# Patient Record
Sex: MALE | Race: WHITE | Employment: OTHER | URBAN - METROPOLITAN AREA
[De-identification: names, ages, dates, MRNs, and addresses within clinical notes are randomized per-mention and may not be internally consistent; named-entity substitution may affect disease eponyms.]

---

## 2017-01-17 PROBLEM — T82.118A BIVENTRICULAR AICD MALFUNCTION: Status: ACTIVE | Noted: 2017-01-17

## 2017-03-02 PROBLEM — R26.89 MULTIFACTORIAL GAIT DISORDER: Status: ACTIVE | Noted: 2017-03-02

## 2018-01-24 PROBLEM — R49.8 HYPOPHONIA: Status: ACTIVE | Noted: 2018-01-24

## 2018-02-13 ENCOUNTER — HOSPITAL ENCOUNTER (OUTPATIENT)
Dept: SURGERY | Age: 73
Discharge: HOME | End: 2018-02-13
Payer: MEDICARE

## 2018-02-13 ENCOUNTER — HOSPITAL ENCOUNTER (OUTPATIENT)
Dept: PHYSICAL THERAPY | Age: 73
Discharge: HOME | End: 2018-02-13
Payer: MEDICARE

## 2018-02-13 LAB
ANION GAP SERPL CALC-SCNC: 9 MMOL/L (ref 7–16)
APPEARANCE UR: CLEAR
APTT PPP: 30.4 SEC (ref 23.2–35.3)
BACTERIA SPEC CULT: ABNORMAL
BASOPHILS # BLD: 0 K/UL (ref 0–0.2)
BASOPHILS NFR BLD: 0 % (ref 0–2)
BILIRUB UR QL: NEGATIVE
BUN SERPL-MCNC: 21 MG/DL (ref 8–23)
CALCIUM SERPL-MCNC: 9 MG/DL (ref 8.3–10.4)
CHLORIDE SERPL-SCNC: 109 MMOL/L (ref 98–107)
CO2 SERPL-SCNC: 28 MMOL/L (ref 21–32)
COLOR UR: YELLOW
CREAT SERPL-MCNC: 1.21 MG/DL (ref 0.8–1.5)
DIFFERENTIAL METHOD BLD: ABNORMAL
EOSINOPHIL # BLD: 0.3 K/UL (ref 0–0.8)
EOSINOPHIL NFR BLD: 3 % (ref 0.5–7.8)
ERYTHROCYTE [DISTWIDTH] IN BLOOD BY AUTOMATED COUNT: 12.9 % (ref 11.9–14.6)
GLUCOSE SERPL-MCNC: 79 MG/DL (ref 65–100)
GLUCOSE UR STRIP.AUTO-MCNC: NEGATIVE MG/DL
HCT VFR BLD AUTO: 45.6 % (ref 41.1–50.3)
HGB BLD-MCNC: 14.4 G/DL (ref 13.6–17.2)
HGB UR QL STRIP: NEGATIVE
IMM GRANULOCYTES # BLD: 0 K/UL (ref 0–0.5)
IMM GRANULOCYTES NFR BLD AUTO: 0 % (ref 0–5)
INR PPP: 1.1
KETONES UR QL STRIP.AUTO: NEGATIVE MG/DL
LEUKOCYTE ESTERASE UR QL STRIP.AUTO: NEGATIVE
LYMPHOCYTES # BLD: 1.7 K/UL (ref 0.5–4.6)
LYMPHOCYTES NFR BLD: 18 % (ref 13–44)
MCH RBC QN AUTO: 30.8 PG (ref 26.1–32.9)
MCHC RBC AUTO-ENTMCNC: 31.6 G/DL (ref 31.4–35)
MCV RBC AUTO: 97.6 FL (ref 79.6–97.8)
MONOCYTES # BLD: 0.7 K/UL (ref 0.1–1.3)
MONOCYTES NFR BLD: 7 % (ref 4–12)
NEUTS SEG # BLD: 6.7 K/UL (ref 1.7–8.2)
NEUTS SEG NFR BLD: 72 % (ref 43–78)
NITRITE UR QL STRIP.AUTO: NEGATIVE
PH UR STRIP: 6 [PH] (ref 5–9)
PLATELET # BLD AUTO: 226 K/UL (ref 150–450)
PMV BLD AUTO: 10 FL (ref 10.8–14.1)
POTASSIUM SERPL-SCNC: 4.2 MMOL/L (ref 3.5–5.1)
PROT UR STRIP-MCNC: NEGATIVE MG/DL
PROTHROMBIN TIME: 13.9 SEC (ref 11.5–14.5)
RBC # BLD AUTO: 4.67 M/UL (ref 4.23–5.67)
SERVICE CMNT-IMP: ABNORMAL
SODIUM SERPL-SCNC: 146 MMOL/L (ref 136–145)
SP GR UR REFRACTOMETRY: 1.02 (ref 1–1.02)
UROBILINOGEN UR QL STRIP.AUTO: 0.2 EU/DL (ref 0.2–1)
WBC # BLD AUTO: 9.4 K/UL (ref 4.3–11.1)

## 2018-02-13 PROCEDURE — 81003 URINALYSIS AUTO W/O SCOPE: CPT | Performed by: ORTHOPAEDIC SURGERY

## 2018-02-13 PROCEDURE — 85025 COMPLETE CBC W/AUTO DIFF WBC: CPT | Performed by: ORTHOPAEDIC SURGERY

## 2018-02-13 PROCEDURE — G8979 MOBILITY GOAL STATUS: HCPCS

## 2018-02-13 PROCEDURE — G8980 MOBILITY D/C STATUS: HCPCS

## 2018-02-13 PROCEDURE — 85610 PROTHROMBIN TIME: CPT | Performed by: ORTHOPAEDIC SURGERY

## 2018-02-13 PROCEDURE — 36415 COLL VENOUS BLD VENIPUNCTURE: CPT | Performed by: ORTHOPAEDIC SURGERY

## 2018-02-13 PROCEDURE — 80048 BASIC METABOLIC PNL TOTAL CA: CPT | Performed by: ORTHOPAEDIC SURGERY

## 2018-02-13 PROCEDURE — 87641 MR-STAPH DNA AMP PROBE: CPT | Performed by: ORTHOPAEDIC SURGERY

## 2018-02-13 PROCEDURE — 97162 PT EVAL MOD COMPLEX 30 MIN: CPT

## 2018-02-13 PROCEDURE — 77030027138 HC INCENT SPIROMETER -A

## 2018-02-13 PROCEDURE — 85730 THROMBOPLASTIN TIME PARTIAL: CPT | Performed by: ORTHOPAEDIC SURGERY

## 2018-02-13 PROCEDURE — G8978 MOBILITY CURRENT STATUS: HCPCS

## 2018-02-13 RX ORDER — CHOLECALCIFEROL (VITAMIN D3) 125 MCG
100 CAPSULE ORAL DAILY
COMMUNITY
End: 2018-03-29

## 2018-02-13 RX ORDER — BISMUTH SUBSALICYLATE 262 MG
1 TABLET,CHEWABLE ORAL DAILY
COMMUNITY
End: 2018-03-01

## 2018-02-13 NOTE — PERIOP NOTES
Patient verified name, , and surgery as listed in [facility]. Type 3 surgery, PAT joint assessment complete. Labs per surgeon: cbc, cmp, ua, pt/inr, ptt, mrsa/mssa swab, T&S DOS; NA results to be reviewed by anesthesia. All other lab results within anesthesia limits. Labs dated 18 routed via [address] [facility] to patients PCP, Dr. Jo Jesus, per Dr. Lionel Fortune' request.    [facility] per anesthesia protocol: All required lab work included in surgeon's orders. EKG: completed 17; within anesthesia limits. Previous cardiology note (17), Pacemaker Interrogation (18), Echo (10/24/17), Telephone encounter with surgical clearance (18), and neurology note (18) placed on chart for anesthesia reference. Anesthesia to review echo (10/24/17) due to EF < 50%    LISA Flowers made aware of NA results and presence of pea sized lesion located on right side of patient's nose due to recent diagnosis of basal cell carcinoma. No in-person assessment needed, no new orders received. Hibiclens and instructions to return bottle on DOS given per hospital policy. Nasal Swab collected per MD order and instructions for Mupirocin nasal ointment if required. Patient provided with handouts including Guide to Surgery, Pain Management, Hand Hygiene, Blood Transfusion Education, and [place] Brochure. Patient answered medical/surgical history questions at their best of ability. All prior to admission medications documented in [facility]. Original medication prescription bottle NOT visualized during patient appointment. Patient instructed to hold all vitamins 7 days prior to surgery and NSAIDS 5 days prior to surgery. Medications to be held: all vitamins/supplements/herbals.      Patient instructed to continue previous medications as prescribed prior to surgery and to take the following medications the day of surgery according to anesthesia guidelines with a small sip of water: Carvedilol, Tylenol if needed, Spiriva, Simvastatin, Carbidopa-levodopa, ProAir, Advair, 81 mg ASA, Vesicare. Patient instructed to bring inhalers, Carbidopa-Levodopa ER, bottle of Hibiclens, and incentive spirometer to the hospital on the DOS. Patient teach back successful and patient demonstrates knowledge of instruction.

## 2018-02-13 NOTE — PERIOP NOTES
Recent Results (from the past 12 hour(s))   CBC WITH AUTOMATED DIFF    Collection Time: 02/13/18 10:25 AM   Result Value Ref Range    WBC 9.4 4.3 - 11.1 K/uL    RBC 4.67 4.23 - 5.67 M/uL    HGB 14.4 13.6 - 17.2 g/dL    HCT 45.6 41.1 - 50.3 %    MCV 97.6 79.6 - 97.8 FL    MCH 30.8 26.1 - 32.9 PG    MCHC 31.6 31.4 - 35.0 g/dL    RDW 12.9 11.9 - 14.6 %    PLATELET 799 835 - 464 K/uL    MPV 10.0 (L) 10.8 - 14.1 FL    DF AUTOMATED      NEUTROPHILS 72 43 - 78 %    LYMPHOCYTES 18 13 - 44 %    MONOCYTES 7 4.0 - 12.0 %    EOSINOPHILS 3 0.5 - 7.8 %    BASOPHILS 0 0.0 - 2.0 %    IMMATURE GRANULOCYTES 0 0.0 - 5.0 %    ABS. NEUTROPHILS 6.7 1.7 - 8.2 K/UL    ABS. LYMPHOCYTES 1.7 0.5 - 4.6 K/UL    ABS. MONOCYTES 0.7 0.1 - 1.3 K/UL    ABS. EOSINOPHILS 0.3 0.0 - 0.8 K/UL    ABS. BASOPHILS 0.0 0.0 - 0.2 K/UL    ABS. IMM.  GRANS. 0.0 0.0 - 0.5 K/UL   PROTHROMBIN TIME + INR    Collection Time: 02/13/18 10:25 AM   Result Value Ref Range    Prothrombin time 13.9 11.5 - 14.5 sec    INR 1.1     PTT    Collection Time: 02/13/18 10:25 AM   Result Value Ref Range    aPTT 30.4 23.2 - 20.9 SEC   METABOLIC PANEL, BASIC    Collection Time: 02/13/18 10:25 AM   Result Value Ref Range    Sodium 146 (H) 136 - 145 mmol/L    Potassium 4.2 3.5 - 5.1 mmol/L    Chloride 109 (H) 98 - 107 mmol/L    CO2 28 21 - 32 mmol/L    Anion gap 9 7 - 16 mmol/L    Glucose 79 65 - 100 mg/dL    BUN 21 8 - 23 MG/DL    Creatinine 1.21 0.8 - 1.5 MG/DL    GFR est AA >60 >60 ml/min/1.73m2    GFR est non-AA >60 >60 ml/min/1.73m2    Calcium 9.0 8.3 - 10.4 MG/DL   URINALYSIS W/ RFLX MICROSCOPIC    Collection Time: 02/13/18 10:25 AM   Result Value Ref Range    Color YELLOW      Appearance CLEAR      Specific gravity 1.019 1.001 - 1.023      pH (UA) 6.0 5.0 - 9.0      Protein NEGATIVE  NEG mg/dL    Glucose NEGATIVE  mg/dL    Ketone NEGATIVE  NEG mg/dL    Bilirubin NEGATIVE  NEG      Blood NEGATIVE  NEG      Urobilinogen 0.2 0.2 - 1.0 EU/dL    Nitrites NEGATIVE  NEG      Leukocyte Esterase NEGATIVE  NEG

## 2018-02-13 NOTE — ADVANCED PRACTICE NURSE
Total Joint Surgery Preoperative Chart Review      Patient ID:  Qasim Leach  [id number]  71 y.o.  1945  Surgeon: Dr. Sidney Sol  Date of Surgery: 2/27/2018  Procedure: Total Left Hip Arthroplasty  Primary Care Physician: Oanh Ruiz [phone]  Specialty Physician(s):      Subjective:   Qasim Leach. is a 71 y.o. WHITE OR  male who presents for preoperative evaluation for Total Left Hip arthroplasty. This is a preoperative chart review note based on data collected by the nurse at the surgical Pre-Assessment visit. Past Medical History:   Diagnosis Date    Acquired spondylolisthesis 6/16/2015    Actinic keratosis 8/9/2016    Arthritis     generalized OA, managed with OTC medication     Back pain 8/9/2016    Basal cell carcinoma dx 2/2018    small pea-sized lesion on right side of nose    BPH (benign prostatic hypertrophy) 8/9/2016    Cardiomyopathy ([facility] [address] 08/02/2016    Follow[facility] by Dr. R[facility]tr[facility] at [facility]     Chronic obstructive pulmonary disease [facility])     managed with inhalers-Followed by PCP, Dr. Marlena Juárez Chronic renal insufficiency 08/09/2016    patient denies, states Creatinine elevated one time.      Controlled diabetes mellitus type II without complication ([address] 72/30/5914    Patient denies-states BS was elevated one time-patient does not take any medications for diabetes     COPD exacerbation ([facility] [address] 8/9/2016    Coronary artery disease involving native coronary artery of native heart without angina pectoris 8/2/2016    Dermatitis, seborrheic 8/9/2016    ZAFAR (dyspnea on exertion) 05/31/2016    due to COPD     Dyspnea     ED (erectile dysfunction) 8/9/2016    Elevated PSA 8/9/2016    Emphysemal COPD     Encounter for long-term (current) use of medications 8/9/2016    Enlarged prostate     managed with medication     Fatigue 8/9/2016    Finger swelling 8/9/2016    History of kidney stones     several with surgical interventions    Hyperlipidemia     Hypertension     managed with medication     Hypophonia with hoarseness 8/8/2016    Kidney problem     Kidney stone 06/2015    states he presently has 3 kidney stones monitored by Urology    Knee effusion 8/9/2016    Low vitamin B12 level 8/9/2016    Neurogenic bladder 8/9/2016    Obesity (BMI 30-39. 9)     BMI 34.5    Osteoarthritis 8/9/2016    Parkinson disease ([address]     Followed by Dr. Lucius Haddad with medication     Primary osteoarthritis of knee 8/9/2016    Prostatitis 8/9/2016    Pseudogout of hand 8/9/2016    Rosacea 8/9/2016    S/P placement of cardiac pacemaker 10/31/2016    Sciatica 8/9/2016    Seborrhea 8/9/2016    Slurred speech 08/09/2016    per patient \"mostly in the evenings\"     SOB (shortness of breath) 8/9/2016    Spinal stenosis, lumbar region, with neurogenic claudication 6/16/2015    Syncope, near 8/9/2016    Tremor 8/8/2016    Urinary frequency 08/09/2016    controlled with medication     Urinary retention 8/9/2016    Urinary urgency 8/9/2016    Wears dentures     upper and lower    Wears glasses       Past Surgical History:   Procedure Laterality Date    HX ACL RECONSTRUCTION Right     HX APPENDECTOMY      age 22    HX CYSTOSTOMY      HX KNEE ARTHROSCOPY Right     HX LITHOTRIPSY  1998    x 2    HX LUMBAR LAMINECTOMY  06/2015    Laminectomy with fusion; lower back; with pins    HX PACEMAKER  10/31/2016    HX UROLOGICAL Right     open removal of kidney stones     Family History   Problem Relation Age of Onset    Heart Disease Mother     Lung Disease Mother     COPD Mother     Stroke Father     Heart Attack Father 52    Heart Disease Father     Hypertension Other     Diabetes Other      paternal uncle    Heart Attack Other 45     son    Thyroid Disease Sister     Heart Disease Paternal Uncle     Other Paternal Grandmother      Aneurysm    Diabetes Son     Heart Attack Son 45      Social History   Substance Use Topics    Smoking status: Former Smoker     Packs/day: 1.50     Years: 45.00     Quit date: 7/12/2009    Smokeless tobacco: Never Used      Comment: stopped 2007    Alcohol use No       Prior to Admission medications    Medication Sig Start Date End Date Taking? Authorizing Provider   co-enzyme Q-10 (COQ-10) 100 mg capsule Take 100 mg by mouth daily. Yes Historical Provider   multivitamin (ONE A DAY) tablet Take 1 Tab by mouth daily. Yes Historical Provider   carbidopa-levodopa ER (SINEMET CR)  mg per tablet TAKE 2 TABLETs AT BEDTIME  Patient taking differently: TAKE 2 TABLETs AT BEDTIME    Non-formulary. Instructed to bring to the hospital on the DOS, in the original bottle, and give to nurse. 1/24/18  Yes Salvatore Barreto MD   carbidopa-levodopa (SINEMET-25/250)  mg per tablet Take 1 Tab by mouth four (4) times daily. 1/24/18  Yes Salvatore Barreto MD   sacubitril-valsartan (ENTRESTO) 24 mg/26 mg tablet Take 1 Tab by mouth two (2) times a day. 12/1/17  Yes Stephen Mccarthy MD   tiotropium (SPIRIVA WITH HANDIHALER) 18 mcg inhalation capsule Take 1 Cap by inhalation every morning. 11/27/17  Yes Artis Kapoor MD   fluticasone-salmeterol (ADVAIR) 250-50 mcg/dose diskus inhaler Take 1 Puff by inhalation daily. Patient taking differently: Take 1 Puff by inhalation daily. Instructed to bring to the hospital on the DOS per anesthesia protocol. 11/27/17  Yes Artis Kapoor MD   potassium chloride SR (KLOR-CON 10) 10 mEq tablet Take 2 Tabs by mouth daily. 7/31/17  Yes Artis Kapoor MD   carvedilol (COREG) 6.25 mg tablet Take 1 Tab by mouth two (2) times daily (with meals). 7/21/17  Yes Stephen Mccarthy MD   VESICARE 5 mg tablet TAKE 1 TABLET DAILY 6/25/17  Yes Giuseppe Kaur DO   simvastatin (ZOCOR) 80 mg tablet Take 1 Tab by mouth daily. Patient taking differently: Take 40 mg by mouth daily.  5/17/17  Yes Artis Kapoor MD   acetaminophen (TYLENOL) 650 mg CR tablet Take 650 mg by mouth every six (6) hours as needed for Pain. Yes Historical Provider   albuterol (PROAIR HFA) 90 mcg/actuation inhaler Take 2 Puffs by inhalation every four (4) hours as needed for Wheezing. Instructed to bring to the hospital on the DOS per anesthesia protocol. Yes Historical Provider   aspirin delayed-release 81 mg tablet Take 81 mg by mouth daily. Indications: 1qd   Yes Historical Provider   OTHER Please resume PT specifically for gait and balance. Avoid aggravating his lower back with frequent sitting and standing. His gait is still slow with decreased stride and dragging of his leg leg and decreased arm swing. Posture is leaning forward. 11/8/16   Salvatore Barreto MD     Allergies   Allergen Reactions    Polyurethane-39 Rash     Harsh soaps           Objective:     Physical Exam:   Patient Vitals for the past 24 hrs:   Temp Pulse Resp BP SpO2   02/13/18 1215 96.6 °F (35.9 °C) 78 18 145/81 95 %       ECG:    EKG Results     Procedure 720 Value Units Date/Time    EKG, 12 LEAD, INITIAL [[id number]]     Order Status:  Sent           Data Review:   Labs:   Recent Results (from the past 24 hour(s))   CBC WITH AUTOMATED DIFF    Collection Time: 02/13/18 10:25 AM   Result Value Ref Range    WBC 9.4 4.3 - 11.1 K/uL    RBC 4.67 4.23 - 5.67 M/uL    HGB 14.4 13.6 - 17.2 g/dL    HCT 45.6 41.1 - 50.3 %    MCV 97.6 79.6 - 97.8 FL    MCH 30.8 26.1 - 32.9 PG    MCHC 31.6 31.4 - 35.0 g/dL    RDW 12.9 11.9 - 14.6 %    PLATELET 106 451 - 279 K/uL    MPV 10.0 (L) 10.8 - 14.1 FL    DF AUTOMATED      NEUTROPHILS 72 43 - 78 %    LYMPHOCYTES 18 13 - 44 %    MONOCYTES 7 4.0 - 12.0 %    EOSINOPHILS 3 0.5 - 7.8 %    BASOPHILS 0 0.0 - 2.0 %    IMMATURE GRANULOCYTES 0 0.0 - 5.0 %    ABS. NEUTROPHILS 6.7 1.7 - 8.2 K/UL    ABS. LYMPHOCYTES 1.7 0.5 - 4.6 K/UL    ABS. MONOCYTES 0.7 0.1 - 1.3 K/UL    ABS. EOSINOPHILS 0.3 0.0 - 0.8 K/UL    ABS. BASOPHILS 0.0 0.0 - 0.2 K/UL    ABS. IMM.  GRANS. 0.0 0.0 - 0.5 K/UL   PROTHROMBIN TIME + INR Collection Time: 02/13/18 10:25 AM   Result Value Ref Range    Prothrombin time 13.9 11.5 - 14.5 sec    INR 1.1     PTT    Collection Time: 02/13/18 10:25 AM   Result Value Ref Range    aPTT 30.4 23.2 - 89.0 SEC   METABOLIC PANEL, BASIC    Collection Time: 02/13/18 10:25 AM   Result Value Ref Range    Sodium 146 (H) 136 - 145 mmol/L    Potassium 4.2 3.5 - 5.1 mmol/L    Chloride 109 (H) 98 - 107 mmol/L    CO2 28 21 - 32 mmol/L    Anion gap 9 7 - 16 mmol/L    Glucose 79 65 - 100 mg/dL    BUN 21 8 - 23 MG/DL    Creatinine 1.21 0.8 - 1.5 MG/DL    GFR est AA >60 >60 ml/min/1.73m2    GFR est non-AA >60 >60 ml/min/1.73m2    Calcium 9.0 8.3 - 10.4 MG/DL   URINALYSIS W/ RFLX MICROSCOPIC    Collection Time: 02/13/18 10:25 AM   Result Value Ref Range    Color YELLOW      Appearance CLEAR      Specific gravity 1.019 1.001 - 1.023      pH (UA) 6.0 5.0 - 9.0      Protein NEGATIVE  NEG mg/dL    Glucose NEGATIVE  mg/dL    Ketone NEGATIVE  NEG mg/dL    Bilirubin NEGATIVE  NEG      Blood NEGATIVE  NEG      Urobilinogen 0.2 0.2 - 1.0 EU/dL    Nitrites NEGATIVE  NEG      Leukocyte Esterase NEGATIVE  NEG           Problem List:  )  Patient Active Problem List   Diagnosis Code    Obesity (BMI 30-39. 9) E66.9    Hypertension I10    Chronic obstructive pulmonary disease (HCC) J44.9    Enlarged prostate N40.0    Spinal stenosis, lumbar region, with neurogenic claudication M48.062    Acquired spondylolisthesis M43.10    Parkinson disease ([address] G20    Hyperlipidemia E78.5    ZAFAR (dyspnea on exertion) R06.09    Arthritis M19.90    Cardiomyopathy ([address] I42.9    Coronary artery disease involving native coronary artery of native heart without angina pectoris I25.10    Tremor R25.1    Hoarseness of voice R49.0    BPH (benign prostatic hypertrophy) N40.0    Osteoarthritis M19.90    Dermatitis, seborrheic L21.9    Encounter for long-term (current) use of medications Z79.899    Controlled diabetes mellitus type II without complication ([address] I44.5    Urinary retention R33.9    Primary osteoarthritis of knee M17.10    Chronic renal insufficiency N18.9    ED (erectile dysfunction) N52.9    Rosacea L71.9    Neurogenic bladder N31.9    Sciatica M54.30    COPD exacerbation (HCC) J44.1    Postural imbalance R29.3    Abnormal posture R29.3    Biventricular AICD malfunction T82.118A    Multifactorial gait disorder R26.89    Hypophonia R49.8     Small lesion on right side of nose; basal cell carcinoma to be removed next month. Total Joint Surgery Pre-Assessment Recommendations:         Significant pulmonary history. Albuterol every 6 hours as need during hospitalization. Recommend continuous saturation monitoring hours of sleep, during hospitalization.        Signed By: Dlyan Perez NP-C    February 13, 2018

## 2018-02-13 NOTE — PROGRESS NOTES
Ramy Ferris. : 1945(72 y.o.) [address] at [facility]  [address]  [phone]       Physical Therapy Prehab Plan of Treatment and Evaluation Summary:2018    ICD-10: Treatment Diagnosis:   · Pain in left hip (M25.552)  · Stiffness of Left Hip, Not elsewhere classified (M25.652)  Precautions/Allergies:   Review of patient's allergies indicates no known allergies. MEDICAL/REFERRING DIAGNOSIS:  Unilateral primary osteoarthritis, left hip [M16.12]  REFERRING PHYSICIAN: Maya Medina,*  DATE OF SURGERY: 18   Assessment:   Comments:  He is here with his spouse today. He has Parkinson's dx and has had back surgery. After HIS octavio , HE PLANS ON GOING HOME WITH HIS WIFE'S HELP. PROBLEM LIST (Impacting functional limitations):  Mr. Dexter Winston presents with the following left lower extremity(s) problems:  1. Strength  2. Range of Motion  3. Home Exercise Program  4. Pain   INTERVENTIONS PLANNED:  1. Home Exercise Program  2. Educational Discussion     TREATMENT PLAN: Effective Dates: 2018 TO 2018. Frequency/Duration: Patient to continue to perform home exercise program at least twice per day up until his surgery. GOALS: (Goals have been discussed and agreed upon with patient.)  Discharge Goals: Time Frame: 1 Day  1. Patient will demonstrate independence with a home exercise program designed to increase strength, range of motion and pain control to minimize functional deficits and optimize patient for total joint replacement. Rehabilitation Potential For Stated Goals: Good  Regarding Erasto Crowder Jr.'s therapy, I certify that the treatment plan above will be carried out by a therapist or under their direction.   Thank you for this referral,  Catrachita Terrell, PT               HISTORY:   Present Symptoms:  Pain Intensity 1: 9 (at its worst)  Pain Location 1: Hip, Buttocks, Groin  Pain Orientation 1: Anterior, Medial, Lateral, Posterior, Left   History of Present Injury/Illness (Reason for Referral):  Medical/Referring Diagnosis: Unilateral primary osteoarthritis, left hip [M16.12]   Past Medical History/Comorbidities:   Mr. Dorcas Blum  has a past medical history of Acquired spondylolisthesis (6/16/2015); Actinic keratosis (8/9/2016); Arthritis; Back pain (8/9/2016); BPH (benign prostatic hypertrophy) (8/9/2016); Cardiomyopathy ([facility] [address] (8/2/2016); Chronic obstructive pulmonary disease ([facility] [address] Chronic renal insufficiency (8/9/2016); Controlled diabetes mellitus type II without complication ([facility] [address] (1/5/0699); [facility] (8/9/2016); Coronary artery disease involving native coronary artery of native heart without angina pectoris (8/2/2016); Dermatitis, seborrheic (8/9/2016); ZAFAR (dyspnea on exertion) (5/31/2016); Dyspnea; ED (erectile dysfunction) (8/9/2016); Elevated PSA (8/9/2016); Emphysemal COPD; Encounter for long-term (current) use of medications (8/9/2016); Enlarged prostate; Fatigue (8/9/2016); Finger swelling (8/9/2016); History of kidney stones; Hyperlipidemia; Hypertension; Hypophonia with hoarseness (8/8/2016); Ill-defined condition; Kidney problem; Kidney stone (06/2015); Knee effusion (8/9/2016); Low vitamin B12 level (8/9/2016); Neurogenic bladder (8/9/2016); Obesity (BMI 30-39.9); Osteoarthritis (8/9/2016); Parkinson disease ([facility] Primary osteoarthritis of knee (8/9/2016); Prostatitis (8/9/2016); Pseudogout of hand (8/9/2016); Rosacea (8/9/2016); Sciatica (8/9/2016); Seborrhea (8/9/2016); Slurred speech (8/9/2016); SOB (shortness of breath) (8/9/2016); Spinal stenosis, lumbar region, with neurogenic claudication (6/16/2015); Syncope, near (8/9/2016); Tremor (8/8/2016); Urinary frequency (8/9/2016); Urinary retention (8/9/2016); Urinary urgency (8/9/2016); Wears dentures; and Wears glasses.   Mr. Toscano First  has a past surgical history that includes hx lithotripsy; hx cystostomy; hx urological (Right); hx appendectomy; hx knee arthroscopy (Right); hx back surgery; hx acl reconstruction; and hx pacemaker.   Social History/Living Environment:   Home Environment: Private residence  # Steps to Enter: 1  Hand Rails : Right  One/Two Story Residence: Two story, live on 1st floor  # of Interior Steps: 21  Interior Rails: Left  Living Alone: No  Support Systems: Spouse/Significant Other/Partner  Patient Expects to be Discharged to[de-identified] Private residence  Current DME Used/Available at Home: [employer], [address], straight, [address], [address] [facility] [employer] chair  Tub or Shower Type: Tub/Shower combination  Work/Activity:  retired  Dominant Side:  RIGHT  Current Medications:  See [address] note   Number of Personal Factors/Comorbidities that affect the Plan of Care: 3+: HIGH COMPLEXITY   EXAMINATION:   ADLs (Current Functional Status):  Ambulation:  [x] Independent  [] Walk Indoors Only  [x] Walk Outdoors  [x] Use Assistive Device  [] Use Wheelchair Only Dressing:  [x] [address] from Someone for:  [] Sock/Shoes  [] Pants  [] Everything   Bathing/Showering:   [x] Independent  [] Requires Assistance from Someone  [] [address]:  [] Routine house and yard work  [x] Light Housework Only  [] None   Observation/Orthostatic Postural Assessment:       ROM/Flexibility:   AROM: Within functional limits (R LE)                LLE AROM  L Hip Flexion: 100  L Hip ABduction: 20          Strength:   Strength: Generally decreased, functional (R LE 4/5)       LLE Strength  L Hip Flexion: 3+  L Hip ABduction: 3+  L Knee Extension: 4  L Ankle Dorsiflexion: 4          Functional Mobility:    Sensation: Intact (R LE)    Stand to Sit: Independent  Sit to Stand: Independent  Stand Pivot Transfers: Modified independent  Distance (ft): 1000 Feet (ft)  Ambulation - Level of Assistance: Modified independent  Assistive Device: Walker, rollator  Speed/Dana: Pace decreased (<100 feet/min)  Gait Abnormalities: Antalgic          Balance:    Sitting: Intact  Standing: With support   Body Structures Involved:  1. Bones  2. Joints  3. Muscles Body Functions Affected:  1. Neuromusculoskeletal  2. Movement Related Activities and Participation Affected:  1. Mobility   Number of elements that affect the Plan of Care: 4+: HIGH COMPLEXITY   CLINICAL PRESENTATION:   Presentation: Evolving clinical presentation with changing clinical characteristics: MODERATE COMPLEXITY   CLINICAL DECISION MAKING:   Outcome Measure: Tool Used: Lower Extremity Functional Scale (LEFS)  Score:  Initial: 25/80 Most Recent: X/80 (Date: -- )   Interpretation of Score: 20 questions each scored on a 5 point scale with 0 representing \"extreme difficulty or unable to perform\" and 4 representing \"no difficulty\". The lower the score, the greater the functional disability. 80/80 represents no disability. Minimal detectable change is 9 points. Score 80 79-65 64-49 48-33 32-17 16-1 0   Modifier CH CI CJ CK CL CM CN     ? Mobility - Walking and Moving Around:     - CURRENT STATUS: CL - 60%-79% impaired, limited or restricted    - GOAL STATUS: CL - 60%-79% impaired, limited or restricted    - D/C STATUS:  CL - 60%-79% impaired, limited or restricted  Medical Necessity:   · Mr. Dorcas Blum is expected to optimize his lower extremity strength and ROM in preparation for joint replacement surgery. Reason for Services/Other Comments:  · Achieve baseline assesment of musculoskeletal system, functional mobility and home environment. , educate in PT HEP in preparation for surgery, educate in hospital plan of care. Use of outcome tool(s) and clinical judgement create a POC that gives a: Questionable prediction of patient's progress: MODERATE COMPLEXITY   TREATMENT:   Treatment/Session Assessment:  Patient was instructed in PT- HEP to increase strength and ROM in LEs. Answered all questions.   · Post session pain:  1  · Compliance with Program/Exercises: compliant most of the time.   Total Treatment Duration:  PT Patient Time In/Time Out  Time In: 1015  Time Out: [address], [place]

## 2018-02-13 NOTE — PERIOP NOTES
Berta Espinal, surgery scheduler for [facility], made aware that patient has a pacemaker.

## 2018-02-13 NOTE — PERIOP NOTES
Dr. Ethel Le (anesthesia) reviewed cardiac records, including Echo report, and NA results; \"okay\" to proceed with procedure, no new orders received.

## 2018-02-14 VITALS
BODY MASS INDEX: 31.83 KG/M2 | WEIGHT: 235 LBS | HEART RATE: 78 BPM | RESPIRATION RATE: 18 BRPM | HEIGHT: 72 IN | DIASTOLIC BLOOD PRESSURE: 81 MMHG | SYSTOLIC BLOOD PRESSURE: 145 MMHG | TEMPERATURE: 96.6 F | OXYGEN SATURATION: 95 %

## 2018-02-14 RX ORDER — MUPIROCIN 20 MG/G
OINTMENT TOPICAL 2 TIMES DAILY
COMMUNITY
Start: 2018-02-22 | End: 2018-02-27

## 2018-02-14 NOTE — PERIOP NOTES
Nasal swab results reviewed:   Culture result:  (A)    Final   MRSA target DNA not detected, SA target DNA detected.   A MRSA negative, SA positive test result does not preclude MRSA nasal colonization         Called pt and informed of results    Instructed:Called Mupirocin Rx to [facility].  Pt to apply to ea nostril intranasally twice a day for 5 days beginning :2/22/18

## 2018-02-14 NOTE — PROGRESS NOTES
02/13/18 1030   Oxygen Therapy   O2 Sat (%) 98 %   Pulse via Oximetry 81 beats per minute   O2 Device Room air   Pre-Treatment   Breath Sounds Bilateral Clear   Pre FEV1 (liters) 3.4 liters   % Predicted 95   Incentive Spirometry Treatment   Actual Volume (ml) 2750 ml   Sleep Disorder Breathing Screen:     Patient reports symptoms of:   · Snoring   · Excessive daytime sleepiness with napping  · Observed apnea  · TIRED NAPS DAILY FOR 1-3 HOURS  · STOP-BANG __6__  · Liebenthal Score __10__  · Height_6'0\"____ Weight_235 lbs____  · Sacs 42  · Menard 4     Refer patient for sleep study based on above assessment. Initial respiratory Assessment completed with pt. Pt was interviewed and evaluated in [facility] [place] prior to surgery. Patient ID:  Nicole Gutierrez  [id number]  71 y.o.  1945  Surgeon: Dr. Kraig Luz  Date of Surgery: 2/27/2018  Procedure: Total Left Hip Arthroplasty  Primary Care Physician: Ayah Hernandez [phone]  Specialists:                                  Pt instructed in the use of Incentive Spirometry. Pt instructed to bring Incentive Spirometer back on date of surgery & to start using Is upon return to pt room.     Pt taught proper cough technique    History of smoking:                                    FORMER                        1-1.5 ppd for 35 years  Quit date:          10 years ago  Secondhand smoke:PARENTS      Past procedures with Oxygen desaturation:NONE    Past Medical History:   Diagnosis Date    Acquired spondylolisthesis 6/16/2015    Actinic keratosis 8/9/2016    Arthritis     generalized OA, managed with OTC medication     Back pain 8/9/2016    Basal cell carcinoma dx 2/2018    small pea-sized lesion on right side of nose    BPH (benign prostatic hypertrophy) 8/9/2016    Cardiomyopathy ([address] 08/02/2016    Follow[facility] by [facility]. Emili Lee at [facility]     Chronic obstructive pulmonary disease [facility])     managed with inhalers-Followed by PCP, Dr. Addison Parker Chronic renal insufficiency 08/09/2016    patient denies, states Creatinine elevated one time.  Controlled diabetes mellitus type II without complication ([facility] 92/56/8169    Patient denies-states BS was elevated one time-patient does not take any medications for diabetes     COPD exacerbation ([facility] 8/9/2016    Coronary artery disease involving native coronary artery of native heart without angina pectoris 8/2/2016    Dermatitis, seborrheic 8/9/2016    ZAFAR (dyspnea on exertion) 05/31/2016    due to COPD     Dyspnea     ED (erectile dysfunction) 8/9/2016    Elevated PSA 8/9/2016    Emphysemal COPD     Encounter for long-term (current) use of medications 8/9/2016    Enlarged prostate     managed with medication     Fatigue 8/9/2016    Finger swelling 8/9/2016    History of kidney stones     several with surgical interventions    Hyperlipidemia     Hypertension     managed with medication     Hypophonia with hoarseness 8/8/2016    Kidney problem     Kidney stone 06/2015    states he presently has 3 kidney stones monitored by Urology    Knee effusion 8/9/2016    Low vitamin B12 level 8/9/2016    Neurogenic bladder 8/9/2016    Obesity (BMI 30-39. 9)     BMI 34.5    Osteoarthritis 8/9/2016    Parkinson disease ([facility]     Followed by Dr. Marquez Morillo with medication     Primary osteoarthritis of knee 8/9/2016    Prostatitis 8/9/2016    Pseudogout of hand 8/9/2016    Rosacea 8/9/2016    S/P placement of cardiac pacemaker 10/31/2016    Sciatica 8/9/2016    Seborrhea 8/9/2016    Slurred speech 08/09/2016    per patient \"mostly in the evenings\"     SOB (shortness of breath) 8/9/2016    Spinal stenosis, lumbar region, with neurogenic claudication 6/16/2015    Syncope, near 8/9/2016    Tremor 8/8/2016    Urinary frequency 08/09/2016    controlled with medication     Urinary retention 8/9/2016    Urinary urgency 8/9/2016    Wears dentures     upper and lower    Wears glasses Respiratory history: COPD                                 SOB  ON EXERTION                                    Respiratory meds:  SPIRIVA,ADVAIR PROAIR                                       FAMILY PRESENT:            SPOUSE,                                                                                           PAST SLEEP STUDY:                        NO  HX OF ANTHONY:                                       NO                                   PT'S 2 BROTHERS HAD DIAGNOSED ANTHONY  ANTHONY assessment:                                               SLEEPS ON SIDE                                                             PHYSICAL EXAM   Body mass index is 31.87 kg/(m^2). Visit Vitals    /81 (BP 1 Location: Left arm)    Pulse 78    Temp 96.6 °F (35.9 °C)    Resp 18    Ht 6' (1.829 m)    Wt 106.6 kg (235 lb)    SpO2 95%    BMI 31.87 kg/m2     Neck circumference:   43.5   cm    Loud snoring:        YES                              Witnessed apnea or wakening gasping or choking:,             APNEA                                                                                               Awakens with headaches:                                                  DENIES    Morning or daytime tiredness/ sleepiness:                                                                                                            TIRED   Dry mouth or sore throat in morning:                YES                                                                            Menard stage:  4    SACS score:20    STOP/BAN                              CPAP:                       NONE                                     ALBUTEROL NEB Q6 PRN          SPACER         CONT SAT HS              Referrals:  SLEEP STUDY  Pt.  Phone Number:  [phone]

## 2018-02-19 NOTE — H&P
[id number] [facility]  History and Physical Exam    Patient ID:  Fermin Flores  [id number]    37 y.o.  1945    Today: February 19, 2018    Vitals Signs: Reviewed as noted in medical record. Allergies: Allergies   Allergen Reactions    Polyurethane-39 Rash     Harsh soaps        CC: Left hip pain    HPI:  Pt complains of left hip pain and with difficulty ambulating. Relevant PMH:   Past Medical History:   Diagnosis Date    Acquired spondylolisthesis 6/16/2015    Actinic keratosis 8/9/2016    Arthritis     generalized OA, managed with OTC medication     Back pain 8/9/2016    Basal cell carcinoma dx 2/2018    small pea-sized lesion on right side of nose    BPH (benign prostatic hypertrophy) 8/9/2016    Cardiomyopathy ([address] 08/02/2016    [place] [facility]     Chronic obstructive pulmonary disease [facility])     managed with inhalers-Followed by PCP, Dr. Elmer Pulido Chronic renal insufficiency 08/09/2016    patient denies, states Creatinine elevated one time.      Controlled diabetes mellitus type II without complication ([address] 51/21/7666    Patient denies-states BS was elevated one time-patient does not take any medications for diabetes     COPD exacerbation ([address] 8/9/2016    Coronary artery disease involving native coronary artery of native heart without angina pectoris 8/2/2016    Dermatitis, seborrheic 8/9/2016    ZAFAR (dyspnea on exertion) 05/31/2016    due to COPD     Dyspnea     ED (erectile dysfunction) 8/9/2016    Elevated PSA 8/9/2016    Emphysemal COPD     Encounter for long-term (current) use of medications 8/9/2016    Enlarged prostate     managed with medication     Fatigue 8/9/2016    Finger swelling 8/9/2016    History of kidney stones     several with surgical interventions    Hyperlipidemia     Hypertension     managed with medication     Hypophonia with hoarseness 8/8/2016    Kidney problem     Kidney stone 06/2015 states he presently has 3 kidney stones monitored by Urology    Knee effusion 8/9/2016    Low vitamin B12 level 8/9/2016    Neurogenic bladder 8/9/2016    Obesity (BMI 30-39. 9)     BMI 34.5    Osteoarthritis 8/9/2016    Parkinson disease ([address]     Followed by Dr. Susanne Pisano with medication     Primary osteoarthritis of knee 8/9/2016    Prostatitis 8/9/2016    Pseudogout of hand 8/9/2016    Rosacea 8/9/2016    S/P placement of cardiac pacemaker 10/31/2016    Sciatica 8/9/2016    Seborrhea 8/9/2016    Slurred speech 08/09/2016    per patient \"mostly in the evenings\"     SOB (shortness of breath) 8/9/2016    Spinal stenosis, lumbar region, with neurogenic claudication 6/16/2015    Syncope, near 8/9/2016    Tremor 8/8/2016    Urinary frequency 08/09/2016    controlled with medication     Urinary retention 8/9/2016    Urinary urgency 8/9/2016    Wears dentures     upper and lower    Wears glasses        Objective:                    HEENT: NC/AT                   Lungs:  clear                   Heart:   rrr                   Abdomen: soft                   Extremities:  Pain with rom of the left hip joint    Radiographs: reveal osteoarthritis with loss of joint space and bone spurs. Assessment: Unilateral primary osteoarthritis, left hip [M16.12]    Plan:  Proceed with scheduled Procedure(s) (LRB):  LEFT HIP ARTHROPLASTY TOTAL/MEL DUAL MOBILITY/ PT HAS A PACEMAKER (Left) . The patient has failed conservative treatment including NSAIDS, and injections. Due to the amount of pain the patient is experiencing we will proceed with scheduled procedure.       Signed By: LEANA Ballard  February 19, 2018

## 2018-02-26 ENCOUNTER — ANESTHESIA EVENT (OUTPATIENT)
Dept: SURGERY | Age: 73
DRG: 470 | End: 2018-02-26
Payer: MEDICARE

## 2018-02-27 ENCOUNTER — HOSPITAL ENCOUNTER (INPATIENT)
Age: 73
LOS: 2 days | Discharge: HOME HEALTH | DRG: 470 | End: 2018-03-01
Attending: ORTHOPAEDIC SURGERY | Admitting: ORTHOPAEDIC SURGERY
Payer: MEDICARE

## 2018-02-27 ENCOUNTER — ANESTHESIA (OUTPATIENT)
Dept: SURGERY | Age: 73
DRG: 470 | End: 2018-02-27
Payer: MEDICARE

## 2018-02-27 ENCOUNTER — APPOINTMENT (OUTPATIENT)
Dept: GENERAL RADIOLOGY | Age: 73
DRG: 470 | End: 2018-02-27
Attending: PHYSICIAN ASSISTANT
Payer: MEDICARE

## 2018-02-27 DIAGNOSIS — M43.10 ACQUIRED SPONDYLOLISTHESIS: ICD-10-CM

## 2018-02-27 DIAGNOSIS — M16.12 ARTHRITIS OF LEFT HIP: Primary | ICD-10-CM

## 2018-02-27 DIAGNOSIS — R29.3 ABNORMAL POSTURE: ICD-10-CM

## 2018-02-27 DIAGNOSIS — M19.90 ARTHRITIS: ICD-10-CM

## 2018-02-27 LAB
ABO + RH BLD: NORMAL
BLOOD GROUP ANTIBODIES SERPL: NORMAL
GLUCOSE BLD STRIP.AUTO-MCNC: 90 MG/DL (ref 65–100)
HGB BLD-MCNC: 13.4 G/DL (ref 13.6–17.2)
SPECIMEN EXP DATE BLD: NORMAL

## 2018-02-27 PROCEDURE — 74011250636 HC RX REV CODE- 250/636: Performed by: ANESTHESIOLOGY

## 2018-02-27 PROCEDURE — 97165 OT EVAL LOW COMPLEX 30 MIN: CPT

## 2018-02-27 PROCEDURE — 0SRB03A REPLACEMENT OF LEFT HIP JOINT WITH CERAMIC SYNTHETIC SUBSTITUTE, UNCEMENTED, OPEN APPROACH: ICD-10-PCS | Performed by: ORTHOPAEDIC SURGERY

## 2018-02-27 PROCEDURE — 77030011640 HC PAD GRND REM COVD -A: Performed by: ORTHOPAEDIC SURGERY

## 2018-02-27 PROCEDURE — 77030007880 HC KT SPN EPDRL BBMI -B: Performed by: NURSE ANESTHETIST, CERTIFIED REGISTERED

## 2018-02-27 PROCEDURE — 77030002966 HC SUT PDS J&J -A: Performed by: ORTHOPAEDIC SURGERY

## 2018-02-27 PROCEDURE — 74011250637 HC RX REV CODE- 250/637: Performed by: PHYSICIAN ASSISTANT

## 2018-02-27 PROCEDURE — 76060000035 HC ANESTHESIA 2 TO 2.5 HR: Performed by: ORTHOPAEDIC SURGERY

## 2018-02-27 PROCEDURE — 65270000029 HC RM PRIVATE

## 2018-02-27 PROCEDURE — 74011250636 HC RX REV CODE- 250/636: Performed by: PHYSICIAN ASSISTANT

## 2018-02-27 PROCEDURE — 85018 HEMOGLOBIN: CPT | Performed by: PHYSICIAN ASSISTANT

## 2018-02-27 PROCEDURE — 77030018074 HC RTVR SUT ARTH4 S&N -B: Performed by: ORTHOPAEDIC SURGERY

## 2018-02-27 PROCEDURE — 74011250636 HC RX REV CODE- 250/636

## 2018-02-27 PROCEDURE — 77030018846 HC SOL IRR STRL H20 ICUM -A: Performed by: ORTHOPAEDIC SURGERY

## 2018-02-27 PROCEDURE — 94762 N-INVAS EAR/PLS OXIMTRY CONT: CPT

## 2018-02-27 PROCEDURE — 77030003665 HC NDL SPN BBMI -A: Performed by: NURSE ANESTHETIST, CERTIFIED REGISTERED

## 2018-02-27 PROCEDURE — 77030019557 HC ELECTRD VES SEAL MEDT -F: Performed by: ORTHOPAEDIC SURGERY

## 2018-02-27 PROCEDURE — 77030034849: Performed by: ORTHOPAEDIC SURGERY

## 2018-02-27 PROCEDURE — 74011000302 HC RX REV CODE- 302: Performed by: ORTHOPAEDIC SURGERY

## 2018-02-27 PROCEDURE — C1776 JOINT DEVICE (IMPLANTABLE): HCPCS | Performed by: ORTHOPAEDIC SURGERY

## 2018-02-27 PROCEDURE — 74011250637 HC RX REV CODE- 250/637: Performed by: ANESTHESIOLOGY

## 2018-02-27 PROCEDURE — 74011000258 HC RX REV CODE- 258: Performed by: ORTHOPAEDIC SURGERY

## 2018-02-27 PROCEDURE — 99221 1ST HOSP IP/OBS SF/LOW 40: CPT | Performed by: PHYSICAL MEDICINE & REHABILITATION

## 2018-02-27 PROCEDURE — 86580 TB INTRADERMAL TEST: CPT | Performed by: ORTHOPAEDIC SURGERY

## 2018-02-27 PROCEDURE — 74011000250 HC RX REV CODE- 250: Performed by: ANESTHESIOLOGY

## 2018-02-27 PROCEDURE — 77030032490 HC SLV COMPR SCD KNE COVD -B

## 2018-02-27 PROCEDURE — 74011000250 HC RX REV CODE- 250: Performed by: ORTHOPAEDIC SURGERY

## 2018-02-27 PROCEDURE — 82962 GLUCOSE BLOOD TEST: CPT

## 2018-02-27 PROCEDURE — 77030006790 HC BLD SAW OSC ZIMM -B: Performed by: ORTHOPAEDIC SURGERY

## 2018-02-27 PROCEDURE — 76210000016 HC OR PH I REC 1 TO 1.5 HR: Performed by: ORTHOPAEDIC SURGERY

## 2018-02-27 PROCEDURE — 77030035236 HC SUT PDS STRATFX BARB J&J -B: Performed by: ORTHOPAEDIC SURGERY

## 2018-02-27 PROCEDURE — 77030020782 HC GWN BAIR PAWS FLX 3M -B: Performed by: NURSE ANESTHETIST, CERTIFIED REGISTERED

## 2018-02-27 PROCEDURE — 36415 COLL VENOUS BLD VENIPUNCTURE: CPT | Performed by: PHYSICIAN ASSISTANT

## 2018-02-27 PROCEDURE — 77030018547 HC SUT ETHBND1 J&J -B: Performed by: ORTHOPAEDIC SURGERY

## 2018-02-27 PROCEDURE — 77030013727 HC IRR FAN PULSVC ZIMM -B: Performed by: ORTHOPAEDIC SURGERY

## 2018-02-27 PROCEDURE — 77030002933 HC SUT MCRYL J&J -A: Performed by: ORTHOPAEDIC SURGERY

## 2018-02-27 PROCEDURE — 77030008477 HC STYL SATN SLP COVD -A: Performed by: NURSE ANESTHETIST, CERTIFIED REGISTERED

## 2018-02-27 PROCEDURE — 77030034479 HC ADH SKN CLSR PRINEO J&J -B: Performed by: ORTHOPAEDIC SURGERY

## 2018-02-27 PROCEDURE — 77030018836 HC SOL IRR NACL ICUM -A: Performed by: ORTHOPAEDIC SURGERY

## 2018-02-27 PROCEDURE — 97161 PT EVAL LOW COMPLEX 20 MIN: CPT

## 2018-02-27 PROCEDURE — 76010000171 HC OR TIME 2 TO 2.5 HR INTENSV-TIER 1: Performed by: ORTHOPAEDIC SURGERY

## 2018-02-27 PROCEDURE — 74011250636 HC RX REV CODE- 250/636: Performed by: ORTHOPAEDIC SURGERY

## 2018-02-27 PROCEDURE — 72170 X-RAY EXAM OF PELVIS: CPT

## 2018-02-27 PROCEDURE — 94760 N-INVAS EAR/PLS OXIMETRY 1: CPT

## 2018-02-27 PROCEDURE — 74011000250 HC RX REV CODE- 250

## 2018-02-27 PROCEDURE — 86900 BLOOD TYPING SEROLOGIC ABO: CPT | Performed by: ORTHOPAEDIC SURGERY

## 2018-02-27 PROCEDURE — 77030008703 HC TU ET UNCUF COVD -A: Performed by: NURSE ANESTHETIST, CERTIFIED REGISTERED

## 2018-02-27 DEVICE — SHELL ACET SZ G DIA60MM HIP TRIDENT HA CLUS H HMSPHR MOD 2: Type: IMPLANTABLE DEVICE | Site: HIP | Status: FUNCTIONAL

## 2018-02-27 DEVICE — LINER- CEMENTLESS
Type: IMPLANTABLE DEVICE | Site: HIP | Status: FUNCTIONAL
Brand: MDM

## 2018-02-27 DEVICE — X3 INSERT FOR ADM/ MDM
Type: IMPLANTABLE DEVICE | Site: HIP | Status: FUNCTIONAL
Brand: X3

## 2018-02-27 DEVICE — CANCELLOUS BONE SCREW
Type: IMPLANTABLE DEVICE | Site: HIP | Status: FUNCTIONAL
Brand: TORX

## 2018-02-27 DEVICE — CORAIL HIP SYSTEM CEMENTLESS FEMORAL STEM 12/14 AMT 135 DEGREES KA SIZE 14 HA COATED STANDARD COLLAR
Type: IMPLANTABLE DEVICE | Site: HIP | Status: FUNCTIONAL
Brand: CORAIL

## 2018-02-27 DEVICE — BIOLOX DELTA CERAMIC FEMORAL HEAD 28MM DIA +5.0 12/14 TAPER
Type: IMPLANTABLE DEVICE | Site: HIP | Status: FUNCTIONAL
Brand: BIOLOX DELTA

## 2018-02-27 RX ORDER — SODIUM CHLORIDE 0.9 % (FLUSH) 0.9 %
5-10 SYRINGE (ML) INJECTION AS NEEDED
Status: DISCONTINUED | OUTPATIENT
Start: 2018-02-27 | End: 2018-02-27 | Stop reason: HOSPADM

## 2018-02-27 RX ORDER — FENTANYL CITRATE 50 UG/ML
INJECTION, SOLUTION INTRAMUSCULAR; INTRAVENOUS AS NEEDED
Status: DISCONTINUED | OUTPATIENT
Start: 2018-02-27 | End: 2018-02-27 | Stop reason: HOSPADM

## 2018-02-27 RX ORDER — HYDROMORPHONE HYDROCHLORIDE 2 MG/ML
0.5 INJECTION, SOLUTION INTRAMUSCULAR; INTRAVENOUS; SUBCUTANEOUS
Status: DISCONTINUED | OUTPATIENT
Start: 2018-02-27 | End: 2018-02-27 | Stop reason: HOSPADM

## 2018-02-27 RX ORDER — ACETAMINOPHEN 10 MG/ML
1000 INJECTION, SOLUTION INTRAVENOUS ONCE
Status: COMPLETED | OUTPATIENT
Start: 2018-02-27 | End: 2018-02-27

## 2018-02-27 RX ORDER — FAMOTIDINE 20 MG/1
20 TABLET, FILM COATED ORAL ONCE
Status: COMPLETED | OUTPATIENT
Start: 2018-02-27 | End: 2018-02-27

## 2018-02-27 RX ORDER — SODIUM CHLORIDE 9 MG/ML
100 INJECTION, SOLUTION INTRAVENOUS CONTINUOUS
Status: DISPENSED | OUTPATIENT
Start: 2018-02-27 | End: 2018-02-28

## 2018-02-27 RX ORDER — ASPIRIN 81 MG/1
81 TABLET ORAL EVERY 12 HOURS
Status: DISCONTINUED | OUTPATIENT
Start: 2018-02-27 | End: 2018-03-01 | Stop reason: HOSPADM

## 2018-02-27 RX ORDER — MIDAZOLAM HYDROCHLORIDE 1 MG/ML
2 INJECTION, SOLUTION INTRAMUSCULAR; INTRAVENOUS
Status: DISCONTINUED | OUTPATIENT
Start: 2018-02-27 | End: 2018-02-27 | Stop reason: HOSPADM

## 2018-02-27 RX ORDER — MIDAZOLAM HYDROCHLORIDE 1 MG/ML
2 INJECTION, SOLUTION INTRAMUSCULAR; INTRAVENOUS ONCE
Status: DISCONTINUED | OUTPATIENT
Start: 2018-02-27 | End: 2018-02-27 | Stop reason: HOSPADM

## 2018-02-27 RX ORDER — FLUTICASONE PROPIONATE AND SALMETEROL 250; 50 UG/1; UG/1
1 POWDER RESPIRATORY (INHALATION) DAILY
Status: DISCONTINUED | OUTPATIENT
Start: 2018-02-28 | End: 2018-03-01 | Stop reason: HOSPADM

## 2018-02-27 RX ORDER — CARVEDILOL 6.25 MG/1
6.25 TABLET ORAL 2 TIMES DAILY WITH MEALS
Status: DISCONTINUED | OUTPATIENT
Start: 2018-02-27 | End: 2018-03-01 | Stop reason: HOSPADM

## 2018-02-27 RX ORDER — TRANEXAMIC ACID 100 MG/ML
INJECTION, SOLUTION INTRAVENOUS AS NEEDED
Status: DISCONTINUED | OUTPATIENT
Start: 2018-02-27 | End: 2018-02-27 | Stop reason: HOSPADM

## 2018-02-27 RX ORDER — CEFAZOLIN SODIUM/WATER 2 G/20 ML
2 SYRINGE (ML) INTRAVENOUS ONCE
Status: COMPLETED | OUTPATIENT
Start: 2018-02-27 | End: 2018-02-27

## 2018-02-27 RX ORDER — CEFAZOLIN SODIUM/WATER 2 G/20 ML
2 SYRINGE (ML) INTRAVENOUS EVERY 8 HOURS
Status: COMPLETED | OUTPATIENT
Start: 2018-02-27 | End: 2018-02-28

## 2018-02-27 RX ORDER — LIDOCAINE HYDROCHLORIDE 10 MG/ML
0.1 INJECTION INFILTRATION; PERINEURAL AS NEEDED
Status: DISCONTINUED | OUTPATIENT
Start: 2018-02-27 | End: 2018-02-27 | Stop reason: HOSPADM

## 2018-02-27 RX ORDER — DIPHENHYDRAMINE HYDROCHLORIDE 50 MG/ML
12.5 INJECTION, SOLUTION INTRAMUSCULAR; INTRAVENOUS ONCE
Status: DISCONTINUED | OUTPATIENT
Start: 2018-02-27 | End: 2018-02-27 | Stop reason: HOSPADM

## 2018-02-27 RX ORDER — HYDROMORPHONE HYDROCHLORIDE 2 MG/ML
1 INJECTION, SOLUTION INTRAMUSCULAR; INTRAVENOUS; SUBCUTANEOUS
Status: DISCONTINUED | OUTPATIENT
Start: 2018-02-27 | End: 2018-03-01 | Stop reason: HOSPADM

## 2018-02-27 RX ORDER — SOLIFENACIN SUCCINATE 5 MG/1
5 TABLET, FILM COATED ORAL DAILY
Status: DISCONTINUED | OUTPATIENT
Start: 2018-02-28 | End: 2018-03-01 | Stop reason: HOSPADM

## 2018-02-27 RX ORDER — ALBUTEROL SULFATE 0.83 MG/ML
2.5 SOLUTION RESPIRATORY (INHALATION)
Status: DISCONTINUED | OUTPATIENT
Start: 2018-02-27 | End: 2018-03-01 | Stop reason: HOSPADM

## 2018-02-27 RX ORDER — DEXAMETHASONE SODIUM PHOSPHATE 4 MG/ML
INJECTION, SOLUTION INTRA-ARTICULAR; INTRALESIONAL; INTRAMUSCULAR; INTRAVENOUS; SOFT TISSUE AS NEEDED
Status: DISCONTINUED | OUTPATIENT
Start: 2018-02-27 | End: 2018-02-27 | Stop reason: HOSPADM

## 2018-02-27 RX ORDER — ROCURONIUM BROMIDE 10 MG/ML
INJECTION, SOLUTION INTRAVENOUS AS NEEDED
Status: DISCONTINUED | OUTPATIENT
Start: 2018-02-27 | End: 2018-02-27 | Stop reason: HOSPADM

## 2018-02-27 RX ORDER — CARBIDOPA AND LEVODOPA 25; 250 MG/1; MG/1
1 TABLET ORAL 4 TIMES DAILY
Status: DISCONTINUED | OUTPATIENT
Start: 2018-02-27 | End: 2018-03-01 | Stop reason: HOSPADM

## 2018-02-27 RX ORDER — AMOXICILLIN 250 MG
2 CAPSULE ORAL DAILY
Status: DISCONTINUED | OUTPATIENT
Start: 2018-02-28 | End: 2018-03-01 | Stop reason: HOSPADM

## 2018-02-27 RX ORDER — NEOMYCIN AND POLYMYXIN B SULFATES 40; 200000 MG/ML; [USP'U]/ML
SOLUTION IRRIGATION AS NEEDED
Status: DISCONTINUED | OUTPATIENT
Start: 2018-02-27 | End: 2018-02-27 | Stop reason: HOSPADM

## 2018-02-27 RX ORDER — ONDANSETRON 2 MG/ML
INJECTION INTRAMUSCULAR; INTRAVENOUS AS NEEDED
Status: DISCONTINUED | OUTPATIENT
Start: 2018-02-27 | End: 2018-02-27 | Stop reason: HOSPADM

## 2018-02-27 RX ORDER — HYDROMORPHONE HYDROCHLORIDE 2 MG/ML
INJECTION, SOLUTION INTRAMUSCULAR; INTRAVENOUS; SUBCUTANEOUS AS NEEDED
Status: DISCONTINUED | OUTPATIENT
Start: 2018-02-27 | End: 2018-02-27 | Stop reason: HOSPADM

## 2018-02-27 RX ORDER — ALBUTEROL SULFATE 90 UG/1
2 AEROSOL, METERED RESPIRATORY (INHALATION)
Status: DISCONTINUED | OUTPATIENT
Start: 2018-02-27 | End: 2018-03-01 | Stop reason: HOSPADM

## 2018-02-27 RX ORDER — LIDOCAINE HYDROCHLORIDE 20 MG/ML
INJECTION, SOLUTION EPIDURAL; INFILTRATION; INTRACAUDAL; PERINEURAL AS NEEDED
Status: DISCONTINUED | OUTPATIENT
Start: 2018-02-27 | End: 2018-02-27 | Stop reason: HOSPADM

## 2018-02-27 RX ORDER — OXYCODONE AND ACETAMINOPHEN 5; 325 MG/1; MG/1
1 TABLET ORAL AS NEEDED
Status: DISCONTINUED | OUTPATIENT
Start: 2018-02-27 | End: 2018-02-27 | Stop reason: HOSPADM

## 2018-02-27 RX ORDER — SODIUM CHLORIDE 0.9 % (FLUSH) 0.9 %
5-10 SYRINGE (ML) INJECTION EVERY 8 HOURS
Status: DISCONTINUED | OUTPATIENT
Start: 2018-02-27 | End: 2018-02-27 | Stop reason: HOSPADM

## 2018-02-27 RX ORDER — SODIUM CHLORIDE 0.9 % (FLUSH) 0.9 %
5-10 SYRINGE (ML) INJECTION EVERY 8 HOURS
Status: CANCELLED | OUTPATIENT
Start: 2018-02-27

## 2018-02-27 RX ORDER — ACETAMINOPHEN 500 MG
1000 TABLET ORAL EVERY 6 HOURS
Status: DISCONTINUED | OUTPATIENT
Start: 2018-02-28 | End: 2018-03-01 | Stop reason: HOSPADM

## 2018-02-27 RX ORDER — GLYCOPYRROLATE 0.2 MG/ML
INJECTION INTRAMUSCULAR; INTRAVENOUS AS NEEDED
Status: DISCONTINUED | OUTPATIENT
Start: 2018-02-27 | End: 2018-02-27 | Stop reason: HOSPADM

## 2018-02-27 RX ORDER — OXYCODONE HYDROCHLORIDE 5 MG/1
10 TABLET ORAL
Status: DISCONTINUED | OUTPATIENT
Start: 2018-02-27 | End: 2018-03-01 | Stop reason: HOSPADM

## 2018-02-27 RX ORDER — DIPHENHYDRAMINE HCL 25 MG
25 CAPSULE ORAL
Status: DISCONTINUED | OUTPATIENT
Start: 2018-02-27 | End: 2018-03-01 | Stop reason: HOSPADM

## 2018-02-27 RX ORDER — CARBIDOPA AND LEVODOPA 50; 200 MG/1; MG/1
2 TABLET, EXTENDED RELEASE ORAL
Status: DISCONTINUED | OUTPATIENT
Start: 2018-02-27 | End: 2018-03-01 | Stop reason: HOSPADM

## 2018-02-27 RX ORDER — OXYCODONE HYDROCHLORIDE 5 MG/1
5 TABLET ORAL
Status: DISCONTINUED | OUTPATIENT
Start: 2018-02-27 | End: 2018-02-27 | Stop reason: HOSPADM

## 2018-02-27 RX ORDER — NALOXONE HYDROCHLORIDE 0.4 MG/ML
.2-.4 INJECTION, SOLUTION INTRAMUSCULAR; INTRAVENOUS; SUBCUTANEOUS
Status: DISCONTINUED | OUTPATIENT
Start: 2018-02-27 | End: 2018-03-01 | Stop reason: HOSPADM

## 2018-02-27 RX ORDER — FENTANYL CITRATE 50 UG/ML
25 INJECTION, SOLUTION INTRAMUSCULAR; INTRAVENOUS ONCE
Status: DISCONTINUED | OUTPATIENT
Start: 2018-02-27 | End: 2018-02-27 | Stop reason: HOSPADM

## 2018-02-27 RX ORDER — NEOSTIGMINE METHYLSULFATE 1 MG/ML
INJECTION INTRAVENOUS AS NEEDED
Status: DISCONTINUED | OUTPATIENT
Start: 2018-02-27 | End: 2018-02-27 | Stop reason: HOSPADM

## 2018-02-27 RX ORDER — ACETAMINOPHEN 10 MG/ML
1000 INJECTION, SOLUTION INTRAVENOUS ONCE
Status: DISCONTINUED | OUTPATIENT
Start: 2018-02-27 | End: 2018-02-27

## 2018-02-27 RX ORDER — SODIUM CHLORIDE 0.9 % (FLUSH) 0.9 %
5-10 SYRINGE (ML) INJECTION AS NEEDED
Status: DISCONTINUED | OUTPATIENT
Start: 2018-02-27 | End: 2018-03-01 | Stop reason: HOSPADM

## 2018-02-27 RX ORDER — ETOMIDATE 2 MG/ML
INJECTION INTRAVENOUS AS NEEDED
Status: DISCONTINUED | OUTPATIENT
Start: 2018-02-27 | End: 2018-02-27 | Stop reason: HOSPADM

## 2018-02-27 RX ORDER — DEXAMETHASONE SODIUM PHOSPHATE 100 MG/10ML
10 INJECTION INTRAMUSCULAR; INTRAVENOUS ONCE
Status: ACTIVE | OUTPATIENT
Start: 2018-02-28 | End: 2018-03-01

## 2018-02-27 RX ORDER — ROPIVACAINE HYDROCHLORIDE 5 MG/ML
INJECTION, SOLUTION EPIDURAL; INFILTRATION; PERINEURAL AS NEEDED
Status: DISCONTINUED | OUTPATIENT
Start: 2018-02-27 | End: 2018-02-27 | Stop reason: HOSPADM

## 2018-02-27 RX ORDER — SODIUM CHLORIDE, SODIUM LACTATE, POTASSIUM CHLORIDE, CALCIUM CHLORIDE 600; 310; 30; 20 MG/100ML; MG/100ML; MG/100ML; MG/100ML
100 INJECTION, SOLUTION INTRAVENOUS CONTINUOUS
Status: DISCONTINUED | OUTPATIENT
Start: 2018-02-27 | End: 2018-02-27 | Stop reason: HOSPADM

## 2018-02-27 RX ORDER — SODIUM CHLORIDE 9 MG/ML
50 INJECTION, SOLUTION INTRAVENOUS CONTINUOUS
Status: DISCONTINUED | OUTPATIENT
Start: 2018-02-27 | End: 2018-02-27 | Stop reason: HOSPADM

## 2018-02-27 RX ORDER — ZOLPIDEM TARTRATE 5 MG/1
5 TABLET ORAL
Status: DISCONTINUED | OUTPATIENT
Start: 2018-02-27 | End: 2018-03-01 | Stop reason: HOSPADM

## 2018-02-27 RX ORDER — CELECOXIB 200 MG/1
200 CAPSULE ORAL EVERY 12 HOURS
Status: DISCONTINUED | OUTPATIENT
Start: 2018-02-27 | End: 2018-03-01 | Stop reason: HOSPADM

## 2018-02-27 RX ORDER — ONDANSETRON 2 MG/ML
4 INJECTION INTRAMUSCULAR; INTRAVENOUS
Status: DISCONTINUED | OUTPATIENT
Start: 2018-02-27 | End: 2018-03-01 | Stop reason: HOSPADM

## 2018-02-27 RX ADMIN — ASPIRIN 81 MG: 81 TABLET, COATED ORAL at 21:15

## 2018-02-27 RX ADMIN — HYDROMORPHONE HYDROCHLORIDE 0.8 MG: 2 INJECTION, SOLUTION INTRAMUSCULAR; INTRAVENOUS; SUBCUTANEOUS at 10:09

## 2018-02-27 RX ADMIN — FENTANYL CITRATE 50 MCG: 50 INJECTION, SOLUTION INTRAMUSCULAR; INTRAVENOUS at 08:12

## 2018-02-27 RX ADMIN — FENTANYL CITRATE 50 MCG: 50 INJECTION, SOLUTION INTRAMUSCULAR; INTRAVENOUS at 08:16

## 2018-02-27 RX ADMIN — SODIUM CHLORIDE, SODIUM LACTATE, POTASSIUM CHLORIDE, AND CALCIUM CHLORIDE 1000 ML: 600; 310; 30; 20 INJECTION, SOLUTION INTRAVENOUS at 06:55

## 2018-02-27 RX ADMIN — OXYCODONE HYDROCHLORIDE 10 MG: 5 TABLET ORAL at 22:57

## 2018-02-27 RX ADMIN — HYDROMORPHONE HYDROCHLORIDE 0.4 MG: 2 INJECTION, SOLUTION INTRAMUSCULAR; INTRAVENOUS; SUBCUTANEOUS at 08:48

## 2018-02-27 RX ADMIN — SODIUM CHLORIDE, SODIUM LACTATE, POTASSIUM CHLORIDE, AND CALCIUM CHLORIDE: 600; 310; 30; 20 INJECTION, SOLUTION INTRAVENOUS at 08:45

## 2018-02-27 RX ADMIN — NEOSTIGMINE METHYLSULFATE 3 MG: 1 INJECTION INTRAVENOUS at 09:57

## 2018-02-27 RX ADMIN — ACETAMINOPHEN 1000 MG: 10 INJECTION, SOLUTION INTRAVENOUS at 17:39

## 2018-02-27 RX ADMIN — Medication 2 G: at 17:39

## 2018-02-27 RX ADMIN — CARBIDOPA AND LEVODOPA 1 TABLET: 25; 250 TABLET ORAL at 21:15

## 2018-02-27 RX ADMIN — GLYCOPYRROLATE 0.4 MG: 0.2 INJECTION INTRAMUSCULAR; INTRAVENOUS at 09:57

## 2018-02-27 RX ADMIN — HYDROMORPHONE HYDROCHLORIDE 0.4 MG: 2 INJECTION, SOLUTION INTRAMUSCULAR; INTRAVENOUS; SUBCUTANEOUS at 09:03

## 2018-02-27 RX ADMIN — FENTANYL CITRATE 100 MCG: 50 INJECTION, SOLUTION INTRAMUSCULAR; INTRAVENOUS at 10:09

## 2018-02-27 RX ADMIN — TUBERCULIN PURIFIED PROTEIN DERIVATIVE 5 UNITS: 5 INJECTION, SOLUTION INTRADERMAL at 06:55

## 2018-02-27 RX ADMIN — ONDANSETRON 4 MG: 2 INJECTION INTRAMUSCULAR; INTRAVENOUS at 22:55

## 2018-02-27 RX ADMIN — CELECOXIB 200 MG: 200 CAPSULE ORAL at 21:15

## 2018-02-27 RX ADMIN — TRANEXAMIC ACID 1000 MG: 100 INJECTION, SOLUTION INTRAVENOUS at 08:34

## 2018-02-27 RX ADMIN — SODIUM CHLORIDE 100 ML/HR: 900 INJECTION, SOLUTION INTRAVENOUS at 12:38

## 2018-02-27 RX ADMIN — ETOMIDATE 20 MG: 2 INJECTION INTRAVENOUS at 08:16

## 2018-02-27 RX ADMIN — DEXAMETHASONE SODIUM PHOSPHATE 10 MG: 4 INJECTION, SOLUTION INTRA-ARTICULAR; INTRALESIONAL; INTRAMUSCULAR; INTRAVENOUS; SOFT TISSUE at 08:39

## 2018-02-27 RX ADMIN — SACUBITRIL AND VALSARTAN 1 TABLET: 24; 26 TABLET, FILM COATED ORAL at 17:39

## 2018-02-27 RX ADMIN — ETOMIDATE 20 MG: 2 INJECTION INTRAVENOUS at 08:17

## 2018-02-27 RX ADMIN — LIDOCAINE HYDROCHLORIDE 60 MG: 20 INJECTION, SOLUTION EPIDURAL; INFILTRATION; INTRACAUDAL; PERINEURAL at 08:16

## 2018-02-27 RX ADMIN — LIDOCAINE HYDROCHLORIDE 0.1 ML: 10 INJECTION, SOLUTION INFILTRATION; PERINEURAL at 06:55

## 2018-02-27 RX ADMIN — HYDROMORPHONE HYDROCHLORIDE 0.5 MG: 2 INJECTION, SOLUTION INTRAMUSCULAR; INTRAVENOUS; SUBCUTANEOUS at 10:49

## 2018-02-27 RX ADMIN — ROCURONIUM BROMIDE 30 MG: 10 INJECTION, SOLUTION INTRAVENOUS at 08:16

## 2018-02-27 RX ADMIN — OXYCODONE HYDROCHLORIDE 10 MG: 5 TABLET ORAL at 12:38

## 2018-02-27 RX ADMIN — OXYCODONE HYDROCHLORIDE 10 MG: 5 TABLET ORAL at 17:39

## 2018-02-27 RX ADMIN — Medication 2 G: at 08:23

## 2018-02-27 RX ADMIN — ONDANSETRON 4 MG: 2 INJECTION INTRAMUSCULAR; INTRAVENOUS at 17:53

## 2018-02-27 RX ADMIN — SODIUM CHLORIDE 100 ML/HR: 900 INJECTION, SOLUTION INTRAVENOUS at 21:23

## 2018-02-27 RX ADMIN — SODIUM CHLORIDE, SODIUM LACTATE, POTASSIUM CHLORIDE, AND CALCIUM CHLORIDE: 600; 310; 30; 20 INJECTION, SOLUTION INTRAVENOUS at 08:03

## 2018-02-27 RX ADMIN — HYDROMORPHONE HYDROCHLORIDE 0.4 MG: 2 INJECTION, SOLUTION INTRAMUSCULAR; INTRAVENOUS; SUBCUTANEOUS at 08:55

## 2018-02-27 RX ADMIN — ONDANSETRON 4 MG: 2 INJECTION INTRAMUSCULAR; INTRAVENOUS at 08:39

## 2018-02-27 RX ADMIN — ACETAMINOPHEN 1000 MG: 10 INJECTION, SOLUTION INTRAVENOUS at 10:54

## 2018-02-27 RX ADMIN — FAMOTIDINE 20 MG: 20 TABLET ORAL at 06:55

## 2018-02-27 NOTE — PROGRESS NOTES
TRANSFER - IN REPORT:    Verbal report received from Twan RN (name) on [facility].  being received from [facility]) for routine progression of care      Report consisted of patients Situation, Background, Assessment and   Recommendations(SBAR). Information from the following report(s) SBAR, Kardex, ED Summary, OR Summary, Procedure Summary, Intake/Output, MAR, Accordion, Recent Results and Med Rec Status was reviewed with the receiving nurse. Opportunity for questions and clarification was provided. Assessment completed upon patients arrival to unit and care assumed.

## 2018-02-27 NOTE — PROGRESS NOTES
Admission Assessment Complete. Pt had a LTHA today. . Pt is A&Ox 3.  +2 pedal pulses with purposeful movement in all four extremities. General Anesthesia  Dressing is clean, dry and intact. IVF inusing. Pierce is draining straw yellow urine. Pt denies any pain or need at this time. Bed low and locked. Side rails x3. Call light with in reach. Pt verbalizes understanding of call light.

## 2018-02-27 NOTE — ANESTHESIA POSTPROCEDURE EVALUATION
Post-Anesthesia Evaluation and Assessment    Patient: Stuart Newton. MRN: [id number]  SSN: [id number]    YOB: 1945  Age: 71 y.o. Sex: male       Cardiovascular Function/Vital Signs  Visit Vitals    /66    Pulse 71    Temp 36.6 °C (97.8 °F)    Resp 16    Wt 106.1 kg (234 lb)    SpO2 100%    BMI 31.74 kg/m2       Patient is status post spinal anesthesia for Procedure(s):  LEFT HIP ARTHROPLASTY TOTAL/MEL DUAL MOBILITY/ PT HAS A PACEMAKER. Nausea/Vomiting: None    Postoperative hydration reviewed and adequate. Pain:  Pain Scale 1: Visual (02/27/18 1114)  Pain Intensity 1: 0 (02/27/18 1114)   Managed    Neurological Status:   Neuro (WDL): Exceptions to WDL (02/27/18 1104)  Neuro  Neurologic State: Drowsy (02/27/18 1104)  Orientation Level: Oriented X4 (02/27/18 1104)  LLE Motor Response: Weak (02/27/18 1104)   At baseline    Mental Status and Level of Consciousness: Arousable    Pulmonary Status:   O2 Device: Room air (02/27/18 1114)   Adequate oxygenation and airway patent    Complications related to anesthesia: None    Post-anesthesia assessment completed.  No concerns    Signed By: Aarti Escalera MD     February 27, 2018

## 2018-02-27 NOTE — PERIOP NOTES
TRANSFER - OUT REPORT:    Verbal report given to GAGE Smith on [place] [facility].  being transferred to preop holding for routine progression of care       Report consisted of patients Situation, Background, Assessment and   Recommendations(SBAR). Information from the following report(s) SBAR, MAR and Recent Results was reviewed with the receiving nurse. Lines:   Peripheral IV 20/84/27 Left Cephalic (Active)   Site Assessment Clean, dry, & intact 2/27/2018  6:46 AM   Phlebitis Assessment 0 2/27/2018  6:46 AM   Dressing Status Clean, dry, & intact 2/27/2018  6:46 AM   Dressing Type Transparent;Tape 2/27/2018  6:46 AM   Hub Color/Line Status Green; Infusing 2/27/2018  6:46 AM   Action Taken Blood drawn 2/27/2018  6:46 AM        Opportunity for questions and clarification was provided.       Patient transported with:   wripl

## 2018-02-27 NOTE — PERIOP NOTES
TRANSFER - OUT REPORT:    Verbal report given to Paris Obregon RN on Poppy Barakat.  being transferred to [id number] for routine post - op       Report consisted of patients Situation, Background, Assessment and   Recommendations(SBAR). Information from the following report(s) OR Summary, Procedure Summary, Intake/Output and MAR was reviewed with the receiving nurse. Opportunity for questions and clarification was provided.       Patient transported on room air

## 2018-02-27 NOTE — H&P
The patient has end stage arthritis of the left hip. The patient was seen and examined and there are no changes to the patient's orthopedic condition. They have tried conservative treatment for this condition; including antiinflammatories and lifestyle modifications and have failed. The necessity for the joint replacement is still present, and the H&P from the office is still current. The patient will be admitted today forProcedure(s) (LRB):  LEFT HIP ARTHROPLASTY TOTAL/MEL DUAL MOBILITY/ PT HAS A PACEMAKER (Left) .

## 2018-02-27 NOTE — ANESTHESIA PREPROCEDURE EVALUATION
Anesthetic History   No history of anesthetic complications            Review of Systems / Medical History  Patient summary reviewed and pertinent labs reviewed    Pulmonary    COPD: moderate               Neuro/Psych   Within defined limits           Cardiovascular    Hypertension: well controlled          CAD and hyperlipidemia    Exercise tolerance: <4 METS  Comments: EF 35% has defibrillator/pacer. Never discharged. Hx cardiomyopathy   GI/Hepatic/Renal         Renal disease: stones       Endo/Other        Morbid obesity and arthritis  Pertinent negatives: Diabetes: denies. Other Findings   Comments: Parkinson's  Spinal stenosis  Urinary retention  ED  LBP           Physical Exam    Airway  Mallampati: II  TM Distance: 4 - 6 cm  Neck ROM: normal range of motion   Mouth opening: Normal     Cardiovascular    Rhythm: regular  Rate: normal         Dental    Dentition: Lower partial plate and Upper partial plate     Pulmonary  Breath sounds clear to auscultation          Prolonged expiration     Abdominal  GI exam deferred       Other Findings            Anesthetic Plan    ASA: 3  Anesthesia type: spinal            Anesthetic plan and risks discussed with: Patient      Magnet available.

## 2018-02-27 NOTE — IP AVS SNAPSHOT
[address] 
[phone] Patient: Ernie Sanchez. MRN: [id number] :1945 About your hospitalization You were admitted on:  2018 You last received care in the:  [facility] You were discharged on:  2018 Why you were hospitalized Your primary diagnosis was:  Not on File Your diagnoses also included: Arthritis Of Left Hip Follow-up Information Follow up With Details Comments Contact Info Denny Reed MD  As needed [address] [address] 
[phone] Aurora Lo MD  [facility] [address] [place] [address] 
[phone] [address] [place] [address]   Will contact you within 48 hrs  
[phone] Your Scheduled Appointments 2018 10:30 AM EDT Follow Up with RAMSEY Mcpherson [facility] [place] ([facility]UROLOG[facility]) [address]  
[phone]  11:00 AM EDT Office Visit with Derek Balbuena MD  
[facility] ([facility]) [address] [place] [address]  
[place] [address] [place] [address]  
[phone] Discharge Orders None A check juan indicates which time of day the medication should be taken. My Medications START taking these medications Instructions Each Dose to Equal  
 Morning Noon Evening Bedtime  
 oxyCODONE IR 10 mg Tab immediate release tablet Commonly known as:  Crockett Cam Your next dose is: Today Take 1 Tab by mouth every three (3) hours as needed. Max Daily Amount: 80 mg.  
 10 mg CHANGE how you take these medications Instructions Each Dose to Equal  
 Morning Noon Evening Bedtime  
 aspirin delayed-release 81 mg tablet What changed:  when to take this Your next dose is: Today Take 1 Tab by mouth every twelve (12) hours every twelve (12) hours. 81 mg  
    
   
   
  
   
  
 * carbidopa-levodopa ER  mg per tablet Commonly known as:  SINEMET CR What changed:  additional instructions Your next dose is: Today TAKE 2 TABLETs AT BEDTIME  
     
   
   
   
  
  
 * carbidopa-levodopa  mg per tablet Commonly known as:  Sinemet-25/250 What changed:  Another medication with the same name was changed. Make sure you understand how and when to take each. Your next dose is: Today Take 1 Tab by mouth four (4) times daily. 1 Tab  
    
   
   
  
   
  
 fluticasone-salmeterol 250-50 mcg/dose diskus inhaler Commonly known as:  ADVAIR What changed:  additional instructions Your next dose is:  Tomorrow Take 1 Puff by inhalation daily. 1 Puff  
    
  
   
   
   
  
 simvastatin 80 mg tablet Commonly known as:  ZOCOR What changed:  how much to take Your next dose is:  Tomorrow Take 1 Tab by mouth daily. 80 mg  
    
  
   
   
   
  
 * Notice: This list has 2 medication(s) that are the same as other medications prescribed for you. Read the directions carefully, and ask your doctor or other care provider to review them with you. CONTINUE taking these medications Instructions Each Dose to Equal  
 Morning Noon Evening Bedtime  
 acetaminophen 650 mg Tber Commonly known as:  TYLENOL Your next dose is: Today Take 650 mg by mouth every six (6) hours as needed for Pain. 650 mg  
    
   
   
  
   
  
 carvedilol 6.25 mg tablet Commonly known as:  Delphisalvador [place] Your next dose is: Today Take 1 Tab by mouth two (2) times daily (with meals). 6.25 mg  
    
   
   
  
   
  
 COQ-10 100 mg capsule Generic drug:  co-enzyme Q-10 Your next dose is:  DO NOT TAKE X 5 WEEKS!!  
   
 Take 100 mg by mouth daily.   
 100 mg  
    
   
   
 OTHER Please resume PT specifically for gait and balance. Avoid aggravating his lower back with frequent sitting and standing. His gait is still slow with decreased stride and dragging of his leg leg and decreased arm swing. Posture is leaning forward. potassium chloride SR 10 mEq tablet Commonly known as:  KLOR-CON 10 Your next dose is:  Tomorrow Take 2 Tabs by mouth daily. 20 mEq PROAIR HFA 90 mcg/actuation inhaler Generic drug:  albuterol Your next dose is: Today Take 2 Puffs by inhalation every four (4) hours as needed for Wheezing. Instructed to bring to the hospital on the DOS per anesthesia protocol. 2 Puff  
    
   
   
  
   
  
 sacubitril-valsartan 24-26 mg tablet Commonly known as:  ENTRESTO Your next dose is: Today Take 1 Tab by mouth two (2) times a day. 1 Tab  
    
   
   
  
   
  
 tiotropium 18 mcg inhalation capsule Commonly known as:  [address] Your next dose is:  Tomorrow Take 1 Cap by inhalation every morning. 1 Cap VESIcare 5 mg tablet Generic drug:  solifenacin Your next dose is:  Tomorrow TAKE 1 TABLET DAILY  
     
  
   
   
   
  
  
STOP taking these medications   
 multivitamin tablet Commonly known as:  ONE A DAY ASK your doctor about these medications Instructions Each Dose to Equal  
 Morning Noon Evening Bedtime  
 mupirocin 2 % ointment Commonly known as:  [facility] Ask about: Should I take this medication? by Both Nostrils route two (2) times a day. Where to Get Your Medications These medications were sent to [address], [address] [place] [address] Phone:  [phone]  
  aspirin delayed-release 81 mg tablet Information on where to get these meds will be given to you by the nurse or doctor. ! Ask your nurse or doctor about these medications  
  oxyCODONE IR 10 mg Tab immediate release tablet Discharge Instructions [employer] Patient Discharge Instructions Joselito Burleson. / [id number] : 1945 Admitted 2018 Discharged: 3/1/2018 IF YOU HAVE ANY PROBLEMS ONCE YOU ARE AT HOME CALL THE FOLLOWING NUMBERS:  
Main office number: [phone] Take Home Medications · It is important that you take the medication exactly as they are prescribed. · Keep your medication in the bottles provided by the pharmacist and keep a list of the medication names, dosages, and times to be taken in your wallet. · Do not take other medications without consulting your doctor. What to do at [facility] Resume your prehospital diet. If you have excessive nausea or vomitting call your doctor's office Home Physical Therapy is arranged. Use rolling walker when walking. Patients who have had a joint replacement should not drive until you are seen for your follow up appointment by Dr. Kraig Luz. When to Call - Call if you have a temperature greater then 101 
- Unable to keep food down - Loose control of your bladder or bowel function - Are unable to bear any weight  
- Need a pain medication refill DISCHARGE SUMMARY from Nurse The following personal items collected during your admission are returned to you:  
Dental Appliance: Dental Appliances: Partials, Lowers, Uppers Vision: Visual Aid: None Hearing Aid:   na 
Jewelry: Jewelry: Ring Clothing:   self Other Valuables: Other Valuables: Chong , Cell Phone (all with Bruna Parks) Valuables sent to safe:   na 
 
PATIENT INSTRUCTIONS: 
 
After general anesthesia or intravenous sedation, for 24 hours or while taking prescription Narcotics: · Limit your activities · Do not drive and operate hazardous machinery · Do not make important personal or business decisions · Do  not drink alcoholic beverages · If you have not urinated within 8 hours after discharge, please contact your surgeon on call. Report the following to your surgeon: 
· Excessive pain, swelling, redness or odor of or around the surgical area · Temperature over 101 · Nausea and vomiting lasting longer than 4 hours or if unable to take medications · Any signs of decreased circulation or nerve impairment to extremity: change in color, persistent  numbness, tingling, coldness or increase pain · Follow hip precautions @ all times! · Any questions, call office @ [phone] Keep scheduled follow up appointment. If need to change, call office @ [phone]. *  Please give a list of your current medications to your Primary Care Provider. *  Please update this list whenever your medications are discontinued, doses are 
    changed, or new medications (including over-the-counter products) are added. *  Please carry medication information at all times in case of emergency situations. Hip Replacement Surgery (Posterior): What to Expect at [facility] Your Recovery Hip replacement surgery replaces the worn parts of your hip joint. When you leave the hospital, you will probably be walking with crutches or a walker. You may be able to climb a few stairs and get in and out of bed and chairs. But you will need someone to help you at home for the next few weeks or until you have more energy and can move around better. If there is no one to help you at home, you may go to a rehabilitation center or long-term care center. You will go home with a bandage and stitches or staples. You can remove the bandage when your doctor tells you to. Your doctor will remove your stitches or staples 10 days to 3 weeks after your surgery.  You may still have some mild pain, and the area may be swollen for 3 to 4 months after surgery. Your doctor will give you medicine for the pain. You will continue the rehabilitation program (rehab) you started in the hospital. The better you do with your rehab exercises, the sooner you will get your strength and movement back. Most people are able to return to work 4 weeks to 4 months after surgery. This care sheet gives you a general idea about how long it will take for you to recover. But each person recovers at a different pace. Follow the steps below to get better as quickly as possible. How can you care for yourself at home? Activity ? · Your doctor may not want your affected leg to cross the center of your body toward the other leg. If so, your therapist may suggest these ideas: ¨ Do not cross your legs. ¨ Be very careful as you get in or out of bed or a car, so your leg does not cross that imaginary line in the middle of your body. ? · Rest when you feel tired. You may take a nap, but do not stay in bed all day. ? · Work with your physical therapist to learn the best way to exercise. You may be able to take frequent, short walks using crutches or a walker. You will probably have to use crutches or a walker for at least 4 to 6 weeks. ? · Your doctor may advise you to stay away from activities that put stress on the joint. This includes sports such as tennis, football, and jogging. ? · Try not to sit for too long at one time. You will feel less stiff if you take a short walk about every hour. When you sit, use chairs with arms, and do not sit in low chairs. ? · Do not bend over more than 90 degrees (like the angle in a letter \"L\"). ? · Sleep on your back with your legs slightly apart or on your side with a pillow between your knees for about 6 weeks or as your doctor tells you. Do not sleep on your stomach or affected leg. ? · You may need to take sponge baths until your stitches or staples have been removed.  You will probably be able to shower 24 hours after they are removed. ? · Ask your doctor when you can drive again. ? · Most people are able to return to work 4 weeks to 4 months after surgery. ? · Ask your doctor when it is okay for you to have sex. Diet ? · By the time you leave the hospital, you will probably be eating your normal diet. If your stomach is upset, try bland, low-fat foods like plain rice, broiled chicken, toast, and yogurt. Your doctor may recommend that you take iron and vitamin supplements. ? · Drink plenty of fluids (unless your doctor tells you not to). ? · Eat healthy foods, and watch your portion sizes. Try to stay at your ideal weight. Too much weight puts more stress on your new hip joint. ? · You may notice that your bowel movements are not regular right after your surgery. This is common. Try to avoid constipation and straining with bowel movements. You may want to take a fiber supplement every day. If you have not had a bowel movement after a couple of days, ask your doctor about taking a mild laxative. Medicines ? · Your doctor will tell you if and when you can restart your medicines. He or she will also give you instructions about taking any new medicines. ? · If you take blood thinners, such as warfarin (Coumadin), clopidogrel (Plavix), or aspirin, be sure to talk to your doctor. He or she will tell you if and when to start taking those medicines again. Make sure that you understand exactly what your doctor wants you to do.  
? · Your doctor may give you a blood-thinning medicine to prevent blood clots. If you take a blood thinner, be sure you get instructions about how to take your medicine safely. Blood thinners can cause serious bleeding problems. This medicine could be in pill form or as a shot (injection). If a shot is necessary, your doctor will tell you how to do this. ? · Be safe with medicines. Take pain medicines exactly as directed.  
¨ If the doctor gave you a prescription medicine for pain, take it as prescribed. ¨ If you are not taking a prescription pain medicine, ask your doctor if you can take an over-the-counter medicine. ? · If you think your pain medicine is making you sick to your stomach: 
¨ Take your medicine after meals (unless your doctor has told you not to). ¨ Ask your doctor for a different pain medicine. ? · If your doctor prescribed antibiotics, take them as directed. Do not stop taking them just because you feel better. You need to take the full course of antibiotics. Incision care ? · You will have a bandage over the cut (incision) and staples or stitches. Follow your doctor's instructions on when to take the bandage off. Giving the incision air will help it heal.  
? · Your doctor will remove the staples or stitches 10 days to 3 weeks after the surgery and replace them with strips of tape. Leave the strips on for a week or until they fall off. Exercise ? · Your rehab program will include a number of exercises to do. Always do them as your therapist tells you. Ice and elevation ? · For pain, put ice or a cold pack on the area for 10 to 20 minutes at a time. Put a thin cloth between the ice and your skin. ? · Your ankle may swell for about 3 months. Prop up your ankle when you ice it or anytime you sit or lie down. Try to keep it above the level of your heart. This will help reduce swelling. Other instructions ? Continue to wear your support stockings as your doctor says. These help to prevent blood clots. The length of time that you will have to wear them depends on your activity level and the amount of swelling you have. Most people wear these stockings for 4 to 6 weeks after surgery. ?Preventing falls is also very important. To prevent falls: 
? · Arrange furniture so that you will not trip on it. ? · Get rid of throw rugs, and move electrical cords out of the way. ? · Walk only in areas with plenty of light. ? · Put grab bars in showers and bathtubs. ? · Avoid icy or snowy sidewalks. ? · Wear shoes with sturdy, flat soles. Follow-up care is a key part of your treatment and safety. Be sure to make and go to all appointments, and call your doctor if you are having problems. It's also a good idea to know your test results and keep a list of the medicines you take. When should you call for help? Call 911 anytime you think you may need emergency care. For example, call if: 
? · You passed out (lost consciousness). ? · You have severe trouble breathing. ? · You have sudden chest pain and shortness of breath, or you cough up blood. ?Call your doctor now or seek immediate medical care if: 
? · You have signs that your hip may be dislocated, including: ¨ Severe pain and not being able to stand. ¨ A crooked leg that looks like your hip is out of position. ¨ Not being able to bend or straighten your leg. ? · Your leg or foot is cool or pale or changes color. ? · You cannot feel or move your leg. ? · You have signs of a blood clot, such as: 
¨ Pain in your calf, back of the knee, thigh, or groin. ¨ Redness and swelling in your leg or groin. ? · Your incision comes open and begins to bleed, or the bleeding increases. ? · You feel like your heart is racing or beating irregularly. ? · You have signs of infection, such as: 
¨ Increased pain, swelling, warmth, or redness. ¨ Red streaks leading from the incision. ¨ Pus draining from the incision. ¨ A fever. ? Watch closely for changes in your health, and be sure to contact your doctor if: 
? · You do not have a bowel movement after taking a laxative. ? · You do not get better as expected. Where can you learn more? Go to http://clementina-shaista.info/. Enter C695 in the search box to learn more about \"Hip Replacement Surgery (Posterior): What to Expect at Home. \" Current as of: March 21, 2017 Content Version: 11.4 © 4933-9843 Healthwise, [employer]. Care instructions adapted under license by [employer] (which disclaims liability or warranty for this information). If you have questions about a medical condition or this instruction, always ask your healthcare professional. [address] any warranty or liability for your use of this information. These are general instructions for a healthy lifestyle: No smoking/ No tobacco products/ Avoid exposure to second hand smoke Surgeon General's Warning:  Quitting smoking now greatly reduces serious risk to your health. Obesity, smoking, and sedentary lifestyle greatly increases your risk for illness A healthy diet, regular physical exercise & weight monitoring are important for maintaining a healthy lifestyle You may be retaining fluid if you have a history of heart failure or if you experience any of the following symptoms:  Weight gain of 3 pounds or more overnight or 5 pounds in a week, increased swelling in our hands or feet or shortness of breath while lying flat in bed. Please call your doctor as soon as you notice any of these symptoms; do not wait until your next office visit. Recognize signs and symptoms of STROKE: 
 
F-face looks uneven A-arms unable to move or move even S-speech slurred or non-existent T-time-call 911 as soon as signs and symptoms begin-DO NOT go Back to bed or wait to see if you get better-TIME IS BRAIN. The discharge information has been reviewed with the patient. The patient verbalized understanding. Information obtained by : 
I understand that if any problems occur once I am at home I am to contact my physician. I understand and acknowledge receipt of the instructions indicated above. Physician's or R.N.'s Signature                                                                  Date/Time Patient or Representative Signature                                                          Date/Time 
 
 
 
[employer]O Transitions of Care Introducing [employer] [address] offers a voluntary care coordination program to provide high quality service and care to [facility] fee-for-service beneficiaries. Ru Gallegos was designed to help you enhance your health and well-being through the following services: ? Transitions of Care  support for individuals who are transitioning from one care setting to another (example: Hospital to home). ? Chronic and Complex Care Coordination  support for individuals and caregivers of those with serious or chronic illnesses or with more than one chronic (ongoing) condition and those who take a number of different medications. If you meet specific medical criteria, a [address] [place] [address] Rd may call you directly to coordinate your care with your primary care physician and your other care providers. For questions about the [facility] programs, please, contact your physicians office. For general questions or additional information about Accountable Care Organizations: 
Please visit www.medicare.gov/acos. html or call 1-800-MEDICARE (9-434.843.3563) TTY users should call 8-107.525.3920. Introducing [facility]! [facility] introduces ID.me patient portal. Now you can access parts of your medical record, email your doctor's office, and request medication refills online. 1. In your internet browser, go to [email or web address] 2. Click on the First Time User? Click Here link in the Sign In box.  You will see the New Member Sign Up page. 3. Enter your [employer] Access Code exactly as it appears below. You will not need to use this code after youve completed the sign-up process. If you do not sign up before the expiration date, you must request a new code. · [employer] Access Code: [email or web address] Expires: 5/21/2018 11:28 AM 
 
4. Enter the last four digits of your Social Security Number (xxxx) and Date of Birth (mm/dd/yyyy) as indicated and click Submit. You will be taken to the next sign-up page. 5. Create a "[employer]t ID. This will be your [employer] login ID and cannot be changed, so think of one that is secure and easy to remember. 6. Create a [employer] password. You can change your password at any time. 7. Enter your Password Reset Question and Answer. This can be used at a later time if you forget your password. 8. Enter your e-mail address. You will receive e-mail notification when new information is available in [address]. 9. Click Sign Up. You can now view and download portions of your medical record. 10. Click the Download Summary menu link to download a portable copy of your medical information. If you have questions, please visit the Frequently Asked Questions section of the [employer] website. Remember, [employer] is NOT to be used for urgent needs. For medical emergencies, dial 911. Now available from your iPhone and Android! Providers Seen During Your Hospitalization Provider Specialty Primary office phone Aurora Lo MD Orthopedic Surgery [phone] Immunizations Administered for This Admission Name Date  
 TB Skin Test (PPD) Intradermal 2/27/2018 Your Primary Care Physician (PCP) Primary Care Physician Office Phone Office Fax Daphane Epley [phone] You are allergic to the following Allergen Reactions Phenergan (Promethazine) Other (comments) Polyurethane-39 Rash Harsh soaps Recent Documentation Weight BMI Smoking Status 106.1 kg 31.74 kg/m2 Former Smoker Emergency Contacts Name Discharge Info Relation Home Work Mobile [facility] DISCHARGE CAREGIVER [3] Spouse [3] [phone] Patient Belongings The following personal items are in your possession at time of discharge: 
  Dental Appliances: Partials, Lowers, Uppers  Visual Aid: None      Home Medications: None   Jewelry: Ring       Other Valuables: Wallet, Cell Phone (all with Sandoval Viridiana) Please provide this summary of care documentation to your next provider. Signatures-by signing, you are acknowledging that this After Visit Summary has been reviewed with you and you have received a copy. Patient Signature:  ____________________________________________________________ Date:  ____________________________________________________________  
  
[facility] Provider Signature:  ____________________________________________________________ Date:  ____________________________________________________________

## 2018-02-27 NOTE — PERIOP NOTES
TRANSFER - IN REPORT:    Verbal report received from Sarah CHAPARRO/KENIA Campbell on Ernie [facility].  being received from 3rd floor for routine progression of care      Report consisted of patients Situation, Background, Assessment and   Recommendations(SBAR). Information from the following report(s) Kardex was reviewed with the receiving nurse. Opportunity for questions and clarification was provided. Assessment completed upon patients arrival to unit and care assumed.

## 2018-02-27 NOTE — PROGRESS NOTES
Problem: Self Care Deficits Care Plan (Adult)  Goal: *Acute Goals and Plan of Care (Insert Text)  GOALS:   DISCHARGE GOALS (in preparation for going home/rehab):  3 days  1. Mr. Nupur Gaviria will perform one lower body dressing activity with minimal assistance with adaptive equipment to demonstrate improved functional mobility and safety. 2.  Mr. Nupur Gaviria will perform one lower body bathing activity with minimal  assistance with adaptive equipment to demonstrate improved functional mobility and safety. 3.  Mr. Nupur Gaviria will perform toileting/toilet transfer with contact guard assistance with adaptive equipment to demonstrate improved functional mobility and safety. 4.  Mr. Nupur Gaviria will perform shower transfer with contact guard assistance with adaptive equipment to demonstrate improved functional mobility and safety. 5.  Mr. Nupur Gaviria will state GILMA precautions with two verbal cues to demonstrate improved functional mobility and safety. Comments:       JOINT CAMP OCCUPATIONAL THERAPY GILMA: Initial Assessment and PM 2/27/2018  INPATIENT: Hospital Day: 1  [place] / Product Type: Medicare /      NAME/AGE/GENDER: Igor Wilkins is a 71 y.o. male   PRIMARY DIAGNOSIS:  Unilateral primary osteoarthritis, left hip [M16.12]   Procedure(s) and Anesthesia Type:     * LEFT HIP ARTHROPLASTY TOTAL/MEL DUAL MOBILITY/ PT HAS A PACEMAKER - General (Left)  ICD-10: Treatment Diagnosis:    · Pain in left hip (M25.552)  · Stiffness of Left Hip, Not elsewhere classified (M25.652)  · Other lack of cordination (R27.8)      ASSESSMENT:     Mr. Nupur Gaviria is s/p left GILMA and presents with decreased weight bearing on left LE and decreased independence with functional mobility and activities of daily living. Patient would benefit from skilled Occupational Therapy to maximize independence and safety with self-care task and functional mobility.   Pt would also benefit from education on lower body adaptive equipment and hip precautions post-surgery in preparation for going home or for recommendations for post-hospital rehab program.  Patient plans for further rehab at home with home health services and good family support. OT reviewed therapy schedule and plan of care with patient. Patient was able to transfer and perform self care skills as charted below. Patient instructed to call for assistance when needing to get up from the bed and all needs in reach. Patient verbalized understanding of call light. This section established at most recent assessment   PROBLEM LIST (Impairments causing functional limitations):  1. Decreased Strength  2. Decreased ADL/Functional Activities  3. Decreased Transfer Abilities  4. Increased Pain  5. Increased Fatigue  6. Decreased Flexibility/Joint Mobility  7. Decreased Knowledge of Precautions   INTERVENTIONS PLANNED: (Benefits and precautions of occupational therapy have been discussed with the patient.)  1. Activities of daily living training  2. Adaptive equipment training  3. Balance training  4. Clothing management  5. Donning&doffing training  6. Theraputic activity     TREATMENT PLAN: Frequency/Duration: Follow patient 1 time to address above goals. Rehabilitation Potential For Stated Goals: Good     RECOMMENDED REHABILITATION/EQUIPMENT: (at time of discharge pending progress): Continue Skilled Therapy and Home Health: Physical Therapy. OCCUPATIONAL PROFILE AND HISTORY:   History of Present Injury/Illness (Reason for Referral): Pt presents this date s/p (left) GILMA. Past Medical History/Comorbidities:   Mr. James Lynne  has a past medical history of Acquired spondylolisthesis (6/16/2015); Actinic keratosis (8/9/2016); Arthritis; Back pain (8/9/2016); Basal cell carcinoma (dx 2/2018); BPH (benign prostatic hypertrophy) (8/9/2016); Cardiomyopathy ([facility] (08/02/2016); Chronic obstructive pulmonary disease ([facility]  Chronic renal insufficiency (08/09/2016); Controlled diabetes mellitus type II without complication ([facility] (99/67/1846); COPD exacerbation ([facility] (8/9/2016); Coronary artery disease involving native coronary artery of native heart without angina pectoris (8/2/2016); Dermatitis, seborrheic (8/9/2016); ZAFAR (dyspnea on exertion) (05/31/2016); Dyspnea; ED (erectile dysfunction) (8/9/2016); Elevated PSA (8/9/2016); Emphysemal COPD; Encounter for long-term (current) use of medications (8/9/2016); Enlarged prostate; Fatigue (8/9/2016); Finger swelling (8/9/2016); History of kidney stones; Hyperlipidemia; Hypertension; Hypophonia with hoarseness (8/8/2016); Kidney problem; Kidney stone (06/2015); Knee effusion (8/9/2016); Low vitamin B12 level (8/9/2016); Neurogenic bladder (8/9/2016); Obesity (BMI 30-39.9); Osteoarthritis (8/9/2016); Parkinson disease ([facility] Primary osteoarthritis of knee (8/9/2016); Prostatitis (8/9/2016); Pseudogout of hand (8/9/2016); Rosacea (8/9/2016); S/P placement of cardiac pacemaker (10/31/2016); Sciatica (8/9/2016); Seborrhea (8/9/2016); Slurred speech (08/09/2016); SOB (shortness of breath) (8/9/2016); Spinal stenosis, lumbar region, with neurogenic claudication (6/16/2015); Syncope, near (8/9/2016); Tremor (8/8/2016); Urinary frequency (08/09/2016); Urinary retention (8/9/2016); Urinary urgency (8/9/2016); Wears dentures; and Wears glasses. Mr. Rebecca Cartwright  has a past surgical history that includes hx lithotripsy (1998); hx cystostomy; hx urological (Right); hx appendectomy; hx knee arthroscopy (Right); hx acl reconstruction (Right); hx pacemaker (10/31/2016); and hx lumbar laminectomy (06/2015).   Social History/Living Environment:   Home Environment: Private residence  # Steps to Enter: 1  One/Two Story Residence: Two story, live on 1st floor  # of Interior Steps: 21  Interior Rails: Left  Living Alone: No  Support Systems: Spouse/Significant Other/Partner  Patient Expects to be Discharged to[employer] residence  Current DME Used/Available at Home: [address] [facility] [place], straight, Shower chair, Walker, rollator, Walker, rolling  Tub or Shower Type: Tub/Shower combination  Prior Level of Function/Work/Activity:  Mod I with ADLs, used canes     Number of Personal Factors/Comorbidities that affect the Plan of Care: Brief history (0):  LOW COMPLEXITY   ASSESSMENT OF OCCUPATIONAL PERFORMANCE[de-identified]   Most Recent Physical Functioning:   Balance  Sitting: Intact  Standing: Pull to stand; With support       Patient Vitals for the past 6 hrs:   BP BP Patient Position SpO2 O2 Flow Rate (L/min) Pulse   02/27/18 1019 165/82 Supine 99 % 2 l/min 75   02/27/18 1024 170/77 - 100 % - 71   02/27/18 1029 167/80 - 100 % - 68   02/27/18 1034 168/81 - 95 % - 70   02/27/18 1049 153/66 - 99 % - 64   02/27/18 1104 147/68 - 100 % - 65   02/27/18 1114 140/66 - 100 % - 71   02/27/18 1147 121/74 - 94 % - 72   02/27/18 1339 - - 96 % - -                    Coordination  Fine Motor Skills-Upper: Left Intact; Right Intact  Gross Motor Skills-Upper: Left Intact; Right Intact (for walker use)         Mental Status  Neurologic State: Appropriate for age;Drowsy  Orientation Level: Appropriate for age  Cognition: Appropriate decision making; Appropriate for age attention/concentration; Appropriate safety awareness; Follows commands  Perception: Appears intact  Perseveration: No perseveration noted  Safety/Judgement: Awareness of environment; Fall prevention                Basic ADLs (From Assessment) Complex ADLs (From Assessment)   Basic ADL  Feeding: Supervision  Oral Facial Hygiene/Grooming: Supervision  Bathing: Moderate assistance  Upper Body Dressing: Minimum assistance  Lower Body Dressing: Moderate assistance  Toileting: Total assistance (catheter)     Grooming/Bathing/Dressing Activities of Daily Living     Cognitive Retraining  Safety/Judgement: Awareness of environment; Fall prevention                 Functional Transfers  Toilet Transfer : Minimum assistance  Shower Transfer: Minimum assistance     Bed/Mat Mobility  Supine to Sit: Minimum assistance; Moderate assistance  Sit to Supine: Moderate assistance  Sit to Stand: Minimum assistance  Scooting: Minimum assistance         Physical Skills Involved:  1. Balance  2. Strength  3. Activity Tolerance Cognitive Skills Affected (resulting in the inability to perform in a timely and safe manner): 1. patient has parkinsons disease Psychosocial Skills Affected:  1. Habits/Routines  2. Self-Awareness   Number of elements that affect the Plan of Care: 5+:  HIGH COMPLEXITY   CLINICAL DECISION MAKIN[address] [place] [address] AM-PAC 6 Clicks   Daily Activity Inpatient Short Form  How much help from another person does the patient currently need. .. Total A Lot A Little None   1. Putting on and taking off regular lower body clothing? [] 1   [x] 2   [] 3   [] 4   2. Bathing (including washing, rinsing, drying)? [] 1   [x] 2   [] 3   [] 4   3. Toileting, which includes using toilet, bedpan or urinal?   [x] 1   [] 2   [] 3   [] 4   4. Putting on and taking off regular upper body clothing? [] 1   [] 2   [x] 3   [] 4   5. Taking care of personal grooming such as brushing teeth? [] 1   [] 2   [x] 3   [] 4   6. Eating meals? [] 1   [] 2   [] 3   [x] 4   © , Trustees of [address] [place] [address], under license to [employer]. All rights reserved     Score:  Initial: 15 Most Recent: X (Date: -- )    Interpretation of Tool:  Represents activities that are increasingly more difficult (i.e. Bed mobility, Transfers, Gait). Score 24 23 22-20 19-15 14-10 9-7 6     Modifier CH CI CJ CK CL CM CN      ?  Self Care:     - CURRENT STATUS: CK - 40%-59% impaired, limited or restricted    - GOAL STATUS: CJ - 20%-39% impaired, limited or restricted    - D/C STATUS:  ---------------To be determined---------------  [place] / Product Type: Medicare /      Medical Necessity:     · Patient is expected to demonstrate progress in balance, coordination and functional technique to decrease assistance required with self care and functional mobility and improve safety during self care and functional mobility. Reason for Services/Other Comments:  · Patient continues to require skilled intervention due to decreased self care and functional mobility. Use of outcome tool(s) and clinical judgement create a POC that gives a: LOW COMPLEXITY            TREATMENT:   (In addition to Assessment/Re-Assessment sessions the following treatments were rendered)     Pre-treatment Symptoms/Complaints:    Pain: Initial:   Pain Intensity 1: 3  Pain Location 1: Hip  Pain Orientation 1: Left  Pain Intervention(s) 1:  (nurse gave pain meds prior)  Post Session:  2/10 ice pack given , rest     Assessment/Reassessment only, no treatment provided today    Treatment/Session Assessment:     Response to Treatment:  Tolerated well, wife staying here with him. Education:  [] Home Exercises  [x] Fall Precautions  [x] Hip Precautions [] Going Home Video  [] Knee/Hip Prosthesis Review  [x] Walker Management/Safety [x] Adaptive Equipment as Needed       Interdisciplinary Collaboration:   o Physical Therapist  o Occupational Therapist  o Registered Nurse    After treatment position/precautions:   o Supine in bed  o Bed/Chair-wheels locked  o Bed in low position  o Call light within reach  o RN notified  o Family at bedside  o Side rails x 3     Compliance with Program/Exercises: compliant all of the time. Recommendations/Intent for next treatment session:  Treatment next visit will focus on increasing Mr. Bowie's independence with bed mobility, transfers, self care, functional mobility, modalities for pain, and patient education.       Total Treatment Duration:  OT Patient Time In/Time Out  Time In: 1325  Time Out: Joe Vansesa 5, OT

## 2018-02-27 NOTE — PROGRESS NOTES
Problem: Mobility Impaired (Adult and Pediatric)  Goal: *Acute Goals and Plan of Care (Insert Text)  GOALS (1-4 days):  (1.)Mr. Casey Gant will move from supine to sit and sit to supine  in bed with INDEPENDENT. (2.)Mr. Casey Gant will transfer from bed to chair and chair to bed with INDEPENDENT using the least restrictive device. (3.)Mr. Casey Gant will ambulate with INDEPENDENT for 250 feet with the least restrictive device. (4.)Mr. Casey Gant will ambulate up/down 3 steps with bilateral  railing with MINIMAL ASSIST with no device. (5.)Mr. Casey Gant will state/observe GILMA precautions with 0 verbal cues. ________________________________________________________________________________________________    Outcome: Progressing Towards Goal    PHYSICAL THERAPY Joint camp [place]: Initial Assessment 2/27/2018  INPATIENT: Hospital Day: 1  [place] / Product Type: Medicare /      NAME/AGE/GENDER: Rey Rudolph is a 71 y.o. male   PRIMARY DIAGNOSIS:  Unilateral primary osteoarthritis, left hip [M16.12]   Procedure(s) and Anesthesia Type:     * LEFT HIP ARTHROPLASTY TOTAL/MEL DUAL MOBILITY/ PT HAS A PACEMAKER - General (Left)  ICD-10: Treatment Diagnosis:    · Pain in left hip (M25.552)  · Stiffness of Left Hip, Not elsewhere classified (M25.652)  · Difficulty in walking, Not elsewhere classified (R26.2)  · Other abnormalities of gait and mobility (R26.89)      ASSESSMENT:     Mr. Casey Gant presents status post left total hip replacement with decreased independence with bed mobility, transfers, gait, and therapeutic exercise. Therapy will maximize independence with functional mobility. Pt progressing with steps by edge of bed. This section established at most recent assessment   PROBLEM LIST (Impairments causing functional limitations):  1. Decreased Strength  2. Decreased Transfer Abilities  3. Decreased Ambulation Ability/Technique  4. Decreased Balance  5.  Increased Pain  6. Decreased Activity Tolerance  7. Decreased Flexibility/Joint Mobility   INTERVENTIONS PLANNED: (Benefits and precautions of physical therapy have been discussed with the patient.)  1. Bed Mobility  2. Gait Training  3. Home Exercise Program (HEP)  4. Therapeutic Exercise/Strengthening  5. Transfer Training  6. Range of Motion: active/assisted/passive  7. Therapeutic Activities  8. Group Therapy     TREATMENT PLAN: Frequency/Duration: Follow patient BID   to address above goals. Rehabilitation Potential For Stated Goals: Good     RECOMMENDED REHABILITATION/EQUIPMENT: (at time of discharge pending progress): Home Health: Physical Therapy. HISTORY:   History of Present Injury/Illness (Reason for Referral):  Pt is status post left total hip replacement. Past Medical History/Comorbidities:   Mr. Jennifer Alex  has a past medical history of Acquired spondylolisthesis (6/16/2015); Actinic keratosis (8/9/2016); Arthritis; Back pain (8/9/2016); Basal cell carcinoma (dx 2/2018); BPH (benign prostatic hypertrophy) (8/9/2016); Cardiomyopathy ([facility] (08/02/2016); Chronic obstructive pulmonary disease ([facility] Chronic renal insufficiency (08/09/2016); Controlled diabetes mellitus type II without complication ([facility] (95/88/5637); COPD exacerbation ([facility] (8/9/2016); Coronary artery disease involving native coronary artery of native heart without angina pectoris (8/2/2016); Dermatitis, seborrheic (8/9/2016); ZAFAR (dyspnea on exertion) (05/31/2016); Dyspnea; ED (erectile dysfunction) (8/9/2016); Elevated PSA (8/9/2016); Emphysemal COPD; Encounter for long-term (current) use of medications (8/9/2016); Enlarged prostate; Fatigue (8/9/2016); Finger swelling (8/9/2016); History of kidney stones; Hyperlipidemia; Hypertension; Hypophonia with hoarseness (8/8/2016); Kidney problem; Kidney stone (06/2015); Knee effusion (8/9/2016); Low vitamin B12 level (8/9/2016); Neurogenic bladder (8/9/2016);  Obesity (BMI 30-39.9); Osteoarthritis (8/9/2016); Parkinson disease ([facility] Primary osteoarthritis of knee (8/9/2016); Prostatitis (8/9/2016); Pseudogout of hand (8/9/2016); Rosacea (8/9/2016); S/P placement of cardiac pacemaker (10/31/2016); Sciatica (8/9/2016); Seborrhea (8/9/2016); Slurred speech (08/09/2016); SOB (shortness of breath) (8/9/2016); Spinal stenosis, lumbar region, with neurogenic claudication (6/16/2015); Syncope, near (8/9/2016); Tremor (8/8/2016); Urinary frequency (08/09/2016); Urinary retention (8/9/2016); Urinary urgency (8/9/2016); Wears dentures; and Wears glasses. Mr. Asenath Alpers  has a past surgical history that includes hx lithotripsy (1998); hx cystostomy; hx urological (Right); hx appendectomy; hx knee arthroscopy (Right); hx acl reconstruction (Right); hx pacemaker (10/31/2016); and hx lumbar laminectomy (06/2015). Social History/Living Environment:   Home Environment: Private residence  # Steps to Enter: 1  One/Two Story Residence: Two story, live on 1st floor  # of Interior Steps: 20  Interior Rails: Left  Living Alone: No  Support Systems: Spouse/Significant Other/Partner  Patient Expects to be Discharged to[de-identified] Private residence  Current DME Used/Available at Home: [employer], straight, Shower chair, Walker, rollator, Walker, rolling  Tub or Shower Type: Tub/Shower combination  Prior Level of Function/Work/Activity:  Independent with ambulation with a rollator walker. Number of Personal Factors/Comorbidities that affect the Plan of Care: 0: LOW COMPLEXITY   EXAMINATION:   Most Recent Physical Functioning:   Gross Assessment: Yes  Gross Assessment  AROM: Generally decreased, functional  Strength: Generally decreased, functional  Sensation: Intact          LLE AROM  L Hip Flexion: 90          Bed Mobility  Supine to Sit: Minimum assistance; Moderate assistance  Sit to Supine:  Moderate assistance  Scooting: Minimum assistance    Transfers  Sit to Stand: Minimum assistance  Stand to Sit: Minimum assistance    Balance  Sitting: Intact  Standing: Pull to stand; With support    Posture  Posture (WDL): Within defined limits            Distance (ft): 5 Feet (ft) (to head of bed)  Ambulation - Level of Assistance: Minimal assistance;Assist x2  Assistive Device: Walker, rolling  Gait Abnormalities: Antalgic        Braces/Orthotics: none    Left Hip Cold  Type: Cold/ice pack      Body Structures Involved:  1. Bones  2. Joints  3. Muscles  4. Ligaments Body Functions Affected:  1. Neuromusculoskeletal  2. Movement Related Activities and Participation Affected:  1. Mobility   Number of elements that affect the Plan of Care: 4+: HIGH COMPLEXITY   CLINICAL PRESENTATION:   Presentation: Stable and uncomplicated: LOW COMPLEXITY   CLINICAL DECISION MAKIN[address] [place] [id number] AM-PAC 6 Clicks   Basic Mobility Inpatient Short Form  How much difficulty does the patient currently have. .. Unable A Lot A Little None   1. Turning over in bed (including adjusting bedclothes, sheets and blankets)? [] 1   [] 2   [x] 3   [] 4   2. Sitting down on and standing up from a chair with arms ( e.g., wheelchair, bedside commode, etc.)   [] 1   [] 2   [x] 3   [] 4   3. Moving from lying on back to sitting on the side of the bed? [] 1   [] 2   [x] 3   [] 4   How much help from another person does the patient currently need. .. Total A Lot A Little None   4. Moving to and from a bed to a chair (including a wheelchair)? [] 1   [] 2   [x] 3   [] 4   5. Need to walk in hospital room? [] 1   [] 2   [x] 3   [] 4   6. Climbing 3-5 steps with a railing? [] 1   [] 2   [x] 3   [] 4   © , Trustees of [address] [place] [address] [address] [address], under license to [employer]. All rights reserved        Score:  Initial: 18 Most Recent: X (Date: -- )    Interpretation of Tool:  Represents activities that are increasingly more difficult (i.e. Bed mobility, Transfers, Gait). Score 24 23 22-20 19-15 14-10 9-7 6     Modifier CH CI CJ CK CL CM CN      ?  Mobility - Walking and Moving Around:     - CURRENT STATUS: CK - 40%-59% impaired, limited or restricted    - GOAL STATUS: CK - 40%-59% impaired, limited or restricted    - D/C STATUS:  CK - 40%-59% impaired, limited or restricted  [place] / Product Type: Medicare /      Medical Necessity:     · Skilled intervention continues to be required due to decreased mobility ability. Reason for Services/Other Comments:  · decreased mobility ability. Use of outcome tool(s) and clinical judgement create a POC that gives a: Clear prediction of patient's progress: LOW COMPLEXITY            TREATMENT:   (In addition to Assessment/Re-Assessment sessions the following treatments were rendered)     Pre-treatment Symptoms/Complaints:  3/10 pain left hip  Pain: Initial:   Pain Intensity 1: 3  Pain Location 1: Hip  Pain Orientation 1: Left  Post Session:  3/10 pain left hip     Assessment/Reassessment only, no treatment provided today    Date:   Date:   Date:     ACTIVITY/EXERCISE AM PM AM PM AM PM   GROUP THERAPY  []  []  []  []  []  []   Ankle Pumps         Quad Sets         Gluteal Sets         Hip ABd/ADduction         Straight Leg Raises         Knee Slides         Short Arc Quads         Long Arc Quads         Chair Slides                  B = bilateral; AA = active assistive; A = active; P = passive      Treatment/Session Assessment:     Response to Treatment:  Pt agreeable to take steps to head of bed.     Education:  [x] Home Exercises  [] Fall Precautions  [x] Hip Precautions [] D/C Instruction Review  [] Knee/Hip Prosthesis Review  [x] Walker Management/Safety [] Adaptive Equipment as Needed       Interdisciplinary Collaboration:   o Physical Therapist  o Occupational Therapist  o Registered Nurse    After treatment position/precautions:   o Supine in bed  o Bed/Chair-wheels locked  o Bed in low position  o Caregiver at bedside  o Call light within reach  o RN notified Compliance with Program/Exercises: compliant most of the time. Recommendations/Intent for next treatment session:  Treatment next visit will focus on increasing Mr. Bowie's independence with bed mobility, transfers, gait training, strength/ROM exercises, modalities for pain, and patient education.       Total Treatment Duration:  PT Patient Time In/Time Out  Time In: 1335  Time Out: Nadya 60, PT

## 2018-02-27 NOTE — PROGRESS NOTES
02/27/18 1339   Oxygen Therapy   O2 Sat (%) 96 %   Pulse via Oximetry 93 beats per minute   O2 Device Room air   Joint Camp Notes Reviewed. Pt working on IS. Pt encouraged to do 10 breaths per hour while awake on IS. Good NPC. No respiratory distress noted at this time. No complications noted at this time. C/S 8  At bedside.

## 2018-02-27 NOTE — CONSULTS
Physical Medicine & Rehabilitation Note-consult    Patient: Rocío Sánchez. MRN: [id number]  SSN: [id number]    YOB: 1945  Age: 71 y.o. Sex: male      Admit Date: 2/27/2018  Admitting Physician: Denisa Beyer MD    Medical Decision Making/Plan/Recommend: Gait impairment and functional deficits following left total hip arthroplasty. Plan for home discharge if possible. If unsafe will plan sub acute rehab at [facility]. Continue PT, OT for LLE strengthening, mobility, transfers, and gait training. Weakness, parkinson's symptoms may limit progress. Will follow progress. Chief Complaint : Gait dysfunction secondary to below. Admit Diagnosis: Unilateral primary osteoarthritis, left hip [M16.12]  left  total hip arthroplasty   2/27/2018  Pain  DVT risk  Post op hemorrhagic anemia  Parkinson disease ([facility] [address]  Osteoarthritis  Controlled diabetes mellitus type II without complication (HCC)  Acute Rehab Dx:  Gait impairment  Weakness  tremor  Mobility and ambulation deficits  Self Care/ADL deficits    Medical Dx:  Past Medical History:   Diagnosis Date    Acquired spondylolisthesis 6/16/2015    Actinic keratosis 8/9/2016    Arthritis     generalized OA, managed with OTC medication     Back pain 8/9/2016    Basal cell carcinoma dx 2/2018    small pea-sized lesion on right side of nose    BPH (benign prostatic hypertrophy) 8/9/2016    Cardiomyopathy ([facility] [address] 08/02/2016    Followed by Dr. Anyi Crawley at [facility]     Chronic obstructive pulmonary disease [facility])     managed with inhalers-Followed by PCP, Dr. Hollis Gunn Chronic renal insufficiency 08/09/2016    patient denies, states Creatinine elevated one time.      Controlled diabetes mellitus type II without complication ([address] 72/52/2754    Patient denies-states BS was elevated one time-patient does not take any medications for diabetes     COPD exacerbation ([address] 8/9/2016    Coronary artery disease involving native coronary artery of native heart without angina pectoris 8/2/2016    Dermatitis, seborrheic 8/9/2016    ZAFAR (dyspnea on exertion) 05/31/2016    due to COPD     Dyspnea     ED (erectile dysfunction) 8/9/2016    Elevated PSA 8/9/2016    Emphysemal COPD     Encounter for long-term (current) use of medications 8/9/2016    Enlarged prostate     managed with medication     Fatigue 8/9/2016    Finger swelling 8/9/2016    History of kidney stones     several with surgical interventions    Hyperlipidemia     Hypertension     managed with medication     Hypophonia with hoarseness 8/8/2016    Kidney problem     Kidney stone 06/2015    states he presently has 3 kidney stones monitored by Urology    Knee effusion 8/9/2016    Low vitamin B12 level 8/9/2016    Neurogenic bladder 8/9/2016    Obesity (BMI 30-39. 9)     BMI 34.5    Osteoarthritis 8/9/2016    Parkinson disease ([facility] [address]     Followed by Dr. Hardy Dye with medication     Primary osteoarthritis of knee 8/9/2016    Prostatitis 8/9/2016    Pseudogout of hand 8/9/2016    Rosacea 8/9/2016    S/P placement of cardiac pacemaker 10/31/2016    Sciatica 8/9/2016    Seborrhea 8/9/2016    Slurred speech 08/09/2016    per patient \"mostly in the evenings\"     SOB (shortness of breath) 8/9/2016    Spinal stenosis, lumbar region, with neurogenic claudication 6/16/2015    Syncope, near 8/9/2016    Tremor 8/8/2016    Urinary frequency 08/09/2016    controlled with medication     Urinary retention 8/9/2016    Urinary urgency 8/9/2016    Wears dentures     upper and lower    Wears glasses      Subjective:     Date of Evaluation:  February 27, 2018    HPI: Natasha Bliss is a 71 y.o. male patient at [address] [place] [address] who was admitted on 2/27/2018  by Perez Brandon MD with below mentioned medical history, is being seen for Physical Medicine and Rehabilitation consult.     Natasha Bliss had painful left hip due to severe DJD, that has been refractory to conservative management. The symptoms were aggravated by weight bearing, walking and activity, limiting daily function. He underwent a left GILMA per Dr. Daniel Augustin MD on 2/27/2018. The patient's roselyn operative course has been uneventful. Carlee Scott. is seen and examined today. Medical Records reviewed. Patient is to be WBAT LLE. Hip precautions are to be followed strictly. Patient is starting to work with acute PT, OT, standing, taking few steps with minimum assist  so far. Patient still shows significant functional deficits due to right hip pain, decreased ROM and strength. He is also likely limited by Parkinson's symptoms. Will need to monitor closely. He is at baseline independent using a RW. Present level of function - bed mobility - min/ mod A, transfers - min/ mod A, decreased balance , ambulation - 5' with min assist x 2 using a RW. Prior Level of Function/Work/Activity:  Independent with ambulation with a rollator walker.   Family History   Problem Relation Age of Onset    Heart Disease Mother     Lung Disease Mother     COPD Mother     Stroke Father     Heart Attack Father 52    Heart Disease Father     Hypertension Other     Diabetes Other      paternal uncle    Heart Attack Other 45     son    Thyroid Disease Sister     Heart Disease Paternal Uncle     Other Paternal Grandmother      Aneurysm    Diabetes Son     Heart Attack Son 45      Social History   Substance Use Topics    Smoking status: Former Smoker     Packs/day: 1.50     Years: 45.00     Quit date: 7/12/2009    Smokeless tobacco: Never Used      Comment: stopped 2007    Alcohol use No     Past Surgical History:   Procedure Laterality Date    HX ACL RECONSTRUCTION Right     HX APPENDECTOMY      age 22    HX CYSTOSTOMY      HX KNEE ARTHROSCOPY Right     HX LITHOTRIPSY  1998    x 2    HX LUMBAR LAMINECTOMY  06/2015    Laminectomy with fusion; lower back; with pins    HX PACEMAKER  10/31/2016    HX UROLOGICAL Right     open removal of kidney stones      Prior to Admission medications    Medication Sig Start Date End Date Taking? Authorizing Provider   mupirocin (BACTROBAN) 2 % ointment by Both Nostrils route two (2) times a day. 2/22/18 2/27/18 Yes Historical Provider   co-enzyme Q-10 (COQ-10) 100 mg capsule Take 100 mg by mouth daily. Yes Historical Provider   multivitamin (ONE A DAY) tablet Take 1 Tab by mouth daily. Yes Historical Provider   carbidopa-levodopa ER (SINEMET CR)  mg per tablet TAKE 2 TABLETs AT BEDTIME  Patient taking differently: TAKE 2 TABLETs AT BEDTIME    Non-formulary. Instructed to bring to the hospital on the DOS, in the original bottle, and give to nurse. 1/24/18  Yes Geoffrey Lee MD   carbidopa-levodopa (SINEMET-25/250)  mg per tablet Take 1 Tab by mouth four (4) times daily. 1/24/18  Yes Geoffrey Lee MD   sacubitril-valsartan (ENTRESTO) 24 mg/26 mg tablet Take 1 Tab by mouth two (2) times a day. 12/1/17  Yes Alejandro Mckeon MD   tiotropium (SPIRIVA WITH HANDIHALER) 18 mcg inhalation capsule Take 1 Cap by inhalation every morning. 11/27/17  Yes Shahid Mar MD   fluticasone-salmeterol (ADVAIR) 250-50 mcg/dose diskus inhaler Take 1 Puff by inhalation daily. Patient taking differently: Take 1 Puff by inhalation daily. Instructed to bring to the hospital on the DOS per anesthesia protocol. 11/27/17  Yes Shahid Mar MD   potassium chloride SR (KLOR-CON 10) 10 mEq tablet Take 2 Tabs by mouth daily. 7/31/17  Yes Shahid Mar MD   carvedilol (COREG) 6.25 mg tablet Take 1 Tab by mouth two (2) times daily (with meals). 7/21/17  Yes Alejandro Mckeon MD   VESICARE 5 mg tablet TAKE 1 TABLET DAILY 6/25/17  Yes Giuseppe Kaur DO   simvastatin (ZOCOR) 80 mg tablet Take 1 Tab by mouth daily. Patient taking differently: Take 40 mg by mouth daily.  5/17/17  Yes Shahid Masterson., MD   acetaminophen (TYLENOL) 650 mg CR tablet Take 650 mg by mouth every six (6) hours as needed for Pain. Yes Historical Provider   albuterol (PROAIR HFA) 90 mcg/actuation inhaler Take 2 Puffs by inhalation every four (4) hours as needed for Wheezing. Instructed to bring to the hospital on the DOS per anesthesia protocol. Yes Historical Provider   aspirin delayed-release 81 mg tablet Take 81 mg by mouth daily. Indications: 1qd   Yes Historical Provider   OTHER Please resume PT specifically for gait and balance. Avoid aggravating his lower back with frequent sitting and standing. His gait is still slow with decreased stride and dragging of his leg leg and decreased arm swing. Posture is leaning forward. 16   Tee Ceballos MD     Allergies   Allergen Reactions    Phenergan [Promethazine] Other (comments)    Polyurethane-39 Rash     Harsh soaps         Review of Systems: + hip  +antalgic gait. + tremor. Denies chest pain, shortness of breath, cough, headache, visual problems, abdominal pain, dysurea, calf pain. Pertinent positives are as noted in the medical records and unremarkable otherwise. Objective:     Vitals:  Blood pressure 108/67, pulse 89, temperature 98.4 °F (36.9 °C), resp. rate 16, weight 234 lb (106.1 kg), SpO2 92 %. Temp (24hrs), Av.5 °F (36.4 °C), Min:96.3 °F (35.7 °C), Max:98.4 °F (36.9 °C)    BMI (calculated): 31.7 (18 1034)   Intake and Output:       Physical Exam:   General: Alert and age appropriately oriented. No acute cardio respiratory distress. HEENT: Normocephalic, no conjunctival pallor, no scleral icterus  Oral mucosa moist without cyanosis   Lungs: Clear to auscultation bilaterally. Respiration even and unlabored   Heart: Regular rate and rhythm, S1, S2   No  murmurs, clicks, rub or gallops   Abdomen: Soft, non-tender, non-distended. Genitourinary: defered   Neuromuscular:      Grossly no focal motor deficits. Left knee extension strong.   Left ankle dorsiflexion 5/5  Left ankle plantarflexion 5/5  No sensory deficits distally BLE to soft touch. + tremors B hands, + hoarseness. + mild bradykinesia. Skin/extremity: Calves non tender BLE. No rashes, no marginal erythema. Labs/Studies:  Recent Results (from the past 72 hour(s))   TYPE & SCREEN    Collection Time: 02/27/18  6:55 AM   Result Value Ref Range    Crossmatch Expiration 03/02/2018     ABO/Rh(D) AB POSITIVE     Antibody screen NEG    GLUCOSE, POC    Collection Time: 02/27/18  7:03 AM   Result Value Ref Range    Glucose (POC) 90 65 - 100 mg/dL       Functional Assessment:  Reviewed participation and progress in therapies  Gross Assessment  AROM: Generally decreased, functional (02/27/18 1459)  Strength: Generally decreased, functional (02/27/18 1459)  Sensation: Intact (02/27/18 1459)   Bed Mobility  Supine to Sit: Minimum assistance; Moderate assistance (02/27/18 1459)  Sit to Supine: Moderate assistance (02/27/18 1459)  Scooting: Minimum assistance (02/27/18 1325)   Balance  Sitting: Intact (02/27/18 1459)  Standing: Pull to stand; With support (02/27/18 1459)               Bed/Mat Mobility  Supine to Sit: Minimum assistance; Moderate assistance (02/27/18 1459)  Sit to Supine:  Moderate assistance (02/27/18 1459)  Sit to Stand: Minimum assistance (02/27/18 1459)  Scooting: Minimum assistance (02/27/18 1325)     Ambulation:  Gait  Gait Abnormalities: Antalgic (02/27/18 1459)  Ambulation - Level of Assistance: Minimal assistance;Assist x2 (02/27/18 1459)  Distance (ft): 5 Feet (ft) (to head of bed) (02/27/18 1459)  Assistive Device: Walker, rolling (02/27/18 1459)    Impression/Plan:     Active Problems:    Arthritis of left hip (2/27/2018)        Current Facility-Administered Medications   Medication Dose Route Frequency Provider Last Rate Last Dose    albuterol (PROVENTIL VENTOLIN) nebulizer solution 2.5 mg  2.5 mg Nebulization Q6H PRN Harriet Vick MD        tuberculin injection 5 Units  5 Units IntraDERMal ONCE Harriet Vick MD   5 Units at 02/27/18 0655    albuterol (PROVENTIL HFA, VENTOLIN HFA, PROAIR HFA) inhaler 2 Puff  2 Puff Inhalation Q4H PRN LEANA Schrader        carbidopa-levodopa (SINEMET)  mg per tablet 1 Tab  1 Tab Oral QID LEANA Schrader        carvedilol (COREG) tablet 6.25 mg  6.25 mg Oral BID WITH MEALS LEANA Schrader        carbidopa-levodopa ER (SINEMET CR)  mg per tablet 2 Tab  (Patient Supplied)  2 Tab Oral QHS LEANA Christianson        [START ON 2/28/2018] fluticasone-salmeterol (ADVAIR) 250mcg-50mcg/puff  (Patient Supplied)  1 Puff Inhalation DAILY LEANA Christianson        sacubitril-valsartan (ENTRESTO) 24-26 mg tablet 1 Tab  1 Tab Oral BID WITH MEALS LEANA Jay        [START ON 2/28/2018] tiotropium [facility]) inhalation capsule 18 mcg  1 Cap Inhalation DAILY LEANA Schrader        [START ON 2/28/2018] solifenacin (VESICARE) tablet 5 mg  5 mg Oral DAILY LEANA Christianson        0.9% sodium chloride infusion  100 mL/hr IntraVENous CONTINUOUS LEANA Christianson 100 mL/hr at 02/27/18 1238 100 mL/hr at 02/27/18 1238    sodium chloride (NS) flush 5-10 mL  5-10 mL IntraVENous PRN LEANA Schrader        [START ON 2/28/2018] acetaminophen (TYLENOL) tablet 1,000 mg  1,000 mg Oral Q6H LEANA Schrader        celecoxib (CELEBREX) capsule 200 mg  200 mg Oral Q12H LEANA Schrader        naloxone [facility]) injection 0.2-0.4 mg  0.2-0.4 mg IntraVENous Q10MIN PRN LEANA Schrader        [START ON 2/28/2018] dexamethasone (DECADRON) injection 10 mg  10 mg IntraVENous ONCE LEANA Schrader        diphenhydrAMINE (BENADRYL) capsule 25 mg  25 mg Oral Q4H PRN Jas Members, PA        zolpidem (AMBIEN) tablet 5 mg  5 mg Oral QHS PRN Jas Members, PA        aspirin delayed-release tablet 81 mg  81 mg Oral Q12H Jas Members, PA        ceFAZolin (ANCEF) 2 g/20 mL in sterile water IV syringe  2 g IntraVENous Q8H LEANA Joy        oxyCODONE IR (ROXICODONE) tablet 10 mg  10 mg Oral Q3H PRN LEANA Joy   10 mg at 02/27/18 1238    HYDROmorphone (PF) (DILAUDID) injection 1 mg  1 mg IntraVENous Q3H PRN LEANA Ball        [START ON 2/28/2018] senna-docusate (PERICOLACE) 8.6-50 mg per tablet 2 Tab  2 Tab Oral DAILY LEANA Joy        acetaminophen (OFIRMEV) infusion 1,000 mg  1,000 mg IntraVENous ONCE Romi Tse MD            Recommendations: Plan for home discharge if possible. If unsafe will plan sub acute [facility]. Will follow progress. Continue Acute Rehab Program. Continue gait training, transfer training, balance activities. Coordination of rehab/medical care. Counseling of Physical Medicine & Rehab care issues management. Monitoring and management of rehab conditions per the plan of care/orders. Will follow along with you for PM&R issues and provide you follow up. Rehabilitation Management/ Medical Management: 1. Devices:Walkers, Type: Rolling Walker  2. Consult:Rehab team including PT, OT,  and . 3. Disposition Rehab-discussed with patient. 4. Thigh-high or knee-high ADELIA's when out of bed. 5. DVT Prophylaxis - [id number] bid x 30days. 6. Incentive spirometer Q1H while awake  7. Post op hemorrhagic anemia- monitor. 8. Activity: WBAT LLE, total hip precautions. 9. Planned Labs: CBC,BMP  10. Pain Control: fair control. Continue scheduled tylenol, celebrex and  PRN meds. Continue current management. 11.Wound Care: Keep surgical wound clean and dry and reinforce dressing PRN. May remove Aquacel 1 week post op ad replace with new one. Remove staples 12-14 post surgery, when incision appears appropriately closed and apply benzoin and 1/2\" steristrips. Follow up with Dr Jaymie Larson  2 weeks after discharge from rehab. Follow up with ORTHO per instructions.       Thank you for the opportunity to participate in the care of this patient.     Signed By: Yamel Garcia MD     February 27, 2018

## 2018-02-27 NOTE — OP NOTES
Total Hip Procedure Note    [place],  [id number],  1945    Pre-operative Diagnosis:  Unilateral primary osteoarthritis, left hip [M16.12]  Post-operative Diagnosis: Unilateral primary osteoarthritis, left hip [M16.12]    Procedure: Left Total Hip Arthroplasty    BMI: There is no height or weight on file to calculate BMI. .    Location: [address] [place] [address]  Surgeon: Marcela Owens MD  Assistant: Cheryle Bunch, PA    Anesthesia: GET    EBL: 300 cc    Procedure: Elizabethgabbyhirenjackson Avendano MDM Total Hip Arthroplasty     The complexity of total joint surgery requires the use of a skilled first assistant for positioning, retraction and assistance in closure. [place] was brought to the operating room and positioned on the operating table. Anesthesia was administered. A pretty catheter was placed preoperatively and IV antibiotics were administered. A time out identifying the patient, procedure, operative side and surgeon was administered and charted by the OR Nurse. [place] was positioned on right lateral decubitus position. The limb was prepped and draped in the usual sterile fashion. The Posterior approach was utilized to expose the hip. The incision was carried through the subcutaneous tissue and underlying fascia with hemostasis obtained using the bovie cauterization. A Charmley retractor was inserted. The short external rotators were divided at their insertion. The sciatic nerve was palpated and identified. Next, a T-shaped capsular incision was accomplished and femoral head was dislocated. The neck was osteotomized in the appropriate position just above the lesser trochanteric region. The neck cut was measured to be 8 mm. Acetabular retractors were placed and the appropriate capsulotomy performed. Soft tissue was removed from the acetabulum. The acetabulum was sequentially reamed.   Using trial components, the acetabulum was sized to a 60 mm Trident acetabular cup.   The acetabular component was impacted to achieve appropriate horizontal tilt, anteversion, bone coverage and stability. 2 screws were used to stabilize the cup  A metal liner was impacted into position. Utilizing the femoral retractor, the canal was prepared with appropriate lateralization and reamed with the starter reamer. The canal was then broached progressively. The broach was positioned with the appropriate anatomic femoral anteversion. A calcar planar was utilized. A trial reduction with a  +5 Bilox neck length was utilized. This was found to be the most stable to flexion greater than 90 degrees and on internal and external rotation. Limb lengths were thought to be equilibrated and with appropriate stability as mentioned above. All trial components were removed. The cementless permanent size 14 std Corail stem was impacted into place. A trial reduction was performed and the above neck length was selected. The permanent  MDM  femoral head was impacted into place. Janna Degroot Jr.'s hip was reduced and stability was as mentioned above. Copious irrigation was accomplished about the surgical site. The sciatic nerve was palpated and noted to be intact. The capsule was repaired with a #1 PDS and the external rotators were reattached with a #5 Mersilene. A lavage of diluted betadine solution of 17.5 ml Betadine in 500 of 0.9% Normal Saline was allowed to soak in the wound for 3 minutes after implanting of the prosthesis. The wound was irrigated with Saline again before closure. Prior to the final skin closure, full strength betadine was applied to the skin surrounding the skin incision. The operative hip was injected prior to closure for post operative pain management. A #1 PDS suture was used to re-approximate the fascia. Monocryl sutures were used to approximate the subcutaneous layers. Staples were used to reapproximate the skin edges and a sterile bandage was placed. The patient was then rolled to a supine position. The sponge and needle counts were correct. The patient tolerated the procedure without difficulty and left the operating room in satisfactory condition. Implants:   Implant Name Type Inv.  Item Serial No.  Lot No. LRB No. Used   SCR BNE ACET CANC 6.5X25MM -- TRIDENT - [id number]  SCR BNE ACET CANC 6.5X25MM -- TRIDENT [id number] [employer] HOW [id number] Left 1   LINER MDM COCR 48MM G --  - [id number]  LINER MDM COCR 48MM G --  [id number] [employer] HOW [id number] Left 1   SCR BNE ACET CANC 6.5X25MM -- TRIDENT - [id number]  SCR BNE ACET CANC 6.5X25MM -- TRIDENT [id number] [employer] HOW [id number] Left 1   SHELL ACET CLUS H TRIDENT 60MM --  - [id number]  SHELL ACET CLUS H TRIDENT 60MM --  [id number] [employer] HOW [id number] Left 1   INSERT ACET CUP X3 28/54MM -- RESTORATION ADM - [id number]  INSERT ACET CUP X3 28/54MM -- RESTORATION ADM [id number] [employer] HOW [id number] Left 1   HEAD FEM 28MM +5MM NK -- BIOLOX DELTA - [id number]  HEAD FEM 28MM +5MM NK -- BIOLOX DELTA [id number] [facility] ORTHOPEDICS [id number] Left 1   STEM FEM CORAIL STD COL SZ 14 --  - [id number]   STEM FEM CORAIL STD COL SZ 14 --  [id number] [facility] ORTHOPEDICS [id number] Left 1     Signed By: Leandra Thomas MD

## 2018-02-28 LAB
ANION GAP SERPL CALC-SCNC: 10 MMOL/L (ref 7–16)
BUN SERPL-MCNC: 18 MG/DL (ref 8–23)
CALCIUM SERPL-MCNC: 8.5 MG/DL (ref 8.3–10.4)
CHLORIDE SERPL-SCNC: 106 MMOL/L (ref 98–107)
CO2 SERPL-SCNC: 27 MMOL/L (ref 21–32)
CREAT SERPL-MCNC: 1.02 MG/DL (ref 0.8–1.5)
GLUCOSE SERPL-MCNC: 126 MG/DL (ref 65–100)
HGB BLD-MCNC: 12.7 G/DL (ref 13.6–17.2)
POTASSIUM SERPL-SCNC: 4.4 MMOL/L (ref 3.5–5.1)
SODIUM SERPL-SCNC: 143 MMOL/L (ref 136–145)

## 2018-02-28 PROCEDURE — 97110 THERAPEUTIC EXERCISES: CPT

## 2018-02-28 PROCEDURE — 94762 N-INVAS EAR/PLS OXIMTRY CONT: CPT

## 2018-02-28 PROCEDURE — 74011250636 HC RX REV CODE- 250/636: Performed by: ORTHOPAEDIC SURGERY

## 2018-02-28 PROCEDURE — 36415 COLL VENOUS BLD VENIPUNCTURE: CPT | Performed by: PHYSICIAN ASSISTANT

## 2018-02-28 PROCEDURE — 97535 SELF CARE MNGMENT TRAINING: CPT

## 2018-02-28 PROCEDURE — 80048 BASIC METABOLIC PNL TOTAL CA: CPT | Performed by: PHYSICIAN ASSISTANT

## 2018-02-28 PROCEDURE — 97150 GROUP THERAPEUTIC PROCEDURES: CPT

## 2018-02-28 PROCEDURE — 85018 HEMOGLOBIN: CPT | Performed by: PHYSICIAN ASSISTANT

## 2018-02-28 PROCEDURE — 74011250636 HC RX REV CODE- 250/636: Performed by: PHYSICIAN ASSISTANT

## 2018-02-28 PROCEDURE — 97116 GAIT TRAINING THERAPY: CPT

## 2018-02-28 PROCEDURE — 65270000029 HC RM PRIVATE

## 2018-02-28 PROCEDURE — 74011250637 HC RX REV CODE- 250/637: Performed by: PHYSICIAN ASSISTANT

## 2018-02-28 PROCEDURE — 94760 N-INVAS EAR/PLS OXIMETRY 1: CPT

## 2018-02-28 RX ADMIN — CARBIDOPA AND LEVODOPA 1 TABLET: 25; 250 TABLET ORAL at 06:41

## 2018-02-28 RX ADMIN — Medication 2 G: at 00:40

## 2018-02-28 RX ADMIN — CARBIDOPA AND LEVODOPA 2 TABLET: 50; 200 TABLET, EXTENDED RELEASE ORAL at 00:43

## 2018-02-28 RX ADMIN — ONDANSETRON 4 MG: 2 INJECTION INTRAMUSCULAR; INTRAVENOUS at 08:09

## 2018-02-28 RX ADMIN — SOLIFENACIN SUCCINATE 5 MG: 5 TABLET, FILM COATED ORAL at 08:11

## 2018-02-28 RX ADMIN — ACETAMINOPHEN 1000 MG: 500 TABLET, FILM COATED ORAL at 06:40

## 2018-02-28 RX ADMIN — CARVEDILOL 6.25 MG: 6.25 TABLET, FILM COATED ORAL at 08:11

## 2018-02-28 RX ADMIN — ASPIRIN 81 MG: 81 TABLET, COATED ORAL at 08:10

## 2018-02-28 RX ADMIN — OXYCODONE HYDROCHLORIDE 10 MG: 5 TABLET ORAL at 08:10

## 2018-02-28 RX ADMIN — SENNOSIDES AND DOCUSATE SODIUM 2 TABLET: 8.6; 5 TABLET ORAL at 08:10

## 2018-02-28 RX ADMIN — CELECOXIB 200 MG: 200 CAPSULE ORAL at 20:31

## 2018-02-28 RX ADMIN — ASPIRIN 81 MG: 81 TABLET, COATED ORAL at 20:31

## 2018-02-28 RX ADMIN — SACUBITRIL AND VALSARTAN 1 TABLET: 24; 26 TABLET, FILM COATED ORAL at 08:10

## 2018-02-28 RX ADMIN — CELECOXIB 200 MG: 200 CAPSULE ORAL at 08:11

## 2018-02-28 RX ADMIN — ACETAMINOPHEN 1000 MG: 500 TABLET, FILM COATED ORAL at 18:02

## 2018-02-28 RX ADMIN — ACETAMINOPHEN 1000 MG: 500 TABLET, FILM COATED ORAL at 00:36

## 2018-02-28 RX ADMIN — SACUBITRIL AND VALSARTAN 1 TABLET: 24; 26 TABLET, FILM COATED ORAL at 18:03

## 2018-02-28 RX ADMIN — CARVEDILOL 6.25 MG: 6.25 TABLET, FILM COATED ORAL at 18:02

## 2018-02-28 NOTE — PROGRESS NOTES
Problem: Mobility Impaired (Adult and Pediatric)  Goal: *Acute Goals and Plan of Care (Insert Text)  GOALS (1-4 days):  (1.)Mr. Oly Luis will move from supine to sit and sit to supine  in bed with INDEPENDENT. (2.)Mr. Oly Luis will transfer from bed to chair and chair to bed with INDEPENDENT using the least restrictive device. (3.)Mr. Oly Luis will ambulate with INDEPENDENT for 250 feet with the least restrictive device. (4.)Mr. Oly Luis will ambulate up/down 3 steps with bilateral  railing with MINIMAL ASSIST with no device. (5.)Mr. Oly Luis will state/observe GILMA precautions with 0 verbal cues. ________________________________________________________________________________________________    Outcome: Progressing Towards Goal    PHYSICAL THERAPY [facility] [place]: Daily Note, PM 2/28/2018  INPATIENT: Hospital Day: 2  [place]: [place] / Product Type: Medicare /      NAME/AGE/GENDER: Ernie Lassiter is a 71 y.o. male   PRIMARY DIAGNOSIS:  Unilateral primary osteoarthritis, left hip [M16.12]   Procedure(s) and Anesthesia Type:     * LEFT HIP ARTHROPLASTY TOTAL/MEL DUAL MOBILITY/ PT HAS A PACEMAKER - General (Left)  ICD-10: Treatment Diagnosis:    · Pain in left hip (M25.552)  · Stiffness of Left Hip, Not elsewhere classified (M25.652)  · Difficulty in walking, Not elsewhere classified (R26.2)  · Other abnormalities of gait and mobility (R26.89)      ASSESSMENT:     Mr. Oly Luis  Sitting up in chair on arrival.  He is agreeable and plans to go home tomorrow with HHPT. This section established at most recent assessment   PROBLEM LIST (Impairments causing functional limitations):  1. Decreased Strength  2. Decreased Transfer Abilities  3. Decreased Ambulation Ability/Technique  4. Decreased Balance  5. Increased Pain  6. Decreased Activity Tolerance  7.  Decreased Flexibility/Joint Mobility   INTERVENTIONS PLANNED: (Benefits and precautions of physical therapy have been discussed with the patient.)  1. Bed Mobility  2. Gait Training  3. Home Exercise Program (HEP)  4. Therapeutic Exercise/Strengthening  5. Transfer Training  6. Range of Motion: active/assisted/passive  7. Therapeutic Activities  8. Group Therapy     TREATMENT PLAN: Frequency/Duration: Follow patient BID   to address above goals. Rehabilitation Potential For Stated Goals: Good     RECOMMENDED REHABILITATION/EQUIPMENT: (at time of discharge pending progress): Home Health: Physical Therapy. HISTORY:   History of Present Injury/Illness (Reason for Referral):  Pt is status post left total hip replacement. Past Medical History/Comorbidities:   Mr. James Lynne  has a past medical history of Acquired spondylolisthesis (6/16/2015); Actinic keratosis (8/9/2016); Arthritis; Back pain (8/9/2016); Basal cell carcinoma (dx 2/2018); BPH (benign prostatic hypertrophy) (8/9/2016); Cardiomyopathy ([facility] [address] (08/02/2016); Chronic obstructive pulmonary disease ([facility] [address] [facility] Chronic renal insufficiency (08/09/2016); Controlled diabetes mellitus type II without complication ([facility] [address] [facility] (20/50/9418); COPD exacerbation ([facility] [address] [facility] (8/9/2016); Coronary artery disease involving native coronary artery of native heart without angina pectoris (8/2/2016); Dermatitis, seborrheic (8/9/2016); ZAFAR (dyspnea on exertion) (05/31/2016); Dyspnea; ED (erectile dysfunction) (8/9/2016); Elevated PSA (8/9/2016); Emphysemal COPD; Encounter for long-term (current) use of medications (8/9/2016); Enlarged prostate; Fatigue (8/9/2016); Finger swelling (8/9/2016); History of kidney stones; Hyperlipidemia; Hypertension; Hypophonia with hoarseness (8/8/2016); Kidney problem; Kidney stone (06/2015); Knee effusion (8/9/2016); Low vitamin B12 level (8/9/2016); Neurogenic bladder (8/9/2016); Obesity (BMI 30-39.9); Osteoarthritis (8/9/2016); Parkinson disease ([facility] [address] Primary osteoarthritis of knee (8/9/2016); Prostatitis (8/9/2016); Pseudogout of hand (8/9/2016);  Rosacea (8/9/2016); S/P placement of cardiac pacemaker (10/31/2016); Sciatica (8/9/2016); Seborrhea (8/9/2016); Slurred speech (08/09/2016); SOB (shortness of breath) (8/9/2016); Spinal stenosis, lumbar region, with neurogenic claudication (6/16/2015); Syncope, near (8/9/2016); Tremor (8/8/2016); Urinary frequency (08/09/2016); Urinary retention (8/9/2016); Urinary urgency (8/9/2016); Wears dentures; and Wears glasses. Mr. Mehran Baca  has a past surgical history that includes hx lithotripsy (1998); hx cystostomy; hx urological (Right); hx appendectomy; hx knee arthroscopy (Right); hx acl reconstruction (Right); hx pacemaker (10/31/2016); and hx lumbar laminectomy (06/2015). Social History/Living Environment:   Home Environment: Private residence  # Steps to Enter: 1  One/Two Story Residence: Two story, live on 1st floor  # of Interior Steps: 20  Interior Rails: Left  Living Alone: No  Support Systems: Spouse/Significant Other/Partner  Patient Expects to be Discharged to[de-identified] Private residence  Current DME Used/Available at Home: Lannette Curb, straight, Shower chair, Walker, rollator, Walker, rolling  Tub or Shower Type: Tub/Shower combination  Prior Level of Function/Work/Activity:  Independent with ambulation with a rollator walker. Number of Personal Factors/Comorbidities that affect the Plan of Care: 0: LOW COMPLEXITY   EXAMINATION:   Most Recent Physical Functioning:                                 Transfers  Sit to Stand: Stand-by assistance  Stand to Sit: Stand-by assistance    Balance  Sitting: Intact  Standing: Pull to stand; With support              Weight Bearing Status  Left Side Weight Bearing: As tolerated  Distance (ft): 308 Feet (ft)  Ambulation - Level of Assistance: Stand-by assistance  Assistive Device: Walker, rolling  Speed/Dana: Delayed;Pace decreased (<100 feet/min)  Step Length: Left shortened;Right shortened  Gait Abnormalities: Antalgic;Decreased step clearance  Interventions: Safety awareness training;Verbal cues Braces/Orthotics: none    Left Hip Cold  Type: Cold/ice pack      Body Structures Involved:  1. Bones  2. Joints  3. Muscles  4. Ligaments Body Functions Affected:  1. Neuromusculoskeletal  2. Movement Related Activities and Participation Affected:  1. Mobility   Number of elements that affect the Plan of Care: 4+: HIGH COMPLEXITY   CLINICAL PRESENTATION:   Presentation: Stable and uncomplicated: LOW COMPLEXITY   CLINICAL DECISION MAKIN[address] [place] [address] AM-PAC 6 Clicks   Basic Mobility Inpatient Short Form  How much difficulty does the patient currently have. .. Unable A Lot A Little None   1. Turning over in bed (including adjusting bedclothes, sheets and blankets)? [] 1   [] 2   [x] 3   [] 4   2. Sitting down on and standing up from a chair with arms ( e.g., wheelchair, bedside commode, etc.)   [] 1   [] 2   [x] 3   [] 4   3. Moving from lying on back to sitting on the side of the bed? [] 1   [] 2   [x] 3   [] 4   How much help from another person does the patient currently need. .. Total A Lot A Little None   4. Moving to and from a bed to a chair (including a wheelchair)? [] 1   [] 2   [x] 3   [] 4   5. Need to walk in hospital room? [] 1   [] 2   [x] 3   [] 4   6. Climbing 3-5 steps with a railing? [] 1   [] 2   [x] 3   [] 4   © , Trustees of [address] [place] [address], under license to [employer]. All rights reserved        Score:  Initial: 18 Most Recent: X (Date: -- )    Interpretation of Tool:  Represents activities that are increasingly more difficult (i.e. Bed mobility, Transfers, Gait). Score 24 23 22-20 19-15 14-10 9-7 6     Modifier CH CI CJ CK CL CM CN      ?  Mobility - Walking and Moving Around:     - CURRENT STATUS: CK - 40%-59% impaired, limited or restricted    - GOAL STATUS: CK - 40%-59% impaired, limited or restricted    - D/C STATUS:  CK - 40%-59% impaired, limited or restricted  [place] / [place] / Product Type: Medicare /      Medical Necessity:     · Skilled intervention continues to be required due to decreased mobility ability. Reason for Services/Other Comments:  · decreased mobility ability. Use of outcome tool(s) and clinical judgement create a POC that gives a: Clear prediction of patient's progress: LOW COMPLEXITY            TREATMENT:   (In addition to Assessment/Re-Assessment sessions the following treatments were rendered)     Pre-treatment Symptoms/Complaints:  3/10 pain left hip  Pain: Initial:      Post Session:  3/10 pain     Gait Training (15 Minutes):  Gait training to improve and/or restore physical functioning as related to mobility, strength and balance. Ambulated 308 Feet (ft) with Stand-by assistance using a Walker, rolling and minimal Safety awareness training;Verbal cues related to their stance phase and stride length to promote proper body alignment and promote proper body posture. Therapeutic Exercise: (45 Minutes (group)):  Exercises per grid below to improve mobility, strength and balance. Required minimal verbal cues to promote proper body alignment, promote proper body posture and promote proper body mechanics. Progressed repetitions as indicated. Date:  2/28/18 Date:   Date:     ACTIVITY/EXERCISE AM PM AM PM AM PM   GROUP THERAPY  []  [x]  []  []  []  []   Ankle Pumps 10 15       Quad Sets 10 15       Gluteal Sets 10 15       Hip ABd/ADduction 10 15       Straight Leg Raises 10 15       Knee Slides 10 15       Short Arc Quads  15       Long Arc Quads         Chair Slides  15                B = bilateral; AA = active assistive; A = active; P = passive      Treatment/Session Assessment:     Response to Treatment:   Continues to progress well.     Education:  [x] Home Exercises  [] Fall Precautions  [x] Hip Precautions [] D/C Instruction Review  [] Knee/Hip Prosthesis Review  [x] Walker Management/Safety [] Adaptive Equipment as Needed       Interdisciplinary Collaboration:   o Registered Nurse    After treatment position/precautions:   o Up in chair  o Bed/Chair-wheels locked  o Bed in low position  o Caregiver at bedside  o Call light within reach  o RN notified    Compliance with Program/Exercises: compliant most of the time. Recommendations/Intent for next treatment session:  Treatment next visit will focus on increasing Mr. Bowie's independence with bed mobility, transfers, gait training, strength/ROM exercises, modalities for pain, and patient education.       Total Treatment Duration:  PT Patient Time In/Time Out  Time In: 1300  Time Out: 110 ALMA ROSA Cobian

## 2018-02-28 NOTE — PROGRESS NOTES
[address]  Face to Face Encounter    Patients Name: [place] YOB: 1945    Ordering Physician: Al Peres    Primary Diagnosis: Unilateral primary osteoarthritis, left hip [M16.12]  S/p left GILMA    Date of Face to Face:   2-27-18                              Face to Face Encounter findings are related to primary reason for home care:   yes. 1. I certify that the patient needs intermittent care as follows: physical therapy: gait/stair training    2. I certify that this patient is homebound, that is: 1) patient requires the use of a walker device, special transportation, or assistance of another to leave the home; or 2) patient's condition makes leaving the home medically contraindicated; and 3) patient has a normal inability to leave the home and leaving the home requires considerable and taxing effort. Patient may leave the home for infrequent and short duration for medical reasons, and occasional absences for non-medical reasons. Homebound status is due to the following functional limitations: Patient's ambulation limited secondary to severe pain and requires the use of an assistive device and the assistance of a caregiver for safe completion. Patient with strength and ROM deficits limiting ambulation endurance requiring the use of an assistive device and the assistance of a caregiver. Patient deemed temporarily homebound secondary to increased risk for infection when leaving home and going out into the community. 3. I certify that this patient is under my care and that I, or a nurse practitioner or  [id number], or clinical nurse specialist, or certified nurse midwife, working with me, had a Face-to-Face Encounter that meets the physician Face-to-Face Encounter requirements.   The following are the clinical findings from the [address] [place] [address] encounter that support the need for skilled services and is a summary of the encounter:   See hospital chart      Michael Albany Jose Magana, [address]  2/28/2018      THE FOLLOWING TO BE COMPLETED BY THE COMMUNITY PHYSICIAN:    I concur with the findings described above from the F2F encounter that this patient is homebound and in need of a skilled service.     Certifying Physician: _____________________________________      Printed Certifying Physician Name: _____________________________________    Date: _________________

## 2018-02-28 NOTE — PROGRESS NOTES
Shift Assessment Complete. Pt is post op day 1 from [address] [place] [address]. Pt is A&Ox 3.  +2 pedal pulses with purposeful movement in all four extremities. Dressing is clean, dry and intact. PIV capped. Pt c/o pain. See MAR. Bed low and locked. Side rails x3. Call light with in reach. Pt verbalizes understanding of call light.

## 2018-02-28 NOTE — PROGRESS NOTES
Care Management Interventions  Mode of Transport at Discharge: Self  Transition of Care Consult (CM Consult): [address]: No  Reason Outside [place]: Out of service area  Physical Therapy Consult: Yes  Occupational Therapy Consult: Yes  Current Support Network: Lives with Spouse  Confirm Follow Up Transport: Family  Plan discussed with Pt/Family/Caregiver: Yes  Freedom of Choice Offered: Yes  Discharge Location  Discharge Placement: Home with home health    Patient is a 71y.o. year old male admitted for Left GILMA . Patient lives with His spouse in [place], [place] and plans to return home on discharge. Order received to arrange home health. Patient without preference towards agency. Referral sent to [facility] who cover [facility] . Patient requesting we arrange a walker and bedside commode. [facility] prior to [facility]. Will follow until discharge.   Adryan Parson

## 2018-02-28 NOTE — PROGRESS NOTES
2018         Post Op day: 1 Day Post-Op   Admit Diagnosis: Unilateral primary osteoarthritis, left hip [M16.12]  LAB:    Recent Results (from the past 24 hour(s))   HEMOGLOBIN    Collection Time: 18  7:07 PM   Result Value Ref Range    HGB 13.4 (L) 13.6 - 17.2 g/dL   HEMOGLOBIN    Collection Time: 18  4:10 AM   Result Value Ref Range    HGB 12.7 (L) 13.6 - 63.8 g/dL   METABOLIC PANEL, BASIC    Collection Time: 18  4:10 AM   Result Value Ref Range    Sodium 143 136 - 145 mmol/L    Potassium 4.4 3.5 - 5.1 mmol/L    Chloride 106 98 - 107 mmol/L    CO2 27 21 - 32 mmol/L    Anion gap 10 7 - 16 mmol/L    Glucose 126 (H) 65 - 100 mg/dL    BUN 18 8 - 23 MG/DL    Creatinine 1.02 0.8 - 1.5 MG/DL    GFR est AA >60 >60 ml/min/1.73m2    GFR est non-AA >60 >60 ml/min/1.73m2    Calcium 8.5 8.3 - 10.4 MG/DL     Vital Signs:    Patient Vitals for the past 8 hrs:   BP Temp Pulse Resp SpO2   18 0519 122/76 95.7 °F (35.4 °C) 85 16 96 %     Temp (24hrs), Av.2 °F (36.2 °C), Min:95.7 °F (35.4 °C), Max:98.4 °F (36.9 °C)    Pain Control:   Pain Assessment  Pain Scale 1: Numeric (0 - 10)  Pain Intensity 1: 4  Pain Onset 1: at rest  Pain Location 1: Hip  Pain Orientation 1: Left  Pain Description 1: Aching  Pain Intervention(s) 1: Medication (see MAR)  Subjective: Doing well, pain is well controlled, no complaints     Objective:  No Acute Distress, Alert and Oriented, left hip dressing is C/D/I. Posterior thigh and calves are soft, NT. Neurovascular exam is normal       Assessment:   Patient Active Problem List   Diagnosis Code    Obesity (BMI 30-39. 9) E66.9    Hypertension I10    Chronic obstructive pulmonary disease (HCC) J44.9    Enlarged prostate N40.0    Spinal stenosis, lumbar region, with neurogenic claudication M48.062    Acquired spondylolisthesis M43.10    Parkinson disease ([address] G20    Hyperlipidemia E78.5    ZAFAR (dyspnea on exertion) R06.09    Arthritis M19.90    Cardiomyopathy ([facility] [address] I42.9    Coronary artery disease involving native coronary artery of native heart without angina pectoris I25.10    Tremor R25.1    Hoarseness of voice R49.0    BPH (benign prostatic hypertrophy) N40.0    Osteoarthritis M19.90    Dermatitis, seborrheic L21.9    Encounter for long-term (current) use of medications Z79.899    Controlled diabetes mellitus type II without complication (HCC) Y65.3    Urinary retention R33.9    Primary osteoarthritis of knee M17.10    Chronic renal insufficiency N18.9    ED (erectile dysfunction) N52.9    Rosacea L71.9    Neurogenic bladder N31.9    Sciatica M54.30    COPD exacerbation (HCC) J44.1    Postural imbalance R29.3    Abnormal posture R29.3    Biventricular AICD malfunction T82.118A    Multifactorial gait disorder R26.89    Hypophonia R49.8    Arthritis of left hip M16.12       Status Post Procedure(s) (LRB):  LEFT HIP ARTHROPLASTY TOTAL/MEL DUAL MOBILITY/ PT HAS A PACEMAKER (Left)        Plan: Continue Physical Therapy, Monitor Hgb. ASA/SCDs for postop DVT prophylaxis. Plan to d/c to home with home health today/ tomorrow vs. Rehab placement.    Signed By: LEANA Harp

## 2018-02-28 NOTE — PROGRESS NOTES
02/27/18 2023   Oxygen Therapy   O2 Sat (%) 96 %   Pulse via Oximetry 86 beats per minute   O2 Device Room air   Patient on room air. No distress noted at this time. Placed on continuous sat monitor (#8). Alarms set and data cleared. No distress at this time.

## 2018-02-28 NOTE — PROGRESS NOTES
Problem: Self Care Deficits Care Plan (Adult)  Goal: *Acute Goals and Plan of Care (Insert Text)  GOALS:   DISCHARGE GOALS (in preparation for going home/rehab):  3 days  1. Mr. Catalina Pina will perform one lower body dressing activity with minimal assistance with adaptive equipment to demonstrate improved functional mobility and safety. GOAL MET 2/28/2018    2. Mr. Catalina Pina will perform one lower body bathing activity with minimal  assistance with adaptive equipment to demonstrate improved functional mobility and safety. GOAL MET 2/28/2018    3. Mr. Catalina Pina will perform toileting/toilet transfer with contact guard assistance with adaptive equipment to demonstrate improved functional mobility and safety. GOAL MET 2/28/2018    4. Mr. Catalina Pina will perform shower transfer with contact guard assistance with adaptive equipment to demonstrate improved functional mobility and safety. GOAL MET 2/28/2018    5. Mr. Catalina Pina will state GILMA precautions with two verbal cues to demonstrate improved functional mobility and safety. GOAL MET 2/28/2018        Comments:       JOINT CAMP OCCUPATIONAL THERAPY GILMA: Daily Note and AM 2/28/2018  INPATIENT: Hospital Day: 2  [place] / Product Type: Medicare /      NAME/AGE/GENDER: [place] is a 71 y.o. male   PRIMARY DIAGNOSIS:  Unilateral primary osteoarthritis, left hip [M16.12]   Procedure(s) and Anesthesia Type:     * LEFT HIP ARTHROPLASTY TOTAL/MEL DUAL MOBILITY/ PT HAS A PACEMAKER - General (Left)  ICD-10: Treatment Diagnosis:    · Pain in left hip (M25.552)  · Stiffness of Left Hip, Not elsewhere classified (M25.652)  · Other lack of cordination (R27.8)      ASSESSMENT:     Mr. Catalina Pina is s/p left GILMA and presents with decreased weight bearing on left LE and decreased independence with functional mobility and activities of daily living.   Patient completed shower and dressing as charter below in ADL grid and is ambulating with rolling walker and min assist to [id number] assist.  Patient has met 5/5 goals and plans to return home with good family support. Family able to provide patient with appropriate level of assistance at this time. OT reviewed hip precautions throughout session and issued long handled sponge for home use. Patient instructed to call for assistance when needing to get up from recliner and all needs in reach. Patient verbalized understanding of call light. This section established at most recent assessment   PROBLEM LIST (Impairments causing functional limitations):  1. Decreased Strength  2. Decreased ADL/Functional Activities  3. Decreased Transfer Abilities  4. Increased Pain  5. Increased Fatigue  6. Decreased Flexibility/Joint Mobility  7. Decreased Knowledge of Precautions   INTERVENTIONS PLANNED: (Benefits and precautions of occupational therapy have been discussed with the patient.)  1. Activities of daily living training  2. Adaptive equipment training  3. Balance training  4. Clothing management  5. Donning&doffing training  6. Theraputic activity     TREATMENT PLAN: Frequency/Duration: Follow patient 1 time to address above goals. Rehabilitation Potential For Stated Goals: Good     RECOMMENDED REHABILITATION/EQUIPMENT: (at time of discharge pending progress): Continue Skilled Therapy and Home Health: Physical Therapy. OCCUPATIONAL PROFILE AND HISTORY:   History of Present Injury/Illness (Reason for Referral): Pt presents this date s/p (left) GILMA. Past Medical History/Comorbidities:   Mr. Radha Thomason  has a past medical history of Acquired spondylolisthesis (6/16/2015); Actinic keratosis (8/9/2016); Arthritis; Back pain (8/9/2016); Basal cell carcinoma (dx 2/2018); BPH (benign prostatic hypertrophy) (8/9/2016); Cardiomyopathy ([facility] (08/02/2016); Chronic obstructive pulmonary disease ([facility] Chronic renal insufficiency (08/09/2016);  Controlled diabetes mellitus type II without complication ([facility] (08/09/2016); COPD exacerbation ([facility] (8/9/2016); Coronary artery disease involving native coronary artery of native heart without angina pectoris (8/2/2016); Dermatitis, seborrheic (8/9/2016); ZAFAR (dyspnea on exertion) (05/31/2016); Dyspnea; ED (erectile dysfunction) (8/9/2016); Elevated PSA (8/9/2016); Emphysemal COPD; Encounter for long-term (current) use of medications (8/9/2016); Enlarged prostate; Fatigue (8/9/2016); Finger swelling (8/9/2016); History of kidney stones; Hyperlipidemia; Hypertension; Hypophonia with hoarseness (8/8/2016); Kidney problem; Kidney stone (06/2015); Knee effusion (8/9/2016); Low vitamin B12 level (8/9/2016); Neurogenic bladder (8/9/2016); Obesity (BMI 30-39.9); Osteoarthritis (8/9/2016); Parkinson disease ([facility] Primary osteoarthritis of knee (8/9/2016); Prostatitis (8/9/2016); Pseudogout of hand (8/9/2016); Rosacea (8/9/2016); S/P placement of cardiac pacemaker (10/31/2016); Sciatica (8/9/2016); Seborrhea (8/9/2016); Slurred speech (08/09/2016); SOB (shortness of breath) (8/9/2016); Spinal stenosis, lumbar region, with neurogenic claudication (6/16/2015); Syncope, near (8/9/2016); Tremor (8/8/2016); Urinary frequency (08/09/2016); Urinary retention (8/9/2016); Urinary urgency (8/9/2016); Wears dentures; and Wears glasses. Mr. Jd Bhandari  has a past surgical history that includes hx lithotripsy (1998); hx cystostomy; hx urological (Right); hx appendectomy; hx knee arthroscopy (Right); hx acl reconstruction (Right); hx pacemaker (10/31/2016); and hx lumbar laminectomy (06/2015).   Social History/Living Environment:   Home Environment: Private residence  # Steps to Enter: 1  One/Two Story Residence: Two story, live on 1st floor  # of Interior Steps: 20  Interior Rails: Left  Living Alone: No  Support Systems: Spouse/Significant Other/Partner  Patient Expects to be Discharged to[de-identified] Private residence  Current DME Used/Available at Home: Adri Dutton, straight, Shower chair, Ernesto Lorenzana, rollator, Walker, rolling  Tub or Shower Type: Tub/Shower combination  Prior Level of Function/Work/Activity:  Mod I with ADLs, used canes     Number of Personal Factors/Comorbidities that affect the Plan of Care: Brief history (0):  LOW COMPLEXITY   ASSESSMENT OF OCCUPATIONAL PERFORMANCE[de-identified]   Most Recent Physical Functioning:   Balance  Sitting: Intact  Standing: Pull to stand; With support       Patient Vitals for the past 6 hrs:   BP BP Patient Position SpO2 Pulse   02/28/18 0519 122/76 At rest 96 % 85   02/28/18 0711 118/64 At rest 92 % 76   02/28/18 0822 - - 97 % -                              Mental Status  Neurologic State: Alert; Appropriate for age  Orientation Level: Appropriate for age  Cognition: Appropriate decision making; Appropriate for age attention/concentration; Appropriate safety awareness; Follows commands  Perception: Appears intact  Perseveration: No perseveration noted  Safety/Judgement: Awareness of environment; Fall prevention                Basic ADLs (From Assessment) Complex ADLs (From Assessment)   Basic ADL  Feeding: Supervision  Oral Facial Hygiene/Grooming: Supervision  Bathing: Moderate assistance  Upper Body Dressing: Minimum assistance  Lower Body Dressing: Moderate assistance  Toileting: Total assistance (catheter)     Grooming/Bathing/Dressing Activities of Daily Living   Grooming  Grooming Assistance: Supervision/set up  Washing Face: Supervision/set-up  Washing Hands: Supervision/set-up Cognitive Retraining  Safety/Judgement: Awareness of environment; Fall prevention   Upper Body Bathing  Bathing Assistance: Supervision/set-up  Position Performed: Seated in chair  Adaptive Equipment: Grab bar;Tub bench     Lower Body Bathing  Bathing Assistance: Minimum assistance  Perineal  : Stand-by assistance  Position Performed: Standing  Adaptive Equipment: Grab bar  Lower Body : Minimum assistance  Position Performed: Seated in chair;Standing  Adaptive Equipment: Grab bar;Tub bench     Upper Body Dressing Assistance  Dressing Assistance: Supervision/set-up  Hospital Gown: Supervision/ set-up  Pullover Shirt: Supervision/set-up Functional Transfers  Bathroom Mobility: Minimum assistance;Contact guard assistance  Toilet Transfer : Contact guard assistance  Shower Transfer: Contact guard assistance   Lower Body Dressing Assistance  Dressing Assistance: Contact guard assistance  Underpants: Contact guard assistance  Pants With Elastic Waist: Contact guard assistance  Antiembolitic Stockings: Contact guard assistance  Adaptive Equipment Used: Grab bar;Long handled shoe horn;Reacher;Sock aid; Walker           Physical Skills Involved:  1. Balance  2. Strength  3. Activity Tolerance Cognitive Skills Affected (resulting in the inability to perform in a timely and safe manner): 1. patient has parkinsons disease Psychosocial Skills Affected:  1. Habits/Routines  2. Self-Awareness   Number of elements that affect the Plan of Care: 5+:  HIGH COMPLEXITY   CLINICAL DECISION MAKIN[address] [place] [address] AM-PAC 6 Clicks   Daily Activity Inpatient Short Form  How much help from another person does the patient currently need. .. Total A Lot A Little None   1. Putting on and taking off regular lower body clothing? [] 1   [x] 2   [] 3   [] 4   2. Bathing (including washing, rinsing, drying)? [] 1   [x] 2   [] 3   [] 4   3. Toileting, which includes using toilet, bedpan or urinal?   [x] 1   [] 2   [] 3   [] 4   4. Putting on and taking off regular upper body clothing? [] 1   [] 2   [x] 3   [] 4   5. Taking care of personal grooming such as brushing teeth? [] 1   [] 2   [x] 3   [] 4   6. Eating meals? [] 1   [] 2   [] 3   [x] 4   © , Trustees of [address] [place] [address], under license to [employer]. All rights reserved     Score:  Initial: 15 Most Recent: X (Date: -- )    Interpretation of Tool:  Represents activities that are increasingly more difficult (i.e. Bed mobility, Transfers, Gait).    Score 24 23 22-20 19-15 14-10 9-7 6     Modifier CH CI CJ CK CL CM CN      ? Self Care:     - CURRENT STATUS: CK - 40%-59% impaired, limited or restricted    - GOAL STATUS: CJ - 20%-39% impaired, limited or restricted    - D/C STATUS:  ---------------To be determined---------------  [place] / Product Type: Medicare /      Medical Necessity:     · Patient is expected to demonstrate progress in balance, coordination and functional technique to decrease assistance required with self care and functional mobility and improve safety during self care and functional mobility. Reason for Services/Other Comments:  · Patient continues to require skilled intervention due to decreased self care and functional mobility. Use of outcome tool(s) and clinical judgement create a POC that gives a: LOW COMPLEXITY            TREATMENT:   (In addition to Assessment/Re-Assessment sessions the following treatments were rendered)     Pre-treatment Symptoms/Complaints:    Pain: Initial:   Pain Intensity 1: 0  Pain Location 1: Hip  Pain Orientation 1: Left  Pain Intervention(s) 1:  shower Post Session:  0/10  rest     Self Care: (30 minutes): Procedure(s) (per grid) utilized to improve and/or restore self-care/home management as related to dressing, bathing, toileting and grooming. Required minimal visual, verbal and tactile cueing to facilitate activities of daily living skills and compensatory activities. Treatment/Session Assessment:     Response to Treatment:  Tolerated well, wife staying here with him.     Education:  [] Home Exercises  [x] Fall Precautions  [x] Hip Precautions [] Going Home Video  [] Knee/Hip Prosthesis Review  [x] Walker Management/Safety [x] Adaptive Equipment as Needed       Interdisciplinary Collaboration:   o Physical Therapy Assistant  o Occupational Therapist  o Registered Nurse  o Certified Nursing Assistant/Patient Care Technician    After treatment position/precautions:   o Up in chair  o Bed/Chair-wheels locked  o Bed in low position  o Call light within reach  o RN notified  o Family at bedside     Compliance with Program/Exercises: compliant all of the time. Recommendations/Intent for next treatment session:  Treatment next visit will focus on increasing Mr. Bowie's independence with bed mobility, transfers, self care, functional mobility, modalities for pain, and patient education.       Total Treatment Duration:30 minutes  OT Patient Time In/Time Out  Time In: 1030  Time Out: Mika [address] 20, OT

## 2018-02-28 NOTE — PROGRESS NOTES
Problem: Mobility Impaired (Adult and Pediatric)  Goal: *Acute Goals and Plan of Care (Insert Text)  GOALS (1-4 days):  (1.)Mr. Merlin Bartholomew will move from supine to sit and sit to supine  in bed with INDEPENDENT. (2.)Mr. Merlin Bartholomew will transfer from bed to chair and chair to bed with INDEPENDENT using the least restrictive device. (3.)Mr. Merlin Bartholomew will ambulate with INDEPENDENT for 250 feet with the least restrictive device. (4.)Mr. Merlin Bartholomew will ambulate up/down 3 steps with bilateral  railing with MINIMAL ASSIST with no device. (5.)Mr. Merlin Bartholomew will state/observe GILMA precautions with 0 verbal cues. ________________________________________________________________________________________________    Outcome: Progressing Towards Goal    PHYSICAL THERAPY [facility] camp [place]: Daily Note, AM 2/28/2018  INPATIENT: Hospital Day: 2  [place]: [place] / Product Type: Medicare /      NAME/AGE/GENDER: Natasha Bliss is a 71 y.o. male   PRIMARY DIAGNOSIS:  Unilateral primary osteoarthritis, left hip [M16.12]   Procedure(s) and Anesthesia Type:     * LEFT HIP ARTHROPLASTY TOTAL/MEL DUAL MOBILITY/ PT HAS A PACEMAKER - General (Left)  ICD-10: Treatment Diagnosis:    · Pain in left hip (M25.552)  · Stiffness of Left Hip, Not elsewhere classified (M25.652)  · Difficulty in walking, Not elsewhere classified (R26.2)  · Other abnormalities of gait and mobility (R26.89)      ASSESSMENT:     Mr. Merlin Bartholomew was sitting up in chair on arrival.  He is agreeable to PT and deciding if he should go home or rehab. This section established at most recent assessment   PROBLEM LIST (Impairments causing functional limitations):  1. Decreased Strength  2. Decreased Transfer Abilities  3. Decreased Ambulation Ability/Technique  4. Decreased Balance  5. Increased Pain  6. Decreased Activity Tolerance  7.  Decreased Flexibility/Joint Mobility   INTERVENTIONS PLANNED: (Benefits and precautions of physical therapy have been discussed with the patient.)  1. Bed Mobility  2. Gait Training  3. Home Exercise Program (HEP)  4. Therapeutic Exercise/Strengthening  5. Transfer Training  6. Range of Motion: active/assisted/passive  7. Therapeutic Activities  8. Group Therapy     TREATMENT PLAN: Frequency/Duration: Follow patient BID   to address above goals. Rehabilitation Potential For Stated Goals: Good     RECOMMENDED REHABILITATION/EQUIPMENT: (at time of discharge pending progress): Home Health: Physical Therapy. HISTORY:   History of Present Injury/Illness (Reason for Referral):  Pt is status post left total hip replacement. Past Medical History/Comorbidities:   Mr. Hollis Schulte  has a past medical history of Acquired spondylolisthesis (6/16/2015); Actinic keratosis (8/9/2016); Arthritis; Back pain (8/9/2016); Basal cell carcinoma (dx 2/2018); BPH (benign prostatic hypertrophy) (8/9/2016); Cardiomyopathy ([facility] (08/02/2016); Chronic obstructive pulmonary disease ([facility] Chronic renal insufficiency (08/09/2016); Controlled diabetes mellitus type II without complication ([facility] (19/73/5814); COPD exacerbation ([facility] (8/9/2016); Coronary artery disease involving native coronary artery of native heart without angina pectoris (8/2/2016); Dermatitis, seborrheic (8/9/2016); ZAFAR (dyspnea on exertion) (05/31/2016); Dyspnea; ED (erectile dysfunction) (8/9/2016); Elevated PSA (8/9/2016); Emphysemal COPD; Encounter for long-term (current) use of medications (8/9/2016); Enlarged prostate; Fatigue (8/9/2016); Finger swelling (8/9/2016); History of kidney stones; Hyperlipidemia; Hypertension; Hypophonia with hoarseness (8/8/2016); Kidney problem; Kidney stone (06/2015); Knee effusion (8/9/2016); Low vitamin B12 level (8/9/2016); Neurogenic bladder (8/9/2016); Obesity (BMI 30-39.9); Osteoarthritis (8/9/2016); Parkinson disease ([facility] Primary osteoarthritis of knee (8/9/2016); Prostatitis (8/9/2016); Pseudogout of hand (8/9/2016);  Rosacea (8/9/2016); S/P placement of cardiac pacemaker (10/31/2016); Sciatica (8/9/2016); Seborrhea (8/9/2016); Slurred speech (08/09/2016); SOB (shortness of breath) (8/9/2016); Spinal stenosis, lumbar region, with neurogenic claudication (6/16/2015); Syncope, near (8/9/2016); Tremor (8/8/2016); Urinary frequency (08/09/2016); Urinary retention (8/9/2016); Urinary urgency (8/9/2016); Wears dentures; and Wears glasses. Mr. Nupur Gaviria  has a past surgical history that includes hx lithotripsy (1998); hx cystostomy; hx urological (Right); hx appendectomy; hx knee arthroscopy (Right); hx acl reconstruction (Right); hx pacemaker (10/31/2016); and hx lumbar laminectomy (06/2015). Social History/Living Environment:   Home Environment: Private residence  # Steps to Enter: 1  One/Two Story Residence: Two story, live on 1st floor  # of Interior Steps: 20  Interior Rails: Left  Living Alone: No  Support Systems: Spouse/Significant Other/Partner  Patient Expects to be Discharged to[de-identified] Private residence  Current DME Used/Available at Home: [address] [facility] [place], straight, Shower chair, Walker, rollator, Walker, rolling  Tub or Shower Type: Tub/Shower combination  Prior Level of Function/Work/Activity:  Independent with ambulation with a rollator walker. Number of Personal Factors/Comorbidities that affect the Plan of Care: 0: LOW COMPLEXITY   EXAMINATION:   Most Recent Physical Functioning:                                 Transfers  Sit to Stand: Contact guard assistance  Stand to Sit: Contact guard assistance    Balance  Sitting: Intact  Standing: Pull to stand; With support              Weight Bearing Status  Left Side Weight Bearing: As tolerated  Distance (ft): 100 Feet (ft)  Ambulation - Level of Assistance: Contact guard assistance  Assistive Device: Walker, rolling  Speed/Dana: Delayed;Pace decreased (<100 feet/min)  Step Length: Left shortened;Right shortened  Gait Abnormalities: Antalgic;Decreased step clearance  Interventions: Safety awareness training;Verbal cues     Braces/Orthotics: none    Left Hip Cold  Type: Cold/ice pack      Body Structures Involved:  1. Bones  2. Joints  3. Muscles  4. Ligaments Body Functions Affected:  1. Neuromusculoskeletal  2. Movement Related Activities and Participation Affected:  1. Mobility   Number of elements that affect the Plan of Care: 4+: HIGH COMPLEXITY   CLINICAL PRESENTATION:   Presentation: Stable and uncomplicated: LOW COMPLEXITY   CLINICAL DECISION MAKIN[address] [place] [address] AM-PAC 6 Clicks   Basic Mobility Inpatient Short Form  How much difficulty does the patient currently have. .. Unable A Lot A Little None   1. Turning over in bed (including adjusting bedclothes, sheets and blankets)? [] 1   [] 2   [x] 3   [] 4   2. Sitting down on and standing up from a chair with arms ( e.g., wheelchair, bedside commode, etc.)   [] 1   [] 2   [x] 3   [] 4   3. Moving from lying on back to sitting on the side of the bed? [] 1   [] 2   [x] 3   [] 4   How much help from another person does the patient currently need. .. Total A Lot A Little None   4. Moving to and from a bed to a chair (including a wheelchair)? [] 1   [] 2   [x] 3   [] 4   5. Need to walk in hospital room? [] 1   [] 2   [x] 3   [] 4   6. Climbing 3-5 steps with a railing? [] 1   [] 2   [x] 3   [] 4   © , Trustees of [address] [place] [address], under license to [employer]. All rights reserved        Score:  Initial: 18 Most Recent: X (Date: -- )    Interpretation of Tool:  Represents activities that are increasingly more difficult (i.e. Bed mobility, Transfers, Gait). Score 24 23 22-20 19-15 14-10 9-7 6     Modifier CH CI CJ CK CL CM CN      ?  Mobility - Walking and Moving Around:     - CURRENT STATUS: CK - 40%-59% impaired, limited or restricted    - GOAL STATUS: CK - 40%-59% impaired, limited or restricted    - D/C STATUS:  CK - 40%-59% impaired, limited or restricted  [place] / Product Type: Medicare /      Medical Necessity:     · Skilled intervention continues to be required due to decreased mobility ability. Reason for Services/Other Comments:  · decreased mobility ability. Use of outcome tool(s) and clinical judgement create a POC that gives a: Clear prediction of patient's progress: LOW COMPLEXITY            TREATMENT:   (In addition to Assessment/Re-Assessment sessions the following treatments were rendered)     Pre-treatment Symptoms/Complaints:  3/10 pain left hip  Pain: Initial:      Post Session:  3/10 pain     Gait Training (10 Minutes):  Gait training to improve and/or restore physical functioning as related to mobility, strength and balance. Ambulated 100 Feet (ft) with Contact guard assistance using a Walker, rolling and minimal Safety awareness training;Verbal cues related to their stance phase and stride length to promote proper body alignment and promote proper body posture. Therapeutic Exercise: (15 Minutes):  Exercises per grid below to improve mobility, strength and balance. Required minimal verbal cues to promote proper body alignment, promote proper body posture and promote proper body mechanics. Progressed repetitions as indicated. Date:  2/28/18 Date:   Date:     ACTIVITY/EXERCISE AM PM AM PM AM PM   GROUP THERAPY  []  []  []  []  []  []   Ankle Pumps 10        Quad Sets 10        Gluteal Sets 10        Hip ABd/ADduction 10        Straight Leg Raises 10        Knee Slides 10        Short Arc Quads         Long Arc Quads         Chair Slides                  B = bilateral; AA = active assistive; A = active; P = passive      Treatment/Session Assessment:     Response to Treatment:   Pt doing well. His wife is worried about him going home.     Education:  [x] Home Exercises  [] Fall Precautions  [x] Hip Precautions [] D/C Instruction Review  [] Knee/Hip Prosthesis Review  [x] Walker Management/Safety [] Adaptive Equipment as Needed Interdisciplinary Collaboration:   o Registered Nurse    After treatment position/precautions:   o Up in chair  o Bed/Chair-wheels locked  o Bed in low position  o Caregiver at bedside  o Call light within reach  o RN notified    Compliance with Program/Exercises: compliant most of the time. Recommendations/Intent for next treatment session:  Treatment next visit will focus on increasing Mr. Beans independence with bed mobility, transfers, gait training, strength/ROM exercises, modalities for pain, and patient education.       Total Treatment Duration:  PT Patient Time In/Time Out  Time In: 1005  Time Out: 110 ALMA ROSA Cobian

## 2018-03-01 VITALS
HEART RATE: 80 BPM | SYSTOLIC BLOOD PRESSURE: 132 MMHG | TEMPERATURE: 97.7 F | BODY MASS INDEX: 31.74 KG/M2 | OXYGEN SATURATION: 96 % | WEIGHT: 234 LBS | RESPIRATION RATE: 18 BRPM | DIASTOLIC BLOOD PRESSURE: 72 MMHG

## 2018-03-01 LAB — HGB BLD-MCNC: 11.4 G/DL (ref 13.6–17.2)

## 2018-03-01 PROCEDURE — 77030012935 HC DRSG AQUACEL BMS -B

## 2018-03-01 PROCEDURE — 85018 HEMOGLOBIN: CPT | Performed by: PHYSICIAN ASSISTANT

## 2018-03-01 PROCEDURE — 74011250637 HC RX REV CODE- 250/637: Performed by: PHYSICIAN ASSISTANT

## 2018-03-01 PROCEDURE — 36415 COLL VENOUS BLD VENIPUNCTURE: CPT | Performed by: PHYSICIAN ASSISTANT

## 2018-03-01 PROCEDURE — 97150 GROUP THERAPEUTIC PROCEDURES: CPT

## 2018-03-01 PROCEDURE — 97116 GAIT TRAINING THERAPY: CPT

## 2018-03-01 PROCEDURE — 94760 N-INVAS EAR/PLS OXIMETRY 1: CPT

## 2018-03-01 RX ORDER — ASPIRIN 81 MG/1
81 TABLET ORAL EVERY 12 HOURS
Qty: 120 TAB | Refills: 0 | Status: SHIPPED | OUTPATIENT
Start: 2018-03-01 | End: 2021-01-07

## 2018-03-01 RX ORDER — OXYCODONE HYDROCHLORIDE 10 MG/1
10 TABLET ORAL
Qty: 50 TAB | Refills: 0 | Status: SHIPPED | OUTPATIENT
Start: 2018-03-01 | End: 2018-03-29

## 2018-03-01 RX ADMIN — CARBIDOPA AND LEVODOPA 1 TABLET: 25; 250 TABLET ORAL at 07:00

## 2018-03-01 RX ADMIN — CELECOXIB 200 MG: 200 CAPSULE ORAL at 08:21

## 2018-03-01 RX ADMIN — ACETAMINOPHEN 1000 MG: 500 TABLET, FILM COATED ORAL at 06:29

## 2018-03-01 RX ADMIN — SACUBITRIL AND VALSARTAN 1 TABLET: 24; 26 TABLET, FILM COATED ORAL at 08:22

## 2018-03-01 RX ADMIN — CARVEDILOL 6.25 MG: 6.25 TABLET, FILM COATED ORAL at 08:21

## 2018-03-01 RX ADMIN — ASPIRIN 81 MG: 81 TABLET, COATED ORAL at 08:22

## 2018-03-01 RX ADMIN — SOLIFENACIN SUCCINATE 5 MG: 5 TABLET, FILM COATED ORAL at 08:21

## 2018-03-01 RX ADMIN — ACETAMINOPHEN 1000 MG: 500 TABLET, FILM COATED ORAL at 00:33

## 2018-03-01 RX ADMIN — TIOTROPIUM BROMIDE 18 MCG: 18 CAPSULE ORAL; RESPIRATORY (INHALATION) at 08:08

## 2018-03-01 RX ADMIN — SENNOSIDES AND DOCUSATE SODIUM 2 TABLET: 8.6; 5 TABLET ORAL at 08:21

## 2018-03-01 RX ADMIN — FLUTICASONE PROPIONATE AND SALMETEROL 1 PUFF: 250; 50 POWDER RESPIRATORY (INHALATION) at 08:09

## 2018-03-01 NOTE — REHAB NOTE
I am accessing Mr. Bowie's chart as a part of our department's internal chart auditing process. I certify that Mr. Radha Thomason is, or was, a patient in our department.   Thank you,  Cheryl Looney, PT  3/1/2018

## 2018-03-01 NOTE — PROGRESS NOTES
Discharge isntructions given to pt and wife. Voiced understanding.  No questions or concerns at this time

## 2018-03-01 NOTE — PROGRESS NOTES
Problem: Mobility Impaired (Adult and Pediatric)  Goal: *Acute Goals and Plan of Care (Insert Text)  GOALS (1-4 days):  (1.)Mr. Rigoberto Gerardo will move from supine to sit and sit to supine  in bed with INDEPENDENT. (2.)Mr. Rigoberto Gerardo will transfer from bed to chair and chair to bed with INDEPENDENT using the least restrictive device. (3.)Mr. Rigoberto Gerardo will ambulate with INDEPENDENT for 250 feet with the least restrictive device. (4.)Mr. Rigoberto Gerardo will ambulate up/down 3 steps with bilateral  railing with MINIMAL ASSIST with no device. goal met  (5.)Mr. Rigoberto Gerardo will state/observe GILMA precautions with 0 verbal cues. goal met  ________________________________________________________________________________________________    Outcome: Progressing Towards Goal    PHYSICAL THERAPY [facility] [place]: Daily Note, AM 3/1/2018  INPATIENT: Hospital Day: 3  [place] / [place] / Product Type: Medicare /      NAME/AGE/GENDER: Sanchez Morfin is a 71 y.o. male   PRIMARY DIAGNOSIS:  Unilateral primary osteoarthritis, left hip [M16.12]   Procedure(s) and Anesthesia Type:     * LEFT HIP ARTHROPLASTY TOTAL/MEL DUAL MOBILITY/ PT HAS A PACEMAKER - General (Left)  ICD-10: Treatment Diagnosis:    · Pain in left hip (M25.552)  · Stiffness of Left Hip, Not elsewhere classified (M25.652)  · Difficulty in walking, Not elsewhere classified (R26.2)  · Other abnormalities of gait and mobility (R26.89)      ASSESSMENT:     Mr. Rigoberto Gerardo  Is doing well this am and ready to go home. He has no questions or concerns about going home. He ambulated well in the henriquez with walker and did stairs without difficulty. He did gilma exercises with cues only, reviewed gilma precautions and safety. This section established at most recent assessment   PROBLEM LIST (Impairments causing functional limitations):  1. Decreased Strength  2. Decreased Transfer Abilities  3. Decreased Ambulation Ability/Technique  4.  Decreased Balance  5. Increased Pain  6. Decreased Activity Tolerance  7. Decreased Flexibility/Joint Mobility   INTERVENTIONS PLANNED: (Benefits and precautions of physical therapy have been discussed with the patient.)  1. Bed Mobility  2. Gait Training  3. Home Exercise Program (HEP)  4. Therapeutic Exercise/Strengthening  5. Transfer Training  6. Range of Motion: active/assisted/passive  7. Therapeutic Activities  8. Group Therapy     TREATMENT PLAN: Frequency/Duration: Follow patient BID   to address above goals. Rehabilitation Potential For Stated Goals: Good     RECOMMENDED REHABILITATION/EQUIPMENT: (at time of discharge pending progress): Home Health: Physical Therapy. HISTORY:   History of Present Injury/Illness (Reason for Referral):  Pt is status post left total hip replacement. Past Medical History/Comorbidities:   Mr. Chandrika Sanchez  has a past medical history of Acquired spondylolisthesis (6/16/2015); Actinic keratosis (8/9/2016); Arthritis; Back pain (8/9/2016); Basal cell carcinoma (dx 2/2018); BPH (benign prostatic hypertrophy) (8/9/2016); Cardiomyopathy ([facility] [address] (08/02/2016); Chronic obstructive pulmonary disease ([facility] [address] Chronic renal insufficiency (08/09/2016); Controlled diabetes mellitus type II without complication ([facility] [address] (00/58/5837); COPD exacerbation ([facility] [address] (8/9/2016); Coronary artery disease involving native coronary artery of native heart without angina pectoris (8/2/2016); Dermatitis, seborrheic (8/9/2016); ZAFAR (dyspnea on exertion) (05/31/2016); Dyspnea; ED (erectile dysfunction) (8/9/2016); Elevated PSA (8/9/2016); Emphysemal COPD; Encounter for long-term (current) use of medications (8/9/2016); Enlarged prostate; Fatigue (8/9/2016); Finger swelling (8/9/2016); History of kidney stones; Hyperlipidemia; Hypertension; Hypophonia with hoarseness (8/8/2016); Kidney problem; Kidney stone (06/2015); Knee effusion (8/9/2016); Low vitamin B12 level (8/9/2016); Neurogenic bladder (8/9/2016);  Obesity (BMI 30-39.9); Osteoarthritis (8/9/2016); Parkinson disease ([facility] [address] Primary osteoarthritis of knee (8/9/2016); Prostatitis (8/9/2016); Pseudogout of hand (8/9/2016); Rosacea (8/9/2016); S/P placement of cardiac pacemaker (10/31/2016); Sciatica (8/9/2016); Seborrhea (8/9/2016); Slurred speech (08/09/2016); SOB (shortness of breath) (8/9/2016); Spinal stenosis, lumbar region, with neurogenic claudication (6/16/2015); Syncope, near (8/9/2016); Tremor (8/8/2016); Urinary frequency (08/09/2016); Urinary retention (8/9/2016); Urinary urgency (8/9/2016); Wears dentures; and Wears glasses. Mr. Merlin Bartholomew  has a past surgical history that includes hx lithotripsy (1998); hx cystostomy; hx urological (Right); hx appendectomy; hx knee arthroscopy (Right); hx acl reconstruction (Right); hx pacemaker (10/31/2016); and hx lumbar laminectomy (06/2015). Social History/Living Environment:   Home Environment: Private residence  # Steps to Enter: 1  One/Two Story Residence: Two story, live on 1st floor  # of Interior Steps: 20  Interior Rails: Left  Living Alone: No  Support Systems: Spouse/Significant Other/Partner  Patient Expects to be Discharged to[de-identified] Private residence  Current DME Used/Available at Home: Kegaan Cheers, straight, Shower chair, Walker, rollator, Walker, rolling  Tub or Shower Type: Tub/Shower combination  Prior Level of Function/Work/Activity:  Independent with ambulation with a rollator walker.    Number of Personal Factors/Comorbidities that affect the Plan of Care: 0: LOW COMPLEXITY   EXAMINATION:   Most Recent Physical Functioning:                 LLE AROM  L Hip Flexion: 90  L Hip ABduction: 5               Transfers  Sit to Stand: Stand-by assistance  Stand to Sit: Stand-by assistance    Balance  Sitting: Intact  Standing: With support              Weight Bearing Status  Left Side Weight Bearing: As tolerated  Distance (ft): 308 Feet (ft) (x 2)  Ambulation - Level of Assistance: Stand-by assistance  Assistive Device: Walker, rolling  Speed/Dana: Delayed  Step Length: Left shortened;Right shortened  Gait Abnormalities: Antalgic  Number of Stairs Trained: 3  Stairs - Level of Assistance: Contact guard assistance  Rail Use: Both (also one step with walker minimal assist)  Interventions: Safety awareness training;Verbal cues     Braces/Orthotics: none    Left Hip Cold  Type: Cold/ice pack      Body Structures Involved:  1. Bones  2. Joints  3. Muscles  4. Ligaments Body Functions Affected:  1. Neuromusculoskeletal  2. Movement Related Activities and Participation Affected:  1. Mobility   Number of elements that affect the Plan of Care: 4+: HIGH COMPLEXITY   CLINICAL PRESENTATION:   Presentation: Stable and uncomplicated: LOW COMPLEXITY   CLINICAL DECISION MAKIN[address] [place] [id number] AM-PAC 6 Clicks   Basic Mobility Inpatient Short Form  How much difficulty does the patient currently have. .. Unable A Lot A Little None   1. Turning over in bed (including adjusting bedclothes, sheets and blankets)? [] 1   [] 2   [x] 3   [] 4   2. Sitting down on and standing up from a chair with arms ( e.g., wheelchair, bedside commode, etc.)   [] 1   [] 2   [x] 3   [] 4   3. Moving from lying on back to sitting on the side of the bed? [] 1   [] 2   [x] 3   [] 4   How much help from another person does the patient currently need. .. Total A Lot A Little None   4. Moving to and from a bed to a chair (including a wheelchair)? [] 1   [] 2   [x] 3   [] 4   5. Need to walk in hospital room? [] 1   [] 2   [x] 3   [] 4   6. Climbing 3-5 steps with a railing? [] 1   [] 2   [x] 3   [] 4   © , Trustees of [address] [place] [address] [address] [address], under license to [employer]. All rights reserved        Score:  Initial: 18 Most Recent: X (Date: -- )    Interpretation of Tool:  Represents activities that are increasingly more difficult (i.e. Bed mobility, Transfers, Gait). Score 24 23 22-20 19-15 14-10 9-7 6     Modifier CH CI CJ CK CL CM CN      ?  Mobility - Walking and Moving Around:     - CURRENT STATUS: CK - 40%-59% impaired, limited or restricted    - GOAL STATUS: CK - 40%-59% impaired, limited or restricted    - D/C STATUS:  CK - 40%-59% impaired, limited or restricted  [place] / [place] / Product Type: Medicare /      Medical Necessity:     · Skilled intervention continues to be required due to decreased mobility ability. Reason for Services/Other Comments:  · decreased mobility ability. Use of outcome tool(s) and clinical judgement create a POC that gives a: Clear prediction of patient's progress: LOW COMPLEXITY            TREATMENT:   (In addition to Assessment/Re-Assessment sessions the following treatments were rendered)     Pre-treatment Symptoms/Complaints:  pain left hip  Pain: Initial:      Post Session:  2-3     Gait Training (15 Minutes):  Gait training to improve and/or restore physical functioning as related to mobility, strength and balance. Ambulated 308 Feet (ft) (x 2) with Stand-by assistance using a Walker, rolling and minimal Safety awareness training;Verbal cues related to their stance phase and stride length to promote proper body alignment and promote proper body posture. Therapeutic Exercise: (45 Minutes):  Exercises per grid below to improve mobility, strength and balance. Required minimal verbal cues to promote proper body alignment, promote proper body posture and promote proper body mechanics. Progressed repetitions as indicated.      Date:  2/28/18 Date:  3/1 Date:     ACTIVITY/EXERCISE AM PM AM PM AM PM   GROUP THERAPY  []  [x]  [x]  []  []  []   Ankle Pumps 10 15 20a      Quad Sets 10 15 20a      Gluteal Sets 10 15 20a      Hip ABd/ADduction 10 15 20a      Straight Leg Raises 10 15 20a      Knee Slides 10 15 20a      Short Arc Quads  15 20a      Long Arc Quads         Chair Slides  15 20a               B = bilateral; AA = active assistive; A = active; P = passive Treatment/Session Assessment:     Response to Treatment:   Pt. Has made good progress    Education:  [x] Home Exercises  [x] Fall Precautions  [x] Hip Precautions [x] D/C Instruction Review  [x] Knee/Hip Prosthesis Review  [x] Walker Management/Safety [] Adaptive Equipment as Needed       Interdisciplinary Collaboration:   o Physical Therapist  o Rehabilitation Attendant    After treatment position/precautions:   o Up in chair  o Bed/Chair-wheels locked  o Bed in low position  o Caregiver at bedside  o Call light within reach  o RN notified    Compliance with Program/Exercises: compliant most of the time. Recommendations/Intent for next treatment session:  Treatment next visit will focus on increasing Mr. Bowie's independence with bed mobility, transfers, gait training, strength/ROM exercises, modalities for pain, and patient education.       Total Treatment Duration:  PT Patient Time In/Time Out  Time In: 1030  Time Out: [address], PT

## 2018-03-01 NOTE — PROGRESS NOTES
Shift Assessment Complete. Pt is post op day 2  from [address] [place] [address]. Pt is A&Ox 3.  +2 pedal pulses with purposeful movement in all four extremities. Dressing is clean, dry and intact. Pt denies any pain or need at this time. Bed low and locked. Side rails x3. Call light with in reach. Pt verbalizes understanding of call light.

## 2018-03-01 NOTE — PROGRESS NOTES
2018         Post Op day: 2 Days Post-Op   Admit Diagnosis: Unilateral primary osteoarthritis, left hip [M16.12]  LAB:    Recent Results (from the past 24 hour(s))   HEMOGLOBIN    Collection Time: 18  4:21 AM   Result Value Ref Range    HGB 11.4 (L) 13.6 - 17.2 g/dL     Vital Signs:    Patient Vitals for the past 8 hrs:   BP Temp Pulse Resp SpO2   18 0400 96/61 98.4 °F (36.9 °C) 81 17 93 %   18 2340 107/67 98.3 °F (36.8 °C) 81 17 93 %     Temp (24hrs), Av.1 °F (36.7 °C), Min:97.3 °F (36.3 °C), Max:98.4 °F (36.9 °C)    Pain Control:   Pain Assessment  Pain Scale 1: Visual  Pain Intensity 1: 0  Pain Onset 1: at rest  Pain Location 1: Hip  Pain Orientation 1: Left  Pain Description 1: Aching  Pain Intervention(s) 1: Medication (see MAR)  Subjective: Doing well, pain is well controlled, no complaints     Objective:  No Acute Distress, Alert and Oriented,  Left hip dressing is C/D/I. Posterior thigh and calves are soft, NT. Neurovascular exam is normal       Assessment:   Patient Active Problem List   Diagnosis Code    Obesity (BMI 30-39. 9) E66.9    Hypertension I10    Chronic obstructive pulmonary disease (HCC) J44.9    Enlarged prostate N40.0    Spinal stenosis, lumbar region, with neurogenic claudication M48.062    Acquired spondylolisthesis M43.10    Parkinson disease ([address] G20    Hyperlipidemia E78.5    ZAFAR (dyspnea on exertion) R06.09    Arthritis M19.90    Cardiomyopathy ([facility] [address] I42.9    Coronary artery disease involving native coronary artery of native heart without angina pectoris I25.10    Tremor R25.1    Hoarseness of voice R49.0    BPH (benign prostatic hypertrophy) N40.0    Osteoarthritis M19.90    Dermatitis, seborrheic L21.9    Encounter for long-term (current) use of medications Z79.899    Controlled diabetes mellitus type II without complication ([facility]) W33.5    Urinary retention R33.9    Primary osteoarthritis of knee M17.10    Chronic renal insufficiency N18.9    ED (erectile dysfunction) N52.9    Rosacea L71.9    Neurogenic bladder N31.9    Sciatica M54.30    COPD exacerbation (HCC) J44.1    Postural imbalance R29.3    Abnormal posture R29.3    Biventricular AICD malfunction T82.118A    Multifactorial gait disorder R26.89    Hypophonia R49.8    Arthritis of left hip M16.12       Status Post Procedure(s) (LRB):  LEFT HIP ARTHROPLASTY TOTAL/MEL DUAL MOBILITY/ PT HAS A PACEMAKER (Left)        Plan: Continue Physical Therapy, Monitor Hgb. ASA/SCDs for postop DVT prophylaxis. Plan to d/c to home with home health today.    Signed By: LEANA Rubin

## 2018-03-01 NOTE — PROGRESS NOTES
02/28/18 2027   Oxygen Therapy   O2 Sat (%) 95 %   Pulse via Oximetry 79 beats per minute   O2 Device Room air   O2 Flow Rate (L/min) 0 l/min   Continuous oximetry ordered QHS. Monitor # 8 placed on patient. Alarms set per protocol.

## 2018-03-02 ENCOUNTER — PATIENT OUTREACH (OUTPATIENT)
Dept: CASE MANAGEMENT | Age: 73
End: 2018-03-02

## 2018-03-02 NOTE — PROGRESS NOTES
Transition of Care Discharge Follow-up Questionnaire   Date/Time of Call:   March 2, 2018 2:34PM   What was the patient hospitalized for? Patient hospitalized for Arthritis of Left Hip             Does the patient understand his/her diagnosis and/or treatment and what happened during the hospitalization? Patient states understanding of diagnosis and treatment plan during hospitalization. Did the patient receive discharge instructions? Yes     Review any discharge instructions (see notes in [facility]). Ask patient if they understand these. Do they have any questions? Patient states understanding of discharge instructions, patient states no questions. Were home services ordered (nursing, PT, OT, ST, etc.)? Yes, home health services ordered with [address] [place] [address] (PT). If so, has the first visit occurred? If not, why? (Assist with coordination of services if necessary.) No, Care Coordinator informed patient that home health will contact you within 24 to 48 hours. Care Coordinator provided contact information for patient if assistance is needed. Was any DME ordered? Walker and Bedside Commode ordered     If so, has it been received? If not, why?  (Assist with coordination of arranging DME orders if necessary.) Walker and Bedside Commode delivered to patient hospital room prior to discharge. Complete a review of all medications (new, continued and discontinued meds per the D/C instructions and medication tab in [facility]). Care Coordinator reviewed all medications with patient per connect [facility]. Start date: 03/01/2018  oxyCODONE IR (ROXICODONE) 10 mg tab immediate release tablet-Take 1 Tab by mouth every three (3) hours as needed  aspirin delayed-release 81 mg tablet  Take 1 Tab by mouth every twelve (12) hours every twelve (12) hours.            -             Were all new prescriptions filled?   If not, why?  (Assist with obtainment of medications if necessary.) Yes     Does the patient understand the purpose and dosing instructions for all medications? (If patient has questions, provide explanation and education.) Patient states understanding of current medications and dosing instructions. Care Coordinator educated patient on the importance of medication compliance and reporting medication side effects to PCP/Specialist.      Does the patient have any problems in performing ADLs? (If patient is unable to perform ADLs  what is the limiting factor(s)? Do they have a support system that can assist? If no support system is present, discuss possible assistance that they may be able to obtain.) Patient states he is independent with ADL's and uses a Walker to assist with ambulation. Care Coordinator educated patient on the importance of using and maintaining safety precautions at all time to avoid injury and hospital visit. Patient verbalized understanding. Does the patient have all follow-up appointments scheduled? 7 day f/up with PCP?    7-14 day f/up with specialist?    If f/up has not been made  what actions has the care coordinator made to accomplish this? Has transportation been arranged? [facility] follow-up should be within 7 days of discharge; all others should have PCP follow-up within 7 days of discharge; follow-ups with other specialists should be within 7-14 days of discharge.) Care Coordinator educated patient on the importance of scheduling follow up appointment with PCP within 7 days of hospital discharge. Patient states he will follow up with PCP, and states he can schedule appointment himself. Patient states follow up with Dr. Sid Wilson has been scheduled, patient did not give date/time of follow up appointment. Yes        NA     Any other questions or concerns expressed by the patient? No further needs identified, patient instructed to call Care Coordinator if further questions or concerns arise.  Patient informed of the importance of compliance with follow up appointments with PCP/Specialist.     Schedule next appointment with ZENAIDA NARAYANAN Coordinator or refer to RN Case Manager/  per the workflow guidelines. When is care coordinators next follow-up call scheduled? If referred for CCM  what RN care manager was the referral assigned? NA          NA      NA             KURTIS Call Completed By: Beau Goyal LPN  [facility]  Care Coordinator     This note will not be viewable in [address]

## 2018-03-29 PROBLEM — Z95.810 BIVENTRICULAR ICD (IMPLANTABLE CARDIOVERTER-DEFIBRILLATOR) IN PLACE: Status: ACTIVE | Noted: 2018-03-29

## 2018-04-04 ENCOUNTER — HOSPITAL ENCOUNTER (OUTPATIENT)
Dept: SLEEP MEDICINE | Age: 73
Discharge: HOME | End: 2018-04-04
Payer: MEDICARE

## 2018-04-04 PROCEDURE — 95810 POLYSOM 6/> YRS 4/> PARAM: CPT

## 2018-04-18 PROBLEM — G47.33 OSA (OBSTRUCTIVE SLEEP APNEA): Status: ACTIVE | Noted: 2018-04-18

## 2019-01-10 ENCOUNTER — HOSPITAL ENCOUNTER (OUTPATIENT)
Dept: CT IMAGING | Age: 74
Discharge: HOME | End: 2019-01-10
Attending: ORTHOPAEDIC SURGERY
Payer: MEDICARE

## 2019-01-10 ENCOUNTER — HOSPITAL ENCOUNTER (OUTPATIENT)
Dept: INTERVENTIONAL RADIOLOGY/VASCULAR | Age: 74
Discharge: HOME | End: 2019-01-10
Attending: ORTHOPAEDIC SURGERY
Payer: MEDICARE

## 2019-01-10 VITALS
HEIGHT: 72 IN | BODY MASS INDEX: 31.15 KG/M2 | TEMPERATURE: 97.8 F | HEART RATE: 76 BPM | RESPIRATION RATE: 18 BRPM | DIASTOLIC BLOOD PRESSURE: 93 MMHG | WEIGHT: 230 LBS | SYSTOLIC BLOOD PRESSURE: 151 MMHG | OXYGEN SATURATION: 98 %

## 2019-01-10 DIAGNOSIS — M48.062 LUMBAR STENOSIS WITH NEUROGENIC CLAUDICATION: ICD-10-CM

## 2019-01-10 PROCEDURE — 74011636320 HC RX REV CODE- 636/320: Performed by: PHYSICIAN ASSISTANT

## 2019-01-10 PROCEDURE — 72132 CT LUMBAR SPINE W/DYE: CPT

## 2019-01-10 PROCEDURE — 77030003666 HC NDL SPINAL BD -A

## 2019-01-10 PROCEDURE — 74011250636 HC RX REV CODE- 250/636: Performed by: PHYSICIAN ASSISTANT

## 2019-01-10 PROCEDURE — 62304 MYELOGRAPHY LUMBAR INJECTION: CPT

## 2019-01-10 RX ORDER — LIDOCAINE HYDROCHLORIDE 20 MG/ML
1-10 INJECTION, SOLUTION EPIDURAL; INFILTRATION; INTRACAUDAL; PERINEURAL ONCE
Status: COMPLETED | OUTPATIENT
Start: 2019-01-10 | End: 2019-01-10

## 2019-01-10 RX ADMIN — IOPAMIDOL 16 ML: 408 INJECTION, SOLUTION INTRATHECAL at 11:23

## 2019-01-10 RX ADMIN — LIDOCAINE HYDROCHLORIDE 4 ML: 20 INJECTION, SOLUTION EPIDURAL; INFILTRATION; INTRACAUDAL; PERINEURAL at 11:21

## 2019-01-10 NOTE — DISCHARGE INSTRUCTIONS
[address]  Department of Interventional Radiology  [phone] Office  [phone] Fax  MYELOGRAM DISCHARGE INSTRUCTIONS  General Information:    Myelogram:     A Myelogram involves a lumbar puncture, and instead of removing fluid, contrast will be injected into the sac surrounding the spinal column. It is done to visualize the spinal column, nerve roots, spinal canal, vertebral discs and disc space. It is usually done to diagnose back pain with unknown cause or in preparation for surgery. After the injection, a CT scan will be done, usually within two hours of the injection. Call If:     You should call your Physician and/or the Radiology Nurse if you develop a headache that is not relieved by Tylenol, and worsens when you stand and eases when you lie down, you need to call. You may have developed what is referred to as a spinal headache. Our physician's will probably advise you to be on strict bed rest for 24 hours, to drink lots of fluids and caffeine. If this does not help the head pain, call again the next day. You should call if you have bleeding other than a small spot on your bandage. You should call if you have any numbness, tingling, weakness, fever, chills, urinary retention, severe itching, rash, welts, swelling, or confusion. Follow-Up Instructions: See the doctor who ordered your procedure as he/she has instructed. If you had a Lumbar Puncture or Myelogram, your results should be available to your ordering doctor in 3-5 business days. You can remove your dressing in 24 hours and shower regularly. Do not bathe or swim for 72 hours. To Reach Us: If you have any questions about your procedure, please call the Interventional Radiology department at [phone]. After business hours (5pm) and weekends, call the answering service at [phone] and ask for the Radiologist on call to be paged.      Interventional Radiology General Nurse Discharge    After general anesthesia or intravenous sedation, for 24 hours or while taking prescription Narcotics:  · Limit your activities  · Do not drive and operate hazardous machinery  · Do not make important personal or business decisions  · Do  not drink alcoholic beverages  · If you have not urinated within 8 hours after discharge, please contact your surgeon on call. * Please give a list of your current medications to your Primary Care Provider. * Please update this list whenever your medications are discontinued, doses are     changed, or new medications (including over-the-counter products) are added. * Please carry medication information at all times in case of emergency situations. These are general instructions for a healthy lifestyle:    No smoking/ No tobacco products/ Avoid exposure to second hand smoke  Surgeon General's Warning:  Quitting smoking now greatly reduces serious risk to your health. Obesity, smoking, and sedentary lifestyle greatly increases your risk for illness  A healthy diet, regular physical exercise & weight monitoring are important for maintaining a healthy lifestyle    You may be retaining fluid if you have a history of heart failure or if you experience any of the following symptoms:  Weight gain of 3 pounds or more overnight or 5 pounds in a week, increased swelling in our hands or feet or shortness of breath while lying flat in bed. Please call your doctor as soon as you notice any of these symptoms; do not wait until your next office visit. Recognize signs and symptoms of STROKE:  F-face looks uneven    A-arms unable to move or move unevenly    S-speech slurred or non-existent    T-time-call 911 as soon as signs and symptoms begin-DO NOT go       Back to bed or wait to see if you get better-TIME IS BRAIN.       Patient Signature:  Date: 1/10/2019  Discharging Nurse: Lisa Fernandez RN

## 2019-01-10 NOTE — PROGRESS NOTES
TRANSFER - OUT REPORT:    Verbal report given to Arianne Sinclair on Diane Bethea.  being transferred to IR for routine progression of care       Report consisted of patients Situation, Background, Assessment and   Recommendations(SBAR). Information from the following report(s) SBAR and Procedure Summary was reviewed with the receiving nurse. Lines:       Opportunity for questions and clarification was provided. Patient transported with:   Registered Nurse      *CT notified that patient will need scan at 1200. *

## 2019-04-11 PROBLEM — G47.52 RBD (REM BEHAVIORAL DISORDER): Status: ACTIVE | Noted: 2019-04-11

## 2019-04-11 PROBLEM — G90.3 NEUROGENIC ORTHOSTATIC HYPOTENSION (HCC): Status: ACTIVE | Noted: 2019-04-11

## 2019-07-08 ENCOUNTER — HOSPITAL ENCOUNTER (OUTPATIENT)
Dept: CT IMAGING | Age: 74
Discharge: HOME | End: 2019-07-08
Attending: UROLOGY

## 2019-07-08 DIAGNOSIS — N20.0 KIDNEY STONE: ICD-10-CM

## 2019-08-29 ENCOUNTER — HOSPITAL ENCOUNTER (OUTPATIENT)
Dept: LAB | Age: 74
Discharge: HOME | End: 2019-08-29
Payer: MEDICARE

## 2019-08-29 DIAGNOSIS — I42.0 DILATED CARDIOMYOPATHY (HCC): ICD-10-CM

## 2019-08-29 LAB
ANION GAP SERPL CALC-SCNC: 7 MMOL/L (ref 7–16)
BASOPHILS # BLD: 0 K/UL (ref 0–0.2)
BASOPHILS NFR BLD: 0 % (ref 0–2)
BUN SERPL-MCNC: 22 MG/DL (ref 8–23)
CALCIUM SERPL-MCNC: 8.8 MG/DL (ref 8.3–10.4)
CHLORIDE SERPL-SCNC: 109 MMOL/L (ref 98–107)
CO2 SERPL-SCNC: 26 MMOL/L (ref 21–32)
CREAT SERPL-MCNC: 1.3 MG/DL (ref 0.8–1.5)
DIFFERENTIAL METHOD BLD: ABNORMAL
EOSINOPHIL # BLD: 0.4 K/UL (ref 0–0.8)
EOSINOPHIL NFR BLD: 6 % (ref 0.5–7.8)
ERYTHROCYTE [DISTWIDTH] IN BLOOD BY AUTOMATED COUNT: 12 % (ref 11.9–14.6)
GLUCOSE SERPL-MCNC: 102 MG/DL (ref 65–100)
HCT VFR BLD AUTO: 44.1 % (ref 41.1–50.3)
HGB BLD-MCNC: 14.5 G/DL (ref 13.6–17.2)
IMM GRANULOCYTES # BLD AUTO: 0 K/UL (ref 0–0.5)
IMM GRANULOCYTES NFR BLD AUTO: 0 % (ref 0–5)
LYMPHOCYTES # BLD: 1.7 K/UL (ref 0.5–4.6)
LYMPHOCYTES NFR BLD: 23 % (ref 13–44)
MCH RBC QN AUTO: 32.6 PG (ref 26.1–32.9)
MCHC RBC AUTO-ENTMCNC: 32.9 G/DL (ref 31.4–35)
MCV RBC AUTO: 99.1 FL (ref 79.6–97.8)
MONOCYTES # BLD: 0.5 K/UL (ref 0.1–1.3)
MONOCYTES NFR BLD: 7 % (ref 4–12)
NEUTS SEG # BLD: 4.5 K/UL (ref 1.7–8.2)
NEUTS SEG NFR BLD: 63 % (ref 43–78)
NRBC # BLD: 0 K/UL (ref 0–0.2)
PLATELET # BLD AUTO: 251 K/UL (ref 150–450)
PMV BLD AUTO: 9.3 FL (ref 9.4–12.3)
POTASSIUM SERPL-SCNC: 4.1 MMOL/L (ref 3.5–5.1)
RBC # BLD AUTO: 4.45 M/UL (ref 4.23–5.6)
SODIUM SERPL-SCNC: 142 MMOL/L (ref 136–145)
WBC # BLD AUTO: 7.1 K/UL (ref 4.3–11.1)

## 2019-08-29 PROCEDURE — 80048 BASIC METABOLIC PNL TOTAL CA: CPT

## 2019-08-29 PROCEDURE — 85025 COMPLETE CBC W/AUTO DIFF WBC: CPT

## 2019-08-29 PROCEDURE — 36415 COLL VENOUS BLD VENIPUNCTURE: CPT

## 2020-11-06 RX ORDER — CEFAZOLIN SODIUM/WATER 2 G/20 ML
2 SYRINGE (ML) INTRAVENOUS ONCE
Status: CANCELLED | OUTPATIENT
Start: 2020-11-06 | End: 2020-11-06

## 2020-11-18 PROBLEM — R41.89 COGNITIVE CHANGES: Status: ACTIVE | Noted: 2020-11-18

## 2020-12-21 ENCOUNTER — HOSPITAL ENCOUNTER (OUTPATIENT)
Dept: PHYSICAL THERAPY | Age: 75
Discharge: HOME | End: 2020-12-21
Payer: MEDICARE

## 2020-12-21 ENCOUNTER — HOSPITAL ENCOUNTER (OUTPATIENT)
Dept: SURGERY | Age: 75
Discharge: HOME | End: 2020-12-21
Payer: MEDICARE

## 2020-12-21 VITALS
BODY MASS INDEX: 29.7 KG/M2 | HEIGHT: 72 IN | OXYGEN SATURATION: 96 % | TEMPERATURE: 98.3 F | HEART RATE: 69 BPM | SYSTOLIC BLOOD PRESSURE: 122 MMHG | RESPIRATION RATE: 16 BRPM | DIASTOLIC BLOOD PRESSURE: 69 MMHG | WEIGHT: 219.3 LBS

## 2020-12-21 LAB
ANION GAP SERPL CALC-SCNC: 7 MMOL/L (ref 7–16)
APTT PPP: 28.1 SEC (ref 24.1–35.1)
BACTERIA SPEC CULT: ABNORMAL
BASOPHILS # BLD: 0 K/UL (ref 0–0.2)
BASOPHILS NFR BLD: 0 % (ref 0–2)
BUN SERPL-MCNC: 21 MG/DL (ref 8–23)
CALCIUM SERPL-MCNC: 9 MG/DL (ref 8.3–10.4)
CHLORIDE SERPL-SCNC: 109 MMOL/L (ref 98–107)
CO2 SERPL-SCNC: 26 MMOL/L (ref 21–32)
CREAT SERPL-MCNC: 0.94 MG/DL (ref 0.8–1.5)
DIFFERENTIAL METHOD BLD: ABNORMAL
EOSINOPHIL # BLD: 0.4 K/UL (ref 0–0.8)
EOSINOPHIL NFR BLD: 5 % (ref 0.5–7.8)
ERYTHROCYTE [DISTWIDTH] IN BLOOD BY AUTOMATED COUNT: 11.8 % (ref 11.9–14.6)
EST. AVERAGE GLUCOSE BLD GHB EST-MCNC: 117 MG/DL
GLUCOSE SERPL-MCNC: 72 MG/DL (ref 65–100)
HBA1C MFR BLD: 5.7 %
HCT VFR BLD AUTO: 42.2 % (ref 41.1–50.3)
HGB BLD-MCNC: 13.8 G/DL (ref 13.6–17.2)
IMM GRANULOCYTES # BLD AUTO: 0 K/UL (ref 0–0.5)
IMM GRANULOCYTES NFR BLD AUTO: 0 % (ref 0–5)
INR PPP: 1.1
LYMPHOCYTES # BLD: 1.2 K/UL (ref 0.5–4.6)
LYMPHOCYTES NFR BLD: 17 % (ref 13–44)
MCH RBC QN AUTO: 32.3 PG (ref 26.1–32.9)
MCHC RBC AUTO-ENTMCNC: 32.7 G/DL (ref 31.4–35)
MCV RBC AUTO: 98.8 FL (ref 79.6–97.8)
MONOCYTES # BLD: 0.6 K/UL (ref 0.1–1.3)
MONOCYTES NFR BLD: 9 % (ref 4–12)
NEUTS SEG # BLD: 4.9 K/UL (ref 1.7–8.2)
NEUTS SEG NFR BLD: 69 % (ref 43–78)
NRBC # BLD: 0 K/UL (ref 0–0.2)
PLATELET # BLD AUTO: 266 K/UL (ref 150–450)
PMV BLD AUTO: 10 FL (ref 9.4–12.3)
POTASSIUM SERPL-SCNC: 3.8 MMOL/L (ref 3.5–5.1)
PROTHROMBIN TIME: 14.4 SEC (ref 12.5–14.7)
RBC # BLD AUTO: 4.27 M/UL (ref 4.23–5.6)
SERVICE CMNT-IMP: ABNORMAL
SODIUM SERPL-SCNC: 142 MMOL/L (ref 136–145)
WBC # BLD AUTO: 7.2 K/UL (ref 4.3–11.1)

## 2020-12-21 PROCEDURE — 97161 PT EVAL LOW COMPLEX 20 MIN: CPT

## 2020-12-21 PROCEDURE — 87641 MR-STAPH DNA AMP PROBE: CPT

## 2020-12-21 PROCEDURE — 85610 PROTHROMBIN TIME: CPT

## 2020-12-21 PROCEDURE — 83036 HEMOGLOBIN GLYCOSYLATED A1C: CPT

## 2020-12-21 PROCEDURE — 85025 COMPLETE CBC W/AUTO DIFF WBC: CPT

## 2020-12-21 PROCEDURE — 80048 BASIC METABOLIC PNL TOTAL CA: CPT

## 2020-12-21 PROCEDURE — 36415 COLL VENOUS BLD VENIPUNCTURE: CPT

## 2020-12-21 PROCEDURE — 93005 ELECTROCARDIOGRAM TRACING: CPT

## 2020-12-21 PROCEDURE — 85730 THROMBOPLASTIN TIME PARTIAL: CPT

## 2020-12-21 NOTE — PERIOP NOTES
Patient verified name and . Order for consent found in EHR and matches case posting; patient verified. Robot-assisted left total knee arthroplasty    Type 3 surgery, PAT Joint assessment complete. Labs per surgeon: CBC,BMP, PT/PTT, HgbA1C; results pending. Labs per anesthesia protocol: no additional lab work needed. EKG: Done today- within anesthesia guidelines. Last cardiology office note dated 10/6/20, last Echo dated 20, last in person pacemaker interrogation dated 20 and last neurology office note dated 20 found in EHR if needed for anesthesia reference. Anesthesia to review chart due to LVEF 35-40% on Echo dated 20. Spinal x-rays dated 19 found in EHR if needed for anesthesia reference. Pt states he was unable to have spinal anesthesia with hip surgery due to hardware in lower back. Mickey Nava in surgery scheduling notified of ICD/pacemaker. Patient aware that a negative Covid swab result is required to proceed with surgery; appointments are made by the surgeon office and should test should be collected 7 days prior to surgery. The testing center is located at the . [address] , [place]. MRSA/MSSA swab collected; pharmacy to review and dose antibiotic as appropriate. Hospital approved surgical skin cleanser and instructions to return bottle on DOS given per hospital policy. Patient provided with handouts including Guide to Surgery, Pain Management, Hand Hygiene, Blood Transfusion Education, and [facility] Brochure. Patient answered medical/surgical history questions at their best of ability. All prior to admission medications documented in [facility]. Original medication prescription bottle not visualized during patient appointment. Patient instructed to hold all vitamins 3 weeks prior to surgery and NSAIDS 5 days prior to surgery.      Patient teach back successful and patient demonstrates knowledge of instruction.

## 2020-12-21 NOTE — PERIOP NOTES
PLEASE CONTINUE TAKING ALL PRESCRIPTION MEDICATIONS UP TO THE DAY OF SURGERY UNLESS OTHERWISE DIRECTED BELOW. DISCONTINUE all vitamins and supplements 7 days prior to surgery. DISCONTINUE Non-Steriodal Anti-Inflammatory (NSAIDS) such as Advil and Aleve 5 days prior to surgery. Home Medications to take  the day of surgery   Albuterol inhaler, aspirin, carbidopa-levodopa, carvedilol,  Spiriva inhaler, tamsulosin, fluticasone inhaler           Home Medications   to Hold   Stop vitamins and supplements three weeks prior to surgery. Comments    Bring Incentive spirometer, All inhalers, carbidopa-levodopa 50-200mg, entresto, doxycycline and Marilia-hex wash to the hospital on the day of surgery. *Visitor policy of 1 visitor per patient discussed. Please do not bring home medications with you on the day of surgery unless otherwise directed by your nurse. If you are instructed to bring home medications, please give them to your nurse as they will be administered by the nursing staff. If you have any questions, please call Natan Bateman [phone]. A copy of this note was provided to the patient for reference.

## 2020-12-21 NOTE — PROGRESS NOTES
[facility] Case Management note:  Patient screened in Prehab for discharge planning needs. Patient scheduled for a future total joint replacement. We discussed Home Health and equipment needed after surgery. Patient lives in [place] ([address]). He is requesting the same agency he used after his GILMA here. Chart reviewed and we used [facility] .   He has a walker and RTS

## 2020-12-21 NOTE — PROGRESS NOTES
Jolene Richmond. : (86 y.o.) [address] at [address], [facility].  [phone]       Physical Therapy Prehab Plan of Treatment and Evaluation Summary:2020    ICD-10: Treatment Diagnosis:   · Pain in Left Knee (M25.562)  · Stiffness of Left Knee, Not elsewhere classified (M33.721)  Precautions/Allergies:   Oxycodone, Phenergan [promethazine], and Polyurethane-39  MEDICAL/REFERRING DIAGNOSIS:  Unilateral primary osteoarthritis, left knee [M17.12]  REFERRING PHYSICIAN: Juarez Parekh,*  DATE OF SURGERY: 21    Assessment:   Comments:  Scheduled for L TKA. Has B knee pain and plans to later have R TKA. Ambulates with rollator. Plans to discharge home with support of spouse. Involved medical history-see below. PROBLEM LIST (Impacting functional limitations):  Mr. Araceli Dorsey presents with the following left lower extremity(s) problems:  1. Transfers  2. Gait  3. Strength  4. Range of Motion  5. Home Exercise Program  6. Pain   INTERVENTIONS PLANNED:  1. Home Exercise Program  2. Educational Discussion      TREATMENT PLAN: Effective Dates: 2020 TO 2020. Frequency/Duration: Patient to continue to perform home exercise program at least twice per day up until his surgery. GOALS: (Goals have been discussed and agreed upon with patient.)  Discharge Goals: Time Frame: 1 Day  1. Patient will demonstrate independence with a home exercise program designed to increase functional technique and pain control to minimize functional deficits and optimize patient for total joint replacement. Rehabilitation Potential For Stated Goals: Good  Regarding Odell Lr Jr.'s therapy, I certify that the treatment plan above will be carried out by a therapist or under their direction.   Thank you for this referral,  Zayra Mendoza, PT               HISTORY:   Present Symptoms:  Pain Intensity 1: 2(currently 2 in sitting; high of 10)  Pain Location 1: Knee  Pain Orientation 1: Left   History of Present Injury/Illness (Reason for Referral):  Medical/Referring Diagnosis: Unilateral primary osteoarthritis, left knee [M17.12]   Past Medical History/Comorbidities:   Mr. Hanna Talamantes  has a past medical history of Acquired spondylolisthesis (6/16/2015), Actinic keratosis (8/9/2016), Arthritis, Back pain (8/9/2016), Basal cell carcinoma (dx 2/2018), BPH (benign prostatic hypertrophy) (8/9/2016), Cardiomyopathy ([facility] (08/02/2016), Chronic obstructive pulmonary disease ([facility] Chronic renal insufficiency (08/09/2016), Controlled diabetes mellitus type II without complication ([facility] (20/95/7072), COPD exacerbation ([facility] (8/9/2016), Coronary artery disease involving native coronary artery of native heart without angina pectoris (8/2/2016), Dermatitis, seborrheic (8/9/2016), ZAFAR (dyspnea on exertion) (05/31/2016), Dyspnea, ED (erectile dysfunction) (8/9/2016), Elevated PSA (8/9/2016), Emphysemal COPD, Encounter for long-term (current) use of medications (8/9/2016), Enlarged prostate, Fatigue (8/9/2016), Finger swelling (8/9/2016), History of kidney stones, Hyperlipidemia, Hypertension, Hypophonia with hoarseness (8/8/2016), Kidney problem, Kidney stone (06/2015), Knee effusion (8/9/2016), Low vitamin B12 level (8/9/2016), Neurogenic bladder (8/9/2016), Obesity (BMI 30-39.9), Osteoarthritis (8/9/2016), Parkinson disease ([facility] Primary osteoarthritis of knee (8/9/2016), Prostatitis (8/9/2016), Pseudogout of hand (8/9/2016), Rosacea (8/9/2016), S/P placement of cardiac pacemaker (10/31/2016), Sciatica (8/9/2016), Seborrhea (8/9/2016), Sleep apnea, Slurred speech (08/09/2016), SOB (shortness of breath) (08/09/2016), Spinal stenosis, lumbar region, with neurogenic claudication (6/16/2015), Syncope, near (8/9/2016), Tremor (8/8/2016), Urinary frequency (08/09/2016), Urinary retention (8/9/2016), Urinary urgency (8/9/2016), Wears dentures, and Wears glasses.    Naz Gates  has a past surgical history that includes hx lithotripsy (1998); hx cystostomy; hx urological (Right); hx appendectomy; hx knee arthroscopy (Right); hx acl reconstruction (Right, 1991); hx pacemaker (10/31/2016); hx lumbar laminectomy (06/2015); and hx hip replacement (Left, 2018). Social History/Living Environment:   Home Environment: Private residence  # Steps to Enter: 0(ramp)  Wheelchair Ramp: Yes  One/Two Story Residence: Two story, live on 1st floor  # of Interior Steps: 12  Living Alone: No  Support Systems: Spouse/Significant Other/Partner  Patient Expects to be Discharged to[de-identified] Private residence  Current DME Used/Available at Home: [place], straight, Commode, bedside, Shower chair, Zayra Moron, rollator, Walker, rolling  Tub or Shower Type: Tub/Shower combination  Work/Activity:  retired  Dominant Side:  RIGHT  Current Medications:  See Pre-assessment nursing note   Number of Personal Factors/Comorbidities that affect the Plan of Care: 1-2: MODERATE COMPLEXITY   EXAMINATION:   ADLs (Current Functional Status):   Ambulation:  [] Independent  [x] Walk Indoors Only  [] Walk Outdoors  [x] Use Assistive Device  [] Use Wheelchair Only Dressing:  [x] [address] from Someone for:  [] Sock/Shoes  [] Pants  [] Everything   Bathing/Showering:   [x] Independent  [] Requires Assistance from Someone  [] [address]:  [] Routine house and yard work  [x] Light Housework Only  [] None   Observation/Orthostatic Postural Assessment:       ROM/Flexibility:   AROM: Within functional limits(L knee 0-120)                           Strength:   Strength: Generally decreased, functional                  Functional Mobility:         Stand to Sit: Modified independent, Additional time  Sit to Stand: Modified independent, Additional time  Distance (ft): 250 Feet (ft)  Ambulation - Level of Assistance: Modified independent; Additional time  Assistive Device: [address], rollator  Speed/Dana: Slow  Stance: Left decreased  Gait Abnormalities: Antalgic;Decreased step clearance          Balance:    Sitting: Intact  Standing: With support   Body Structures Involved:  1. Bones  2. Joints  3. Muscles Body Functions Affected:  1. Neuromusculoskeletal  2. Movement Related Activities and Participation Affected:  1. General Tasks and Demands  2. Mobility   Number of elements that affect the Plan of Care: 3: MODERATE COMPLEXITY   CLINICAL PRESENTATION:   Presentation: Stable and uncomplicated: LOW COMPLEXITY   CLINICAL DECISION MAKING:   Outcome Measure: Tool Used: Lower Extremity Functional Scale (LEFS)  Score:  Initial: 23/80 Most Recent: X/80 (Date: -- )   Interpretation of Score: 20 questions each scored on a 5 point scale with 0 representing \"extreme difficulty or unable to perform\" and 4 representing \"no difficulty\". The lower the score, the greater the functional disability. 80/80 represents no disability. Minimal detectable change is 9 points. Medical Necessity:   · Mr. Chandrakant Jorge is expected to optimize his lower extremity strength and ROM in preparation for joint replacement surgery. Reason for Services/Other Comments:  · Achieve baseline assesment of musculoskeletal system, functional mobility and home environment. , educate in PT HEP in preparation for surgery, educate in hospital plan of care. Use of outcome tool(s) and clinical judgement create a POC that gives a: Clear prediction of patient's progress: LOW COMPLEXITY   TREATMENT:   Treatment/Session Assessment:  Patient was instructed in PT- HEP to increase strength and ROM in LEs. Answered all questions. · Post session pain:  2  · Compliance with Program/Exercises: anticipate compliance.   Total Treatment Duration:  PT Patient Time In/Time Out  Time In: 1230  Time Out: [address]

## 2020-12-21 NOTE — PERIOP NOTES
Lab results within anesthesia guidelines. Lab results sent to PCP per surgeon's request.       Recent Results (from the past 12 hour(s))   CBC WITH AUTOMATED DIFF    Collection Time: 12/21/20 11:41 AM   Result Value Ref Range    WBC 7.2 4.3 - 11.1 K/uL    RBC 4.27 4.23 - 5.6 M/uL    HGB 13.8 13.6 - 17.2 g/dL    HCT 42.2 41.1 - 50.3 %    MCV 98.8 (H) 79.6 - 97.8 FL    MCH 32.3 26.1 - 32.9 PG    MCHC 32.7 31.4 - 35.0 g/dL    RDW 11.8 (L) 11.9 - 14.6 %    PLATELET 644 447 - 150 K/uL    MPV 10.0 9.4 - 12.3 FL    ABSOLUTE NRBC 0.00 0.0 - 0.2 K/uL    DF AUTOMATED      NEUTROPHILS 69 43 - 78 %    LYMPHOCYTES 17 13 - 44 %    MONOCYTES 9 4.0 - 12.0 %    EOSINOPHILS 5 0.5 - 7.8 %    BASOPHILS 0 0.0 - 2.0 %    IMMATURE GRANULOCYTES 0 0.0 - 5.0 %    ABS. NEUTROPHILS 4.9 1.7 - 8.2 K/UL    ABS. LYMPHOCYTES 1.2 0.5 - 4.6 K/UL    ABS. MONOCYTES 0.6 0.1 - 1.3 K/UL    ABS. EOSINOPHILS 0.4 0.0 - 0.8 K/UL    ABS. BASOPHILS 0.0 0.0 - 0.2 K/UL    ABS. IMM.  GRANS. 0.0 0.0 - 0.5 K/UL   HEMOGLOBIN A1C WITH EAG    Collection Time: 12/21/20 11:41 AM   Result Value Ref Range    Hemoglobin A1c 5.7 %    Est. average glucose 654 mg/dL   METABOLIC PANEL, BASIC    Collection Time: 12/21/20 11:41 AM   Result Value Ref Range    Sodium 142 136 - 145 mmol/L    Potassium 3.8 3.5 - 5.1 mmol/L    Chloride 109 (H) 98 - 107 mmol/L    CO2 26 21 - 32 mmol/L    Anion gap 7 7 - 16 mmol/L    Glucose 72 65 - 100 mg/dL    BUN 21 8 - 23 MG/DL    Creatinine 0.94 0.8 - 1.5 MG/DL    GFR est AA >60 >60 ml/min/1.73m2    GFR est non-AA >60 >60 ml/min/1.73m2    Calcium 9.0 8.3 - 10.4 MG/DL   PROTHROMBIN TIME + INR    Collection Time: 12/21/20 11:41 AM   Result Value Ref Range    Prothrombin time 14.4 12.5 - 14.7 sec    INR 1.1     PTT    Collection Time: 12/21/20 11:41 AM   Result Value Ref Range    aPTT 28.1 24.1 - 35.1 SEC

## 2020-12-22 LAB
ATRIAL RATE: 71 BPM
CALCULATED P AXIS, ECG09: 78 DEGREES
CALCULATED R AXIS, ECG10: -74 DEGREES
CALCULATED T AXIS, ECG11: 74 DEGREES
DIAGNOSIS, 93000: NORMAL
P-R INTERVAL, ECG05: 130 MS
Q-T INTERVAL, ECG07: 444 MS
QRS DURATION, ECG06: 144 MS
QTC CALCULATION (BEZET), ECG08: 482 MS
VENTRICULAR RATE, ECG03: 71 BPM

## 2020-12-22 NOTE — ADVANCED PRACTICE NURSE
Total Joint Surgery Preoperative Chart Review      Patient ID:  Christopher Salter  [id number]  77 y.o.  1945  Surgeon: Dr. Anastacio Templeton  Date of Surgery: 1/5/2021  Procedure: Total Left Knee Arthroplasty  Primary Care Physician: Ezio Beck [phone]  Specialty Physician(s):      Subjective:   Christopher Satler. is a 77 y.o. WHITE OR  male who presents for preoperative evaluation for Total Left Knee arthroplasty. This is a preoperative chart review note based on data collected by the nurse at the surgical Pre-Assessment visit. Past Medical History:   Diagnosis Date    Acquired spondylolisthesis 6/16/2015    Actinic keratosis 8/9/2016    Arthritis     generalized OA, managed with OTC medication     Back pain 8/9/2016    Basal cell carcinoma dx 2/2018    small pea-sized lesion on right side of nose    BPH (benign prostatic hypertrophy) 8/9/2016    Cardiomyopathy ([facility] [address] 08/02/2016    Followed by Dr. Serg David at [facility]     Chronic obstructive pulmonary disease [facility])     managed with inhalers-Followed by PCP, Dr. Sherrie Yuan Chronic renal insufficiency 08/09/2016    patient denies, states Creatinine elevated one time.      Controlled diabetes mellitus type II without complication ([address] 88/08/8651    Patient denies-states BS was elevated one time-patient does not take any medications for diabetes     COPD exacerbation ([facility] [address] 8/9/2016    Coronary artery disease involving native coronary artery of native heart without angina pectoris 8/2/2016    Dermatitis, seborrheic 8/9/2016    ZAFAR (dyspnea on exertion) 05/31/2016    due to COPD     Dyspnea     ED (erectile dysfunction) 8/9/2016    Elevated PSA 8/9/2016    Emphysemal COPD     Encounter for long-term (current) use of medications 8/9/2016    Enlarged prostate     managed with medication     Fatigue 8/9/2016    Finger swelling 8/9/2016    H/O echocardiogram 08/04/2020    LVEF 35-40%    History of kidney stones     several with surgical interventions    Hyperlipidemia     Hypertension     managed with medication     Hypophonia with hoarseness 8/8/2016    Kidney problem     Kidney stone 06/2015    states he presently has 3 kidney stones monitored by Urology    Knee effusion 8/9/2016    Low vitamin B12 level 8/9/2016    Neurogenic bladder 8/9/2016    Obesity (BMI 30-39. 9)     BMI 34.5    Osteoarthritis 8/9/2016    Parkinson disease ([address]     Followed by Dr. Maryam De Guzman with medication     Presence of combination internal cardiac defibrillator (ICD) and pacemaker     Pt states he has never had a shock as of 12/21/20    Primary osteoarthritis of knee 8/9/2016    Prostatitis 8/9/2016    Pseudogout of hand 8/9/2016    Rosacea 8/9/2016    S/P placement of cardiac pacemaker 10/31/2016    Sciatica 8/9/2016    Seborrhea 8/9/2016    Sleep apnea     Does not use c-pap. C-pap broken.      Slurred speech 08/09/2016    per patient \"mostly in the evenings\" from parkinsons     SOB (shortness of breath) 08/09/2016    With exertion only     Spinal stenosis, lumbar region, with neurogenic claudication 6/16/2015    Syncope, near 8/9/2016    Tremor 8/8/2016    Urinary frequency 08/09/2016    controlled with medication     Urinary retention 8/9/2016    Urinary urgency 8/9/2016    Wears dentures     upper and lower    Wears glasses       Past Surgical History:   Procedure Laterality Date    HX ACL RECONSTRUCTION Right 1991    HX APPENDECTOMY      age 22    HX CYSTOSTOMY      HX HIP REPLACEMENT Left 2018    HX KNEE ARTHROSCOPY Right     HX LITHOTRIPSY  1998    x 2    HX LUMBAR LAMINECTOMY  06/2015    Laminectomy with fusion; lower back; with pins    HX PACEMAKER  10/31/2016    ICD/pacemaker    HX UROLOGICAL Right     open removal of kidney stones     Family History   Problem Relation Age of Onset    Heart Disease Mother     Lung Disease Mother     COPD Mother     Stroke Father    Betzaida Wing Heart Attack Father 52    Heart Disease Father     Hypertension Other     Diabetes Other         paternal uncle    Heart Attack Other 45        son    Thyroid Disease Sister     Heart Disease Paternal Uncle     Other Paternal Grandmother         Aneurysm    Diabetes Son     Heart Attack Son 45      Social History     Tobacco Use    Smoking status: Former Smoker     Packs/day: 1.50     Years: 45.00     Pack years: 67.50     Quit date: 2007     Years since quittin.4    Smokeless tobacco: Never Used    Tobacco comment: stopped    Substance Use Topics    Alcohol use: No       Prior to Admission medications    Medication Sig Start Date End Date Taking? Authorizing Provider   clonazePAM (KlonoPIN) 0.5 mg tablet 1/2-1 tab QHS 20  Yes Felipe Stark MD   cyanocobalamin, vitamin B-12, (VITAMIN B-12 PO) Take  by mouth daily. Yes Provider, Historical   simvastatin (Zocor) 80 mg tablet Take 40 mg by mouth nightly. Yes Provider, Historical   carvediloL (COREG) 6.25 mg tablet Take 1 Tab by mouth two (2) times daily (with meals). 10/16/20  Yes Tavo Park MD   sacubitriL-valsartan [facility]) 24-26 mg tablet Take 1 Tab by mouth two (2) times a day. 10/16/20  Yes Taov Park MD   tamsulosin [facility]) 0.4 mg capsule Take 1 Cap by mouth daily. 20  Yes LISA Nogueira   MAGNESIUM PO Take  by mouth daily. Yes Provider, Historical   carbidopa-levodopa ER (SINEMET CR)  mg per tablet TAKE 2 TABLETS AT BEDTIME 3/31/20  Yes Felipe Stark MD   carbidopa-levodopa (SINEMET)  mg per tablet TAKE 1 TABLET FIVE TIMES A DAY (EVERY 3 HOURS) 3/3/20  Yes Hernan Sadler NP   entacapone (COMTAN) 200 mg tablet 1 tab every 3 hours, 5 times a day  Patient taking differently: 1 tab 5 times daily 19  Yes Yisel Licona NP   clotrimazole-betamethasone (LOTRISONE) topical cream Apply  to affected area two (2) times daily as needed.    Yes Provider, Historical doxycycline (VIBRA-TABS) 100 mg tablet Take 100 mg by mouth daily. Yes Provider, Historical   carbidopa-levodopa (SINEMET)  mg per tablet Take 1/2 tab at night time for cramping as needed. 4/11/19  Yes Bronson Suarez NP   DISABLED PLACARD (DISABLED PLACARD) DMV Dx: Parkinson's Disease 4/11/19  Yes Bronson Suarez NP   aspirin delayed-release 81 mg tablet Take 1 Tab by mouth every twelve (12) hours every twelve (12) hours. Patient taking differently: Take 81 mg by mouth daily. 3/1/18  Yes Gama Godwin PA   tiotropium (SPIRIVA WITH HANDIHALER) 18 mcg inhalation capsule Take 1 Cap by inhalation every morning. 11/27/17  Yes Odell Nguyen MD   fluticasone-salmeterol (ADVAIR) 250-50 mcg/dose diskus inhaler Take 1 Puff by inhalation daily. Patient taking differently: Take 1 Puff by inhalation daily. Instructed to bring to the hospital on the DOS per anesthesia protocol. 11/27/17  Yes Odell Nguyen MD   acetaminophen (TYLENOL) 650 mg CR tablet Take 650 mg by mouth every six (6) hours as needed for Pain. Yes Provider, Historical   albuterol (PROAIR HFA) 90 mcg/actuation inhaler Take 2 Puffs by inhalation every four (4) hours as needed for Wheezing. Instructed to bring to the hospital on the DOS per anesthesia protocol. Yes Provider, Historical   OTHER Please resume PT specifically for gait and balance. Avoid aggravating his lower back with frequent sitting and standing. His gait is still slow with decreased stride and dragging of his leg leg and decreased arm swing. Posture is leaning forward.  11/8/16   Ida Cohw MD     Allergies   Allergen Reactions    Oxycodone Nausea and Vomiting    Phenergan [Promethazine] Other (comments)     Confusion    Polyurethane-39 Rash     Harsh soaps           Objective:     Physical Exam:   Patient Vitals for the past 24 hrs:   Temp Pulse Resp BP SpO2   12/21/20 1326 98.3 °F (36.8 °C) 69 16 122/69 96 %       ECG:    EKG Results     Procedure 720 Value Units Date/Time    EKG, 12 LEAD, INITIAL [[id number]] Collected: 12/21/20 1407    Order Status: Completed Updated: 12/22/20 0810     Ventricular Rate 71 BPM      Atrial Rate 71 BPM      P-R Interval 130 ms      QRS Duration 144 ms      Q-T Interval 444 ms      QTC Calculation (Bezet) 482 ms      Calculated P Axis 78 degrees      Calculated R Axis -74 degrees      Calculated T Axis 74 degrees      Diagnosis --     Electronic ventricular pacemaker  When compared with ECG of 31-OCT-2016 13:44,  Vent. rate has increased BY   4 BPM  Confirmed by AMPARO DUGAN (), Bart Darnell ([id number]) on 12/22/2020 8:09:58 AM            Data Review:   Labs:   Recent Results (from the past 24 hour(s))   CBC WITH AUTOMATED DIFF    Collection Time: 12/21/20 11:41 AM   Result Value Ref Range    WBC 7.2 4.3 - 11.1 K/uL    RBC 4.27 4.23 - 5.6 M/uL    HGB 13.8 13.6 - 17.2 g/dL    HCT 42.2 41.1 - 50.3 %    MCV 98.8 (H) 79.6 - 97.8 FL    MCH 32.3 26.1 - 32.9 PG    MCHC 32.7 31.4 - 35.0 g/dL    RDW 11.8 (L) 11.9 - 14.6 %    PLATELET 738 036 - 207 K/uL    MPV 10.0 9.4 - 12.3 FL    ABSOLUTE NRBC 0.00 0.0 - 0.2 K/uL    DF AUTOMATED      NEUTROPHILS 69 43 - 78 %    LYMPHOCYTES 17 13 - 44 %    MONOCYTES 9 4.0 - 12.0 %    EOSINOPHILS 5 0.5 - 7.8 %    BASOPHILS 0 0.0 - 2.0 %    IMMATURE GRANULOCYTES 0 0.0 - 5.0 %    ABS. NEUTROPHILS 4.9 1.7 - 8.2 K/UL    ABS. LYMPHOCYTES 1.2 0.5 - 4.6 K/UL    ABS. MONOCYTES 0.6 0.1 - 1.3 K/UL    ABS. EOSINOPHILS 0.4 0.0 - 0.8 K/UL    ABS. BASOPHILS 0.0 0.0 - 0.2 K/UL    ABS. IMM.  GRANS. 0.0 0.0 - 0.5 K/UL   HEMOGLOBIN A1C WITH EAG    Collection Time: 12/21/20 11:41 AM   Result Value Ref Range    Hemoglobin A1c 5.7 %    Est. average glucose 442 mg/dL   METABOLIC PANEL, BASIC    Collection Time: 12/21/20 11:41 AM   Result Value Ref Range    Sodium 142 136 - 145 mmol/L    Potassium 3.8 3.5 - 5.1 mmol/L    Chloride 109 (H) 98 - 107 mmol/L    CO2 26 21 - 32 mmol/L    Anion gap 7 7 - 16 mmol/L    Glucose 72 65 - 100 mg/dL    BUN 21 8 - 23 MG/DL    Creatinine 0.94 0.8 - 1.5 MG/DL    GFR est AA >60 >60 ml/min/1.73m2    GFR est non-AA >60 >60 ml/min/1.73m2    Calcium 9.0 8.3 - 10.4 MG/DL   PROTHROMBIN TIME + INR    Collection Time: 12/21/20 11:41 AM   Result Value Ref Range    Prothrombin time 14.4 12.5 - 14.7 sec    INR 1.1     PTT    Collection Time: 12/21/20 11:41 AM   Result Value Ref Range    aPTT 28.1 24.1 - 35.1 SEC   MSSA/MRSA SC BY PCR, NASAL SWAB    Collection Time: 12/21/20  1:53 PM    Specimen: Nasopharyngeal; Nasal swab   Result Value Ref Range    Special Requests: NO SPECIAL REQUESTS      Culture result: (A)       MRSA target DNA not detected, SA target DNA detected. A MRSA negative, SA positive test result does not preclude MRSA nasal colonization. EKG, 12 LEAD, INITIAL    Collection Time: 12/21/20  2:07 PM   Result Value Ref Range    Ventricular Rate 71 BPM    Atrial Rate 71 BPM    P-R Interval 130 ms    QRS Duration 144 ms    Q-T Interval 444 ms    QTC Calculation (Bezet) 482 ms    Calculated P Axis 78 degrees    Calculated R Axis -74 degrees    Calculated T Axis 74 degrees    Diagnosis       Electronic ventricular pacemaker  When compared with ECG of 31-OCT-2016 13:44,  Vent. rate has increased BY   4 BPM  Confirmed by AMPARO DUGAN (), [facility] ([id number]) on 12/22/2020 8:09:58 AM           Problem List:  )  Patient Active Problem List   Diagnosis Code    Obesity (BMI 30-39. 9) E66.9    Hypertension I10    Chronic obstructive pulmonary disease (HCC) J44.9    Enlarged prostate N40.0    Spinal stenosis, lumbar region, with neurogenic claudication M48.062    Acquired spondylolisthesis M43.10    Parkinson's disease ([address] G20    Hyperlipidemia E78.5    ZAFAR (dyspnea on exertion) R06.00    Arthritis M19.90    Cardiomyopathy ([facility] [address] I42.9    Coronary artery disease involving native coronary artery of native heart without angina pectoris I25.10    Tremor R25.1    Hoarseness of voice R49.0    Benign prostatic hyperplasia without lower urinary tract symptoms N40.0    Osteoarthritis M19.90    Dermatitis, seborrheic L21.9    Encounter for long-term (current) use of medications Z79.899    Controlled diabetes mellitus type II without complication (HCC) E75.4    Urinary retention R33.9    Primary osteoarthritis of knee M17.10    Chronic renal insufficiency N18.9    ED (erectile dysfunction) N52.9    Rosacea L71.9    Neurogenic bladder N31.9    Sciatica M54.30    COPD exacerbation (HCC) J44.1    Postural imbalance R29.3    Abnormal posture R29.3    Biventricular AICD malfunction T82.118A    Multifactorial gait disorder R26.89    Hypophonia R49.8    Arthritis of left hip M16.12    Biventricular ICD (implantable cardioverter-defibrillator) in place Z95.810    ANTHONY (obstructive sleep apnea) G47.33    Neurogenic orthostatic hypotension (HCC) G90.3    RBD (REM behavioral disorder) G47.52    Cognitive changes R41.89       Total Joint Surgery Pre-Assessment Recommendations:           Patient is to wear home CPAP during hospitalization. Albuterol every 6 hours as need during hospitalization.      Signed By: Nasima Alfred NP-C    December 22, 2020

## 2020-12-28 NOTE — H&P
[id number] [facility]  History and Physical Exam    Patient ID:  Analy Mike  [id number]    78 y.o.  1945    Today: December 28, 2020    Vitals Signs: Reviewed as noted in medical record. Allergies: Allergies   Allergen Reactions    Oxycodone Nausea and Vomiting    Phenergan [Promethazine] Other (comments)     Confusion    Polyurethane-39 Rash     Harsh soaps        CC: Left knee pain    HPI:  Pt complains of left knee pain and difficulty ambulating. Relevant PMH:   Past Medical History:   Diagnosis Date    Acquired spondylolisthesis 6/16/2015    Actinic keratosis 8/9/2016    Arthritis     generalized OA, managed with OTC medication     Back pain 8/9/2016    Basal cell carcinoma dx 2/2018    small pea-sized lesion on right side of nose    BPH (benign prostatic hypertrophy) 8/9/2016    Cardiomyopathy ([facility] [address] 08/02/2016    Followed by Dr. Araceli Go at [facility]     Chronic obstructive pulmonary disease [facility])     managed with inhalers-Followed by PCP, Dr. Samara Jacobson Chronic renal insufficiency 08/09/2016    patient denies, states Creatinine elevated one time.      Controlled diabetes mellitus type II without complication ([facility] [address] 04/80/8075    Patient denies-states BS was elevated one time-patient does not take any medications for diabetes     COPD exacerbation ([facility] [address] 8/9/2016    Coronary artery disease involving native coronary artery of native heart without angina pectoris 8/2/2016    Dermatitis, seborrheic 8/9/2016    ZAFAR (dyspnea on exertion) 05/31/2016    due to COPD     Dyspnea     ED (erectile dysfunction) 8/9/2016    Elevated PSA 8/9/2016    Emphysemal COPD     Encounter for long-term (current) use of medications 8/9/2016    Enlarged prostate     managed with medication     Fatigue 8/9/2016    Finger swelling 8/9/2016    H/O echocardiogram 08/04/2020    LVEF 35-40%    History of kidney stones     several with surgical interventions    Hyperlipidemia     Hypertension     managed with medication     Hypophonia with hoarseness 8/8/2016    Kidney problem     Kidney stone 06/2015    states he presently has 3 kidney stones monitored by Urology    Knee effusion 8/9/2016    Low vitamin B12 level 8/9/2016    Neurogenic bladder 8/9/2016    Obesity (BMI 30-39. 9)     BMI 34.5    Osteoarthritis 8/9/2016    Parkinson disease ([address]     Followed by Dr. Ryan Barnes with medication     Presence of combination internal cardiac defibrillator (ICD) and pacemaker     Pt states he has never had a shock as of 12/21/20    Primary osteoarthritis of knee 8/9/2016    Prostatitis 8/9/2016    Pseudogout of hand 8/9/2016    Rosacea 8/9/2016    S/P placement of cardiac pacemaker 10/31/2016    Sciatica 8/9/2016    Seborrhea 8/9/2016    Sleep apnea     Does not use c-pap. C-pap broken.  Slurred speech 08/09/2016    per patient \"mostly in the evenings\" from parkinsons     SOB (shortness of breath) 08/09/2016    With exertion only     Spinal stenosis, lumbar region, with neurogenic claudication 6/16/2015    Syncope, near 8/9/2016    Tremor 8/8/2016    Urinary frequency 08/09/2016    controlled with medication     Urinary retention 8/9/2016    Urinary urgency 8/9/2016    Wears dentures     upper and lower    Wears glasses        Objective:                    HEENT: NC/AT                   Lungs:  clear                   Heart:   rrr                   Abdomen: soft                   Extremities:  Pain with rom of the left knee joint    Radiographs: reveal osteoarthritis with loss of joint space and bone spurs. Assessment: Primary osteoarthritis of left knee [M17.12]    Plan:  Proceed with scheduled Procedure(s) (LRB):  LEFT KNEE ARTHROPLASTY TOTAL ROBOTIC ASSISTED IRVING/MEL/ PT HAS ICD PACEMAKER (Left) . The patient has failed conservative treatment including NSAIDS, and injections.   Due to the amount of pain the patient is experiencing we will proceed with scheduled procedure. It is also felt that the patient is high risk for postoperative complication due to history of multiple chronic medical problems.   The patient may potentially spend 2 nights in the hospital.      Signed By: LEANA Crawford  December 28, 2020

## 2021-01-04 ENCOUNTER — ANESTHESIA EVENT (OUTPATIENT)
Dept: SURGERY | Age: 76
DRG: 470 | End: 2021-01-04
Payer: MEDICARE

## 2021-01-05 ENCOUNTER — HOSPITAL ENCOUNTER (INPATIENT)
Age: 76
LOS: 2 days | Discharge: HOME HEALTH | DRG: 470 | End: 2021-01-07
Attending: ORTHOPAEDIC SURGERY | Admitting: ORTHOPAEDIC SURGERY
Payer: MEDICARE

## 2021-01-05 ENCOUNTER — ANESTHESIA (OUTPATIENT)
Dept: SURGERY | Age: 76
DRG: 470 | End: 2021-01-05
Payer: MEDICARE

## 2021-01-05 DIAGNOSIS — Z96.652 STATUS POST TOTAL KNEE REPLACEMENT, LEFT: Primary | ICD-10-CM

## 2021-01-05 PROBLEM — M17.12 ARTHRITIS OF LEFT KNEE: Status: ACTIVE | Noted: 2021-01-05

## 2021-01-05 LAB
GLUCOSE BLD STRIP.AUTO-MCNC: 91 MG/DL (ref 65–100)
HGB BLD-MCNC: 12.1 G/DL (ref 13.6–17.2)

## 2021-01-05 PROCEDURE — 76010010054 HC POST OP PAIN BLOCK: Performed by: ORTHOPAEDIC SURGERY

## 2021-01-05 PROCEDURE — 76942 ECHO GUIDE FOR BIOPSY: CPT | Performed by: ORTHOPAEDIC SURGERY

## 2021-01-05 PROCEDURE — C1776 JOINT DEVICE (IMPLANTABLE): HCPCS | Performed by: ORTHOPAEDIC SURGERY

## 2021-01-05 PROCEDURE — 77030012935 HC DRSG AQUACEL BMS -B: Performed by: ORTHOPAEDIC SURGERY

## 2021-01-05 PROCEDURE — 74011250636 HC RX REV CODE- 250/636: Performed by: NURSE ANESTHETIST, CERTIFIED REGISTERED

## 2021-01-05 PROCEDURE — 76010000875 HC OR TIME 1.5 TO 2HR INTENSV - TIER 2: Performed by: ORTHOPAEDIC SURGERY

## 2021-01-05 PROCEDURE — 97110 THERAPEUTIC EXERCISES: CPT

## 2021-01-05 PROCEDURE — 77030031139 HC SUT VCRL2 J&J -A: Performed by: ORTHOPAEDIC SURGERY

## 2021-01-05 PROCEDURE — 77030002966 HC SUT PDS J&J -A: Performed by: ORTHOPAEDIC SURGERY

## 2021-01-05 PROCEDURE — 74011250637 HC RX REV CODE- 250/637: Performed by: PHYSICIAN ASSISTANT

## 2021-01-05 PROCEDURE — 97165 OT EVAL LOW COMPLEX 30 MIN: CPT

## 2021-01-05 PROCEDURE — 77030040922 HC BLNKT HYPOTHRM STRY -A: Performed by: ANESTHESIOLOGY

## 2021-01-05 PROCEDURE — C1713 ANCHOR/SCREW BN/BN,TIS/BN: HCPCS | Performed by: ORTHOPAEDIC SURGERY

## 2021-01-05 PROCEDURE — 85018 HEMOGLOBIN: CPT

## 2021-01-05 PROCEDURE — 77030020275 HC MISC ORTHOPEDIC

## 2021-01-05 PROCEDURE — 77030029820: Performed by: ORTHOPAEDIC SURGERY

## 2021-01-05 PROCEDURE — 74011000250 HC RX REV CODE- 250: Performed by: NURSE ANESTHETIST, CERTIFIED REGISTERED

## 2021-01-05 PROCEDURE — 74011250636 HC RX REV CODE- 250/636: Performed by: ORTHOPAEDIC SURGERY

## 2021-01-05 PROCEDURE — 74011000258 HC RX REV CODE- 258: Performed by: ORTHOPAEDIC SURGERY

## 2021-01-05 PROCEDURE — 97535 SELF CARE MNGMENT TRAINING: CPT

## 2021-01-05 PROCEDURE — 74011250636 HC RX REV CODE- 250/636: Performed by: ANESTHESIOLOGY

## 2021-01-05 PROCEDURE — 74011250636 HC RX REV CODE- 250/636: Performed by: PHYSICIAN ASSISTANT

## 2021-01-05 PROCEDURE — 94760 N-INVAS EAR/PLS OXIMETRY 1: CPT

## 2021-01-05 PROCEDURE — 77030006835 HC BLD SAW SAG STRY -B: Performed by: ORTHOPAEDIC SURGERY

## 2021-01-05 PROCEDURE — 76060000035 HC ANESTHESIA 2 TO 2.5 HR: Performed by: ORTHOPAEDIC SURGERY

## 2021-01-05 PROCEDURE — 77030011208: Performed by: ORTHOPAEDIC SURGERY

## 2021-01-05 PROCEDURE — 77030002933 HC SUT MCRYL J&J -A: Performed by: ORTHOPAEDIC SURGERY

## 2021-01-05 PROCEDURE — 2709999900 HC NON-CHARGEABLE SUPPLY: Performed by: ORTHOPAEDIC SURGERY

## 2021-01-05 PROCEDURE — 77030003602 HC NDL NRV BLK BBMI -B: Performed by: ANESTHESIOLOGY

## 2021-01-05 PROCEDURE — 77030029828 HC FEM TIB CKPNT KT DISP STRY -B: Performed by: ORTHOPAEDIC SURGERY

## 2021-01-05 PROCEDURE — 76210000006 HC OR PH I REC 0.5 TO 1 HR: Performed by: ORTHOPAEDIC SURGERY

## 2021-01-05 PROCEDURE — 77030037088 HC TUBE ENDOTRACH ORAL NSL COVD-A: Performed by: ANESTHESIOLOGY

## 2021-01-05 PROCEDURE — 97161 PT EVAL LOW COMPLEX 20 MIN: CPT

## 2021-01-05 PROCEDURE — 77030040361 HC SLV COMPR DVT MDII -B

## 2021-01-05 PROCEDURE — 74011250637 HC RX REV CODE- 250/637: Performed by: ANESTHESIOLOGY

## 2021-01-05 PROCEDURE — 77030008462 HC STPLR SKN PROX J&J -A: Performed by: ORTHOPAEDIC SURGERY

## 2021-01-05 PROCEDURE — 82962 GLUCOSE BLOOD TEST: CPT

## 2021-01-05 PROCEDURE — 36415 COLL VENOUS BLD VENIPUNCTURE: CPT

## 2021-01-05 PROCEDURE — 65270000029 HC RM PRIVATE

## 2021-01-05 PROCEDURE — 2709999900 HC NON-CHARGEABLE SUPPLY

## 2021-01-05 PROCEDURE — 74011000250 HC RX REV CODE- 250: Performed by: PHYSICIAN ASSISTANT

## 2021-01-05 PROCEDURE — 77030019557 HC ELECTRD VES SEAL MEDT -F: Performed by: ORTHOPAEDIC SURGERY

## 2021-01-05 PROCEDURE — 77030038023 HC PIN FIX MAKO STRY -B: Performed by: ORTHOPAEDIC SURGERY

## 2021-01-05 PROCEDURE — 77030038149 HC BLD SAW SAG STRY -D: Performed by: ORTHOPAEDIC SURGERY

## 2021-01-05 PROCEDURE — 77030039425 HC BLD LARYNG TRULITE DISP TELE -A: Performed by: ANESTHESIOLOGY

## 2021-01-05 PROCEDURE — 77030019908 HC STETH ESOPH SIMS -A: Performed by: ANESTHESIOLOGY

## 2021-01-05 PROCEDURE — 74011000250 HC RX REV CODE- 250: Performed by: ORTHOPAEDIC SURGERY

## 2021-01-05 PROCEDURE — 74011250637 HC RX REV CODE- 250/637: Performed by: ORTHOPAEDIC SURGERY

## 2021-01-05 PROCEDURE — 0SRD0J9 REPLACEMENT OF LEFT KNEE JOINT WITH SYNTHETIC SUBSTITUTE, CEMENTED, OPEN APPROACH: ICD-10-PCS | Performed by: ORTHOPAEDIC SURGERY

## 2021-01-05 PROCEDURE — 8E0Y0CZ ROBOTIC ASSISTED PROCEDURE OF LOWER EXTREMITY, OPEN APPROACH: ICD-10-PCS | Performed by: ORTHOPAEDIC SURGERY

## 2021-01-05 DEVICE — CEMENT BONE 40GM HI VISC PALACOS R: Type: IMPLANTABLE DEVICE | Site: KNEE | Status: FUNCTIONAL

## 2021-01-05 DEVICE — UNIVERSAL TIBIAL BASEPLATE
Type: IMPLANTABLE DEVICE | Site: KNEE | Status: FUNCTIONAL
Brand: TRIATHLON

## 2021-01-05 DEVICE — CRUCIATE RETAINING FEMORAL
Type: IMPLANTABLE DEVICE | Site: KNEE | Status: FUNCTIONAL
Brand: TRIATHLON

## 2021-01-05 DEVICE — KNEE K1 TOT HEMI STD CEM -- IMPL CAPPED K1: Type: IMPLANTABLE DEVICE | Status: FUNCTIONAL

## 2021-01-05 DEVICE — PATELLA
Type: IMPLANTABLE DEVICE | Site: KNEE | Status: FUNCTIONAL
Brand: TRIATHLON

## 2021-01-05 DEVICE — TIBIAL BEARING INSERT
Type: IMPLANTABLE DEVICE | Site: KNEE | Status: FUNCTIONAL
Brand: TRIATHLON

## 2021-01-05 RX ORDER — CEFAZOLIN SODIUM/WATER 2 G/20 ML
2 SYRINGE (ML) INTRAVENOUS EVERY 8 HOURS
Status: COMPLETED | OUTPATIENT
Start: 2021-01-05 | End: 2021-01-06

## 2021-01-05 RX ORDER — TAMSULOSIN HYDROCHLORIDE 0.4 MG/1
0.4 CAPSULE ORAL DAILY
Status: DISCONTINUED | OUTPATIENT
Start: 2021-01-06 | End: 2021-01-07 | Stop reason: HOSPADM

## 2021-01-05 RX ORDER — CARBIDOPA AND LEVODOPA 25; 250 MG/1; MG/1
1 TABLET ORAL
Status: DISCONTINUED | OUTPATIENT
Start: 2021-01-05 | End: 2021-01-07 | Stop reason: HOSPADM

## 2021-01-05 RX ORDER — PROPOFOL 10 MG/ML
INJECTION, EMULSION INTRAVENOUS AS NEEDED
Status: DISCONTINUED | OUTPATIENT
Start: 2021-01-05 | End: 2021-01-05 | Stop reason: HOSPADM

## 2021-01-05 RX ORDER — HYDROMORPHONE HYDROCHLORIDE 2 MG/ML
0.5 INJECTION, SOLUTION INTRAMUSCULAR; INTRAVENOUS; SUBCUTANEOUS
Status: COMPLETED | OUTPATIENT
Start: 2021-01-05 | End: 2021-01-05

## 2021-01-05 RX ORDER — SODIUM CHLORIDE, SODIUM LACTATE, POTASSIUM CHLORIDE, CALCIUM CHLORIDE 600; 310; 30; 20 MG/100ML; MG/100ML; MG/100ML; MG/100ML
75 INJECTION, SOLUTION INTRAVENOUS CONTINUOUS
Status: DISCONTINUED | OUTPATIENT
Start: 2021-01-05 | End: 2021-01-05 | Stop reason: HOSPADM

## 2021-01-05 RX ORDER — FENTANYL CITRATE 50 UG/ML
100 INJECTION, SOLUTION INTRAMUSCULAR; INTRAVENOUS ONCE
Status: COMPLETED | OUTPATIENT
Start: 2021-01-05 | End: 2021-01-05

## 2021-01-05 RX ORDER — BUDESONIDE AND FORMOTEROL FUMARATE DIHYDRATE 80; 4.5 UG/1; UG/1
2 AEROSOL RESPIRATORY (INHALATION)
Status: DISCONTINUED | OUTPATIENT
Start: 2021-01-05 | End: 2021-01-07 | Stop reason: HOSPADM

## 2021-01-05 RX ORDER — VANCOMYCIN HYDROCHLORIDE 1 G/20ML
INJECTION, POWDER, LYOPHILIZED, FOR SOLUTION INTRAVENOUS AS NEEDED
Status: DISCONTINUED | OUTPATIENT
Start: 2021-01-05 | End: 2021-01-05 | Stop reason: HOSPADM

## 2021-01-05 RX ORDER — ONDANSETRON 2 MG/ML
INJECTION INTRAMUSCULAR; INTRAVENOUS AS NEEDED
Status: DISCONTINUED | OUTPATIENT
Start: 2021-01-05 | End: 2021-01-05 | Stop reason: HOSPADM

## 2021-01-05 RX ORDER — ASPIRIN 81 MG/1
81 TABLET ORAL EVERY 12 HOURS
Status: DISCONTINUED | OUTPATIENT
Start: 2021-01-05 | End: 2021-01-07 | Stop reason: HOSPADM

## 2021-01-05 RX ORDER — CARBIDOPA AND LEVODOPA 25; 100 MG/1; MG/1
2 TABLET, EXTENDED RELEASE ORAL
Status: DISCONTINUED | OUTPATIENT
Start: 2021-01-05 | End: 2021-01-07 | Stop reason: HOSPADM

## 2021-01-05 RX ORDER — CARVEDILOL 6.25 MG/1
6.25 TABLET ORAL 2 TIMES DAILY WITH MEALS
Status: DISCONTINUED | OUTPATIENT
Start: 2021-01-05 | End: 2021-01-07 | Stop reason: HOSPADM

## 2021-01-05 RX ORDER — CEFAZOLIN SODIUM/WATER 2 G/20 ML
SYRINGE (ML) INTRAVENOUS AS NEEDED
Status: DISCONTINUED | OUTPATIENT
Start: 2021-01-05 | End: 2021-01-05 | Stop reason: HOSPADM

## 2021-01-05 RX ORDER — DIPHENHYDRAMINE HCL 25 MG
25 CAPSULE ORAL
Status: DISCONTINUED | OUTPATIENT
Start: 2021-01-05 | End: 2021-01-07 | Stop reason: HOSPADM

## 2021-01-05 RX ORDER — ROPIVACAINE HYDROCHLORIDE 2 MG/ML
INJECTION, SOLUTION EPIDURAL; INFILTRATION; PERINEURAL
Status: COMPLETED | OUTPATIENT
Start: 2021-01-05 | End: 2021-01-05

## 2021-01-05 RX ORDER — SODIUM CHLORIDE, SODIUM LACTATE, POTASSIUM CHLORIDE, CALCIUM CHLORIDE 600; 310; 30; 20 MG/100ML; MG/100ML; MG/100ML; MG/100ML
100 INJECTION, SOLUTION INTRAVENOUS CONTINUOUS
Status: DISCONTINUED | OUTPATIENT
Start: 2021-01-05 | End: 2021-01-05 | Stop reason: HOSPADM

## 2021-01-05 RX ORDER — HYDROMORPHONE HYDROCHLORIDE 1 MG/ML
1 INJECTION, SOLUTION INTRAMUSCULAR; INTRAVENOUS; SUBCUTANEOUS
Status: DISCONTINUED | OUTPATIENT
Start: 2021-01-05 | End: 2021-01-07 | Stop reason: HOSPADM

## 2021-01-05 RX ORDER — FENTANYL CITRATE 50 UG/ML
INJECTION, SOLUTION INTRAMUSCULAR; INTRAVENOUS AS NEEDED
Status: DISCONTINUED | OUTPATIENT
Start: 2021-01-05 | End: 2021-01-05 | Stop reason: HOSPADM

## 2021-01-05 RX ORDER — ACETAMINOPHEN 325 MG/1
975 TABLET ORAL ONCE
Status: DISCONTINUED | OUTPATIENT
Start: 2021-01-05 | End: 2021-01-05 | Stop reason: SDUPTHER

## 2021-01-05 RX ORDER — ACETAMINOPHEN 650 MG/1
650 SUPPOSITORY RECTAL ONCE
Status: DISCONTINUED | OUTPATIENT
Start: 2021-01-05 | End: 2021-01-05 | Stop reason: SDUPTHER

## 2021-01-05 RX ORDER — ACETAMINOPHEN 500 MG
1000 TABLET ORAL EVERY 6 HOURS
Status: DISCONTINUED | OUTPATIENT
Start: 2021-01-05 | End: 2021-01-05

## 2021-01-05 RX ORDER — ENTACAPONE 200 MG/1
200 TABLET ORAL
Status: DISCONTINUED | OUTPATIENT
Start: 2021-01-05 | End: 2021-01-07 | Stop reason: HOSPADM

## 2021-01-05 RX ORDER — ACETAMINOPHEN 500 MG
1000 TABLET ORAL ONCE
Status: COMPLETED | OUTPATIENT
Start: 2021-01-05 | End: 2021-01-05

## 2021-01-05 RX ORDER — OXYCODONE HYDROCHLORIDE 5 MG/1
5 TABLET ORAL
Status: DISCONTINUED | OUTPATIENT
Start: 2021-01-05 | End: 2021-01-05 | Stop reason: HOSPADM

## 2021-01-05 RX ORDER — CELECOXIB 200 MG/1
200 CAPSULE ORAL ONCE
Status: COMPLETED | OUTPATIENT
Start: 2021-01-05 | End: 2021-01-05

## 2021-01-05 RX ORDER — NALOXONE HYDROCHLORIDE 0.4 MG/ML
0.2 INJECTION, SOLUTION INTRAMUSCULAR; INTRAVENOUS; SUBCUTANEOUS AS NEEDED
Status: DISCONTINUED | OUTPATIENT
Start: 2021-01-05 | End: 2021-01-05 | Stop reason: HOSPADM

## 2021-01-05 RX ORDER — MIDAZOLAM HYDROCHLORIDE 1 MG/ML
2 INJECTION, SOLUTION INTRAMUSCULAR; INTRAVENOUS ONCE
Status: COMPLETED | OUTPATIENT
Start: 2021-01-05 | End: 2021-01-05

## 2021-01-05 RX ORDER — SODIUM CHLORIDE 0.9 % (FLUSH) 0.9 %
5-40 SYRINGE (ML) INJECTION AS NEEDED
Status: DISCONTINUED | OUTPATIENT
Start: 2021-01-05 | End: 2021-01-07 | Stop reason: HOSPADM

## 2021-01-05 RX ORDER — AMOXICILLIN 250 MG
2 CAPSULE ORAL DAILY
Status: DISCONTINUED | OUTPATIENT
Start: 2021-01-06 | End: 2021-01-07 | Stop reason: HOSPADM

## 2021-01-05 RX ORDER — ROCURONIUM BROMIDE 10 MG/ML
INJECTION, SOLUTION INTRAVENOUS AS NEEDED
Status: DISCONTINUED | OUTPATIENT
Start: 2021-01-05 | End: 2021-01-05 | Stop reason: HOSPADM

## 2021-01-05 RX ORDER — CLONAZEPAM 0.5 MG/1
0.25 TABLET ORAL
Status: DISCONTINUED | OUTPATIENT
Start: 2021-01-05 | End: 2021-01-07 | Stop reason: HOSPADM

## 2021-01-05 RX ORDER — DEXAMETHASONE SODIUM PHOSPHATE 100 MG/10ML
10 INJECTION INTRAMUSCULAR; INTRAVENOUS ONCE
Status: ACTIVE | OUTPATIENT
Start: 2021-01-06 | End: 2021-01-07

## 2021-01-05 RX ORDER — ALBUTEROL SULFATE 0.83 MG/ML
2.5 SOLUTION RESPIRATORY (INHALATION)
Status: DISCONTINUED | OUTPATIENT
Start: 2021-01-05 | End: 2021-01-07 | Stop reason: HOSPADM

## 2021-01-05 RX ORDER — SODIUM CHLORIDE 0.9 % (FLUSH) 0.9 %
5-40 SYRINGE (ML) INJECTION EVERY 8 HOURS
Status: DISCONTINUED | OUTPATIENT
Start: 2021-01-05 | End: 2021-01-07 | Stop reason: HOSPADM

## 2021-01-05 RX ORDER — LIDOCAINE HYDROCHLORIDE 10 MG/ML
0.1 INJECTION INFILTRATION; PERINEURAL AS NEEDED
Status: DISCONTINUED | OUTPATIENT
Start: 2021-01-05 | End: 2021-01-05 | Stop reason: HOSPADM

## 2021-01-05 RX ORDER — LIDOCAINE HYDROCHLORIDE 20 MG/ML
INJECTION, SOLUTION EPIDURAL; INFILTRATION; INTRACAUDAL; PERINEURAL AS NEEDED
Status: DISCONTINUED | OUTPATIENT
Start: 2021-01-05 | End: 2021-01-05 | Stop reason: HOSPADM

## 2021-01-05 RX ORDER — ONDANSETRON 4 MG/1
4 TABLET, ORALLY DISINTEGRATING ORAL
Status: DISCONTINUED | OUTPATIENT
Start: 2021-01-05 | End: 2021-01-07 | Stop reason: HOSPADM

## 2021-01-05 RX ORDER — ACETAMINOPHEN 500 MG
1000 TABLET ORAL
Status: DISCONTINUED | OUTPATIENT
Start: 2021-01-05 | End: 2021-01-07 | Stop reason: HOSPADM

## 2021-01-05 RX ORDER — SODIUM CHLORIDE 9 MG/ML
100 INJECTION, SOLUTION INTRAVENOUS CONTINUOUS
Status: DISPENSED | OUTPATIENT
Start: 2021-01-05 | End: 2021-01-07

## 2021-01-05 RX ORDER — DEXAMETHASONE SODIUM PHOSPHATE 4 MG/ML
INJECTION, SOLUTION INTRA-ARTICULAR; INTRALESIONAL; INTRAMUSCULAR; INTRAVENOUS; SOFT TISSUE
Status: COMPLETED | OUTPATIENT
Start: 2021-01-05 | End: 2021-01-05

## 2021-01-05 RX ORDER — HYDROCODONE BITARTRATE AND ACETAMINOPHEN 7.5; 325 MG/1; MG/1
1 TABLET ORAL
Status: DISCONTINUED | OUTPATIENT
Start: 2021-01-05 | End: 2021-01-07 | Stop reason: HOSPADM

## 2021-01-05 RX ORDER — NALOXONE HYDROCHLORIDE 0.4 MG/ML
.2-.4 INJECTION, SOLUTION INTRAMUSCULAR; INTRAVENOUS; SUBCUTANEOUS
Status: DISCONTINUED | OUTPATIENT
Start: 2021-01-05 | End: 2021-01-07 | Stop reason: HOSPADM

## 2021-01-05 RX ORDER — CEFAZOLIN SODIUM/WATER 2 G/20 ML
2 SYRINGE (ML) INTRAVENOUS ONCE
Status: DISCONTINUED | OUTPATIENT
Start: 2021-01-05 | End: 2021-01-05 | Stop reason: HOSPADM

## 2021-01-05 RX ORDER — DEXAMETHASONE SODIUM PHOSPHATE 4 MG/ML
INJECTION, SOLUTION INTRA-ARTICULAR; INTRALESIONAL; INTRAMUSCULAR; INTRAVENOUS; SOFT TISSUE AS NEEDED
Status: DISCONTINUED | OUTPATIENT
Start: 2021-01-05 | End: 2021-01-05 | Stop reason: HOSPADM

## 2021-01-05 RX ORDER — ALBUTEROL SULFATE 0.83 MG/ML
2.5 SOLUTION RESPIRATORY (INHALATION)
Status: DISCONTINUED | OUTPATIENT
Start: 2021-01-05 | End: 2021-01-05 | Stop reason: SDUPTHER

## 2021-01-05 RX ADMIN — CARBIDOPA AND LEVODOPA 1 TABLET: 25; 250 TABLET ORAL at 18:00

## 2021-01-05 RX ADMIN — DEXAMETHASONE SODIUM PHOSPHATE 5 MG: 4 INJECTION, SOLUTION INTRAMUSCULAR; INTRAVENOUS at 09:13

## 2021-01-05 RX ADMIN — Medication 2 G: at 09:49

## 2021-01-05 RX ADMIN — Medication 2 G: at 17:08

## 2021-01-05 RX ADMIN — HYDROMORPHONE HYDROCHLORIDE 0.5 MG: 2 INJECTION INTRAMUSCULAR; INTRAVENOUS; SUBCUTANEOUS at 11:54

## 2021-01-05 RX ADMIN — PROPOFOL 150 MG: 10 INJECTION, EMULSION INTRAVENOUS at 09:38

## 2021-01-05 RX ADMIN — DEXAMETHASONE SODIUM PHOSPHATE 10 MG: 4 INJECTION, SOLUTION INTRAMUSCULAR; INTRAVENOUS at 10:02

## 2021-01-05 RX ADMIN — CARBIDOPA AND LEVODOPA 1 TABLET: 25; 250 TABLET ORAL at 22:00

## 2021-01-05 RX ADMIN — ROPIVACAINE HYDROCHLORIDE 40 MG: 2 INJECTION, SOLUTION EPIDURAL; INFILTRATION at 09:13

## 2021-01-05 RX ADMIN — PHENYLEPHRINE HYDROCHLORIDE 100 MCG: 10 INJECTION INTRAVENOUS at 10:21

## 2021-01-05 RX ADMIN — TRANEXAMIC ACID 1000 MG: 100 INJECTION, SOLUTION INTRAVENOUS at 09:50

## 2021-01-05 RX ADMIN — CLONAZEPAM 0.25 MG: 0.5 TABLET ORAL at 21:16

## 2021-01-05 RX ADMIN — PHENYLEPHRINE HYDROCHLORIDE 100 MCG: 10 INJECTION INTRAVENOUS at 11:02

## 2021-01-05 RX ADMIN — PHENYLEPHRINE HYDROCHLORIDE 50 MCG: 10 INJECTION INTRAVENOUS at 10:53

## 2021-01-05 RX ADMIN — Medication 10 ML: at 17:09

## 2021-01-05 RX ADMIN — SACUBITRIL AND VALSARTAN 1 TABLET: 24; 26 TABLET, FILM COATED ORAL at 21:01

## 2021-01-05 RX ADMIN — SUGAMMADEX 200 MG: 100 INJECTION, SOLUTION INTRAVENOUS at 11:26

## 2021-01-05 RX ADMIN — CELECOXIB 200 MG: 200 CAPSULE ORAL at 08:10

## 2021-01-05 RX ADMIN — MIDAZOLAM 1 MG: 1 INJECTION INTRAMUSCULAR; INTRAVENOUS at 09:11

## 2021-01-05 RX ADMIN — Medication 81 MG: at 21:01

## 2021-01-05 RX ADMIN — ENTACAPONE 200 MG: 200 TABLET ORAL at 22:00

## 2021-01-05 RX ADMIN — BUDESONIDE AND FORMOTEROL FUMARATE DIHYDRATE 2 PUFF: 80; 4.5 AEROSOL RESPIRATORY (INHALATION) at 21:45

## 2021-01-05 RX ADMIN — ACETAMINOPHEN 1000 MG: 500 TABLET ORAL at 17:08

## 2021-01-05 RX ADMIN — Medication 1 AMPULE: at 21:01

## 2021-01-05 RX ADMIN — ENTACAPONE 200 MG: 200 TABLET ORAL at 18:00

## 2021-01-05 RX ADMIN — ACETAMINOPHEN 1000 MG: 500 TABLET ORAL at 08:10

## 2021-01-05 RX ADMIN — ROCURONIUM BROMIDE 40 MG: 10 INJECTION, SOLUTION INTRAVENOUS at 09:40

## 2021-01-05 RX ADMIN — FENTANYL CITRATE 50 MCG: 50 INJECTION, SOLUTION INTRAMUSCULAR; INTRAVENOUS at 09:11

## 2021-01-05 RX ADMIN — FENTANYL CITRATE 50 MCG: 50 INJECTION INTRAMUSCULAR; INTRAVENOUS at 09:56

## 2021-01-05 RX ADMIN — SODIUM CHLORIDE, SODIUM LACTATE, POTASSIUM CHLORIDE, AND CALCIUM CHLORIDE: 600; 310; 30; 20 INJECTION, SOLUTION INTRAVENOUS at 10:50

## 2021-01-05 RX ADMIN — PHENYLEPHRINE HYDROCHLORIDE 100 MCG: 10 INJECTION INTRAVENOUS at 10:18

## 2021-01-05 RX ADMIN — HYDROMORPHONE HYDROCHLORIDE 0.5 MG: 2 INJECTION INTRAMUSCULAR; INTRAVENOUS; SUBCUTANEOUS at 11:52

## 2021-01-05 RX ADMIN — HYDROMORPHONE HYDROCHLORIDE 0.5 MG: 2 INJECTION INTRAMUSCULAR; INTRAVENOUS; SUBCUTANEOUS at 11:46

## 2021-01-05 RX ADMIN — PHENYLEPHRINE HYDROCHLORIDE 100 MCG: 10 INJECTION INTRAVENOUS at 09:50

## 2021-01-05 RX ADMIN — CARVEDILOL 6.25 MG: 6.25 TABLET, FILM COATED ORAL at 17:08

## 2021-01-05 RX ADMIN — SODIUM CHLORIDE, SODIUM LACTATE, POTASSIUM CHLORIDE, AND CALCIUM CHLORIDE: 600; 310; 30; 20 INJECTION, SOLUTION INTRAVENOUS at 08:48

## 2021-01-05 RX ADMIN — ONDANSETRON 4 MG: 2 INJECTION INTRAMUSCULAR; INTRAVENOUS at 11:06

## 2021-01-05 RX ADMIN — Medication 3 AMPULE: at 08:10

## 2021-01-05 RX ADMIN — LIDOCAINE HYDROCHLORIDE 60 MG: 20 INJECTION, SOLUTION EPIDURAL; INFILTRATION; INTRACAUDAL; PERINEURAL at 09:37

## 2021-01-05 RX ADMIN — PHENYLEPHRINE HYDROCHLORIDE 100 MCG: 10 INJECTION INTRAVENOUS at 09:46

## 2021-01-05 RX ADMIN — SODIUM CHLORIDE, SODIUM LACTATE, POTASSIUM CHLORIDE, AND CALCIUM CHLORIDE 100 ML/HR: 600; 310; 30; 20 INJECTION, SOLUTION INTRAVENOUS at 08:09

## 2021-01-05 RX ADMIN — HYDROMORPHONE HYDROCHLORIDE 0.5 MG: 2 INJECTION INTRAMUSCULAR; INTRAVENOUS; SUBCUTANEOUS at 11:41

## 2021-01-05 RX ADMIN — FENTANYL CITRATE 50 MCG: 50 INJECTION INTRAMUSCULAR; INTRAVENOUS at 09:43

## 2021-01-05 NOTE — PROGRESS NOTES
Care Management Interventions  PCP Verified by CM: Yes  Mode of Transport at Discharge: Self  Transition of Care Consult (CM Consult): [address]: No  Reason Outside [place]: Out of service area  Physical Therapy Consult: Yes  Occupational Therapy Consult: Yes  Current Support Network: Lives with Spouse  Confirm Follow Up Transport: Family  The Plan for Transition of Care is Related to the Following Treatment Goals : return to independent function  The Patient and/or Patient Representative was Provided with a Choice of Provider and Agrees with the Discharge Plan?: Yes  Freedom of Choice List was Provided with Basic Dialogue that Supports the Patient's Individualized Plan of Care/Goals, Treatment Preferences and Shares the Quality Data Associated with the Providers?: Yes  Discharge Location  Discharge Placement: Home with home health    OT recommending we add [facility]. Reina Moran Referral called and order faxed to [facility]. Patient is a 77y.o. year old male admitted for Left TKA . Patient plans to return to his home in [place]. Order received to arrange home health. Patient requesting [facility] in [facility] , the same agency used after his last joint replacement. Referral faxed. They will not accept referral until post op progress note received. Will fax in am.  Patient denies any equipment needs as patient has a walker and raised toilet seat. Will follow until discharge.

## 2021-01-05 NOTE — PERIOP NOTES
TRANSFER - OUT REPORT:    Verbal report given to Milan Issa RN on Richar Ross.  being transferred to [address] [place] [address] for routine post - op       Report consisted of patients Situation, Background, Assessment and   Recommendations(SBAR). Information from the following report(s) SBAR, Procedure Summary, Intake/Output, MAR, Recent Results and Cardiac Rhythm NSR was reviewed with the receiving nurse. Lines:   Peripheral IV 01/05/21 Left Hand (Active)   Site Assessment Clean, dry, & intact 01/05/21 1138   Phlebitis Assessment 0 01/05/21 1138   Infiltration Assessment 0 01/05/21 1138   Dressing Status Clean, dry, & intact 01/05/21 1138   Dressing Type Transparent;Tape 01/05/21 1138   Hub Color/Line Status Green; Infusing 01/05/21 1138   Action Taken Blood drawn 01/05/21 2939        Opportunity for questions and clarification was provided. VTE prophylaxis orders have not been written for Richar Carey .    Patient and family given floor number and nurses name. Family updated re: pt status after security code verified.

## 2021-01-05 NOTE — ANESTHESIA POSTPROCEDURE EVALUATION
Procedure(s):  LEFT KNEE ARTHROPLASTY TOTAL ROBOTIC ASSISTED IRVING/MEL/ PT HAS ICD PACEMAKER. general    Anesthesia Post Evaluation      Multimodal analgesia: multimodal analgesia used between 6 hours prior to anesthesia start to PACU discharge  Patient location during evaluation: bedside  Patient participation: complete - patient participated  Level of consciousness: awake and alert  Pain score: 3  Pain management: adequate  Airway patency: patent  Anesthetic complications: no  Cardiovascular status: acceptable  Respiratory status: acceptable  Hydration status: acceptable  Comments: Patient doing well. Continue care on floor.    Post anesthesia nausea and vomiting:  none  Final Post Anesthesia Temperature Assessment:  Normothermia (36.0-37.5 degrees C)      INITIAL Post-op Vital signs:   Vitals Value Taken Time   /59 01/05/21 1153   Temp 36.6 °C (97.8 °F) 01/05/21 1138   Pulse 70 01/05/21 1200   Resp 16 01/05/21 1200   SpO2 93 % 01/05/21 1200

## 2021-01-05 NOTE — ANESTHESIA PROCEDURE NOTES
Peripheral Block    Start time: 1/5/2021 9:11 AM  End time: 1/5/2021 9:13 AM  Performed by: Rohit Ashton MD  Authorized by: Rohit Ashton MD       Pre-procedure:   Indications: at surgeon's request and post-op pain management    Preanesthetic Checklist: patient identified, risks and benefits discussed, site marked, timeout performed, anesthesia consent given and patient being monitored    Timeout Time: 09:11          Block Type:   Block Type:  Adductor canal  Laterality:  Left  Monitoring:  Standard ASA monitoring, responsive to questions, continuous pulse ox, oxygen, frequent vital sign checks and heart rate  Injection Technique:  Single shot  Procedures: ultrasound guided    Patient Position: supine  Prep: chlorhexidine    Location:  Mid thigh  Needle Type:  Stimuplex  Needle Gauge:  22 G  Needle Localization:  Ultrasound guidance  Medication Injected:  Ropivacaine (NAROPIN) 2 mg/mL (0.2 %) injection, 40 mg  dexamethasone (DECADRON) 4 mg/mL injection, 5 mg  Med Admin Time: 1/5/2021 9:13 AM    Assessment:  Number of attempts:  1  Injection Assessment:  Incremental injection every 5 mL, negative aspiration for CSF, ultrasound image on chart, no paresthesia, local visualized surrounding nerve on ultrasound, negative aspiration for blood and no intravascular symptoms  Patient tolerance:  Patient tolerated the procedure well with no immediate complications

## 2021-01-05 NOTE — PROGRESS NOTES
Problem: Mobility Impaired (Adult and Pediatric)  Goal: *Acute Goals and Plan of Care (Insert Text)  Description: GOALS (1-4 days):  (1.)Mr. Brenda Ayala will move from supine to sit and sit to supine  in bed with SUPERVISION. (2.)Mr. Brenda Ayala will transfer from bed to chair and chair to bed with SUPERVISION using the least restrictive device. (3.)Mr. Brenda Ayala will ambulate with SUPERVISION for 200 feet with the least restrictive device. (4.)Mr. Brenda Ayala will increase left knee ROM to 5°-80°.  ________________________________________________________________________________________________    Outcome: Progressing Towards Goal     PHYSICAL THERAPY JOINT CAMP TKA: Initial Assessment and PM 1/5/2021  INPATIENT: Hospital Day: 1  [place] / Product Type: Medicare /      NAME/AGE/GENDER: Areli Hager is a 77 y.o. male   PRIMARY DIAGNOSIS:  Primary osteoarthritis of left knee [M17.12]   Procedure(s) and Anesthesia Type:     * LEFT KNEE ARTHROPLASTY TOTAL ROBOTIC ASSISTED IRVING/MEL/ PT HAS ICD PACEMAKER - General (Left)  ICD-10: Treatment Diagnosis:    Pain in Left Knee (M25.562)  Stiffness of Left Knee, Not elsewhere classified (M25.662)  Difficulty in walking, Not elsewhere classified (R26.2)      ASSESSMENT:     Mr. Brenda Ayala presents s/p L TKA. Patient demonstrates decreased L LE strength and ROM and decreased independence with mobility. Patient was using a rollator prior to surgery. He has had a L GILMA and is in need of a R TKA. He will return home at d/c with assist from his spouse. Patient very lethargic this afternoon and mobility limited by decreased alertness. L knee wrapped with ace wrap d/t bleeding so gentle ROM only. May not tolerate sitting much due to R sciatic pain. Also has shoulder issues for which he has been going to therapy.     This section established at most recent assessment   PROBLEM LIST (Impairments causing functional limitations):  Decreased Strength  Decreased ADL/Functional Activities  Decreased Transfer Abilities  Decreased Ambulation Ability/Technique  Decreased Balance  Increased Pain  Decreased Flexibility/Joint Mobility  Edema/Girth  Decreased [place] with Home Exercise Program   INTERVENTIONS PLANNED: (Benefits and precautions of physical therapy have been discussed with the patient.)  Bed Mobility  Cold  Gait Training  Home Exercise Program (HEP)  Range of Motion (ROM)  Therapeutic Activites  Therapeutic Exercise/Strengthening  Transfer Training     TREATMENT PLAN: Frequency/Duration: Follow patient BID for duration of hospital stay to address above goals. Rehabilitation Potential For Stated Goals: Good     RECOMMENDED REHABILITATION/EQUIPMENT: (at time of discharge pending progress): Continue Skilled Therapy.               HISTORY:   History of Present Injury/Illness (Reason for Referral):  L TKA  Past Medical History/Comorbidities:   Mr. Danna Plaza  has a past medical history of Acquired spondylolisthesis (6/16/2015), Actinic keratosis (8/9/2016), Arthritis, Back pain (8/9/2016), Basal cell carcinoma (dx 2/2018), BPH (benign prostatic hypertrophy) (8/9/2016), Cardiomyopathy ([facility] [address] (08/02/2016), Chronic obstructive pulmonary disease ([facility] [address] Chronic renal insufficiency (08/09/2016), Controlled diabetes mellitus type II without complication ([facility] [address] (64/32/1116), COPD exacerbation ([facility] [address] (8/9/2016), Coronary artery disease involving native coronary artery of native heart without angina pectoris (8/2/2016), Dermatitis, seborrheic (8/9/2016), ZAFAR (dyspnea on exertion) (05/31/2016), Dyspnea, ED (erectile dysfunction) (8/9/2016), Elevated PSA (8/9/2016), Emphysemal COPD, Encounter for long-term (current) use of medications (8/9/2016), Enlarged prostate, Fatigue (8/9/2016), Finger swelling (8/9/2016), H/O echocardiogram (08/04/2020), History of kidney stones, Hyperlipidemia, Hypertension, Hypophonia with hoarseness (8/8/2016), Kidney problem, Kidney stone (06/2015), Knee effusion (8/9/2016), Low vitamin B12 level (8/9/2016), Neurogenic bladder (8/9/2016), Obesity (BMI 30-39.9), Osteoarthritis (8/9/2016), Parkinson disease ([facility] [address] 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 Mr. Shaw Sinha  has a past surgical history that includes hx lithotripsy (1998); hx cystostomy; hx urological (Right); hx appendectomy; hx knee arthroscopy (Right); hx acl reconstruction (Right, 1991); hx pacemaker (10/31/2016); hx lumbar laminectomy (06/2015); and hx hip replacement (Left, 2018). Social History/Living Environment:   Home Environment: Private residence  Wheelchair Ramp: Yes  One/Two Story Residence: Two story, live on 1st floor  # of Interior Steps: 12  Living Alone: No  Support Systems: Spouse/Significant Other/Partner  Patient Expects to be Discharged to[de-identified] Private residence  Current DME Used/Available at Home: Chantelle Davis, straight, Commode, bedside, Shower chair, Shereen Bloch, rollator, Walker, rolling  Tub or Shower Type: Tub/Shower combination  Prior Level of Function/Work/Activity:  Ambulating with rollator.    Number of Personal Factors/Comorbidities that affect the Plan of Care: 1-2: MODERATE COMPLEXITY   EXAMINATION:   Most Recent Physical Functioning:      Gross Assessment  AROM: Generally decreased, functional(R LE)  Strength: Generally decreased, functional(R LE)                LLE Strength  L Hip Flexion: 2  L Knee Extension: 2+  L Ankle Dorsiflexion: 3+    Bed Mobility  Supine to Sit: Moderate assistance  Scooting: Minimum assistance    Transfers  Sit to Stand: Minimum assistance  Stand to Sit: Minimum assistance  Bed to Chair: Minimum assistance; Additional time    Balance  Sitting: Intact  Standing: With support              Weight Bearing Status  Left Side Weight Bearing: As tolerated  Distance (ft): 5 Feet (ft)  Ambulation - Level of Assistance: Minimal assistance  Assistive Device: Walker, rolling  Speed/Dana: Delayed  Step Length: Left shortened;Right shortened  Stance: Left decreased  Gait Abnormalities: Antalgic;Decreased step clearance  Interventions: Safety awareness training;Verbal cues     Braces/Orthotics: none    Left Knee Cold  Type: Cryocuff      Body Structures Involved:  Joints  Muscles Body Functions Affected: Movement Related Activities and Participation Affected: Mobility   Number of elements that affect the Plan of Care: 4+: HIGH COMPLEXITY   CLINICAL PRESENTATION:   Presentation: Stable and uncomplicated: LOW COMPLEXITY   CLINICAL DECISION MAKIN [facility] [address] AM-PAC 6 Clicks   Basic Mobility Inpatient Short Form  How much difficulty does the patient currently have. .. Unable A Lot A Little None   1. Turning over in bed (including adjusting bedclothes, sheets and blankets)? [] 1   [] 2   [x] 3   [] 4   2. Sitting down on and standing up from a chair with arms ( e.g., wheelchair, bedside commode, etc.)   [] 1   [] 2   [x] 3   [] 4   3. Moving from lying on back to sitting on the side of the bed? [] 1   [x] 2   [] 3   [] 4   How much help from another person does the patient currently need. .. Total A Lot A Little None   4. Moving to and from a bed to a chair (including a wheelchair)? [] 1   [] 2   [x] 3   [] 4   5. Need to walk in hospital room? [] 1   [] 2   [x] 3   [] 4   6. Climbing 3-5 steps with a railing?    [] 1   [x] 2   [] 3   [] 4   © 2007, Trustees of [address] [place] [address] [address] [address], under license to [employer]. All rights reserved     Score:  Initial: 16 Most Recent: X (Date: -- )    Interpretation of Tool:  Represents activities that are increasingly more difficult (i.e. Bed mobility, Transfers, Gait). Medical Necessity:     Patient is expected to demonstrate progress in   strength, range of motion, balance, and coordination   to   increase independence with mobility and HEP. .  Reason for Services/Other Comments:  Patient continues to require skilled intervention due to   Decreased L LE strength and ROM and decreased independence with mobility. .   Use of outcome tool(s) and clinical judgement create a POC that gives a: Clear prediction of patient's progress: LOW COMPLEXITY            TREATMENT:   (In addition to Assessment/Re-Assessment sessions the following treatments were rendered)     Pre-treatment Symptoms/Complaints:  Patient agreeable. Pain Initial:   Pain Intensity 1: 4  Post Session:  4/10     Therapeutic Exercise: (10 Minutes):  Exercises per grid below to improve strength. Required minimal verbal, manual, and tactile cues to promote proper body alignment. Progressed range and repetitions as indicated. Date:  1/5/21 Date:   Date:     ACTIVITY/EXERCISE AM PM AM PM AM PM   GROUP THERAPY  []  []  []  []  []  []   Ankle Pumps  10       Quad Sets  10       Gluteal Sets  10       Hip ABd/ADduction  10 AA       Straight Leg Raises  10 AA       Knee Slides  10 AA       Short Arc Quads  10 AA       Long Arc Quads         Chair Slides                  B = bilateral; AA = active assistive; A = active; P = passive      Treatment/Session Assessment:     Response to Treatment:  Limited participation due to alertness.     Education:  [] Home Exercises  [x] Fall Precautions  [x] Use of Cold Therapy Unit [] D/C Instruction Review  [] Knee Prosthesis Review  [x] Walker Management/Safety [] Adaptive Equipment as Needed       Interdisciplinary Collaboration:   Physical Therapist  Registered Nurse    After treatment position/precautions:   Up in chair  Bed/Chair-wheels locked  Caregiver at bedside  Call light within reach  RN notified    Compliance with Program/Exercises: Will assess as treatment progresses. Recommendations/Intent for next treatment session:  Treatment next visit will focus on increasing Mr. Bowie's independence with bed mobility, transfers, gait training, strength/ROM exercises, modalities for pain, and patient education.       Total Treatment Duration:  PT Patient Time In/Time Out  Time In: 9550  Time Out: Pascual Bergman PT

## 2021-01-05 NOTE — PROGRESS NOTES
Problem: Self Care Deficits Care Plan (Adult)  Goal: *Acute Goals and Plan of Care (Insert Text)  Description: GOALS:   DISCHARGE GOALS (in preparation for going home/rehab):  3 days  1. Mr. Riky Aguilar will perform one lower body dressing activity with minimal assistance required to demonstrate improved functional mobility and safety. 2.  Mr. Riky Aguilar will perform one lower body bathing activity with minimal assistance required to demonstrate improved functional mobility and safety. 3.  Mr. Riky Aguilar will perform toileting/toilet transfer with contact guard assistance to demonstrate improved functional mobility and safety. 4.  Mr. Riky Aguilar will perform shower transfer with contact guard assistance to demonstrate improved functional mobility and safety. Outcome: Progressing Towards Goal      JOINT CAMP OCCUPATIONAL THERAPY TKA: Initial Assessment, Daily Note, Treatment Day: Day of Assessment, and PM 1/5/2021  INPATIENT: Hospital Day: 1  [place] / Product Type: Medicare /      NAME/AGE/GENDER: Pipo Coulter is a 77 y.o. male   PRIMARY DIAGNOSIS:  Primary osteoarthritis of left knee [M17.12]   Procedure(s) and Anesthesia Type:     * LEFT KNEE ARTHROPLASTY TOTAL ROBOTIC ASSISTED IRVING/MEL/ PT HAS ICD PACEMAKER - General (Left)  ICD-10: Treatment Diagnosis:    · Pain in Left Knee (M25.562)  · Stiffness of Left Knee, Not elsewhere classified (M01.688)      ASSESSMENT:     Mr. Riky Aguilar is s/p left TKA and presents with decreased weight bearing on left LE and decreased independence with functional mobility and activities of daily living as compared to baseline level of function and safety. Patient would benefit from skilled Occupational Therapy to maximize independence and safety with self-care task and functional mobility. Pt would also benefit from education on adaptive equipment and safety precautions in preparation for going home.     Pt up in recliner and needed to use the restroom. Pt to bathroom for toileting. Upon leaving the bathroom pt stated he needed to sit down. His wife brought over a straight chair and he was assisted to sit down. OT slid chair to bedside for pt to transfer to bed. Pt should do fairly well tomorrow. This section established at most recent assessment   PROBLEM LIST (Impairments causing functional limitations):  1. Decreased Strength  2. Decreased ADL/Functional Activities  3. Decreased Transfer Abilities  4. Increased Pain  5. Increased Fatigue  6. Decreased Flexibility/Joint Mobility  7. Decreased Knowledge of Precautions   INTERVENTIONS PLANNED: (Benefits and precautions of occupational therapy have been discussed with the patient.)  1. Activities of daily living training  2. Adaptive equipment training  3. Balance training  4. Clothing management  5. Donning&doffing training  6. Theraputic activity     TREATMENT PLAN: Frequency/Duration: Follow patient 1-2 times to address above goals. Rehabilitation Potential For Stated Goals: Good     RECOMMENDED REHABILITATION/EQUIPMENT: (at time of discharge pending progress): Continue Skilled Therapy. OCCUPATIONAL PROFILE AND HISTORY:   History of Present Injury/Illness (Reason for Referral): Pt presents this date s/p () TKA.     Past Medical History/Comorbidities:   Mr. Doris Huizar  has a past medical history of Acquired spondylolisthesis (6/16/2015), Actinic keratosis (8/9/2016), Arthritis, Back pain (8/9/2016), Basal cell carcinoma (dx 2/2018), BPH (benign prostatic hypertrophy) (8/9/2016), Cardiomyopathy ([facility] [address] (08/02/2016), Chronic obstructive pulmonary disease ([facility] [address] Chronic renal insufficiency (08/09/2016), Controlled diabetes mellitus type II without complication ([facility] [address] (72/04/9916), COPD exacerbation ([facility] [address] (8/9/2016), Coronary artery disease involving native coronary artery of native heart without angina pectoris (8/2/2016), Dermatitis, seborrheic (8/9/2016), ZAFAR (dyspnea on exertion) (05/31/2016), Dyspnea, ED (erectile dysfunction) (8/9/2016), Elevated PSA (8/9/2016), Emphysemal COPD, Encounter for long-term (current) use of medications (8/9/2016), Enlarged prostate, Fatigue (8/9/2016), Finger swelling (8/9/2016), H/O echocardiogram (08/04/2020), History of kidney stones, Hyperlipidemia, Hypertension, Hypophonia with hoarseness (8/8/2016), Kidney problem, Kidney stone (06/2015), Knee effusion (8/9/2016), Low vitamin B12 level (8/9/2016), Neurogenic bladder (8/9/2016), Obesity (BMI 30-39.9), Osteoarthritis (8/9/2016), Parkinson disease ([facility] [address] 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 Mr. Foster Bahena  has a past surgical history that includes hx lithotripsy (1998); hx cystostomy; hx urological (Right); hx appendectomy; hx knee arthroscopy (Right); hx acl reconstruction (Right, 1991); hx pacemaker (10/31/2016); hx lumbar laminectomy (06/2015); and hx hip replacement (Left, 2018).   Social History/Living Environment:   Home Environment: Private residence  Wheelchair Ramp: Yes  One/Two Story Residence: Two story, live on 1st floor  # of Interior Steps: 12  Living Alone: No  Support Systems: Spouse/Significant Other/Partner  Patient Expects to be Discharged to[de-identified] Private residence  Current DME Used/Available at Home: Seamus Henriquez, kelley, Commode, bedside, Shower chair, Viridiana Serene, rollator, Walker, rolling  Tub or Shower Type: Tub/Shower combination  Prior Level of Function/Work/Activity:  Independent per wife but she is very cable of assisting if needed      Number of Personal Factors/Comorbidities that affect the Plan of Care: Brief history (0):  LOW COMPLEXITY   ASSESSMENT OF OCCUPATIONAL PERFORMANCE[de-identified]   Most Recent Physical Functioning:   Balance  Sitting: Intact  Standing: Pull to stand; With support       Gross Assessment  AROM: Generally decreased, functional(R LE)  Strength: Generally decreased, functional(R LE)          LLE Strength  L Hip Flexion: 2  L Knee Extension: 2+  L Ankle Dorsiflexion: 3+ Coordination  Fine Motor Skills-Upper: Left Intact; Right Intact  Gross Motor Skills-Upper: Left Intact; Right Intact         Mental Status  Neurologic State: Alert  Orientation Level: Oriented X4  Cognition: Appropriate decision making  Perception: Appears intact  Perseveration: No perseveration noted  Safety/Judgement: Awareness of environment                Basic ADLs (From Assessment) Complex ADLs (From Assessment)   Basic ADL  Feeding: Independent  Oral Facial Hygiene/Grooming: Supervision  Bathing: Moderate assistance  Upper Body Dressing: Supervision  Lower Body Dressing: Maximum assistance  Toileting: Moderate assistance     Grooming/Bathing/Dressing Activities of Daily Living     Cognitive Retraining  Safety/Judgement: Awareness of environment                 Functional Transfers  Bathroom Mobility: Minimum assistance  Toilet Transfer : Moderate assistance  Shower Transfer: Moderate assistance     Bed/Mat Mobility  Supine to Sit: Moderate assistance  Sit to Supine: Moderate assistance  Sit to Stand: Moderate assistance  Stand to Sit: Moderate assistance  Bed to Chair: Minimum assistance(chair to bed)  Scooting: Minimum assistance         Physical Skills Involved:  1. Range of Motion  2. Balance  3. Strength Cognitive Skills Affected (resulting in the inability to perform in a timely and safe manner):   1. none Psychosocial Skills Affected:  1. Environmental Adaptation   Number of elements that affect the Plan of Care: 3-5:  MODERATE COMPLEXITY   CLINICAL DECISION MAKIN[address] [place] [address] AM-PAC 6 Clicks   Daily Activity Inpatient Short Form  How much help from another person does the patient currently need. .. Total A Lot A Little None   1. Putting on and taking off regular lower body clothing? [] 1   [x] 2   [] 3   [] 4   2. Bathing (including washing, rinsing, drying)? [] 1   [x] 2   [] 3   [] 4   3. Toileting, which includes using toilet, bedpan or urinal?   [] 1   [x] 2   [] 3   [] 4   4. Putting on and taking off regular upper body clothing? [] 1   [] 2   [] 3   [x] 4   5. Taking care of personal grooming such as brushing teeth? [] 1   [] 2   [] 3   [x] 4   6. Eating meals? [] 1   [] 2   [] 3   [x] 4   © , Trustees of [address] [place] [address], under license to [employer]. All rights reserved     Score:  Initial: 18 Most Recent: X (Date: -- )    Interpretation of Tool:  Represents activities that are increasingly more difficult (i.e. Bed mobility, Transfers, Gait). ·    Use of outcome tool(s) and clinical judgement create a POC that gives a: LOW COMPLEXITY            TREATMENT:   (In addition to Assessment/Re-Assessment sessions the following treatments were rendered)     Pre-treatment Symptoms/Complaints:    Pain: Initial:     0 Post Session:  0     Self Care: (10 min): Procedure(s) (per grid) utilized to improve and/or restore self-care/home management as related to toileting and grooming. Required moderate verbal, manual and   cueing to facilitate activities of daily living skills and compensatory activities. OT evaluation completed   Treatment/Session Assessment:     Response to Treatment:  Pt up in room and bathroom tolerated fair. .    Education:  [] Home Exercises  [x] Fall Precautions  [] Hip Precautions [] Going Home Video  [x] Knee/Hip Prosthesis Review  [x] Walker Management/Safety [x] Adaptive Equipment as Needed       Interdisciplinary Collaboration:   o Physical Therapist  o Occupational Therapist  o Registered Nurse    After treatment position/precautions:   o Supine in bed  o Bed/Chair-wheels locked  o Call light within reach  o RN notified  o Family at bedside     Compliance with Program/Exercises: Will assess as treatment progresses. Recommendations/Intent for next treatment session:  Treatment next visit will focus on increasing Mr. Bowie's independence with bed mobility, transfers, self care, functional mobility, modalities for pain, and patient education.       Total Treatment Duration:  OT Patient Time In/Time Out  Time In: 1640  Time Out: [address], OT

## 2021-01-05 NOTE — PERIOP NOTES
Betadine lavage:  17.5cc of betadine lot # [id number] , exp. Date  02/2022,  in 500cc of . 9NS Lot # [id number] , exp.  Date : 04/01/2023

## 2021-01-05 NOTE — ANESTHESIA PREPROCEDURE EVALUATION
Anesthetic History   No history of anesthetic complications            Review of Systems / Medical History  Patient summary reviewed and pertinent labs reviewed    Pulmonary    COPD: moderate    Sleep apnea: No treatment           Neuro/Psych   Within defined limits           Cardiovascular    Hypertension: well controlled          Pacemaker (ICD in place), CAD and hyperlipidemia    Exercise tolerance: <4 METS  Comments: EF 35%-40% has defibrillator/pacer. Never discharged. Hx cardiomyopathy down to 10-15%  Denies recent CP or changes in functional status  Pt appears optimized   GI/Hepatic/Renal         Renal disease: stones       Endo/Other        Obesity and arthritis  Pertinent negatives: Diabetes: denies. Other Findings   Comments: Parkinson's last meds at 0730  Spinal stenosis  Urinary retention  ED  LBP           Physical Exam    Airway  Mallampati: II  TM Distance: 4 - 6 cm  Neck ROM: normal range of motion   Mouth opening: Normal     Cardiovascular    Rhythm: regular  Rate: normal         Dental    Dentition: Lower partial plate and Upper partial plate     Pulmonary  Breath sounds clear to auscultation          Prolonged expiration     Abdominal  GI exam deferred       Other Findings            Anesthetic Plan    ASA: 3  Anesthesia type: general      Post-op pain plan if not by surgeon: peripheral nerve block single    Induction: Intravenous  Anesthetic plan and risks discussed with: Patient      Magnet available. Discussed risks in detail and pt desires to proceed. Plan for GETA. Pt optimized.

## 2021-01-05 NOTE — PROGRESS NOTES
01/05/21 1421   Oxygen Therapy   O2 Sat (%) 98 %   Pulse via Oximetry 79 beats per minute   O2 Device Room air   O2 Flow Rate (L/min) 0 l/min   Incentive Spirometry Treatment   Actual Volume (ml) 3000 ml   Patient encouraged to do 10 breaths every hour while awake-patient agreed and demonstrated. No shortness of breath or distress noted. BS are clear b/l. [facility] notes reviewed- Sat monitor order noted HS. Pt has home inhalers.

## 2021-01-05 NOTE — OP NOTES
[address] [place] [address] Robotic Assisted Total Knee Arthroplasty: Posterior Cruciate Retaining       Patient:Kayden Mcdaniel. : 1945  Medical Record Number:[id number]      Pre-operative Diagnosis:  Primary osteoarthritis of left knee [M17.12]  Post-operative Diagnosis: Primary osteoarthritis of left knee [M17.12]  Location: [place] [address]    Date of Procedure: 2021  Surgeon: Mariann Holter, MD  Assistant:  Hieu Silva. LEANA Godwin    Anesthesia: General and  nerve block   BMI:Body mass index is 29.7 kg/m². CPT- 39070- Total knee arthroplasty           93042- Other procedures on musculoskeletal system            0055T- Computer assisted surgical navigation     Procedure:  Left Cemented Cruciate Retained Total Knee Arthroplasty     Tourniquet Time: none    EBL:  200 cc         The complexity of the total joint surgery requires the use of a first assistant for positioning, retraction and assistance in closure. This BMI for this patient is Body mass index is 29.7 kg/m². Jonna Castañeda was brought to the operating room and positioned on the operating table. He was anesthestized with anesthesia. IV antibiotics was administered. Prior to the incision being made a timeout was called identifying the patient, procedure ,operative side and surgeon The operative leg was prepped and draped in the usual sterile manner. An anterior longitudinal incision was accomplished just medial to the tibial tubercle and extending approximal 6 centimeters proximal to the superior pole of the patella. A medial parapatellar capsular incision was performed. The medial capsular flap was elevated around to the insertion of the semimembranous tendon. The patella was everted and the knee flexed and externally rotated. The medial and external menisci were excised. The lateral half of the fat pad excised and the patella femoral ligament was released.   The anterior cruciate ligament was resect and the posterior cruciate ligament was retained. The femoral and tibial arrays were pinned in place and registered with the [employer]. The patient landmarks were collected and the tibial and femoral checkpoints were registered and verified. The preresection balancing was performed. The osteophytes were then removed as exposed on the femoral and tibial surfaces. Utilizing the [facility] robotic arm the femoral and tibial cuts were accomplished. The tibia was sized. The tibial base plate was pinned into place with the appropriate external rotation and stem site prepared. A trial femoral component and poly was placed. A preliminary range of motion was accomplished with the trial components. The patient was found to obtain full extension as well as appropriate flexion. The patient's ligaments were stable in flexion and extension to medial and lateral stressing and the alignment was through the appropriate mechanical axis. Additional surgical releases were none. The patella was then everted. Osteophytes were removed. The patella articular surface was inspected and with wear was resurfaced and a trial polyethylene button was placed. All trial components were removed. The real implants were opened: Sizes selected were a size 5 femoral, 6 tibial, and a 10  mm polyethylene insert. A 32 polyethylene patella button was selected. The knee was irrigated. The real components were cemented into place. Jim Daily. knee was placed through range of motion and noted to be stable as mentioned above with the trial components. The operative knee was injected with 60 cc of Naropin, 10 cc's of morphine and 1 cc of 30 mg of Toradol. The knee was then soaked with a diluted betadine solution for approximately 3 min. This was then thoroughly irrigated. The capsular layer was closed using a #1 PDS suture and a #1 Vicryl.  Then, 1 gram (100 mg/ml) of Transexamic Acid and 1 gram of Vancomycin was injected into the joint space. The subcutaneous layers were closed using 2-0 vicryl. The skin was closed using staples which were applied in occlusive fashion and sterile bandage applied. An Iceman cryo pad was applied on the operative leg. Sponge count and needle counts were correct. Aleena Colon. left the operating room     Implants:   Implant Name Type Inv.  Item Serial No.  Lot No. LRB No. Used Action   CEMENT BONE 40GM HI VISC PALACOS R - [id number]  CEMENT BONE 40GM HI VISC PALACOS R [id number] [facility] [id number] Left 2 Implanted   FEM KNE TIFFANY CR SZ 5 LT -- TRIATHLON - [id number]  FEM KNE TIFFANY CR SZ 5 LT -- TRIATHLON [id number] [employer] [facility]_ [id number] Left 1 Implanted   BASEPLT TIB REV UNIV SZ 6 R/L -- TRIATHLON - [id number]  BASEPLT TIB REV UNIV SZ 6 R/L -- TRIATHLON [id number] [employer] [facility]_ [id number] Left 1 Implanted   COMPONENT PAT [id number] SUP INFERIOR ASYM TRIATHLON - [id number]  COMPONENT PAT [id number] SUP INFERIOR ASYM TRIATHLON [id number] [employer] [facility]_ [id number] Left 1 Implanted   INSERT TIB SZ 6 [id number] UNIV KNEE POLYETH CNDYL STBL MERLE - [id number]  INSERT TIB SZ 6 [id number] UNIV KNEE POLYETH CNDYL STBL MERLE [id number] [employer] [facility]_ [id number] Left 1 Implanted         Signed By: Analisa Lockwood MD   1/5/2021,  12:02 PM

## 2021-01-05 NOTE — H&P
The patient has end stage arthritis of the left knee joint. The patient was seen and examined and there are no changes to the patient's orthopedic condition. They have tried conservative treatment for this condition; including antiinflammatories and lifestyle modifications and have failed. The necessity for the joint replacement is still present, and the H&P from the office is still current. The patient will be admitted today forProcedure(s) (LRB):  LEFT KNEE ARTHROPLASTY TOTAL ROBOTIC ASSISTED IRVING/MEL/ PT HAS ICD PACEMAKER (Left) .

## 2021-01-05 NOTE — PERIOP NOTES
TRANSFER - OUT REPORT:    Verbal report given to YIN RN(name) on Filipe Nguyen.  being transferred to PRE-OP(unit) for routine progression of care       Report consisted of patients Situation, Background, Assessment and   Recommendations(SBAR). Information from the following report(s) SBAR, MAR and Recent Results was reviewed with the receiving nurse. Lines:   Peripheral IV 01/05/21 Left Hand (Active)   Site Assessment Clean, dry, & intact 01/05/21 0808   Phlebitis Assessment 0 01/05/21 0808   Infiltration Assessment 0 01/05/21 0808   Dressing Status Clean, dry, & intact 01/05/21 0808   Dressing Type Tape;Transparent 01/05/21 5784   Hub Color/Line Status Green; Infusing 01/05/21 2931   Action Taken Blood drawn 01/05/21 9349        Opportunity for questions and clarification was provided.       Patient transported with:   [employer] Walk in

## 2021-01-05 NOTE — PROGRESS NOTES
TRANSFER - IN REPORT:    Verbal report received from [facility]) on Hal Sharp.  being received from [facility]) for routine progression of care      Report consisted of patients Situation, Background, Assessment and   Recommendations(SBAR). Information from the following report(s) SBAR, Kardex, Procedure Summary, Intake/Output, MAR and Recent Results was reviewed with the receiving nurse. Opportunity for questions and clarification was provided. Assessment completed upon patients arrival to unit and care assumed.

## 2021-01-06 LAB — HGB BLD-MCNC: 12.1 G/DL (ref 13.6–17.2)

## 2021-01-06 PROCEDURE — 2709999900 HC NON-CHARGEABLE SUPPLY

## 2021-01-06 PROCEDURE — 77030027138 HC INCENT SPIROMETER -A

## 2021-01-06 PROCEDURE — 97110 THERAPEUTIC EXERCISES: CPT

## 2021-01-06 PROCEDURE — 97530 THERAPEUTIC ACTIVITIES: CPT

## 2021-01-06 PROCEDURE — 74011250637 HC RX REV CODE- 250/637: Performed by: PHYSICIAN ASSISTANT

## 2021-01-06 PROCEDURE — 85018 HEMOGLOBIN: CPT

## 2021-01-06 PROCEDURE — 97116 GAIT TRAINING THERAPY: CPT

## 2021-01-06 PROCEDURE — 97535 SELF CARE MNGMENT TRAINING: CPT

## 2021-01-06 PROCEDURE — 65270000029 HC RM PRIVATE

## 2021-01-06 PROCEDURE — 36415 COLL VENOUS BLD VENIPUNCTURE: CPT

## 2021-01-06 PROCEDURE — 74011250636 HC RX REV CODE- 250/636: Performed by: PHYSICIAN ASSISTANT

## 2021-01-06 PROCEDURE — 77030041279 HC DRSG PRMSL AG MDII -B

## 2021-01-06 PROCEDURE — 74011250637 HC RX REV CODE- 250/637: Performed by: ORTHOPAEDIC SURGERY

## 2021-01-06 RX ORDER — HYDROCODONE BITARTRATE AND ACETAMINOPHEN 7.5; 325 MG/1; MG/1
1 TABLET ORAL
Qty: 50 TAB | Refills: 0 | Status: SHIPPED | OUTPATIENT
Start: 2021-01-06 | End: 2021-01-11

## 2021-01-06 RX ORDER — ASPIRIN 81 MG/1
81 TABLET ORAL EVERY 12 HOURS
Qty: 70 TAB | Refills: 0 | Status: SHIPPED | OUTPATIENT
Start: 2021-01-06 | End: 2021-02-10

## 2021-01-06 RX ADMIN — CARBIDOPA AND LEVODOPA 1 TABLET: 25; 250 TABLET ORAL at 11:00

## 2021-01-06 RX ADMIN — Medication 81 MG: at 08:47

## 2021-01-06 RX ADMIN — TIOTROPIUM BROMIDE INHALATION SPRAY 2 PUFF: 3.12 SPRAY, METERED RESPIRATORY (INHALATION) at 09:00

## 2021-01-06 RX ADMIN — HYDROCODONE BITARTRATE AND ACETAMINOPHEN 1 TABLET: 7.5; 325 TABLET ORAL at 22:07

## 2021-01-06 RX ADMIN — ENTACAPONE 200 MG: 200 TABLET ORAL at 14:00

## 2021-01-06 RX ADMIN — BUDESONIDE AND FORMOTEROL FUMARATE DIHYDRATE 2 PUFF: 80; 4.5 AEROSOL RESPIRATORY (INHALATION) at 08:00

## 2021-01-06 RX ADMIN — Medication 1 AMPULE: at 08:47

## 2021-01-06 RX ADMIN — DOCUSATE SODIUM 50MG AND SENNOSIDES 8.6MG 2 TABLET: 8.6; 5 TABLET, FILM COATED ORAL at 08:47

## 2021-01-06 RX ADMIN — ENTACAPONE 200 MG: 200 TABLET ORAL at 22:00

## 2021-01-06 RX ADMIN — ENTACAPONE 200 MG: 200 TABLET ORAL at 11:00

## 2021-01-06 RX ADMIN — SACUBITRIL AND VALSARTAN 1 TABLET: 24; 26 TABLET, FILM COATED ORAL at 08:46

## 2021-01-06 RX ADMIN — HYDROCODONE BITARTRATE AND ACETAMINOPHEN 1 TABLET: 7.5; 325 TABLET ORAL at 01:00

## 2021-01-06 RX ADMIN — CARBIDOPA AND LEVODOPA 1 TABLET: 25; 250 TABLET ORAL at 18:00

## 2021-01-06 RX ADMIN — CARBIDOPA AND LEVODOPA 1 TABLET: 25; 250 TABLET ORAL at 07:00

## 2021-01-06 RX ADMIN — SACUBITRIL AND VALSARTAN 1 TABLET: 24; 26 TABLET, FILM COATED ORAL at 22:01

## 2021-01-06 RX ADMIN — CARVEDILOL 6.25 MG: 6.25 TABLET, FILM COATED ORAL at 17:00

## 2021-01-06 RX ADMIN — CARBIDOPA AND LEVODOPA 1 TABLET: 25; 250 TABLET ORAL at 22:00

## 2021-01-06 RX ADMIN — CLONAZEPAM 0.25 MG: 0.5 TABLET ORAL at 22:00

## 2021-01-06 RX ADMIN — TAMSULOSIN HYDROCHLORIDE 0.4 MG: 0.4 CAPSULE ORAL at 08:47

## 2021-01-06 RX ADMIN — ACETAMINOPHEN 1000 MG: 500 TABLET ORAL at 14:13

## 2021-01-06 RX ADMIN — HYDROMORPHONE HYDROCHLORIDE 1 MG: 1 INJECTION, SOLUTION INTRAMUSCULAR; INTRAVENOUS; SUBCUTANEOUS at 03:37

## 2021-01-06 RX ADMIN — ENTACAPONE 200 MG: 200 TABLET ORAL at 18:00

## 2021-01-06 RX ADMIN — CARBIDOPA AND LEVODOPA 1 TABLET: 25; 250 TABLET ORAL at 14:00

## 2021-01-06 RX ADMIN — Medication 1 AMPULE: at 22:00

## 2021-01-06 RX ADMIN — ENTACAPONE 200 MG: 200 TABLET ORAL at 07:00

## 2021-01-06 RX ADMIN — Medication 81 MG: at 22:01

## 2021-01-06 RX ADMIN — CARVEDILOL 6.25 MG: 6.25 TABLET, FILM COATED ORAL at 08:46

## 2021-01-06 RX ADMIN — Medication 2 G: at 01:00

## 2021-01-06 NOTE — PROGRESS NOTES
Problem: Falls - Risk of  Goal: *Absence of Falls  Description: Document Judd Francis Fall Risk and appropriate interventions in the flowsheet.   Outcome: Progressing Towards Goal  Note: Fall Risk Interventions:  Mobility Interventions: OT consult for ADLs, Patient to call before getting OOB, PT Consult for mobility concerns, PT Consult for assist device competence, Strengthening exercises (ROM-active/passive), Utilize walker, cane, or other assistive device    Mentation Interventions: Adequate sleep, hydration, pain control, Family/sitter at bedside, More frequent rounding, Reorient patient    Medication Interventions: Assess postural VS orthostatic hypotension, Patient to call before getting OOB, Teach patient to arise slowly    Elimination Interventions: Call light in reach, Patient to call for help with toileting needs, Toileting schedule/hourly rounds              Problem: Patient Education: Go to Patient Education Activity  Goal: Patient/Family Education  Outcome: Progressing Towards Goal     Problem: Knee Replacement: Day of Surgery/Unit  Goal: Off Pathway (Use only if patient is Off Pathway)  Outcome: Progressing Towards Goal  Goal: Activity/Safety  Outcome: Progressing Towards Goal  Goal: Consults, if ordered  Outcome: Progressing Towards Goal  Goal: Diagnostic Test/Procedures  Outcome: Progressing Towards Goal  Goal: Nutrition/Diet  Outcome: Progressing Towards Goal  Goal: Medications  Outcome: Progressing Towards Goal  Goal: Respiratory  Outcome: Progressing Towards Goal  Goal: Treatments/Interventions/Procedures  Outcome: Progressing Towards Goal  Goal: Psychosocial  Outcome: Progressing Towards Goal  Goal: *Initiate mobility  Outcome: Progressing Towards Goal  Goal: *Optimal pain control at patient's stated goal  Outcome: Progressing Towards Goal  Goal: *Hemodynamically stable  Outcome: Progressing Towards Goal

## 2021-01-06 NOTE — PROGRESS NOTES
Pt refused to take Sinemet at 2200 and started to become confused around midnight. Pt was agitated and took off oxygen monitor and attempted to take out IV. Wife at bedside continued to orient him, but he became increasingly more verbally aggressive. I gave him IV pain medicine, which seemed to calm him down some.

## 2021-01-06 NOTE — PROGRESS NOTES
Pt up in recliner with wife at bedside. Assessment unchanged from this am.  Pt calm and cooperative today. A/ox4. Pain to knee controlled well. Appetite good. Discharge plans in progress for tomorrow. inst both to call for needs.

## 2021-01-06 NOTE — PROGRESS NOTES
Problem: Self Care Deficits Care Plan (Adult)  Goal: *Acute Goals and Plan of Care (Insert Text)  Description: GOALS:   DISCHARGE GOALS (in preparation for going home/rehab):  3 days  1. Mr. Lazara Ramirez will perform one lower body dressing activity with minimal assistance required to demonstrate improved functional mobility and safety. PROGRESSING  2. Mr. Lazara Ramirez will perform one lower body bathing activity with minimal assistance required to demonstrate improved functional mobility and safety. PROGRESSING  3. Mr. Lazara Ramirez will perform toileting/toilet transfer with contact guard assistance to demonstrate improved functional mobility and safety. 4.  Mr. Lazara Ramirez will perform shower transfer with contact guard assistance to demonstrate improved functional mobility and safety. Outcome: Progressing Towards Goal      JOINT CAMP OCCUPATIONAL THERAPY TKA: Daily Note, Treatment Day: 2nd and AM 1/6/2021  INPATIENT: Hospital Day: 2  [place] / [place] / Product Type: Medicare /      NAME/AGE/GENDER: Monserrat Russell is a 77 y.o. male   PRIMARY DIAGNOSIS:  Primary osteoarthritis of left knee [M17.12]   Procedure(s) and Anesthesia Type:     * LEFT KNEE ARTHROPLASTY TOTAL ROBOTIC ASSISTED IRVING/MEL/ PT HAS ICD PACEMAKER - General (Left)  ICD-10: Treatment Diagnosis:    · Pain in Left Knee (M25.562)  · Stiffness of Left Knee, Not elsewhere classified (N50.743)      ASSESSMENT:     Mr. Lazara Ramirez is s/p left TKA and presents with decreased weight bearing on left LE and decreased independence with functional mobility and activities of daily living as compared to baseline level of function and safety. Patient would benefit from skilled Occupational Therapy to maximize independence and safety with self-care task and functional mobility. Pt would also benefit from education on adaptive equipment and safety precautions in preparation for going home.     Pt up in recliner and needed to use the restroom. Pt to bathroom for toileting. Upon leaving the bathroom pt stated he needed to sit down. His wife brought over a straight chair and he was assisted to sit down. OT slid chair to bedside for pt to transfer to bed. Pt should do fairly well tomorrow. 1/06/2021 850am patient transferred to EOB with min assist. He stood and slowly ambulated to the bathroom with min assist. He stood to urinate. He then returned to sit in the recliner. His wife was present. Ot to return later this morning for full ADL session after PT. Continue with OT  POC.    1/06/2021 1100am patient completed sponge bath from recliner as charted below. He plans to stay the night to increase over all  [place] with mobility and ADLs. He completed tasks as charted below. He is sitting in recliner with tab alert in place. OT to see him in the am for full ADL session to include shower. Will continue with OT POC. This section established at most recent assessment   PROBLEM LIST (Impairments causing functional limitations):  1. Decreased Strength  2. Decreased ADL/Functional Activities  3. Decreased Transfer Abilities  4. Increased Pain  5. Increased Fatigue  6. Decreased Flexibility/Joint Mobility  7. Decreased Knowledge of Precautions   INTERVENTIONS PLANNED: (Benefits and precautions of occupational therapy have been discussed with the patient.)  1. Activities of daily living training  2. Adaptive equipment training  3. Balance training  4. Clothing management  5. Donning&doffing training  6. Theraputic activity     TREATMENT PLAN: Frequency/Duration: Follow patient 1-2 times to address above goals. Rehabilitation Potential For Stated Goals: Good     RECOMMENDED REHABILITATION/EQUIPMENT: (at time of discharge pending progress): Continue Skilled Therapy. OCCUPATIONAL PROFILE AND HISTORY:   History of Present Injury/Illness (Reason for Referral): Pt presents this date s/p () TKA.     Past Medical History/Comorbidities: Mr. Jeff Manley  has a past medical history of Acquired spondylolisthesis (6/16/2015), Actinic keratosis (8/9/2016), Arthritis, Back pain (8/9/2016), Basal cell carcinoma (dx 2/2018), BPH (benign prostatic hypertrophy) (8/9/2016), [facility] (08/02/2016), Chronic obstructive pulmonary disease ([facility] Chronic renal insufficiency (08/09/2016), Controlled diabetes mellitus type II without complication ([facility] (74/85/4402), COPD exacerbation ([facility] (8/9/2016), Coronary artery disease involving native coronary artery of native heart without angina pectoris (8/2/2016), Dermatitis, seborrheic (8/9/2016), ZAFAR (dyspnea on exertion) (05/31/2016), Dyspnea, ED (erectile dysfunction) (8/9/2016), Elevated PSA (8/9/2016), Emphysemal COPD, Encounter for long-term (current) use of medications (8/9/2016), Enlarged prostate, Fatigue (8/9/2016), Finger swelling (8/9/2016), H/O echocardiogram (08/04/2020), History of kidney stones, Hyperlipidemia, Hypertension, Hypophonia with hoarseness (8/8/2016), Kidney problem, Kidney stone (06/2015), Knee effusion (8/9/2016), Low vitamin B12 level (8/9/2016), Neurogenic bladder (8/9/2016), Obesity (BMI 30-39.9), Osteoarthritis (8/9/2016), Parkinson disease ([facility] 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   Mr. Jeff Manley  has a past surgical history that includes hx lithotripsy (1998); hx cystostomy; hx urological (Right); hx appendectomy; hx knee arthroscopy (Right); hx acl reconstruction (Right, 1991); hx pacemaker (10/31/2016); hx lumbar laminectomy (06/2015); and hx hip replacement (Left, 2018). Social History/Living Environment:   Home Environment: Private residence  Wheelchair Ramp: Yes  One/Two Story Residence: Two story, live on 1st floor  # of Interior Steps: 12  Living Alone: No  Support Systems: Spouse/Significant Other/Partner  Patient Expects to be Discharged to[de-identified] Private residence  Current DME Used/Available at Home: Dee [employer]ee, straight, Commode, bedside, Shower chair, Reji Peeling, rollator, Walker, rolling  Tub or Shower Type: Tub/Shower combination  Prior Level of Function/Work/Activity:  Independent per wife but she is very cable of assisting if needed      Number of Personal Factors/Comorbidities that affect the Plan of Care: Brief history (0):  LOW COMPLEXITY   ASSESSMENT OF OCCUPATIONAL PERFORMANCE[de-identified]   Most Recent Physical Functioning:   Balance  Sitting: Intact  Standing: With support       Gross Assessment  AROM: Generally decreased, functional(R LE)  Strength: Generally decreased, functional(R LE)          LLE AROM  L Knee Flexion: 91  L Knee Extension: 10  LLE Strength  L Hip Flexion: 2  L Knee Extension: 2+  L Ankle Dorsiflexion: 4           Mental Status  Neurologic State: Alert; Appropriate for age  Orientation Level: Appropriate for age  Cognition: Follows commands  Perception: Appears intact  Perseveration: No perseveration noted  Safety/Judgement: Fall prevention                Basic ADLs (From Assessment) Complex ADLs (From Assessment)   Basic ADL  Feeding: Independent  Oral Facial Hygiene/Grooming: Supervision  Bathing: Moderate assistance  Type of Bath: Chlorhexidine (CHG), Bath Pack  Upper Body Dressing: Supervision  Lower Body Dressing: Maximum assistance  Toileting:  Moderate assistance     Grooming/Bathing/Dressing Activities of Daily Living   Grooming  Grooming Assistance: Supervision  Position Performed: Seated in chair  Washing Face: Supervision  Washing Hands: Supervision  Brushing/Combing Hair: Supervision Cognitive Retraining  Safety/Judgement: Fall prevention   Upper Body Bathing  Bathing Assistance: Supervision  Position Performed: Seated in chair     Lower Body Bathing  Bathing Assistance: Minimum assistance; Moderate assistance  Perineal  : Supervision  Position Performed: Seated in chair  Lower Body : Minimum assistance; Moderate assistance  Position Performed: Seated in chair Toileting  Toileting Assistance: Contact guard assistance;Minimum assistance  Bladder Hygiene: Contact guard assistance  Clothing Management: Minimum assistance  Adaptive Equipment: (stood at toilet to urinate)   Upper Body [address] [place] [address]: Supervision  Pullover Shirt: Supervision Functional Transfers  Bathroom Mobility: Minimum assistance  Toilet Transfer : Minimum assistance   [address]: Minimum assistance; Moderate assistance  Underpants: Minimum assistance  Pants With Elastic Waist: Minimum assistance; Moderate assistance  Socks: Minimum assistance  Slip on Shoes with Back: Minimum assistance Bed/Mat Mobility  Supine to Sit: Minimum assistance  Sit to Stand: Contact guard assistance  Stand to Sit: Contact guard assistance  Bed to Chair: Contact guard assistance  Scooting: Minimum assistance         Physical Skills Involved:  1. Range of Motion  2. Balance  3. Strength Cognitive Skills Affected (resulting in the inability to perform in a timely and safe manner): 1. none Psychosocial Skills Affected:  1. Environmental Adaptation   Number of elements that affect the Plan of Care: 3-5:  MODERATE COMPLEXITY   CLINICAL DECISION MAKIN \[facility]\"" [address] [address] AM-PAC 6 Clicks   Daily Activity Inpatient Short Form  How much help from another person does the patient currently need. .. Total A Lot A Little None   1.   Putting on and taking off regular lower body clothing? [] 1   [x] 2   [] 3   [] 4   2. Bathing (including washing, rinsing, drying)? [] 1   [x] 2   [] 3   [] 4   3. Toileting, which includes using toilet, bedpan or urinal?   [] 1   [x] 2   [] 3   [] 4   4. Putting on and taking off regular upper body clothing? [] 1   [] 2   [] 3   [x] 4   5. Taking care of personal grooming such as brushing teeth? [] 1   [] 2   [] 3   [x] 4   6. Eating meals? [] 1   [] 2   [] 3   [x] 4   © 2007, Trustees of [address] [place] [address] [address] [address], under license to [employer]. All rights reserved     Score:  Initial: 18 Most Recent: X (Date: -- )    Interpretation of Tool:  Represents activities that are increasingly more difficult (i.e. Bed mobility, Transfers, Gait). ·    Use of outcome tool(s) and clinical judgement create a POC that gives a: LOW COMPLEXITY            TREATMENT:   (In addition to Assessment/Re-Assessment sessions the following treatments were rendered)     Pre-treatment Symptoms/Complaints:    Pain: Initial:   Pain Intensity 1: 0  Pain Location 1: Knee  Pain Orientation 1: Left  Pain Intervention(s) 1: Ambulation/Increased Activity 0 Post Session:  0/10 rest, iceman in place     Self Care: (30min): Procedure(s) (per grid) utilized to improve and/or restore self-care/home management as related to dressing, bathing and grooming. Required moderate visual, verbal, manual and tactile cueing to facilitate activities of daily living skills and compensatory activities. Treatment/Session Assessment:     Response to Treatment:  Pt completed sponge bath, plans to d/c tomorrow , ot to see in am for full ADL session to include shower.     Education:  [] Home Exercises  [x] Fall Precautions  [] Hip Precautions [] Going Home Video  [x] Knee/Hip Prosthesis Review  [x] Walker Management/Safety [x] Adaptive Equipment as Needed       Interdisciplinary Collaboration:   o Physical Therapist  o Occupational Therapist  o Registered Nurse  o Certified Nursing Assistant/Patient Care Technician    After treatment position/precautions:   o Up in chair  o Bed alarm/tab alert on  o Bed/Chair-wheels locked  o Bed in low position  o Call light within reach  o RN notified     Compliance with Program/Exercises: Compliant all of the time. Recommendations/Intent for next treatment session:  Treatment next visit will focus on increasing Mr. Beans independence with bed mobility, transfers, self care, functional mobility, modalities for pain, and patient education.       Total Treatment Duration:30  OT Patient Time In/Time Out  Time In: 1100  Time Out: [address], OT

## 2021-01-06 NOTE — DISCHARGE INSTRUCTIONS
[facility]   Patient Discharge Instructions    Herminio Nicholson. / [id number] : 1945    Admitted 2021 Discharged: 2021     IF YOU HAVE ANY PROBLEMS ONCE YOU ARE AT HOME CALL THE FOLLOWING NUMBERS:   Main office number: [phone]    Take Home Medications     · It is important that you take the medication exactly as they are prescribed. · Keep your medication in the bottles provided by the pharmacist and keep a list of the medication names, dosages, and times to be taken in your wallet. · Do not take other medications without consulting your doctor. What to do at [address] your prehospital diet. If you have excessive nausea or vomitting call your doctor's office     Home Physical Therapy is arranged. Use rolling walker when walking. Patients who have had a joint replacement should not drive until you are seen for your follow up appointment by Dr. Oneal Templeton. When to Call    - Call if you have a temperature greater then 101  - Unable to keep food down  - Loose control of your bladder or bowel function  - Are unable to bear any weight   - Need a pain medication refill       DISCHARGE SUMMARY from Nurse    The following personal items collected during your admission are returned to you:   Dental Appliance: Dental Appliances: Uppers, Partials  Vision: Visual Aid: Glasses  Hearing Aid:    Jewelry: Jewelry: None  Clothing: Clothing: Other (comment)  Other Valuables: Other Valuables: Cell Phone, Wallet(PT'S WIFE HAS PHONE AND WALLET)  Valuables sent to safe:      PATIENT INSTRUCTIONS:    After general anesthesia or intravenous sedation, for 24 hours or while taking prescription Narcotics:  · Limit your activities  · Do not drive and operate hazardous machinery  · Do not make important personal or business decisions  · Do  not drink alcoholic beverages  · If you have not urinated within 8 hours after discharge, please contact your surgeon on call.     Report the following to your surgeon:  · Excessive pain, swelling, redness or odor of or around the surgical area  · Temperature over 101  · Nausea and vomiting lasting longer than 4 hours or if unable to take medications  · Any signs of decreased circulation or nerve impairment to extremity: change in color, persistent  numbness, tingling, coldness or increase pain  · Any questions, call office @ [phone]      Keep scheduled follow up appointment. If need to change, call office @ [phone]. *  Please give a list of your current medications to your Primary Care Provider. *  Please update this list whenever your medications are discontinued, doses are      changed, or new medications (including over-the-counter products) are added. *  Please carry medication information at all times in case of emergency situations. Patient Education        Total Knee Replacement: What to Expect at [address] [place] [address] Drive had a total knee replacement. The doctor replaced the worn ends of the bones that connect to your knee (thighbone and lower leg bone) with plastic and metal parts. When you leave the hospital, you should be able to move around with a walker or crutches. But you will need someone to help you at home for the next few weeks or until you have more energy and can move around better. If you need more extensive rehab, you may go to a specialized rehab center for more treatment. You will go home with a bandage and stitches, staples, tissue glue, or tape strips. Change the bandage as your doctor tells you to. If you have stitches or staples, your doctor will remove them 10 to 21 days after your surgery. Glue or tape strips will fall off on their own over time. You may still have some mild pain, and the area may be swollen for 3 to 6 months after surgery. Your knee will continue to improve for 6 to 12 months. You will probably use a walker for 1 to 3 weeks and then use crutches. When you are ready, you can use a cane. You will probably be able to walk on your own in 4 to 8 weeks. You will need to do months of physical rehabilitation (rehab) after a knee replacement. Rehab will help you strengthen the muscles of the knee and help you regain movement. After you recover, your artificial knee will allow you to do normal daily activities with less pain or no pain at all. You may be able to hike, dance, ride a bike, and play golf. Talk to your doctor about whether you can do more strenuous activities. Always tell your caregivers that you have an artificial knee. How long it will take to walk on your own, return to normal activities, and go back to work depends on your health and how well your rehabilitation (rehab) program goes. The better you do with your rehab exercises, the quicker you will get your strength and movement back. This care sheet gives you a general idea about how long it will take for you to recover. But each person recovers at a different pace. Follow the steps below to get better as quickly as possible. How can you care for yourself at home? Activity    · Rest when you feel tired. You may take a nap, but don't stay in bed all day. When you sit, use a chair with arms. You can use the arms to help you stand up.     · Work with your physical therapist to find the best way to exercise. What you can do as your knee heals will depend on whether your new knee is cemented or uncemented. You may not be able to do certain things for a while if your new knee is uncemented.     · After your knee has healed enough, you can do more strenuous activities with caution. ? You can golf, but use a golf cart. And don't wear shoes with spikes. ? You can bike on a flat road or on a stationary bike. Avoid biking up hills. ? Your doctor may suggest that you stay away from activities that put stress on your knee.  These include tennis, badminton, squash, racquetball, contact sports like football, jumping (such as in basketball), jogging, and running. ? Avoid activities where you might fall. These include horseback riding, skiing, and mountain biking.     · Do not sit for more than 1 hour at a time. Get up and walk around for a while before you sit again. If you must sit for a long time, prop up your leg with a chair or footstool. This will help you avoid swelling.     · Ask your doctor when you can drive again. It may take up to 8 weeks after knee replacement surgery before it's safe for you to drive.     · When you get into a car, sit on the edge of the seat. Then pull in your legs, and turn to face the front.     · You should be able to do many everyday activities 3 to 6 weeks after your surgery. You will probably need to take 4 to 16 weeks off from work. When you can go back to work depends on the type of work you do and how you feel.     · Ask your doctor when it is okay for you to have sex.     · For 12 weeks, do not lift anything heavier than 10 pounds and do not lift weights. Diet    · By the time you leave the hospital, you should be eating your normal diet. If your stomach is upset, try bland, low-fat foods like plain rice, broiled chicken, toast, and yogurt. Your doctor may suggest that you take iron and vitamin supplements.     · Drink plenty of fluids (unless your doctor tells you not to).   · Eat healthy foods, and watch your portion sizes. Try to stay at your ideal weight. Too much weight puts more stress on your new knee.     · You may notice that your bowel movements are not regular right after your surgery. This is common. Try to avoid constipation and straining with bowel movements. You may want to take a fiber supplement every day. If you have not had a bowel movement after a couple of days, ask your doctor about taking a mild laxative. Medicines    · Your doctor will tell you if and when you can restart your medicines.  He or she will also give you instructions about taking any new medicines.     · If you take aspirin or some other blood thinner, ask your doctor if and when to start taking it again. Make sure that you understand exactly what your doctor wants you to do.     · Your doctor may give you a blood-thinning medicine to prevent blood clots. If you take a blood thinner, be sure you get instructions about how to take your medicine safely. Blood thinners can cause serious bleeding problems. This medicine could be in pill form or as a shot (injection). If a shot is needed, your doctor will tell you how to do this.     · Be safe with medicines. Take pain medicines exactly as directed. ? If the doctor gave you a prescription medicine for pain, take it as prescribed. ? If you are not taking a prescription pain medicine, ask your doctor if you can take an over-the-counter medicine. ? Plan to take your pain medicine 30 minutes before exercises. It is easier to prevent pain before it starts than to stop it after it has started.     · If you think your pain medicine is making you sick to your stomach:  ? Take your medicine after meals (unless your doctor has told you not to). ? Ask your doctor for a different pain medicine.     · If your doctor prescribed antibiotics, take them as directed. Do not stop taking them just because you feel better. You need to take the full course of antibiotics. Incision care    · If your doctor told you how to care for your cut (incision), follow your doctor's instructions. You will have a dressing over the cut. A dressing helps the incision heal and protects it. Your doctor will tell you how to take care of this.     · If you did not get instructions, follow this general advice:  ? If you have strips of tape on the cut the doctor made, leave the tape on for a week or until it falls off.  ? If you have stitches or staples, your doctor will tell you when to come back to have them removed. ? If you have skin adhesive on the cut, leave it on until it falls off.  Skin adhesive is also called glue or liquid stitches. ? Change the bandage every day. ? Wash the area daily with warm water, and pat it dry. Don't use hydrogen peroxide or alcohol. They can slow healing. ? You may cover the area with a gauze bandage if it oozes fluid or rubs against clothing. ? You may shower 24 to 48 hours after surgery. Pat the incision dry. Don't swim or take a bath for the first 2 weeks, or until your doctor tells you it is okay. Exercise    · Your rehab program will give you a number of exercises to do to help you get back your knee's range of motion and strength. Always do them as your therapist tells you. Ice    · For pain and swelling, put ice or a cold pack on the area for 10 to 20 minutes at a time. Put a thin cloth between the ice and your skin. Other instructions    · Keep wearing your support stockings as your doctor says. These help to prevent blood clots. How long you'll have to wear them depends on your activity level and the amount of swelling.     · Carry a medical alert card that says you have an artificial joint. You have metal pieces in your knee. These may set off some airport metal detectors. Follow-up care is a key part of your treatment and safety. Be sure to make and go to all appointments, and call your doctor if you are having problems. It's also a good idea to know your test results and keep a list of the medicines you take. When should you call for help? Call 911 anytime you think you may need emergency care. For example, call if:    · You passed out (lost consciousness).     · You have severe trouble breathing.     · You have sudden chest pain and shortness of breath, or you cough up blood. Call your doctor now or seek immediate medical care if:    · You have signs of infection, such as:  ? Increased pain, swelling, warmth, or redness. ? Red streaks leading from the incision. ? Pus draining from the incision. ?  A fever.     · You have signs of a blood clot, such as:  ? Pain in your calf, back of the knee, thigh, or groin. ? Redness and swelling in your leg or groin.     · Your incision comes open and begins to bleed, or the bleeding increases.     · You have pain that does not get better after you take pain medicine. Watch closely for changes in your health, and be sure to contact your doctor if:    · You do not have a bowel movement after taking a laxative. Where can you learn more? Go to http://www.gray.com/  Enter T054 in the search box to learn more about \"Total Knee Replacement: What to Expect at Home. \"  Current as of: March 2, 2020               Content Version: 12.6  © 5814-7834 [employer]. Care instructions adapted under license by [employer] (which disclaims liability or warranty for this information). If you have questions about a medical condition or this instruction, always ask your healthcare professional. [place] [address] any warranty or liability for your use of this information. These are general instructions for a healthy lifestyle:    No smoking/ No tobacco products/ Avoid exposure to second hand smoke    Surgeon General's Warning:  Quitting smoking now greatly reduces serious risk to your health. Obesity, smoking, and sedentary lifestyle greatly increases your risk for illness    A healthy diet, regular physical exercise & weight monitoring are important for maintaining a healthy lifestyle    You may be retaining fluid if you have a history of heart failure or if you experience any of the following symptoms:  Weight gain of 3 pounds or more overnight or 5 pounds in a week, increased swelling in our hands or feet or shortness of breath while lying flat in bed. Please call your doctor as soon as you notice any of these symptoms; do not wait until your next office visit.     Recognize signs and symptoms of STROKE:    F-face looks uneven    A-arms unable to move or move even    S-speech slurred or non-existent    T-time-call 911 as soon as signs and symptoms begin-DO NOT go       Back to bed or wait to see if you get better-TIME IS BRAIN. The discharge information has been reviewed with the patient. The patient verbalized understanding. Information obtained by :  I understand that if any problems occur once I am at home I am to contact my physician. I understand and acknowledge receipt of the instructions indicated above.                                                                                                                                            Physician's or R.N.'s Signature                                                                  Date/Time                                                                                                                                              Patient or Representative Signature                                                          Date/Time

## 2021-01-06 NOTE — PROGRESS NOTES
Problem: Mobility Impaired (Adult and Pediatric)  Goal: *Acute Goals and Plan of Care (Insert Text)  Description: GOALS (1-4 days):  (1.)Mr. Doris uHizar will move from supine to sit and sit to supine  in bed with SUPERVISION. (2.)Mr. Doris Huizar will transfer from bed to chair and chair to bed with SUPERVISION using the least restrictive device. (3.)Mr. Doris Huizar will ambulate with SUPERVISION for 200 feet with the least restrictive device. (4.)Mr. Doris Huizar will increase left knee ROM to 5°-80°.  ________________________________________________________________________________________________    Outcome: Progressing Towards Goal     PHYSICAL THERAPY JOINT CAMP TKA: Daily Note and AM 1/6/2021  INPATIENT: Hospital Day: 2  [place] / [place] / Product Type: Medicare /      NAME/AGE/GENDER: Ronald Zamora is a 77 y.o. male   PRIMARY DIAGNOSIS:  Primary osteoarthritis of left knee [M17.12]   Procedure(s) and Anesthesia Type:     * LEFT KNEE ARTHROPLASTY TOTAL ROBOTIC ASSISTED IRVING/MEL/ PT HAS ICD PACEMAKER - General (Left)  ICD-10: Treatment Diagnosis:    · Pain in Left Knee (M25.562)  · Stiffness of Left Knee, Not elsewhere classified (M25.662)  · Difficulty in walking, Not elsewhere classified (R26.2)      ASSESSMENT:     Mr. Doris Huizar presents s/p L TKA. Patient demonstrates decreased L LE strength and ROM and decreased independence with mobility. Patient was using a rollator prior to surgery. He has had a L GILMA and is in need of a R TKA. He will return home at d/c with assist from his spouse. Patient very lethargic this afternoon and mobility limited by decreased alertness. L knee wrapped with ace wrap d/t bleeding so gentle ROM only. May not tolerate sitting much due to R sciatic pain. Also has shoulder issues for which he has been going to therapy. 1/6/21:  Patient moving more today. Had a rough night-confusion due to missing PD meds but feels better now.   Patient does not do well with too many instructions-unable to correct gait pattern as he continues to use a step to pattern. He is stable with his walker. This section established at most recent assessment   PROBLEM LIST (Impairments causing functional limitations):  1. Decreased Strength  2. Decreased ADL/Functional Activities  3. Decreased Transfer Abilities  4. Decreased Ambulation Ability/Technique  5. Decreased Balance  6. Increased Pain  7. Decreased Flexibility/Joint Mobility  8. Edema/Girth  9. Decreased [place] with Home Exercise Program   INTERVENTIONS PLANNED: (Benefits and precautions of physical therapy have been discussed with the patient.)  1. Bed Mobility  2. Cold  3. Gait Training  4. Home Exercise Program (HEP)  5. Range of Motion (ROM)  6. Therapeutic Activites  7. Therapeutic Exercise/Strengthening  8. Transfer Training     TREATMENT PLAN: Frequency/Duration: Follow patient BID for duration of hospital stay to address above goals. Rehabilitation Potential For Stated Goals: Good     RECOMMENDED REHABILITATION/EQUIPMENT: (at time of discharge pending progress): Continue Skilled Therapy.               HISTORY:   History of Present Injury/Illness (Reason for Referral):  L TKA  Past Medical History/Comorbidities:   Mr. Danna Plaza  has a past medical history of Acquired spondylolisthesis (6/16/2015), Actinic keratosis (8/9/2016), Arthritis, Back pain (8/9/2016), Basal cell carcinoma (dx 2/2018), BPH (benign prostatic hypertrophy) (8/9/2016), Cardiomyopathy ([facility] [address] (08/02/2016), Chronic obstructive pulmonary disease ([facility] [address] Chronic renal insufficiency (08/09/2016), Controlled diabetes mellitus type II without complication ([facility] [address] (36/32/9821), COPD exacerbation ([facility] [address] (8/9/2016), Coronary artery disease involving native coronary artery of native heart without angina pectoris (8/2/2016), Dermatitis, seborrheic (8/9/2016), ZAFAR (dyspnea on exertion) (05/31/2016), Dyspnea, ED (erectile dysfunction) (8/9/2016), Elevated PSA (8/9/2016), Emphysemal COPD, Encounter for long-term (current) use of medications (8/9/2016), Enlarged prostate, Fatigue (8/9/2016), Finger swelling (8/9/2016), H/O echocardiogram (08/04/2020), History of kidney stones, Hyperlipidemia, Hypertension, Hypophonia with hoarseness (8/8/2016), Kidney problem, Kidney stone (06/2015), Knee effusion (8/9/2016), Low vitamin B12 level (8/9/2016), Neurogenic bladder (8/9/2016), Obesity (BMI 30-39.9), Osteoarthritis (8/9/2016), Parkinson disease ([facility] [address] 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 Mr. Bijan Angulo  has a past surgical history that includes hx lithotripsy (1998); hx cystostomy; hx urological (Right); hx appendectomy; hx knee arthroscopy (Right); hx acl reconstruction (Right, 1991); hx pacemaker (10/31/2016); hx lumbar laminectomy (06/2015); and hx hip replacement (Left, 2018).   Social History/Living Environment:   Home Environment: Private residence  Wheelchair Ramp: Yes  One/Two Story Residence: Two story, live on 1st floor  # of Interior Steps: 12  Living Alone: No  Support Systems: Spouse/Significant Other/Partner  Patient Expects to be Discharged to[de-identified] Private residence  Current DME Used/Available at Home: Dayton Mota, straight, Commode, bedside, Shower chair, Jordyn Bare, rollator, Walker, rolling  Tub or Shower Type: Tub/Shower combination  Prior Level of Function/Work/Activity:  Ambulating with rollator. Number of Personal Factors/Comorbidities that affect the Plan of Care: 1-2: MODERATE COMPLEXITY   EXAMINATION:   Most Recent Physical Functioning:      Gross Assessment  AROM: Generally decreased, functional(R LE)  Strength: Generally decreased, functional(R LE)          LLE AROM  L Knee Flexion: 91  L Knee Extension: 10     LLE Strength  L Hip Flexion: 2  L Knee Extension: 2+  L Ankle Dorsiflexion: 4    Bed Mobility  Supine to Sit: Minimum assistance  Scooting: Minimum assistance    Transfers  Sit to Stand: Contact guard assistance  Stand to Sit: Contact guard assistance  Bed to Chair: Contact guard assistance  Toilet Transfers : Contact guard assistance    Balance  Sitting: Intact  Standing: With support              Weight Bearing Status  Left Side Weight Bearing: As tolerated  Distance (ft): 75 Feet (ft)  Ambulation - Level of Assistance: Contact guard assistance  Assistive Device: Walker, rolling  Speed/Dana: Pace decreased (<100 feet/min)  Step Length: Left shortened;Right shortened  Stance: Left decreased  Gait Abnormalities: Antalgic; Step to gait  Interventions: Safety awareness training;Verbal cues     Braces/Orthotics: none    Left Knee Cold  Type: Cryocuff      Body Structures Involved:  1. Joints  2. Muscles Body Functions Affected:  1. Movement Related Activities and Participation Affected:  1. Mobility   Number of elements that affect the Plan of Care: 4+: HIGH COMPLEXITY   CLINICAL PRESENTATION:   Presentation: Stable and uncomplicated: LOW COMPLEXITY   CLINICAL DECISION MAKIN [place] [id number] AM-PAC 6 Clicks   Basic Mobility Inpatient Short Form  How much difficulty does the patient currently have. .. Unable A Lot A Little None   1. Turning over in bed (including adjusting bedclothes, sheets and blankets)? [] 1   [] 2   [x] 3   [] 4   2. Sitting down on and standing up from a chair with arms ( e.g., wheelchair, bedside commode, etc.)   [] 1   [] 2   [x] 3   [] 4   3.   Moving from lying on back to sitting on the side of the bed? [] 1   [x] 2   [] 3   [] 4   How much help from another person does the patient currently need. .. Total A Lot A Little None   4. Moving to and from a bed to a chair (including a wheelchair)? [] 1   [] 2   [x] 3   [] 4   5. Need to walk in hospital room? [] 1   [] 2   [x] 3   [] 4   6. Climbing 3-5 steps with a railing? [] 1   [x] 2   [] 3   [] 4   © 2007, Trustees of [address] [place] [id number], under license to [email or web address]. All rights reserved     Score:  Initial: 16 Most Recent: X (Date: -- )    Interpretation of Tool:  Represents activities that are increasingly more difficult (i.e. Bed mobility, Transfers, Gait). Medical Necessity:     · Patient is expected to demonstrate progress in   · strength, range of motion, balance, and coordination  ·  to   · increase independence with mobility and HEP. · .  Reason for Services/Other Comments:  · Patient continues to require skilled intervention due to   · Decreased L LE strength and ROM and decreased independence with mobility. · .   Use of outcome tool(s) and clinical judgement create a POC that gives a: Clear prediction of patient's progress: LOW COMPLEXITY            TREATMENT:   (In addition to Assessment/Re-Assessment sessions the following treatments were rendered)     Pre-treatment Symptoms/Complaints:  Patient agreeable. Pain Initial:   Pain Intensity 1: 5  Post Session:  5/10   Gait Training (10 Minutes):  Gait training to improve and/or restore physical functioning as related to mobility, balance and coordination. Ambulated 75 Feet (ft) with Contact guard assistance using a Walker, rolling and minimal Safety awareness training;Verbal cues related to their stance phase and stride length to promote proper body alignment. Therapeutic Activity: (    15 minutes): Therapeutic activities including Chair transfers and Toilet transfers to improve mobility, strength, balance and coordination.   Required minimal Safety awareness training;Verbal cues to promote static and dynamic balance in standing. Therapeutic Exercise: (15 Minutes):  Exercises per grid below to improve strength. Required minimal verbal, manual, and tactile cues to promote proper body alignment. Progressed range and repetitions as indicated. Date:  1/5/21 Date:  1/6/21 Date:     ACTIVITY/EXERCISE AM PM AM PM AM PM   GROUP THERAPY  []  []  []  []  []  []   Ankle Pumps  10 15      Quad Sets  10 15      Gluteal Sets  10 15      Hip ABd/ADduction  10 AA 15      Straight Leg Raises  10 AA 15 AA      Knee Slides  10 AA 15      Short Arc Quads  10 AA 15      Long Arc Quads         Chair Slides   15               B = bilateral; AA = active assistive; A = active; P = passive      Treatment/Session Assessment:     Response to Treatment:  Participated well and moving better than yesterday. Doesn't do well with too many instructions. Education:  [x] Home Exercises  [x] Fall Precautions  [x] Use of Cold Therapy Unit [] D/C Instruction Review  [] Knee Prosthesis Review  [x] Walker Management/Safety [] Adaptive Equipment as Needed       Interdisciplinary Collaboration:   o Physical Therapist  o Registered Nurse    After treatment position/precautions:   o Up in chair  o Bed/Chair-wheels locked  o Caregiver at bedside  o Call light within reach    Compliance with Program/Exercises: Will assess as treatment progresses. Recommendations/Intent for next treatment session:  Treatment next visit will focus on increasing Mr. Beans independence with bed mobility, transfers, gait training, strength/ROM exercises, modalities for pain, and patient education.       Total Treatment Duration:  PT Patient Time In/Time Out  Time In: 1005  Time Out: Manav Reeves [id number], PT

## 2021-01-06 NOTE — PROGRESS NOTES
2021         Post Op day: 1 Day Post-Op   Admit Diagnosis: Primary osteoarthritis of left knee [M17.12]  Arthritis of left knee [M17.12]  LAB:    Recent Results (from the past 24 hour(s))   HEMOGLOBIN    Collection Time: 21  7:56 PM   Result Value Ref Range    HGB 12.1 (L) 13.6 - 17.2 g/dL   HEMOGLOBIN    Collection Time: 21  2:12 AM   Result Value Ref Range    HGB 12.1 (L) 13.6 - 17.2 g/dL     Vital Signs:    Patient Vitals for the past 8 hrs:   BP Temp Pulse Resp SpO2   21 0712 (!) 150/84 98.3 °F (36.8 °C) 77 18 95 %   21 0036 133/84 97.7 °F (36.5 °C) 85 18 93 %     Temp (24hrs), Av.9 °F (36.6 °C), Min:97.5 °F (36.4 °C), Max:98.3 °F (36.8 °C)    Pain Control:   Pain Assessment  Pain Scale 1: Numeric (0 - 10)  Pain Intensity 1: 8  Pain Onset 1: post op  Pain Location 1: Knee  Pain Orientation 1: Left  Pain Description 1: Aching, Stabbing  Pain Intervention(s) 1: Medication (see MAR)  Subjective: Doing well, pain is well controlled, no complaints     Objective:  No Acute Distress, Alert and Oriented, Neurovascular exam is normal       Assessment:   Patient Active Problem List   Diagnosis Code    Obesity (BMI 30-39. 9) E66.9    Hypertension I10    Chronic obstructive pulmonary disease (HCC) J44.9    Enlarged prostate N40.0    Spinal stenosis, lumbar region, with neurogenic claudication M48.062    Acquired spondylolisthesis M43.10    Parkinson's disease ([facility] [address] [facility] G20    Hyperlipidemia E78.5    ZAFAR (dyspnea on exertion) R06.00    Arthritis M19.90    Cardiomyopathy ([facility] [address] I42.9    Coronary artery disease involving native coronary artery of native heart without angina pectoris I25.10    Tremor R25.1    Hoarseness of voice R49.0    Benign prostatic hyperplasia without lower urinary tract symptoms N40.0    Osteoarthritis M19.90    Dermatitis, seborrheic L21.9    Encounter for long-term (current) use of medications Z79.899    Controlled diabetes mellitus type II without complication ([address] H28.6    Urinary retention R33.9    Primary osteoarthritis of knee M17.10    Chronic renal insufficiency N18.9    ED (erectile dysfunction) N52.9    Rosacea L71.9    Neurogenic bladder N31.9    Sciatica M54.30    COPD exacerbation (HCC) J44.1    Postural imbalance R29.3    Abnormal posture R29.3    Biventricular AICD malfunction T82.118A    Multifactorial gait disorder R26.89    Hypophonia R49.8    Arthritis of left hip M16.12    Biventricular ICD (implantable cardioverter-defibrillator) in place Z95.810    ANTHONY (obstructive sleep apnea) G47.33    Neurogenic orthostatic hypotension (HCC) G90.3    RBD (REM behavioral disorder) G47.52    Cognitive changes R41.89    Arthritis of left knee M17.12       Status Post Procedure(s) (LRB):  LEFT KNEE ARTHROPLASTY TOTAL ROBOTIC ASSISTED IRVING/MEL/ PT HAS ICD PACEMAKER (Left)        Plan: Continue Physical Therapy, Monitor Hgb. ASA/SCDs for DVT prophylaxis. Anticipate d/c to home today.   Patient seen by Dr. Cindy Christensen this AM.   Signed By: LEANA Schultz

## 2021-01-06 NOTE — PROGRESS NOTES
Problem: Mobility Impaired (Adult and Pediatric)  Goal: *Acute Goals and Plan of Care (Insert Text)  Description: GOALS (1-4 days):  (1.)Mr. Hilda Vaughan will move from supine to sit and sit to supine  in bed with SUPERVISION. (2.)Mr. Hilda Vaughan will transfer from bed to chair and chair to bed with SUPERVISION using the least restrictive device. (3.)Mr. Hilda Vaughan will ambulate with SUPERVISION for 200 feet with the least restrictive device. (4.)Mr. Hilda Vaughan will increase left knee ROM to 5°-80°.  ________________________________________________________________________________________________    Outcome: Progressing Towards Goal     PHYSICAL THERAPY JOINT CAMP TKA: Daily Note and PM 1/6/2021  INPATIENT: Hospital Day: 2  [place] / [place] / Product Type: Medicare /      NAME/AGE/GENDER: Flip Dwyer is a 77 y.o. male   PRIMARY DIAGNOSIS:  Primary osteoarthritis of left knee [M17.12]   Procedure(s) and Anesthesia Type:     * LEFT KNEE ARTHROPLASTY TOTAL ROBOTIC ASSISTED IRVING/MEL/ PT HAS ICD PACEMAKER - General (Left)  ICD-10: Treatment Diagnosis:    · Pain in Left Knee (M25.562)  · Stiffness of Left Knee, Not elsewhere classified (M25.662)  · Difficulty in walking, Not elsewhere classified (R26.2)      ASSESSMENT:     Mr. Hilda Vaughan presents s/p L TKA. Patient demonstrates decreased L LE strength and ROM and decreased independence with mobility. Patient was using a rollator prior to surgery. He has had a L GILMA and is in need of a R TKA. He will return home at d/c with assist from his spouse. Patient very lethargic this afternoon and mobility limited by decreased alertness. L knee wrapped with ace wrap d/t bleeding so gentle ROM only. May not tolerate sitting much due to R sciatic pain. Also has shoulder issues for which he has been going to therapy. 1/6/21:  Patient moving more today. Had a rough night-confusion due to missing PD meds but feels better now.   Patient does not do well with too many instructions-unable to correct gait pattern as he continues to use a step to pattern. He is stable with his walker. PM:  Patient participated well this afternoon. Still struggling with gait pattern but is mobile. Needing some assist with exercises but does have pretty good ROM. This section established at most recent assessment   PROBLEM LIST (Impairments causing functional limitations):  1. Decreased Strength  2. Decreased ADL/Functional Activities  3. Decreased Transfer Abilities  4. Decreased Ambulation Ability/Technique  5. Decreased Balance  6. Increased Pain  7. Decreased Flexibility/Joint Mobility  8. Edema/Girth  9. Decreased [place] with Home Exercise Program   INTERVENTIONS PLANNED: (Benefits and precautions of physical therapy have been discussed with the patient.)  1. Bed Mobility  2. Cold  3. Gait Training  4. Home Exercise Program (HEP)  5. Range of Motion (ROM)  6. Therapeutic Activites  7. Therapeutic Exercise/Strengthening  8. Transfer Training     TREATMENT PLAN: Frequency/Duration: Follow patient BID for duration of hospital stay to address above goals. Rehabilitation Potential For Stated Goals: Good     RECOMMENDED REHABILITATION/EQUIPMENT: (at time of discharge pending progress): Continue Skilled Therapy.               HISTORY:   History of Present Injury/Illness (Reason for Referral):  L TKA  Past Medical History/Comorbidities:   Mr. Bijan Angulo  has a past medical history of Acquired spondylolisthesis (6/16/2015), Actinic keratosis (8/9/2016), Arthritis, Back pain (8/9/2016), Basal cell carcinoma (dx 2/2018), BPH (benign prostatic hypertrophy) (8/9/2016), Cardiomyopathy ([facility] [address] (08/02/2016), Chronic obstructive pulmonary disease ([facility] [address] Chronic renal insufficiency (08/09/2016), Controlled diabetes mellitus type II without complication ([facility] [address] (75/57/6473), COPD exacerbation ([facility] [address] (8/9/2016), Coronary artery disease involving native coronary artery of native heart without angina pectoris (8/2/2016), Dermatitis, seborrheic (8/9/2016), ZAFAR (dyspnea on exertion) (05/31/2016), Dyspnea, ED (erectile dysfunction) (8/9/2016), Elevated PSA (8/9/2016), Emphysemal COPD, Encounter for long-term (current) use of medications (8/9/2016), Enlarged prostate, Fatigue (8/9/2016), Finger swelling (8/9/2016), H/O echocardiogram (08/04/2020), History of kidney stones, Hyperlipidemia, Hypertension, Hypophonia with hoarseness (8/8/2016), Kidney problem, Kidney stone (06/2015), Knee effusion (8/9/2016), Low vitamin B12 level (8/9/2016), Neurogenic bladder (8/9/2016), Obesity (BMI 30-39.9), Osteoarthritis (8/9/2016), Parkinson disease ([facility] [address] 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 Mr. Elisa Daly  has a past surgical history that includes hx lithotripsy (1998); hx cystostomy; hx urological (Right); hx appendectomy; hx knee arthroscopy (Right); hx acl reconstruction (Right, 1991); hx pacemaker (10/31/2016); hx lumbar laminectomy (06/2015); and hx hip replacement (Left, 2018).   Social History/Living Environment:   Home Environment: Private residence  Wheelchair Ramp: Yes  One/Two Story Residence: Two story, live on 1st floor  # of Interior Steps: 12  Living Alone: No  Support Systems: Spouse/Significant Other/Partner  Patient Expects to be Discharged to[de-identified] Private residence  Current DME Used/Available at Home: kelley Leigh, Commode, bedside, Shower chair, Cate Shallow, rollator, Walker, rolling  Tub or Shower Type: Tub/Shower combination  Prior Level of Function/Work/Activity:  Ambulating with rollator. Number of Personal Factors/Comorbidities that affect the Plan of Care: 1-2: MODERATE COMPLEXITY   EXAMINATION:   Most Recent Physical Functioning:      Gross Assessment  AROM: Generally decreased, functional(R LE)  Strength: Generally decreased, functional(R LE)          LLE AROM  L Knee Flexion: 91  L Knee Extension: 10     LLE Strength  L Hip Flexion: 2  L Knee Extension: 2+  L Ankle Dorsiflexion: 4    Bed Mobility  Supine to Sit: Minimum assistance  Scooting: Minimum assistance    Transfers  Sit to Stand: Stand-by assistance  Stand to Sit: Stand-by assistance  Bed to Chair: Stand-by assistance  Toilet Transfers : Contact guard assistance    Balance  Sitting: Intact  Standing: With support              Weight Bearing Status  Left Side Weight Bearing: As tolerated  Distance (ft): 80 Feet (ft)  Ambulation - Level of Assistance: Stand-by assistance;Contact guard assistance  Assistive Device: Walker, rolling  Base of Support: Center of gravity altered  Speed/Dana: Pace decreased (<100 feet/min)  Step Length: Left shortened;Right shortened  Stance: Left decreased  Gait Abnormalities: Antalgic;Decreased step clearance; Step to gait  Interventions: Safety awareness training;Verbal cues     Braces/Orthotics: none    Left Knee Cold  Type: Cryocuff      Body Structures Involved:  1. Joints  2. Muscles Body Functions Affected:  1. Movement Related Activities and Participation Affected:  1. Mobility   Number of elements that affect the Plan of Care: 4+: HIGH COMPLEXITY   CLINICAL PRESENTATION:   Presentation: Stable and uncomplicated: LOW COMPLEXITY   CLINICAL DECISION MAKIN [facility] [address] [id number] AM-PAC 6 Clicks   Basic Mobility Inpatient Short Form  How much difficulty does the patient currently have. .. Unable A Lot A Little None   1.   Turning over in bed (including adjusting bedclothes, sheets and blankets)? [] 1   [] 2   [x] 3   [] 4   2. Sitting down on and standing up from a chair with arms ( e.g., wheelchair, bedside commode, etc.)   [] 1   [] 2   [x] 3   [] 4   3. Moving from lying on back to sitting on the side of the bed? [] 1   [x] 2   [] 3   [] 4   How much help from another person does the patient currently need. .. Total A Lot A Little None   4. Moving to and from a bed to a chair (including a wheelchair)? [] 1   [] 2   [x] 3   [] 4   5. Need to walk in hospital room? [] 1   [] 2   [x] 3   [] 4   6. Climbing 3-5 steps with a railing? [] 1   [x] 2   [] 3   [] 4   © 2007, Trustees of [address] [place] [address], under license to [employer]. All rights reserved     Score:  Initial: 16 Most Recent: X (Date: -- )    Interpretation of Tool:  Represents activities that are increasingly more difficult (i.e. Bed mobility, Transfers, Gait). Medical Necessity:     · Patient is expected to demonstrate progress in   · strength, range of motion, balance, and coordination  ·  to   · increase independence with mobility and HEP. · .  Reason for Services/Other Comments:  · Patient continues to require skilled intervention due to   · Decreased L LE strength and ROM and decreased independence with mobility. · .   Use of outcome tool(s) and clinical judgement create a POC that gives a: Clear prediction of patient's progress: LOW COMPLEXITY            TREATMENT:   (In addition to Assessment/Re-Assessment sessions the following treatments were rendered)     Pre-treatment Symptoms/Complaints:  Patient agreeable. Pain Initial:   Pain Intensity 1: 4  Post Session:  4/10   Gait Training (10 Minutes):  Gait training to improve and/or restore physical functioning as related to mobility, balance and coordination.   Ambulated 80 Feet (ft) with Stand-by assistance;Contact guard assistance using a Walker, rolling and minimal Safety awareness training;Verbal cues related to their stance phase and stride length to promote proper body alignment. Therapeutic Activity: (     minutes): Therapeutic activities including Chair transfers and Toilet transfers to improve mobility, strength, balance and coordination. Required minimal Safety awareness training;Verbal cues to promote static and dynamic balance in standing. Therapeutic Exercise: (20 Minutes):  Exercises per grid below to improve strength. Required minimal verbal, manual, and tactile cues to promote proper body alignment. Progressed range and repetitions as indicated. Date:  1/5/21 Date:  1/6/21 Date:     ACTIVITY/EXERCISE AM PM AM PM AM PM   GROUP THERAPY  []  []  []  []  []  []   Ankle Pumps  10 15 15     Quad Sets  10 15 15     Gluteal Sets  10 15 15     Hip ABd/ADduction  10 AA 15 15     Straight Leg Raises  10 AA 15 AA 15 AA     Knee Slides  10 AA 15 15     Short Arc Quads  10 AA 15 15     Long Arc Quads         Chair Slides   15 15              B = bilateral; AA = active assistive; A = active; P = passive      Treatment/Session Assessment:     Response to Treatment:  Participated well. Mobile but still struggling with gait pattern. Needing cues to increase upright posture with standing and gait. Education:  [x] Home Exercises  [x] Fall Precautions  [x] Use of Cold Therapy Unit [] D/C Instruction Review  [] Knee Prosthesis Review  [x] Walker Management/Safety [] Adaptive Equipment as Needed       Interdisciplinary Collaboration:   o Physical Therapist  o Registered Nurse    After treatment position/precautions:   o Up in chair  o Bed alarm/tab alert on  o Bed/Chair-wheels locked  o Caregiver at bedside  o Call light within reach    Compliance with Program/Exercises: Will assess as treatment progresses. Recommendations/Intent for next treatment session:  Treatment next visit will focus on increasing Mr. Paige independence with bed mobility, transfers, gait training, strength/ROM exercises, modalities for pain, and patient education.       Total Treatment Duration:  PT Patient Time In/Time Out  Time In: 1340  Time Out: 7[address] [facility] Amy Mcclain

## 2021-01-06 NOTE — PROGRESS NOTES
01/05/21 2146   Oxygen Therapy   O2 Sat (%) 94 %   Pulse via Oximetry 74 beats per minute   O2 Device Room air   Pt on continuous monitor for HS. Alarm limits set. Pt working on IS. Pt has taken Classiqs for tonight.

## 2021-01-06 NOTE — PROGRESS NOTES
Post op interview conducted following LEFT KNEE ARTHROPLASTY TOTAL ROBOTIC ASSISTED IRVING/MEL/ PT HAS ICD PACEMAKER:  dated 1/5/21, no anesthetic complications noted

## 2021-01-07 VITALS
BODY MASS INDEX: 29.66 KG/M2 | HEIGHT: 72 IN | HEART RATE: 75 BPM | TEMPERATURE: 97.7 F | SYSTOLIC BLOOD PRESSURE: 98 MMHG | WEIGHT: 219 LBS | DIASTOLIC BLOOD PRESSURE: 60 MMHG | RESPIRATION RATE: 16 BRPM | OXYGEN SATURATION: 94 %

## 2021-01-07 LAB — HGB BLD-MCNC: 10.6 G/DL (ref 13.6–17.2)

## 2021-01-07 PROCEDURE — 97116 GAIT TRAINING THERAPY: CPT

## 2021-01-07 PROCEDURE — 77030041279 HC DRSG PRMSL AG MDII -B

## 2021-01-07 PROCEDURE — 74011250637 HC RX REV CODE- 250/637: Performed by: PHYSICIAN ASSISTANT

## 2021-01-07 PROCEDURE — 36415 COLL VENOUS BLD VENIPUNCTURE: CPT

## 2021-01-07 PROCEDURE — 85018 HEMOGLOBIN: CPT

## 2021-01-07 PROCEDURE — 97110 THERAPEUTIC EXERCISES: CPT

## 2021-01-07 PROCEDURE — 97535 SELF CARE MNGMENT TRAINING: CPT

## 2021-01-07 RX ADMIN — ENTACAPONE 200 MG: 200 TABLET ORAL at 07:00

## 2021-01-07 RX ADMIN — CARVEDILOL 6.25 MG: 6.25 TABLET, FILM COATED ORAL at 09:29

## 2021-01-07 RX ADMIN — Medication 1 AMPULE: at 09:30

## 2021-01-07 RX ADMIN — BUDESONIDE AND FORMOTEROL FUMARATE DIHYDRATE 2 PUFF: 80; 4.5 AEROSOL RESPIRATORY (INHALATION) at 08:00

## 2021-01-07 RX ADMIN — HYDROCODONE BITARTRATE AND ACETAMINOPHEN 1 TABLET: 7.5; 325 TABLET ORAL at 10:19

## 2021-01-07 RX ADMIN — CARBIDOPA AND LEVODOPA 1 TABLET: 25; 250 TABLET ORAL at 07:00

## 2021-01-07 RX ADMIN — TIOTROPIUM BROMIDE INHALATION SPRAY 2 PUFF: 3.12 SPRAY, METERED RESPIRATORY (INHALATION) at 09:00

## 2021-01-07 RX ADMIN — TAMSULOSIN HYDROCHLORIDE 0.4 MG: 0.4 CAPSULE ORAL at 09:29

## 2021-01-07 RX ADMIN — Medication 81 MG: at 09:30

## 2021-01-07 RX ADMIN — SACUBITRIL AND VALSARTAN 1 TABLET: 24; 26 TABLET, FILM COATED ORAL at 09:29

## 2021-01-07 RX ADMIN — DOCUSATE SODIUM 50MG AND SENNOSIDES 8.6MG 2 TABLET: 8.6; 5 TABLET, FILM COATED ORAL at 09:30

## 2021-01-07 RX ADMIN — HYDROCODONE BITARTRATE AND ACETAMINOPHEN 1 TABLET: 7.5; 325 TABLET ORAL at 05:35

## 2021-01-07 NOTE — PROGRESS NOTES
Patient received resting in bed, offers no complaints. Shift assessment completed. Patient's wife at bedside, will monitor.

## 2021-01-07 NOTE — PROGRESS NOTES
Problem: Mobility Impaired (Adult and Pediatric)  Goal: *Acute Goals and Plan of Care (Insert Text)  Description: GOALS (1-4 days):  (1.)Mr. Nam Gerber will move from supine to sit and sit to supine  in bed with SUPERVISION. (2.)Mr. Nam Gerber will transfer from bed to chair and chair to bed with SUPERVISION using the least restrictive device. (3.)Mr. Nam Gerber will ambulate with SUPERVISION for 200 feet with the least restrictive device. (4.)Mr. Nam Gerber will increase left knee ROM to 5°-80°.  ________________________________________________________________________________________________    Outcome: Progressing Towards Goal     PHYSICAL THERAPY JOINT CAMP TKA: Daily Note and AM 1/7/2021  INPATIENT: Hospital Day: 3  [place] / [place] / Product Type: Medicare /      NAME/AGE/GENDER: Aleena Greco is a 77 y.o. male   PRIMARY DIAGNOSIS:  Primary osteoarthritis of left knee [M17.12]   Procedure(s) and Anesthesia Type:     * LEFT KNEE ARTHROPLASTY TOTAL ROBOTIC ASSISTED IRVING/MEL/ PT HAS ICD PACEMAKER - General (Left)  ICD-10: Treatment Diagnosis:    · Pain in Left Knee (M25.562)  · Stiffness of Left Knee, Not elsewhere classified (M25.662)  · Difficulty in walking, Not elsewhere classified (R26.2)      ASSESSMENT:     Mr. Nam Gerber presents s/p L TKA. Patient demonstrates decreased L LE strength and ROM and decreased independence with mobility. Patient was using a rollator prior to surgery. He has had a L GILMA and is in need of a R TKA. He will return home at d/c with assist from his spouse. Patient very lethargic this afternoon and mobility limited by decreased alertness. L knee wrapped with ace wrap d/t bleeding so gentle ROM only. May not tolerate sitting much due to R sciatic pain. Also has shoulder issues for which he has been going to therapy. 1/6/21:  Patient moving more today. Had a rough night-confusion due to missing PD meds but feels better now.   Patient does not do well with too many instructions-unable to correct gait pattern as he continues to use a step to pattern. He is stable with his walker. PM:  Patient participated well this afternoon. Still struggling with gait pattern but is mobile. Needing some assist with exercises but does have pretty good ROM.    1/7/21:  Patient participated well but mobility limited by pain. Less knee flexion ROM but extension a bit better. Still not consistent with a step through gait. SBA-CGA for mobility. Spouse to assist patient at home. This section established at most recent assessment   PROBLEM LIST (Impairments causing functional limitations):  1. Decreased Strength  2. Decreased ADL/Functional Activities  3. Decreased Transfer Abilities  4. Decreased Ambulation Ability/Technique  5. Decreased Balance  6. Increased Pain  7. Decreased Flexibility/Joint Mobility  8. Edema/Girth  9. Decreased [place] with Home Exercise Program   INTERVENTIONS PLANNED: (Benefits and precautions of physical therapy have been discussed with the patient.)  1. Bed Mobility  2. Cold  3. Gait Training  4. Home Exercise Program (HEP)  5. Range of Motion (ROM)  6. Therapeutic Activites  7. Therapeutic Exercise/Strengthening  8. Transfer Training     TREATMENT PLAN: Frequency/Duration: Follow patient BID for duration of hospital stay to address above goals. Rehabilitation Potential For Stated Goals: Good     RECOMMENDED REHABILITATION/EQUIPMENT: (at time of discharge pending progress): Continue Skilled Therapy.               HISTORY:   History of Present Injury/Illness (Reason for Referral):  L TKA  Past Medical History/Comorbidities:   Mr. Ronel Smith  has a past medical history of Acquired spondylolisthesis (6/16/2015), Actinic keratosis (8/9/2016), Arthritis, Back pain (8/9/2016), Basal cell carcinoma (dx 2/2018), BPH (benign prostatic hypertrophy) (8/9/2016), Cardiomyopathy ([facility] (08/02/2016), Chronic obstructive pulmonary disease [facility]), Chronic renal insufficiency (08/09/2016), Controlled diabetes mellitus type II without complication ([facility] (68/13/3199), COPD exacerbation ([facility] (8/9/2016), Coronary artery disease involving native coronary artery of native heart without angina pectoris (8/2/2016), Dermatitis, seborrheic (8/9/2016), ZAFAR (dyspnea on exertion) (05/31/2016), Dyspnea, ED (erectile dysfunction) (8/9/2016), Elevated PSA (8/9/2016), Emphysemal COPD, Encounter for long-term (current) use of medications (8/9/2016), Enlarged prostate, Fatigue (8/9/2016), Finger swelling (8/9/2016), H/O echocardiogram (08/04/2020), History of kidney stones, Hyperlipidemia, Hypertension, Hypophonia with hoarseness (8/8/2016), Kidney problem, Kidney stone (06/2015), Knee effusion (8/9/2016), Low vitamin B12 level (8/9/2016), Neurogenic bladder (8/9/2016), Obesity (BMI 30-39.9), Osteoarthritis (8/9/2016), Parkinson disease ([facility] 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 Mr. Nam Gerber  has a past surgical history that includes hx lithotripsy (1998); hx cystostomy; hx urological (Right); hx appendectomy; hx knee arthroscopy (Right); hx acl reconstruction (Right, 1991); hx pacemaker (10/31/2016); hx lumbar laminectomy (06/2015); and hx hip replacement (Left, 2018).   Social History/Living Environment:   Home Environment: Private residence  Wheelchair Ramp: Yes  One/Two Story Residence: Two story, live on 1st floor  # of Interior Steps: 12  Living Alone: No  Support Systems: Spouse/Significant Other/Partner  Patient Expects to be Discharged to[de-identified] Private residence  Current DME Used/Available at Home: John Campos, straight, Commode, bedside, Shower chair, Evan Sample, Walker, rollator, Walker, rolling  Tub or Shower Type: Tub/Shower combination  Prior Level of Function/Work/Activity:  Ambulating with rollator. Number of Personal Factors/Comorbidities that affect the Plan of Care: 1-2: MODERATE COMPLEXITY   EXAMINATION:   Most Recent Physical Functioning:      Gross Assessment  AROM: Generally decreased, functional(R LE)  Strength: Generally decreased, functional(R LE)          LLE AROM  L Knee Flexion: 78  L Knee Extension: 7     LLE Strength  L Hip Flexion: 2  L Knee Extension: 2+  L Ankle Dorsiflexion: 3+         Transfers  Sit to Stand: Stand-by assistance  Stand to Sit: Stand-by assistance    Balance  Sitting: Intact  Standing: With support              Weight Bearing Status  Left Side Weight Bearing: As tolerated  Distance (ft): 90 Feet (ft)  Ambulation - Level of Assistance: Stand-by assistance;Contact guard assistance  Assistive Device: Walker, rolling  Base of Support: Center of gravity altered  Speed/Dana: Pace decreased (<100 feet/min)  Step Length: Left shortened;Right shortened  Stance: Left decreased  Gait Abnormalities: Antalgic;Decreased step clearance; Step to gait  Interventions: Safety awareness training;Verbal cues     Braces/Orthotics: none    Left Knee Cold  Type: Cryocuff      Body Structures Involved:  1. Joints  2. Muscles Body Functions Affected:  1. Movement Related Activities and Participation Affected:  1.  Mobility   Number of elements that affect the Plan of Care: 4+: HIGH COMPLEXITY   CLINICAL PRESENTATION:   Presentation: Stable and uncomplicated: LOW COMPLEXITY   CLINICAL DECISION MAKIN [facility] [address] [id number] AM-PAC 6 Clicks   Basic Mobility Inpatient Short Form  How much difficulty does the patient currently have. .. Unable A Lot A Little None   1. Turning over in bed (including adjusting bedclothes, sheets and blankets)? [] 1   [] 2   [x] 3   [] 4   2. Sitting down on and standing up from a chair with arms ( e.g., wheelchair, bedside commode, etc.)   [] 1   [] 2   [x] 3   [] 4   3. Moving from lying on back to sitting on the side of the bed? [] 1   [x] 2   [] 3   [] 4   How much help from another person does the patient currently need. .. Total A Lot A Little None   4. Moving to and from a bed to a chair (including a wheelchair)? [] 1   [] 2   [x] 3   [] 4   5. Need to walk in hospital room? [] 1   [] 2   [x] 3   [] 4   6. Climbing 3-5 steps with a railing? [] 1   [x] 2   [] 3   [] 4   © 2007, Trustees of [facility], under license to [employer]. All rights reserved     Score:  Initial: 16 Most Recent: X (Date: -- )    Interpretation of Tool:  Represents activities that are increasingly more difficult (i.e. Bed mobility, Transfers, Gait). Medical Necessity:     · Patient is expected to demonstrate progress in   · strength, range of motion, balance, and coordination  ·  to   · increase independence with mobility and HEP. · .  Reason for Services/Other Comments:  · Patient continues to require skilled intervention due to   · Decreased L LE strength and ROM and decreased independence with mobility. · .   Use of outcome tool(s) and clinical judgement create a POC that gives a: Clear prediction of patient's progress: LOW COMPLEXITY            TREATMENT:   (In addition to Assessment/Re-Assessment sessions the following treatments were rendered)     Pre-treatment Symptoms/Complaints:  Patient agreeable. Pain Initial:   Pain Intensity 1: 7  Post Session:  7/10-pain medicine at end of session. Gait Training (10 Minutes):  Gait training to improve and/or restore physical functioning as related to mobility, balance and coordination.   Ambulated 90 Feet (ft) with Stand-by assistance;Contact guard assistance using a Walker, rolling and minimal Safety awareness training;Verbal cues related to their stance phase and stride length to promote proper body alignment. Therapeutic Activity: (     minutes): Therapeutic activities including Chair transfers and Toilet transfers to improve mobility, strength, balance and coordination. Required minimal Safety awareness training;Verbal cues to promote static and dynamic balance in standing. Therapeutic Exercise: (20 Minutes):  Exercises per grid below to improve strength. Required minimal verbal, manual, and tactile cues to promote proper body alignment. Progressed range and repetitions as indicated. Date:  1/5/21 Date:  1/6/21 Date:  1/7/21   ACTIVITY/EXERCISE AM PM AM PM AM PM   GROUP THERAPY  []  []  []  []  []  []   Ankle Pumps  10 15 15 20    Quad Sets  10 15 15 20    Gluteal Sets  10 15 15 20    Hip ABd/ADduction  10 AA 15 15 20    Straight Leg Raises  10 AA 15 AA 15 AA 20 AA    Knee Slides  10 AA 15 15 20 AA    Short Arc Quads  10 AA 15 15 20 AA    Long Arc Quads         Chair Slides   15 15 20             B = bilateral; AA = active assistive; A = active; P = passive      Treatment/Session Assessment:     Response to Treatment:  Participated well. Pain limiting movement today. Wife to assist at home. Education:  [x] Home Exercises  [x] Fall Precautions  [x] Use of Cold Therapy Unit [x] D/C Instruction Review  [] Knee Prosthesis Review  [x] Walker Management/Safety [] Adaptive Equipment as Needed       Interdisciplinary Collaboration:   o Physical Therapist  o Registered Nurse    After treatment position/precautions:   o Up in chair  o Bed alarm/tab alert on  o Bed/Chair-wheels locked  o Caregiver at bedside  o Call light within reach  o RN notified    Compliance with Program/Exercises: Will assess as treatment progresses.     Recommendations/Intent for next treatment session:  Treatment next visit will focus on increasing Mr. Bowie's independence with bed mobility, transfers, gait training, strength/ROM exercises, modalities for pain, and patient education.       Total Treatment Duration:  PT Patient Time In/Time Out  Time In: 0950  Time Out: 2230 Angella Lucas PT

## 2021-01-07 NOTE — PROGRESS NOTES
2021         Post Op day: 2 Days Post-Op   Admit Diagnosis: Primary osteoarthritis of left knee [M17.12]  Arthritis of left knee [M17.12]  LAB:    Recent Results (from the past 24 hour(s))   HEMOGLOBIN    Collection Time: 21  3:07 AM   Result Value Ref Range    HGB 10.6 (L) 13.6 - 17.2 g/dL     Vital Signs:    Patient Vitals for the past 8 hrs:   BP Temp Pulse Resp SpO2   21 0308 (!) 117/57 97.5 °F (36.4 °C) 72 16 93 %   21 0019 (!) 97/57 97.4 °F (36.3 °C) 77 16 95 %     Temp (24hrs), Av.7 °F (36.5 °C), Min:97.4 °F (36.3 °C), Max:98.1 °F (36.7 °C)    Pain Control:   Pain Assessment  Pain Scale 1: Numeric (0 - 10)  Pain Intensity 1: 0  Pain Onset 1: at rest  Pain Location 1: Knee  Pain Orientation 1: Left  Pain Description 1: Aching  Pain Intervention(s) 1: Ambulation/Increased Activity  Subjective: Doing well, normal recovery experienced. Objective:  No Acute Distress, Alert and Oriented, Neurovascular exam is normal       Assessment:   Patient Active Problem List   Diagnosis Code    Obesity (BMI 30-39. 9) E66.9    Hypertension I10    Chronic obstructive pulmonary disease (HCC) J44.9    Enlarged prostate N40.0    Spinal stenosis, lumbar region, with neurogenic claudication M48.062    Acquired spondylolisthesis M43.10    Parkinson's disease ([address] G20    Hyperlipidemia E78.5    ZAFAR (dyspnea on exertion) R06.00    Arthritis M19.90    Cardiomyopathy ([address] I42.9    Coronary artery disease involving native coronary artery of native heart without angina pectoris I25.10    Tremor R25.1    Hoarseness of voice R49.0    Benign prostatic hyperplasia without lower urinary tract symptoms N40.0    Osteoarthritis M19.90    Dermatitis, seborrheic L21.9    Encounter for long-term (current) use of medications Z79.899    Controlled diabetes mellitus type II without complication ([facility]) U77.1    Urinary retention R33.9    Primary osteoarthritis of knee M17.10    Chronic renal insufficiency N18.9    ED (erectile dysfunction) N52.9    Rosacea L71.9    Neurogenic bladder N31.9    Sciatica M54.30    COPD exacerbation (HCC) J44.1    Postural imbalance R29.3    Abnormal posture R29.3    Biventricular AICD malfunction T82.118A    Multifactorial gait disorder R26.89    Hypophonia R49.8    Arthritis of left hip M16.12    Biventricular ICD (implantable cardioverter-defibrillator) in place Z95.810    ANTHONY (obstructive sleep apnea) G47.33    Neurogenic orthostatic hypotension (HCC) G90.3    RBD (REM behavioral disorder) G47.52    Cognitive changes R41.89    Arthritis of left knee M17.12       Status Post Procedure(s) (LRB):  LEFT KNEE ARTHROPLASTY TOTAL ROBOTIC ASSISTED IRVING/MEL/ PT HAS ICD PACEMAKER (Left)        Plan: Continue Physical Therapy, discharge home anticipated today.    Signed By: Lucita Rust MD

## 2021-01-07 NOTE — PROGRESS NOTES
Problem: Self Care Deficits Care Plan (Adult)  Goal: *Acute Goals and Plan of Care (Insert Text)  Description: GOALS:   DISCHARGE GOALS (in preparation for going home/rehab):  3 days  1. Mr. Foster Bahena will perform one lower body dressing activity with minimal assistance required to demonstrate improved functional mobility and safety. PROGRESSING  2. Mr. Foster Bahena will perform one lower body bathing activity with minimal assistance required to demonstrate improved functional mobility and safety. Christian Hilt GOAL MET 1/7/2021  3. Mr. Foster Bahena will perform toileting/toilet transfer with contact guard assistance to demonstrate improved functional mobility and safety. GOAL MET 1/7/2021    4. Mr. Foster Bahena will perform shower transfer with contact guard assistance to demonstrate improved functional mobility and safety. PROGRESSING  Outcome: Progressing Towards Goal      JOINT CAMP OCCUPATIONAL THERAPY TKA: Daily Note, Discharge, Treatment Day: 3rd and AM 1/7/2021  INPATIENT: Hospital Day: 3  [place] / [place] / Product Type: Medicare /      NAME/AGE/GENDER: Edel Chicas is a 77 y.o. male   PRIMARY DIAGNOSIS:  Primary osteoarthritis of left knee [M17.12]   Procedure(s) and Anesthesia Type:     * LEFT KNEE ARTHROPLASTY TOTAL ROBOTIC ASSISTED IRVING/MEL/ PT HAS ICD PACEMAKER - General (Left)  ICD-10: Treatment Diagnosis:    · Pain in Left Knee (M25.562)  · Stiffness of Left Knee, Not elsewhere classified (X65.555)      ASSESSMENT:     Mr. Foster Bahena is s/p left TKA and presents with decreased weight bearing on left LE and decreased independence with functional mobility and activities of daily living as compared to baseline level of function and safety. Patient would benefit from skilled Occupational Therapy to maximize independence and safety with self-care task and functional mobility.   Pt would also benefit from education on adaptive equipment and safety precautions in preparation for going home. Pt up in recliner and needed to use the restroom. Pt to bathroom for toileting. Upon leaving the bathroom pt stated he needed to sit down. His wife brought over a straight chair and he was assisted to sit down. OT slid chair to bedside for pt to transfer to bed. Pt should do fairly well tomorrow. 1/06/2021 850am patient transferred to EOB with min assist. He stood and slowly ambulated to the bathroom with min assist. He stood to urinate. He then returned to sit in the recliner. His wife was present. Ot to return later this morning for full ADL session after PT. Continue with OT  POC.    1/06/2021 1100am patient completed sponge bath from recliner as charted below. He plans to stay the night to increase over all  [place] with mobility and ADLs. He completed tasks as charted below. He is sitting in recliner with tab alert in place. OT to see him in the am for full ADL session to include shower. Will continue with OT POC.    1/7/2021 845am Patient supine in bed. He transferred to EOB with CGA. He ambulated to the bathroom with SBA improved balance todayl. He stood at commode to urinate. He transferred into shower with min assist to position walker to back up to shower chair. He showered and donned clean gown. He sat at the sink to brush his teeth and comb  His hair. He returned to bedside and dressed as charted below. He is less talkative to day and reported he is having a lot of pain. His wife was present and educated on home safety. He met 2/4 goals. recommend home health OT services. This section established at most recent assessment   PROBLEM LIST (Impairments causing functional limitations):  1. Decreased Strength  2. Decreased ADL/Functional Activities  3. Decreased Transfer Abilities  4. Increased Pain  5. Increased Fatigue  6. Decreased Flexibility/Joint Mobility  7.  Decreased Knowledge of Precautions   INTERVENTIONS PLANNED: (Benefits and precautions of occupational therapy have been discussed with the patient.)  1. Activities of daily living training  2. Adaptive equipment training  3. Balance training  4. Clothing management  5. Donning&doffing training  6. Theraputic activity     TREATMENT PLAN: Frequency/Duration: Follow patient 1-2 times to address above goals. Rehabilitation Potential For Stated Goals: Good     RECOMMENDED REHABILITATION/EQUIPMENT: (at time of discharge pending progress): Continue Skilled Therapy. OCCUPATIONAL PROFILE AND HISTORY:   History of Present Injury/Illness (Reason for Referral): Pt presents this date s/p () TKA.     Past Medical History/Comorbidities:   Mr. Mak Shaw  has a past medical history of Acquired spondylolisthesis (6/16/2015), Actinic keratosis (8/9/2016), Arthritis, Back pain (8/9/2016), Basal cell carcinoma (dx 2/2018), BPH (benign prostatic hypertrophy) (8/9/2016), Cardiomyopathy ([facility] [address] (08/02/2016), Chronic obstructive pulmonary disease ([facility] [address] Chronic renal insufficiency (08/09/2016), Controlled diabetes mellitus type II without complication ([facility] [address] (94/51/4597), COPD exacerbation ([facility] [address] (8/9/2016), Coronary artery disease involving native coronary artery of native heart without angina pectoris (8/2/2016), Dermatitis, seborrheic (8/9/2016), ZAFAR (dyspnea on exertion) (05/31/2016), Dyspnea, ED (erectile dysfunction) (8/9/2016), Elevated PSA (8/9/2016), Emphysemal COPD, Encounter for long-term (current) use of medications (8/9/2016), Enlarged prostate, Fatigue (8/9/2016), Finger swelling (8/9/2016), H/O echocardiogram (08/04/2020), History of kidney stones, Hyperlipidemia, Hypertension, Hypophonia with hoarseness (8/8/2016), Kidney problem, Kidney stone (06/2015), Knee effusion (8/9/2016), Low vitamin B12 level (8/9/2016), Neurogenic bladder (8/9/2016), Obesity (BMI 30-39.9), Osteoarthritis (8/9/2016), Parkinson disease ([address] 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 Mr. Xin Souza  has a past surgical history that includes hx lithotripsy (1998); hx cystostomy; hx urological (Right); hx appendectomy; hx knee arthroscopy (Right); hx acl reconstruction (Right, 1991); hx pacemaker (10/31/2016); hx lumbar laminectomy (06/2015); and hx hip replacement (Left, 2018). Social History/Living Environment:   Home Environment: Private residence  Wheelchair Ramp: Yes  One/Two Story Residence: Two story, live on 1st floor  # of Interior Steps: 12  Living Alone: No  Support Systems: Spouse/Significant Other/Partner  Patient Expects to be Discharged to[de-identified] Private residence  Current DME Used/Available at Home: [address] [facility] [place], straight, Commode, bedside, Shower chair, Marcheta Session, rollator, Walker, rolling  Tub or Shower Type: Tub/Shower combination  Prior Level of Function/Work/Activity:  Independent per wife but she is very cable of assisting if needed      Number of Personal Factors/Comorbidities that affect the Plan of Care: Brief history (0):  LOW COMPLEXITY   ASSESSMENT OF OCCUPATIONAL PERFORMANCE[de-identified]   Most Recent Physical Functioning:   Balance  Sitting: Intact  Standing: With support       Gross Assessment  AROM: Generally decreased, functional(R LE)  Strength: Generally decreased, functional(R LE)          LLE AROM  L Knee Flexion: 78  L Knee Extension: 7  LLE Strength  L Hip Flexion: 2  L Knee Extension: 2+  L Ankle Dorsiflexion: 3+           Mental Status  Neurologic State: Alert; Appropriate for age  Orientation Level: Appropriate for age  Cognition: Appropriate decision making; Follows commands  Perception: Appears intact  Perseveration: No perseveration noted  Safety/Judgement: Awareness of environment; Fall prevention                Basic ADLs (From Assessment) Complex ADLs (From Assessment)   Basic ADL  Feeding: Independent  Oral Facial Hygiene/Grooming: Supervision  Bathing: Moderate assistance  Type of Bath: Chlorhexidine (CHG), Shower  Upper Body Dressing: Supervision  Lower Body Dressing: Maximum assistance  Toileting: Moderate assistance     Grooming/Bathing/Dressing Activities of Daily Living   Grooming  Grooming Assistance: Supervision  Position Performed: Seated in chair  Washing Face: Supervision  Washing Hands: Supervision  Brushing Teeth: Supervision  Brushing/Combing Hair: Supervision Cognitive Retraining  Safety/Judgement: Awareness of environment; Fall prevention   Upper Body Bathing  Bathing Assistance: Supervision  Position Performed: Seated in chair  Adaptive Equipment: Shower chair     Lower Body Bathing  Bathing Assistance: Minimum assistance  Perineal  : Minimum assistance  Position Performed: Seated in chair;Standing  Lower Body : Minimum assistance  Position Performed: Seated in chair  Adaptive Equipment: Shower chair Toileting  Toileting Assistance: Contact guard assistance(stood with walker to urinate at toilet)  Bladder Hygiene: Contact guard assistance   Upper Body Dressing Assistance  Dressing Assistance: Supervision  Pullover Shirt: Supervision Functional Transfers  Bathroom Mobility: Contact guard assistance  Toilet Transfer : Contact guard assistance  Shower Transfer: Contact guard assistance;Minimum assistance  Adaptive Equipment: Grab bars; Shower chair with back   Lower Body Dressing Assistance  Dressing Assistance: Minimum assistance; Moderate assistance  Underpants: Minimum assistance  Pants With Elastic Waist: Minimum assistance; Moderate assistance  Socks: Minimum assistance  Slip on Shoes with Back: Minimum assistance; Moderate assistance Bed/Mat Mobility  Supine to Sit: Contact guard assistance  Sit to Stand: Stand-by assistance  Stand to Sit: Stand-by assistance  Bed to Chair: Contact guard assistance  Scooting: Contact guard assistance         Physical Skills Involved:  1. Range of Motion  2. Balance  3. Strength Cognitive Skills Affected (resulting in the inability to perform in a timely and safe manner): 1. none Psychosocial Skills Affected:  1. Environmental Adaptation   Number of elements that affect the Plan of Care: 3-5:  MODERATE COMPLEXITY   CLINICAL DECISION MAKIN[address] [place] [address] AM-PAC 6 Clicks   Daily Activity Inpatient Short Form  How much help from another person does the patient currently need. .. Total A Lot A Little None   1. Putting on and taking off regular lower body clothing? [] 1   [x] 2   [] 3   [] 4   2. Bathing (including washing, rinsing, drying)? [] 1   [] 2   [x] 3   [] 4   3. Toileting, which includes using toilet, bedpan or urinal?   [] 1   [] 2   [] 3   [] 4   4. Putting on and taking off regular upper body clothing? [] 1   [] 2   [] 3   [x] 4   5. Taking care of personal grooming such as brushing teeth? [] 1   [] 2   [] 3   [x] 4   6. Eating meals? [] 1   [] 2   [] 3   [x] 4   © , Trustees of [address] [place] [address], under license to [employer]. All rights reserved     Score:  Initial: 18 Most Recent: 20    Interpretation of Tool:  Represents activities that are increasingly more difficult (i.e. Bed mobility, Transfers, Gait).     ·    Use of outcome tool(s) and clinical judgement create a POC that gives a: LOW COMPLEXITY            TREATMENT:   (In addition to Assessment/Re-Assessment sessions the following treatments were rendered)     Pre-treatment Symptoms/Complaints:    Pain: Initial:   Pain Intensity 1: 5  Pain Location 1: Knee  Pain Orientation 1: Left  Pain Intervention(s) 1: Shower  Post Session:  5/10 rest, calling for pain meds in a few minutes per wife     Self Care: (40min): Procedure(s) (per grid) utilized to improve and/or restore self-care/home management as related to dressing, bathing, toileting and grooming. Required mod visual, verbal, manual and tactile cueing to facilitate activities of daily living skills and compensatory activities. Treatment/Session Assessment:     Response to Treatment:  Pt completed shower moving better having pain. Plans to discharge home today  . Education:  [] Home Exercises  [x] Fall Precautions  [] Hip Precautions [] Going Home Video  [x] Knee/Hip Prosthesis Review  [x] Walker Management/Safety [x] Adaptive Equipment as Needed       Interdisciplinary Collaboration:   o Occupational Therapist  o Registered Nurse  o     After treatment position/precautions:   o Up in chair  o Bed/Chair-wheels locked  o Bed in low position  o Call light within reach  o RN notified  o Family at bedside     Compliance with Program/Exercises: Compliant all of the time. Recommendations/Intent for next treatment session:   Pt doing well all goals met and will do well at home with support from spouse. Patient will be discharged home with home health PT and OT services.        Total Treatment Duration:40  Time in: 8:45  Time out [address], OT

## 2021-01-07 NOTE — PROGRESS NOTES
Pt discharge summary and home medication sheet reviewed and signed by pt. Copy given for take home use. Oriana nassar counted and returned to pt wife. All goals met. Rx for po Norco given. Assessment unchanged. Pt leaving hospital via w/c with staff member and wife.

## 2021-01-08 NOTE — DISCHARGE SUMMARY
[facility]  Total Joint Discharge Summary      Patient ID:  Ozzie Cardenas  [id number]  77 y.o.  1945    Admit date: 1/5/2021  Discharge date and time: 1/7/2021  Admitting Physician: Coretta Juarez MD  Surgeon: Coretta Juarez  Admission Diagnoses: Primary osteoarthritis of left knee [M17.12]  Arthritis of left knee [M17.12]  Discharge Diagnoses: Active Problems:    Arthritis of left knee (1/5/2021)    Procedure:  Left total knee arthroplasty performed on 6/6/2463 without complication. Perioperative Antibiotics: Ancef 1 to 2 mg was given depending on patients weight. If allergic to Ancef or due to other indications, patient was given Vancomycin. Hospital Medications given:   [unfilled]  [unfilled]  [unfilled]    Additional DVT Prophylaxis:  ADELIA Hose and Plexi-Pulse    Postoperative transfusions:   none  Post Op complications: none    Hemoglobin at discharge:   Lab Results   Component Value Date/Time    HGB 10.6 (L) 01/07/2021 03:07 AM       Wound appears to be healing without any evidence of infection. Physical Therapy started on the day of surgery and progressed. PT/OT:            Assistive Device: Walker (comment)        LLE AROM  L Knee Flexion: 78  L Knee Extension: 7       Discharged to: Home with Home Health    Discharge condition: Stable    Discharge instructions:  - Rx pain medication given  - Anticoagulate with: Ecotrin 81 mg PO BID x 5 weeks unless pt is on other anticoagulants  - Resume pre hospital diet        -Discontinue left knee staples and apply steri- strips on POD # 10, if wound is adequately healed.       - Resume home medications per medical continuation form     - Ambulate with walker, appropriate total joint protocol  - Follow up in office as scheduled       Signed:  LEANA Ruffin  1/8/2021  11:23 AM

## 2021-10-12 ENCOUNTER — HOSPITAL ENCOUNTER (OUTPATIENT)
Dept: LAB | Age: 76
Discharge: HOME | End: 2021-10-12

## 2021-10-12 PROCEDURE — 88305 TISSUE EXAM BY PATHOLOGIST: CPT

## 2021-12-02 ENCOUNTER — HOSPITAL ENCOUNTER (OUTPATIENT)
Dept: GENERAL RADIOLOGY | Age: 76
Discharge: HOME | End: 2021-12-02
Payer: MEDICARE

## 2021-12-02 DIAGNOSIS — R13.14 PHARYNGOESOPHAGEAL DYSPHAGIA: ICD-10-CM

## 2021-12-02 DIAGNOSIS — R13.10 DYSPHAGIA: ICD-10-CM

## 2021-12-02 PROCEDURE — 74230 X-RAY XM SWLNG FUNCJ C+: CPT

## 2021-12-02 PROCEDURE — 74011000250 HC RX REV CODE- 250: Performed by: INTERNAL MEDICINE

## 2021-12-02 PROCEDURE — 92611 MOTION FLUOROSCOPY/SWALLOW: CPT

## 2021-12-02 RX ADMIN — BARIUM SULFATE 15 ML: 400 PASTE ORAL at 09:54

## 2021-12-02 RX ADMIN — BARIUM SULFATE 45 ML: 980 POWDER, FOR SUSPENSION ORAL at 09:54

## 2021-12-02 NOTE — THERAPY EVALUATION
Hal Red.   : 1945  [place]  Secondary: [address] [facility] 68, 101 [facility], [address]  [phone]   [phone]       OUTPATIENT SPEECH LANGUAGE PATHOLOGY: MODIFIED BARIUM SWALLOW    ICD-10: Treatment Diagnosis: Pharyngoesophageal dysphagia (R13.14)  DATE: 2021  REFERRING PHYSICIAN: Gary Hahn MD Orders: Modifed Barium Swallow  PAST MEDICAL HISTORY:   Mr. Mak Shaw is a 77 y.o. male who  has a past medical history of Acquired spondylolisthesis (2015), Actinic keratosis (2016), Arthritis, Back pain (2016), Basal cell carcinoma (dx 2018), BPH (benign prostatic hypertrophy) (2016), Cardiomyopathy ([facility] [address] (2016), Chronic obstructive pulmonary disease ([facility] [address] Chronic renal insufficiency (2016), Controlled diabetes mellitus type II without complication ([facility] [address] (), COPD exacerbation ([facility] [address] (2016), Coronary artery disease involving native coronary artery of native heart without angina pectoris (2016), Dermatitis, seborrheic (2016), ZAFAR (dyspnea on exertion) (2016), Dyspnea, ED (erectile dysfunction) (2016), Elevated PSA (2016), Emphysemal COPD, Encounter for long-term (current) use of medications (2016), Enlarged prostate, Fatigue (2016), Finger swelling (2016), H/O echocardiogram (2020), History of kidney stones, Hyperlipidemia, Hypertension, Hypophonia with hoarseness (2016), Kidney problem, Kidney stone (2015), Knee effusion (2016), Low vitamin B12 level (2016), Neurogenic bladder (2016), Obesity (BMI 30-39.9), Osteoarthritis (2016), Parkinson disease ([facility] [address] Presence of combination internal cardiac defibrillator (ICD) and pacemaker, Primary osteoarthritis of knee (2016), Prostatitis (2016), Pseudogout of hand (2016), Rosacea (2016), S/P placement of cardiac pacemaker (10/31/2016), Sciatica (8/9/2016), Seborrhea (8/9/2016), Sleep apnea, Slurred speech (08/09/2016), SOB (shortness of breath) (08/09/2016), Spinal stenosis, lumbar region, with neurogenic claudication (6/16/2015), Syncope, near (8/9/2016), Tremor (8/8/2016), Urinary frequency (08/09/2016), Urinary retention (8/9/2016), Urinary urgency (8/9/2016), Wears dentures, and Wears glasses. He also  has a past surgical history that includes hx lithotripsy (1998); hx cystostomy; hx urological (Right); hx appendectomy; hx knee arthroscopy (Right); hx acl reconstruction (Right, 1991); hx pacemaker (10/31/2016); hx lumbar laminectomy (06/2015); and hx hip replacement (Left, 2018). RADIOLOGIST:  Dr. Brendan Gutierrez  MEDICAL/REFERRING DIAGNOSIS: Dysphagia [R13.10]  PRECAUTIONS/ALLERGIES: Oxycodone, Phenergan [promethazine], and Polyurethane-39   ASSESSMENT/PLAN OF CARE:Based on the objective data described above, Mr. Sumit Orellana presents with slight pharyngeal dysphagia characterized by swallows of all textures triggered at valleculae with occasional trace laryngeal penetration during swallow of thin liquids. There was a single instance of trace laryngeal penetration prior to the swallow with thin liquids presented by straw with trace deep laryngeal penetration occurring during the swallow that cleared spontaneously, but this was not replicated with subsequent trials. No pharyngeal residue observed after swallows.    Factors increasing risk for aspiration: Parkinson's disease    RECOMMENDATIONS AND PLANNED INTERVENTIONS  DIET:    Easy to [employer] Thin Liquids  MEDICATIONS: Whole in puree or applesauce    COMPENSATORY STRATEGIES/MODIFICATIONS INCLUDING:  · Upright for all PO  · Chin tuck  · Alternate liquids/solids  · Small bites and sips  · Clear throat periodically and dry swallow  · Remain upright for 20-30 min after any PO  · Slow rate of PO intake  OTHER RECOMMENDATIONS (including follow up treatment recommendations): · Treatment to improve/facilitate oral/pharyngeal skills   · Training in laryngeal strengthening and coordination exercises  · Training in use of compensatory safe swallowing strategies/feeding guidelines  · Patient/family education    Thank you for this referral,  Roselyn Carrillo MA, CCC-SLP        SUBJECTIVE:Patient pleasant and cooperative. He is accompanied by his wife. Present Dysphagia Symptoms: He currently complains of globus sensation with solids and pills. Current dietary status prior to evaluation today:  Regular textures, thin liquids  Previous Dysphagia: Patient had EGD performed October 2021 which revealed hiatal hernia and Schatzki's ring. Patient reports he has found that using chin tuck when foods or pills feel like they are stuck improves globus sensation. Patient had speech therapy in the past just after initial diagnosis of Parkinson's disease for LSVT. He has been referred to resume speech therapy per Dr. Guzman Turner. Previous Modified Barium Swallow studies: none reported    OBJECTIVE:Orientation:    Person   Place   Time   Situation    Oral Assessment:  Labial: No impairment  Lingual: No impairment  Dentition: Upper Dentures and Partials  Oral Hygiene: Adequate  Vocal Quality: Low volume  Speech Inteligiblity: WFL    Modified barium swallow study was performed in the radiology suite using c-arm with Mr. Bijan Angulo in the lateral plane seated upright 90° in the chair. To evaluate his swallow function, barium coated liquid and food was administered in the form of thin liquids (by spoon, cup sip, cup gulp and straw sip), pudding, mixed consistency and cracker. Oral phase of swallow:    premature spillage to pharynx pre-swallow   piecemeal deglutition    Pharyngeal phase of swallow:    Swallows of thin liquids were triggered at valleculae.  There was trace and intermittent laryngeal penetration observed during the swallow from cup presentations that was cleared spontaneously during the swallow. With straw presentations, laryngeal penetration occurred prior to the swallow and reached the cords during the swallow, but was spontaneously cleared during the swallow. No cough response. No aspiration was observed. No pharyngeal residue after swallow.  Swallows of pudding were triggered at valleculae. No laryngeal penetration or aspiration was observed. No pharyngeal residue after swallow.  Swallows of mixed consistencies were piecemealed and triggered at valleculae. No laryngeal penetration or aspiration was observed. No pharyngeal residue after swallow.  Swallows of cracker were piecemealed and triggered at valleculae. No laryngeal penetration or aspiration was observed. No pharyngeal residue after swallow. Pharyngeal characteristics:   adequate retraction of base of tongue   delayed hyolaryngeal elevation/excursion   adequate epiglottic inversion   reduced constriction of posterior pharyngeal wall   reduced laryngeal closure  Attempted strategies:    small bites/sips   cup sip  Effective strategies:    small bites/sips   cup sip  Aspiration/Penetration Scale: 4 (Deep penetration/no residue. Contrast contacts the folds and is ejected.)    Cervical esophageal phase of swallow:    signs of reduced bolus clearance through cervical esophagus    a limited view. Therefore, unable to rule out an esophageal component of dysphagia  **Distal esophagus not assessed due to limitations of MBS study. **    Assessment only; no treatment provided today. Objective Measure: Tool Used: National Outcomes Measurement System: Functional Communication Measures: SWALLOWING  Score:  Initial: 6     Interpretation of Tool: This measure describes the change in functional communication status subsequent to speech-language pathology treatment of patients with dysphagia.  Level 1:  Individual is not able to swallow anything safely by mouth.  All nutrition and hydration is received through non-oral means (e.g., nasogastric tube, PEG).  Level 2: Individual is not able to swallow safely by mouth for nutrition and hydration, but may take some consistency with consistent maximal cues in therapy only. Alternative method of feeding required.  Level 3:  Alternative method of feeding required as individual takes less than 50% of nutrition and hydration by mouth, and/or swallowing is safe with consistent use of moderate cues to use compensatory strategies and/or requires maximum diet restriction.  Level 4:  Swallowing is safe, but usually requires moderate cues to use compensatory strategies, and/or the individual has moderate diet restrictions and/or still requires tube feeding and/or oral supplements.  Level 5:  Swallowing is safe with minimal diet restriction and/or occasionally requires minimal cueing to use compensatory strategies. The individual may occasionally self-cue. All nutrition and hydration needs are met by mouth at mealtime.  Level 6:  Swallowing is safe, and the individual eats and drinks independently and may rarely require minimal cueing. The individual usually self-cues when difficulty occurs. May need to avoid specific food items (e.g., popcorn and nuts), or require additional time (due to dysphagia).  Level 7: The individuals ability to eat independently is not limited by swallow function. Swallowing would be safe and efficient for all consistencies. Compensatory strategies are effectively used when needed. Recommendations for treatment: patient provided with laryngeal strengthening exercises. He is supposed to follow up with speech therapy at Dr. Danial Garcia office for [facility]. Patient/Family education:    Mr. Azar Masters was educated on the following topics: anatomy and physiology of the swallowing mechanism and results and recommendations from this assessment. All questions were answered and comprehension of education was expressed.       Total Treatment Duration:  Time In: 0930 Time Out: [address], [place], CCC-SLP

## 2021-12-09 ENCOUNTER — HOSPITAL ENCOUNTER (OUTPATIENT)
Dept: SURGERY | Age: 76
Discharge: HOME | End: 2021-12-09
Payer: MEDICARE

## 2021-12-09 LAB
ALBUMIN SERPL-MCNC: 3.5 G/DL (ref 3.2–4.6)
ANION GAP SERPL CALC-SCNC: ABNORMAL MMOL/L (ref 7–16)
APTT PPP: 31.1 SEC (ref 24.1–35.1)
BACTERIA SPEC CULT: ABNORMAL
BASOPHILS # BLD: 0 K/UL (ref 0–0.2)
BASOPHILS NFR BLD: 0 % (ref 0–2)
BUN SERPL-MCNC: 16 MG/DL (ref 8–23)
CALCIUM SERPL-MCNC: 9.3 MG/DL (ref 8.3–10.4)
CHLORIDE SERPL-SCNC: 110 MMOL/L (ref 98–107)
CO2 SERPL-SCNC: 29 MMOL/L (ref 21–32)
CREAT SERPL-MCNC: 0.86 MG/DL (ref 0.8–1.5)
DIFFERENTIAL METHOD BLD: ABNORMAL
EOSINOPHIL # BLD: 0.4 K/UL (ref 0–0.8)
EOSINOPHIL NFR BLD: 5 % (ref 0.5–7.8)
ERYTHROCYTE [DISTWIDTH] IN BLOOD BY AUTOMATED COUNT: 12.1 % (ref 11.9–14.6)
EST. AVERAGE GLUCOSE BLD GHB EST-MCNC: 120 MG/DL
GLUCOSE SERPL-MCNC: 94 MG/DL (ref 65–100)
HBA1C MFR BLD: 5.8 % (ref 4.2–6.3)
HCT VFR BLD AUTO: 41.1 % (ref 41.1–50.3)
HGB BLD-MCNC: 13.8 G/DL (ref 13.6–17.2)
IMM GRANULOCYTES # BLD AUTO: 0 K/UL (ref 0–0.5)
IMM GRANULOCYTES NFR BLD AUTO: 0 % (ref 0–5)
INR PPP: 1.1
LYMPHOCYTES # BLD: 1.5 K/UL (ref 0.5–4.6)
LYMPHOCYTES NFR BLD: 20 % (ref 13–44)
MCH RBC QN AUTO: 33.1 PG (ref 26.1–32.9)
MCHC RBC AUTO-ENTMCNC: 33.6 G/DL (ref 31.4–35)
MCV RBC AUTO: 98.6 FL (ref 79.6–97.8)
MONOCYTES # BLD: 0.6 K/UL (ref 0.1–1.3)
MONOCYTES NFR BLD: 8 % (ref 4–12)
NEUTS SEG # BLD: 5 K/UL (ref 1.7–8.2)
NEUTS SEG NFR BLD: 66 % (ref 43–78)
NRBC # BLD: 0 K/UL (ref 0–0.2)
PLATELET # BLD AUTO: 261 K/UL (ref 150–450)
PMV BLD AUTO: 10 FL (ref 9.4–12.3)
POTASSIUM SERPL-SCNC: 3.9 MMOL/L (ref 3.5–5.1)
PROTHROMBIN TIME: 14.4 SEC (ref 12.6–14.5)
RBC # BLD AUTO: 4.17 M/UL (ref 4.23–5.6)
SERVICE CMNT-IMP: ABNORMAL
SODIUM SERPL-SCNC: 137 MMOL/L (ref 136–145)
WBC # BLD AUTO: 7.5 K/UL (ref 4.3–11.1)

## 2021-12-09 PROCEDURE — 80307 DRUG TEST PRSMV CHEM ANLYZR: CPT

## 2021-12-09 PROCEDURE — 80048 BASIC METABOLIC PNL TOTAL CA: CPT

## 2021-12-09 PROCEDURE — 87641 MR-STAPH DNA AMP PROBE: CPT

## 2021-12-09 PROCEDURE — 36415 COLL VENOUS BLD VENIPUNCTURE: CPT

## 2021-12-09 PROCEDURE — 85025 COMPLETE CBC W/AUTO DIFF WBC: CPT

## 2021-12-09 PROCEDURE — 82040 ASSAY OF SERUM ALBUMIN: CPT

## 2021-12-09 PROCEDURE — 85730 THROMBOPLASTIN TIME PARTIAL: CPT

## 2021-12-09 PROCEDURE — 83036 HEMOGLOBIN GLYCOSYLATED A1C: CPT

## 2021-12-09 PROCEDURE — 85610 PROTHROMBIN TIME: CPT

## 2021-12-09 NOTE — PERIOP NOTES
Your patient recently had labs drawn during a hospital appointment due to an upcoming surgery. The results are attached. If you have any questions or concerns please reach out to your patient for a follow-up in your office. Please do not respond to this message as my mailbox is not monitored. You may call [phone] with questions or concerns. Recent Results (from the past 12 hour(s))   MSSA/MRSA SC BY PCR, NASAL SWAB    Collection Time: 12/09/21 11:14 AM    Specimen: Nasal swab   Result Value Ref Range    Special Requests: NO SPECIAL REQUESTS      Culture result: (A)       MRSA target DNA not detected, SA target DNA detected. A MRSA negative, SA positive test result does not preclude MRSA nasal colonization. CBC WITH AUTOMATED DIFF    Collection Time: 12/09/21 11:38 AM   Result Value Ref Range    WBC 7.5 4.3 - 11.1 K/uL    RBC 4.17 (L) 4.23 - 5.6 M/uL    HGB 13.8 13.6 - 17.2 g/dL    HCT 41.1 41.1 - 50.3 %    MCV 98.6 (H) 79.6 - 97.8 FL    MCH 33.1 (H) 26.1 - 32.9 PG    MCHC 33.6 31.4 - 35.0 g/dL    RDW 12.1 11.9 - 14.6 %    PLATELET 607 001 - 767 K/uL    MPV 10.0 9.4 - 12.3 FL    ABSOLUTE NRBC 0.00 0.0 - 0.2 K/uL    DF AUTOMATED      NEUTROPHILS 66 43 - 78 %    LYMPHOCYTES 20 13 - 44 %    MONOCYTES 8 4.0 - 12.0 %    EOSINOPHILS 5 0.5 - 7.8 %    BASOPHILS 0 0.0 - 2.0 %    IMMATURE GRANULOCYTES 0 0.0 - 5.0 %    ABS. NEUTROPHILS 5.0 1.7 - 8.2 K/UL    ABS. LYMPHOCYTES 1.5 0.5 - 4.6 K/UL    ABS. MONOCYTES 0.6 0.1 - 1.3 K/UL    ABS. EOSINOPHILS 0.4 0.0 - 0.8 K/UL    ABS. BASOPHILS 0.0 0.0 - 0.2 K/UL    ABS. IMM.  GRANS. 0.0 0.0 - 0.5 K/UL   HEMOGLOBIN A1C WITH EAG    Collection Time: 12/09/21 11:38 AM   Result Value Ref Range    Hemoglobin A1c 5.8 4.20 - 6.30 %    Est. average glucose 368 mg/dL   METABOLIC PANEL, BASIC    Collection Time: 12/09/21 11:38 AM   Result Value Ref Range    Sodium 137 136 - 145 mmol/L    Potassium 3.9 3.5 - 5.1 mmol/L    Chloride 110 (H) 98 - 107 mmol/L    CO2 29 21 - 32 mmol/L Anion gap Cannot be calculated 7 - 16 mmol/L    Glucose 94 65 - 100 mg/dL    BUN 16 8 - 23 MG/DL    Creatinine 0.86 0.8 - 1.5 MG/DL    GFR est AA >60 >60 ml/min/1.73m2    GFR est non-AA >60 >60 ml/min/1.73m2    Calcium 9.3 8.3 - 10.4 MG/DL   PROTHROMBIN TIME + INR    Collection Time: 12/09/21 11:38 AM   Result Value Ref Range    Prothrombin time 14.4 12.6 - 14.5 sec    INR 1.1     PTT    Collection Time: 12/09/21 11:38 AM   Result Value Ref Range    aPTT 31.1 24.1 - 35.1 SEC   ALBUMIN    Collection Time: 12/09/21 11:38 AM   Result Value Ref Range    Albumin 3.5 3.2 - 4.6 g/dL

## 2021-12-09 NOTE — PERIOP NOTES
Patient verified name and . Order for consent for [facility] robotic-assisted right total knee arthroplasty IS found in EHR and matches case posting; patient verified. Type 3 surgery, walk in assessment complete. Labs per surgeon: CBC,BMP, PT/PTT, a1c, albumin, nicotine, MRSA swab ; results processing  Labs per anesthesia protocol: no additional  EKG: dated 20 in EMR is within anesthesia protocols. Last cardiology office note dated 21 states:  \" Arthritis of left knee patient needs another knee replacement he has gone through surgeries for this before and did very well with it. I do not think he has had any greater symptoms when he has moderate risk but he is stable enough to have it done and did well with the previous procedures. \"    Most recent Echo dated 20 shows LVEF 35-40%. Latest in person pacemaker interrogation dated 21 and last neurology office note dated 21 found in EHR if needed for anesthesia reference. Anesthesia to review chart due to LVEF 35-40% on Echo dated 20. [facility] in surgery scheduling notified of ICD/pacemaker. Patient COVID test date 21 at 8:20 am; Patient aware. The testing center info [facility], [place]  and telephone number [phone] provided to the patient if not appointment found. MRSA/MSSA swab collected; pharmacy to review and dose antibiotic as appropriate. Hospital approved surgical skin cleanser and instructions to return bottle on DOS given per hospital policy. Patient provided with handouts including Guide to Surgery, Pain Management, Hand Hygiene, Blood Transfusion Education, and [facility] Brochure. Patient answered medical/surgical history questions at their best of ability. All prior to admission medications documented in [facility]. Original medication prescription bottles were not visualized during patient appointment.      Patient instructed to hold all vitamins 3 weeks prior to surgery and NSAIDS 5 days prior to surgery. Patient teach back successful and patient demonstrates knowledge of instruction.

## 2021-12-09 NOTE — PERIOP NOTES
PLEASE CONTINUE TAKING ALL PRESCRIPTION MEDICATIONS UP TO THE DAY OF SURGERY UNLESS OTHERWISE DIRECTED BELOW. DISCONTINUE all vitamins and supplements 21 days prior to surgery. DISCONTINUE Non-Steriodal Anti-Inflammatory (NSAIDS) such as Advil and Aleve 5 days prior to surgery. Home Medications to take  the day of surgery    Albuterol inhaler, Allopurinol, Atorvastatin, Sinemet, Carvedilol, Clonazepam, Doxycycline, Advair diskus, Mirabegron, Flomax, Spiriva inhaler           Home Medications   to Hold   Hold all vitamins and supplements for 21 days prior to surgery. Comments    Covid test 12/13/21 at 8:20 am @ [address]. The number to the testing site is [phone]. If you get a COVID test closer to home, it must be within 4 days of surgery and must be a PCR test.  Bring results to the hospital on DOS. On the day before surgery please take Acetaminophen 1000mg in the morning and then again before bed. You may substitute for Tylenol 650 mg.    Bring: inhalers, incentive spirometer, DynaHex soap, carbidopa-levodopa 50-200mg, entresto to hospital on DOS. Please do not bring home medications with you on the day of surgery unless otherwise directed by your nurse. If you are instructed to bring home medications, please give them to your nurse as they will be administered by the nursing staff. If you have any questions, please call [facility] [phone]. A copy of this note was provided to the patient for reference.

## 2021-12-09 NOTE — PERIOP NOTES
labs within anesthesia guidelines, no follow-up required. Recent Results (from the past 12 hour(s))   MSSA/MRSA SC BY PCR, NASAL SWAB    Collection Time: 12/09/21 11:14 AM    Specimen: Nasal swab   Result Value Ref Range    Special Requests: NO SPECIAL REQUESTS      Culture result: (A)       MRSA target DNA not detected, SA target DNA detected. A MRSA negative, SA positive test result does not preclude MRSA nasal colonization. CBC WITH AUTOMATED DIFF    Collection Time: 12/09/21 11:38 AM   Result Value Ref Range    WBC 7.5 4.3 - 11.1 K/uL    RBC 4.17 (L) 4.23 - 5.6 M/uL    HGB 13.8 13.6 - 17.2 g/dL    HCT 41.1 41.1 - 50.3 %    MCV 98.6 (H) 79.6 - 97.8 FL    MCH 33.1 (H) 26.1 - 32.9 PG    MCHC 33.6 31.4 - 35.0 g/dL    RDW 12.1 11.9 - 14.6 %    PLATELET 942 973 - 332 K/uL    MPV 10.0 9.4 - 12.3 FL    ABSOLUTE NRBC 0.00 0.0 - 0.2 K/uL    DF AUTOMATED      NEUTROPHILS 66 43 - 78 %    LYMPHOCYTES 20 13 - 44 %    MONOCYTES 8 4.0 - 12.0 %    EOSINOPHILS 5 0.5 - 7.8 %    BASOPHILS 0 0.0 - 2.0 %    IMMATURE GRANULOCYTES 0 0.0 - 5.0 %    ABS. NEUTROPHILS 5.0 1.7 - 8.2 K/UL    ABS. LYMPHOCYTES 1.5 0.5 - 4.6 K/UL    ABS. MONOCYTES 0.6 0.1 - 1.3 K/UL    ABS. EOSINOPHILS 0.4 0.0 - 0.8 K/UL    ABS. BASOPHILS 0.0 0.0 - 0.2 K/UL    ABS. IMM.  GRANS. 0.0 0.0 - 0.5 K/UL   HEMOGLOBIN A1C WITH EAG    Collection Time: 12/09/21 11:38 AM   Result Value Ref Range    Hemoglobin A1c 5.8 4.20 - 6.30 %    Est. average glucose 482 mg/dL   METABOLIC PANEL, BASIC    Collection Time: 12/09/21 11:38 AM   Result Value Ref Range    Sodium 137 136 - 145 mmol/L    Potassium 3.9 3.5 - 5.1 mmol/L    Chloride 110 (H) 98 - 107 mmol/L    CO2 29 21 - 32 mmol/L    Anion gap Cannot be calculated 7 - 16 mmol/L    Glucose 94 65 - 100 mg/dL    BUN 16 8 - 23 MG/DL    Creatinine 0.86 0.8 - 1.5 MG/DL    GFR est AA >60 >60 ml/min/1.73m2    GFR est non-AA >60 >60 ml/min/1.73m2    Calcium 9.3 8.3 - 10.4 MG/DL   PROTHROMBIN TIME + INR    Collection Time: 12/09/21 11:38 AM   Result Value Ref Range    Prothrombin time 14.4 12.6 - 14.5 sec    INR 1.1     PTT    Collection Time: 12/09/21 11:38 AM   Result Value Ref Range    aPTT 31.1 24.1 - 35.1 SEC   ALBUMIN    Collection Time: 12/09/21 11:38 AM   Result Value Ref Range    Albumin 3.5 3.2 - 4.6 g/dL

## 2021-12-10 ENCOUNTER — ANESTHESIA EVENT (OUTPATIENT)
Dept: SURGERY | Age: 76
DRG: 470 | End: 2021-12-10
Payer: MEDICARE

## 2021-12-10 NOTE — PERIOP NOTES
Dr. Hardy Lebron reviewed chart - Echo 08/04/20 and cardiology note 11/30/21. No new orders received. Ok to proceed.

## 2021-12-13 NOTE — ADVANCED PRACTICE NURSE
Total Joint Surgery Preoperative Chart Review      Patient ID:  Ginette Parmar  [id number]  77 y.o.  1945  Surgeon: Dr. Chiquis Davila  Date of Surgery: 12/15/2021  Procedure: Total Right Knee Arthroplasty  Primary Care Physician: Rachel Sharma [phone]  Specialty Physician(s):      Subjective:   Ginette Strong is a 77 y.o. WHITE/NON- male who presents for preoperative evaluation for Total Right Knee arthroplasty. This is a preoperative chart review note based on data collected by the nurse at the surgical Pre-Assessment visit. Past Medical History:   Diagnosis Date    Acquired spondylolisthesis 6/16/2015    Actinic keratosis 8/9/2016    Arthritis     generalized OA, managed with OTC medication     Back pain 8/9/2016    Basal cell carcinoma dx 2/2018    small pea-sized lesion on right side of nose    BPH (benign prostatic hypertrophy) 8/9/2016    Cardiomyopathy ([facility] 08/02/2016    Followed by Dr. Jose Crawley at [facility]     Chronic obstructive pulmonary disease [facility])     managed with inhalers-Followed by PCP, Dr. Jesse Rhodes Chronic renal insufficiency 08/09/2016    patient denies, states Creatinine elevated one time.      Controlled diabetes mellitus type II without complication ([address] 72/50/8448    Patient denies-states BS was elevated one time-patient does not take any medications for diabetes     COPD exacerbation ([facility] 8/9/2016    Coronary artery disease involving native coronary artery of native heart without angina pectoris 8/2/2016    Dermatitis, seborrheic 8/9/2016    ZAFAR (dyspnea on exertion) 05/31/2016    due to COPD     Dyspnea     ED (erectile dysfunction) 8/9/2016    Elevated PSA 8/9/2016    Emphysemal COPD     Encounter for long-term (current) use of medications 8/9/2016    Enlarged prostate     managed with medication     Fatigue 8/9/2016    Finger swelling 8/9/2016    H/O echocardiogram 08/04/2020    LVEF 35-40%    Hiatal hernia     History of kidney stones     several with surgical interventions    Hyperlipidemia     Hypertension     managed with medication     Hypophonia with hoarseness 8/8/2016    Kidney problem     Kidney stone 06/2015    states he presently has 3 kidney stones monitored by Urology    Knee effusion 8/9/2016    Low vitamin B12 level 8/9/2016    Neurogenic bladder 8/9/2016    Obesity (BMI 30-39. 9)     BMI 34.5    Osteoarthritis 8/9/2016    Parkinson disease ([address]     Followed by Dr. Liv Jenkisn with medication     Presence of combination internal cardiac defibrillator (ICD) and pacemaker     Pt states he has never had a shock as of 12/21/20    Primary osteoarthritis of knee 8/9/2016    Prostatitis 8/9/2016    Pseudogout of hand 8/9/2016    Rosacea 8/9/2016    S/P placement of cardiac pacemaker 10/31/2016    Sciatica 8/9/2016    Seborrhea 8/9/2016    Sleep apnea     Does not use c-pap. C-pap broken.      Slurred speech 08/09/2016    per patient \"mostly in the evenings\" from parkinsons     SOB (shortness of breath) 08/09/2016    With exertion only     Spinal stenosis, lumbar region, with neurogenic claudication 6/16/2015    Syncope, near 8/9/2016    Tremor 8/8/2016    Urinary frequency 08/09/2016    controlled with medication     Urinary retention 8/9/2016    Urinary urgency 8/9/2016    Wears dentures     upper and lower    Wears glasses       Past Surgical History:   Procedure Laterality Date    HX ACL RECONSTRUCTION Right 1991    HX APPENDECTOMY      age 22    HX CYSTOSTOMY      HX HIP REPLACEMENT Left 2018    HX KNEE ARTHROSCOPY Right     HX KNEE REPLACEMENT Left 01/2021    HX LITHOTRIPSY  1998    x 2    HX LUMBAR LAMINECTOMY  06/2015    Laminectomy with fusion; lower back; with pins    HX PACEMAKER  10/31/2016    ICD/pacemaker    HX UROLOGICAL Right     open removal of kidney stones     Family History   Problem Relation Age of Onset    Heart Disease Mother     Lung Disease Mother     COPD Mother     Stroke Father     Heart Attack Father 52    Heart Disease Father     Hypertension Other     Diabetes Other         paternal uncle    Heart Attack Other 45        son    Thyroid Disease Sister     Heart Disease Paternal Uncle     Other Paternal Grandmother         Aneurysm    Diabetes Son     Heart Attack Son 45      Social History     Tobacco Use    Smoking status: Former Smoker     Packs/day: 1.50     Years: 45.00     Pack years: 67.50     Quit date: 2007     Years since quittin.4    Smokeless tobacco: Never Used    Tobacco comment: stopped    Substance Use Topics    Alcohol use: No       Prior to Admission medications    Medication Sig Start Date End Date Taking? Authorizing Provider   clonazePAM (KlonoPIN) 0.5 mg tablet 1/2-1 tab QHS 10/28/21  Yes Purvi AQUINO DO   atorvastatin (LIPITOR) 80 mg tablet Take 1 Tablet by mouth daily. 21  Yes Bud Calderon MD   mirabegron ER (Myrbetriq) 25 mg ER tablet Take 1 Tablet by mouth daily. 21  Yes Reginaldo Kaur DO   allopurinoL (ZYLOPRIM) 100 mg tablet  21  Yes Provider, Historical   carbidopa-levodopa (SINEMET)  mg per tablet TAKE 1 TABLET FIVE TIMES A DAY (EVERY 3 HOURS)  Patient taking differently: q 3 hrs 21  Yes Ronit Cox MD   carbidopa-levodopa (SINEMET)  mg per tablet Take 0.5-1 Tabs by mouth daily as needed (worsening OFF time related to PD). Take 1/2 tab as needed for worsening OFF time related to PD. 21  Yes Arley Fernandez MD   carbidopa-levodopa ER (SINEMET CR)  mg per tablet TAKE 2 TABLETS AT BEDTIME 3/30/21  Yes Ronit Cox MD   carvediloL (COREG) 6.25 mg tablet Take 1 Tab by mouth two (2) times daily (with meals). 3/30/21  Yes Bud Calderon MD   sacubitriL-valsartan Creig Pomeroy) 24-26 mg tablet Take 1 Tab by mouth two (2) times a day.  3/30/21  Yes Bud Calderon MD   cyanocobalamin, vitamin B-12, (VITAMIN B-12 PO) Take  by mouth daily. Yes Provider, Historical   tamsulosin (FLOMAX) 0.4 mg capsule Take 1 Cap by mouth daily. 8/26/20  Yes LISA Esquivel   MAGNESIUM PO Take  by mouth daily. Yes Provider, Historical   clotrimazole-betamethasone (LOTRISONE) topical cream Apply  to affected area two (2) times daily as needed. Yes Provider, Historical   doxycycline (VIBRA-TABS) 100 mg tablet Take 100 mg by mouth daily. Yes Provider, Historical   DISABLED PLACARD (DISABLED PLACARD) DMV Dx: Parkinson's Disease 4/11/19  Yes Massimo Glass NP   tiotropium (SPIRIVA WITH HANDIHALER) 18 mcg inhalation capsule Take 1 Cap by inhalation every morning. 11/27/17  Yes Kevin Anderson., MD   fluticasone-salmeterol (ADVAIR) 250-50 mcg/dose diskus inhaler Take 1 Puff by inhalation daily. Patient taking differently: Take 1 Puff by inhalation daily. Instructed to bring to the hospital on the DOS per anesthesia protocol. 11/27/17  Yes Kevin Anderson., MD   acetaminophen (TYLENOL) 650 mg CR tablet Take 650 mg by mouth every six (6) hours as needed for Pain. Yes Provider, Historical   albuterol (PROAIR HFA) 90 mcg/actuation inhaler Take 2 Puffs by inhalation every four (4) hours as needed for Wheezing. Instructed to bring to the hospital on the DOS per anesthesia protocol. Yes Provider, Historical     Allergies   Allergen Reactions    Oxycodone Nausea and Vomiting    Phenergan [Promethazine] Other (comments)     Confusion    Polyurethane-39 Rash     Harsh soaps           Objective:     Physical Exam:   No data found. ECG:    EKG Results     None          Data Review:   Labs:   No results found for this or any previous visit (from the past 24 hour(s)). Problem List:  )  Patient Active Problem List   Diagnosis Code    Obesity (BMI 30-39. 9) E66.9    Hypertension I10    Chronic obstructive pulmonary disease (HCC) J44.9    Enlarged prostate N40.0    Spinal stenosis, lumbar region, with neurogenic claudication M48.062  Acquired spondylolisthesis M43.10    Parkinson's disease ([facility] G20    Hyperlipidemia E78.5    ZAFAR (dyspnea on exertion) R06.00    Arthritis M19.90    Cardiomyopathy ([facility] [address] I42.9    Coronary artery disease involving native coronary artery of native heart without angina pectoris I25.10    Tremor R25.1    Hoarseness of voice R49.0    Benign prostatic hyperplasia without lower urinary tract symptoms N40.0    Dermatitis, seborrheic L21.9    Encounter for long-term (current) use of medications Z79.899    Controlled diabetes mellitus type II without complication (HCC) H62.0    Urinary retention R33.9    Primary osteoarthritis of knee M17.10    Chronic renal insufficiency N18.9    ED (erectile dysfunction) N52.9    Rosacea L71.9    Neurogenic bladder N31.9    Sciatica M54.30    COPD exacerbation (HCC) J44.1    Postural imbalance R29.3    Abnormal posture R29.3    Biventricular AICD malfunction T82.118A    Multifactorial gait disorder R26.89    Hypophonia R49.8    Arthritis of left hip M16.12    Biventricular ICD (implantable cardioverter-defibrillator) in place Z95.810    ANTHONY (obstructive sleep apnea) G47.33    Neurogenic orthostatic hypotension (HCC) G90.3    RBD (REM behavioral disorder) G47.52    Cognitive changes R41.89    Arthritis of left knee M17.12       Total Joint Surgery Pre-Assessment Recommendations:           Continuous saturation monitoring during hospitalization. O2 prn per respiratory protocol. Patient is to wear home CPAP during hospitalization. Albuterol every 6 hours as need during hospitalization.      Signed By: Sergio Baker NP-GILBERTO    December 13, 2021

## 2021-12-14 LAB
COTININE UR QL SCN: NEGATIVE NG/ML
DRUG SCREEN COMMENT:, 753798: NORMAL

## 2021-12-15 ENCOUNTER — HOSPITAL ENCOUNTER (INPATIENT)
Age: 76
LOS: 1 days | Discharge: HOME HEALTH | DRG: 470 | End: 2021-12-16
Attending: ORTHOPAEDIC SURGERY | Admitting: ORTHOPAEDIC SURGERY
Payer: MEDICARE

## 2021-12-15 ENCOUNTER — ANESTHESIA (OUTPATIENT)
Dept: SURGERY | Age: 76
DRG: 470 | End: 2021-12-15
Payer: MEDICARE

## 2021-12-15 PROBLEM — M17.11 OSTEOARTHRITIS OF RIGHT KNEE: Status: ACTIVE | Noted: 2021-12-15

## 2021-12-15 LAB — HGB BLD-MCNC: 12.8 G/DL (ref 13.6–17.2)

## 2021-12-15 PROCEDURE — 74011250636 HC RX REV CODE- 250/636: Performed by: NURSE PRACTITIONER

## 2021-12-15 PROCEDURE — 27447 TOTAL KNEE ARTHROPLASTY: CPT | Performed by: NURSE PRACTITIONER

## 2021-12-15 PROCEDURE — 77030002922 HC SUT FBRWRE ARTH -B: Performed by: ORTHOPAEDIC SURGERY

## 2021-12-15 PROCEDURE — 77030039760: Performed by: ORTHOPAEDIC SURGERY

## 2021-12-15 PROCEDURE — 77030027520 HC SEAL BPLR AQMNTYS MEDT -F: Performed by: ORTHOPAEDIC SURGERY

## 2021-12-15 PROCEDURE — 77030002933 HC SUT MCRYL J&J -A: Performed by: ORTHOPAEDIC SURGERY

## 2021-12-15 PROCEDURE — 77030029820: Performed by: ORTHOPAEDIC SURGERY

## 2021-12-15 PROCEDURE — 94762 N-INVAS EAR/PLS OXIMTRY CONT: CPT

## 2021-12-15 PROCEDURE — 74011250636 HC RX REV CODE- 250/636: Performed by: ORTHOPAEDIC SURGERY

## 2021-12-15 PROCEDURE — 74011000250 HC RX REV CODE- 250: Performed by: ANESTHESIOLOGY

## 2021-12-15 PROCEDURE — 77030040922 HC BLNKT HYPOTHRM STRY -A: Performed by: NURSE ANESTHETIST, CERTIFIED REGISTERED

## 2021-12-15 PROCEDURE — 74011250636 HC RX REV CODE- 250/636: Performed by: ANESTHESIOLOGY

## 2021-12-15 PROCEDURE — 76060000035 HC ANESTHESIA 2 TO 2.5 HR: Performed by: ORTHOPAEDIC SURGERY

## 2021-12-15 PROCEDURE — 74011250637 HC RX REV CODE- 250/637: Performed by: NURSE PRACTITIONER

## 2021-12-15 PROCEDURE — 76010000171 HC OR TIME 2 TO 2.5 HR INTENSV-TIER 1: Performed by: ORTHOPAEDIC SURGERY

## 2021-12-15 PROCEDURE — 77030033067 HC SUT PDO STRATFX SPIR J&J -B: Performed by: ORTHOPAEDIC SURGERY

## 2021-12-15 PROCEDURE — 3E0T3BZ INTRODUCTION OF ANESTHETIC AGENT INTO PERIPHERAL NERVES AND PLEXI, PERCUTANEOUS APPROACH: ICD-10-PCS | Performed by: ANESTHESIOLOGY

## 2021-12-15 PROCEDURE — 65270000029 HC RM PRIVATE

## 2021-12-15 PROCEDURE — 27447 TOTAL KNEE ARTHROPLASTY: CPT | Performed by: ORTHOPAEDIC SURGERY

## 2021-12-15 PROCEDURE — 2709999900 HC NON-CHARGEABLE SUPPLY: Performed by: ORTHOPAEDIC SURGERY

## 2021-12-15 PROCEDURE — 20985 CPTR-ASST DIR MS PX: CPT | Performed by: ORTHOPAEDIC SURGERY

## 2021-12-15 PROCEDURE — 94640 AIRWAY INHALATION TREATMENT: CPT

## 2021-12-15 PROCEDURE — 77030038149 HC BLD SAW SAG STRY -D: Performed by: ORTHOPAEDIC SURGERY

## 2021-12-15 PROCEDURE — 77030029828 HC FEM TIB CKPNT KT DISP STRY -B: Performed by: ORTHOPAEDIC SURGERY

## 2021-12-15 PROCEDURE — 76942 ECHO GUIDE FOR BIOPSY: CPT | Performed by: ORTHOPAEDIC SURGERY

## 2021-12-15 PROCEDURE — 3E0T33Z INTRODUCTION OF ANTI-INFLAMMATORY INTO PERIPHERAL NERVES AND PLEXI, PERCUTANEOUS APPROACH: ICD-10-PCS | Performed by: ANESTHESIOLOGY

## 2021-12-15 PROCEDURE — 0SRC0JZ REPLACEMENT OF RIGHT KNEE JOINT WITH SYNTHETIC SUBSTITUTE, OPEN APPROACH: ICD-10-PCS | Performed by: ORTHOPAEDIC SURGERY

## 2021-12-15 PROCEDURE — 74011000250 HC RX REV CODE- 250: Performed by: NURSE ANESTHETIST, CERTIFIED REGISTERED

## 2021-12-15 PROCEDURE — 77030040361 HC SLV COMPR DVT MDII -B

## 2021-12-15 PROCEDURE — 36415 COLL VENOUS BLD VENIPUNCTURE: CPT

## 2021-12-15 PROCEDURE — C1776 JOINT DEVICE (IMPLANTABLE): HCPCS | Performed by: ORTHOPAEDIC SURGERY

## 2021-12-15 PROCEDURE — 77030038692 HC WND DEB SYS IRMX -B: Performed by: ORTHOPAEDIC SURGERY

## 2021-12-15 PROCEDURE — 85018 HEMOGLOBIN: CPT

## 2021-12-15 PROCEDURE — 2709999900 HC NON-CHARGEABLE SUPPLY

## 2021-12-15 PROCEDURE — 77030020044 HC CLD THERAPY UNIT -B

## 2021-12-15 PROCEDURE — 8E0Y0CZ ROBOTIC ASSISTED PROCEDURE OF LOWER EXTREMITY, OPEN APPROACH: ICD-10-PCS | Performed by: ORTHOPAEDIC SURGERY

## 2021-12-15 PROCEDURE — 76010010054 HC POST OP PAIN BLOCK: Performed by: ORTHOPAEDIC SURGERY

## 2021-12-15 PROCEDURE — 77030012935 HC DRSG AQUACEL BMS -B: Performed by: ORTHOPAEDIC SURGERY

## 2021-12-15 PROCEDURE — 77030039425 HC BLD LARYNG TRULITE DISP TELE -A: Performed by: NURSE ANESTHETIST, CERTIFIED REGISTERED

## 2021-12-15 PROCEDURE — 77030003602 HC NDL NRV BLK BBMI -B: Performed by: NURSE ANESTHETIST, CERTIFIED REGISTERED

## 2021-12-15 PROCEDURE — 74011250636 HC RX REV CODE- 250/636: Performed by: NURSE ANESTHETIST, CERTIFIED REGISTERED

## 2021-12-15 PROCEDURE — 76210000006 HC OR PH I REC 0.5 TO 1 HR: Performed by: ORTHOPAEDIC SURGERY

## 2021-12-15 PROCEDURE — 74011000250 HC RX REV CODE- 250: Performed by: NURSE PRACTITIONER

## 2021-12-15 PROCEDURE — 77030018836 HC SOL IRR NACL ICUM -A: Performed by: ORTHOPAEDIC SURGERY

## 2021-12-15 PROCEDURE — 77030037088 HC TUBE ENDOTRACH ORAL NSL COVD-A: Performed by: NURSE ANESTHETIST, CERTIFIED REGISTERED

## 2021-12-15 PROCEDURE — 74011250637 HC RX REV CODE- 250/637: Performed by: ANESTHESIOLOGY

## 2021-12-15 DEVICE — TIBIAL BEARING INSERT
Type: IMPLANTABLE DEVICE | Site: KNEE | Status: FUNCTIONAL
Brand: TRIATHLON

## 2021-12-15 DEVICE — KNEE K2 TOT HEMI ADV CMTLS -- IMPL CAPPED K2: Type: IMPLANTABLE DEVICE | Status: FUNCTIONAL

## 2021-12-15 DEVICE — TIBIAL COMPONENT
Type: IMPLANTABLE DEVICE | Site: KNEE | Status: FUNCTIONAL
Brand: TRIATHLON

## 2021-12-15 DEVICE — CRUCIATE RETAINING FEMORAL
Type: IMPLANTABLE DEVICE | Site: KNEE | Status: FUNCTIONAL
Brand: TRIATHLON

## 2021-12-15 RX ORDER — BUDESONIDE AND FORMOTEROL FUMARATE DIHYDRATE 160; 4.5 UG/1; UG/1
2 AEROSOL RESPIRATORY (INHALATION)
Status: DISCONTINUED | OUTPATIENT
Start: 2021-12-15 | End: 2021-12-16 | Stop reason: HOSPADM

## 2021-12-15 RX ORDER — LIDOCAINE HYDROCHLORIDE 10 MG/ML
0.1 INJECTION INFILTRATION; PERINEURAL AS NEEDED
Status: DISCONTINUED | OUTPATIENT
Start: 2021-12-15 | End: 2021-12-15 | Stop reason: HOSPADM

## 2021-12-15 RX ORDER — CEFAZOLIN SODIUM/WATER 2 G/20 ML
2 SYRINGE (ML) INTRAVENOUS EVERY 8 HOURS
Status: COMPLETED | OUTPATIENT
Start: 2021-12-15 | End: 2021-12-16

## 2021-12-15 RX ORDER — SODIUM CHLORIDE 0.9 % (FLUSH) 0.9 %
5-40 SYRINGE (ML) INJECTION EVERY 8 HOURS
Status: DISCONTINUED | OUTPATIENT
Start: 2021-12-15 | End: 2021-12-16 | Stop reason: HOSPADM

## 2021-12-15 RX ORDER — ACETAMINOPHEN 500 MG
1000 TABLET ORAL ONCE
Status: COMPLETED | OUTPATIENT
Start: 2021-12-15 | End: 2021-12-15

## 2021-12-15 RX ORDER — MELOXICAM 7.5 MG/1
7.5 TABLET ORAL 2 TIMES DAILY
Status: DISCONTINUED | OUTPATIENT
Start: 2021-12-15 | End: 2021-12-16 | Stop reason: HOSPADM

## 2021-12-15 RX ORDER — ROCURONIUM BROMIDE 10 MG/ML
INJECTION, SOLUTION INTRAVENOUS AS NEEDED
Status: DISCONTINUED | OUTPATIENT
Start: 2021-12-15 | End: 2021-12-15 | Stop reason: HOSPADM

## 2021-12-15 RX ORDER — CLOTRIMAZOLE AND BETAMETHASONE DIPROPIONATE 10; .64 MG/G; MG/G
CREAM TOPICAL
Status: DISCONTINUED | OUTPATIENT
Start: 2021-12-15 | End: 2021-12-16 | Stop reason: HOSPADM

## 2021-12-15 RX ORDER — DIPHENHYDRAMINE HCL 25 MG
25 CAPSULE ORAL
Status: DISCONTINUED | OUTPATIENT
Start: 2021-12-15 | End: 2021-12-16 | Stop reason: HOSPADM

## 2021-12-15 RX ORDER — DOXYCYCLINE 100 MG/1
100 TABLET ORAL DAILY
Status: DISCONTINUED | OUTPATIENT
Start: 2021-12-16 | End: 2021-12-16 | Stop reason: HOSPADM

## 2021-12-15 RX ORDER — HYDROMORPHONE HYDROCHLORIDE 2 MG/1
2 TABLET ORAL
Status: DISCONTINUED | OUTPATIENT
Start: 2021-12-15 | End: 2021-12-16 | Stop reason: HOSPADM

## 2021-12-15 RX ORDER — SODIUM CHLORIDE, SODIUM LACTATE, POTASSIUM CHLORIDE, CALCIUM CHLORIDE 600; 310; 30; 20 MG/100ML; MG/100ML; MG/100ML; MG/100ML
1000 INJECTION, SOLUTION INTRAVENOUS CONTINUOUS
Status: DISCONTINUED | OUTPATIENT
Start: 2021-12-15 | End: 2021-12-15 | Stop reason: HOSPADM

## 2021-12-15 RX ORDER — DEXAMETHASONE SODIUM PHOSPHATE 100 MG/10ML
10 INJECTION INTRAMUSCULAR; INTRAVENOUS ONCE
Status: DISCONTINUED | OUTPATIENT
Start: 2021-12-16 | End: 2021-12-16 | Stop reason: HOSPADM

## 2021-12-15 RX ORDER — KETOROLAC TROMETHAMINE 30 MG/ML
INJECTION, SOLUTION INTRAMUSCULAR; INTRAVENOUS AS NEEDED
Status: DISCONTINUED | OUTPATIENT
Start: 2021-12-15 | End: 2021-12-15 | Stop reason: HOSPADM

## 2021-12-15 RX ORDER — ONDANSETRON 2 MG/ML
INJECTION INTRAMUSCULAR; INTRAVENOUS AS NEEDED
Status: DISCONTINUED | OUTPATIENT
Start: 2021-12-15 | End: 2021-12-15 | Stop reason: HOSPADM

## 2021-12-15 RX ORDER — CARBIDOPA AND LEVODOPA 25; 100 MG/1; MG/1
.5-1 TABLET ORAL
Status: DISCONTINUED | OUTPATIENT
Start: 2021-12-15 | End: 2021-12-16 | Stop reason: HOSPADM

## 2021-12-15 RX ORDER — PANTOPRAZOLE SODIUM 40 MG/1
40 TABLET, DELAYED RELEASE ORAL
Status: DISCONTINUED | OUTPATIENT
Start: 2021-12-16 | End: 2021-12-16 | Stop reason: HOSPADM

## 2021-12-15 RX ORDER — CEFAZOLIN SODIUM/WATER 2 G/20 ML
2 SYRINGE (ML) INTRAVENOUS ONCE
Status: DISCONTINUED | OUTPATIENT
Start: 2021-12-15 | End: 2021-12-15 | Stop reason: HOSPADM

## 2021-12-15 RX ORDER — IPRATROPIUM BROMIDE AND ALBUTEROL SULFATE 2.5; .5 MG/3ML; MG/3ML
3 SOLUTION RESPIRATORY (INHALATION)
Status: DISCONTINUED | OUTPATIENT
Start: 2021-12-15 | End: 2021-12-15 | Stop reason: SDUPTHER

## 2021-12-15 RX ORDER — NALOXONE HYDROCHLORIDE 0.4 MG/ML
.2-.4 INJECTION, SOLUTION INTRAMUSCULAR; INTRAVENOUS; SUBCUTANEOUS
Status: DISCONTINUED | OUTPATIENT
Start: 2021-12-15 | End: 2021-12-16 | Stop reason: HOSPADM

## 2021-12-15 RX ORDER — TRANEXAMIC ACID 100 MG/ML
INJECTION, SOLUTION INTRAVENOUS AS NEEDED
Status: DISCONTINUED | OUTPATIENT
Start: 2021-12-15 | End: 2021-12-15 | Stop reason: HOSPADM

## 2021-12-15 RX ORDER — KETOROLAC TROMETHAMINE 15 MG/ML
15 INJECTION, SOLUTION INTRAMUSCULAR; INTRAVENOUS EVERY 8 HOURS
Status: DISCONTINUED | OUTPATIENT
Start: 2021-12-15 | End: 2021-12-16 | Stop reason: HOSPADM

## 2021-12-15 RX ORDER — ACETAMINOPHEN 500 MG
1000 TABLET ORAL EVERY 6 HOURS
Status: DISCONTINUED | OUTPATIENT
Start: 2021-12-15 | End: 2021-12-16 | Stop reason: HOSPADM

## 2021-12-15 RX ORDER — CARBIDOPA AND LEVODOPA 25; 100 MG/1; MG/1
2 TABLET, EXTENDED RELEASE ORAL
Status: DISCONTINUED | OUTPATIENT
Start: 2021-12-15 | End: 2021-12-16 | Stop reason: HOSPADM

## 2021-12-15 RX ORDER — HYDROMORPHONE HYDROCHLORIDE 1 MG/ML
0.5 INJECTION, SOLUTION INTRAMUSCULAR; INTRAVENOUS; SUBCUTANEOUS
Status: DISCONTINUED | OUTPATIENT
Start: 2021-12-15 | End: 2021-12-15 | Stop reason: HOSPADM

## 2021-12-15 RX ORDER — NEOSTIGMINE METHYLSULFATE 1 MG/ML
INJECTION, SOLUTION INTRAVENOUS AS NEEDED
Status: DISCONTINUED | OUTPATIENT
Start: 2021-12-15 | End: 2021-12-15 | Stop reason: HOSPADM

## 2021-12-15 RX ORDER — TAMSULOSIN HYDROCHLORIDE 0.4 MG/1
0.4 CAPSULE ORAL DAILY
Status: DISCONTINUED | OUTPATIENT
Start: 2021-12-16 | End: 2021-12-16 | Stop reason: HOSPADM

## 2021-12-15 RX ORDER — PROPOFOL 10 MG/ML
INJECTION, EMULSION INTRAVENOUS AS NEEDED
Status: DISCONTINUED | OUTPATIENT
Start: 2021-12-15 | End: 2021-12-15 | Stop reason: HOSPADM

## 2021-12-15 RX ORDER — MIDAZOLAM HYDROCHLORIDE 1 MG/ML
2 INJECTION, SOLUTION INTRAMUSCULAR; INTRAVENOUS ONCE
Status: COMPLETED | OUTPATIENT
Start: 2021-12-15 | End: 2021-12-15

## 2021-12-15 RX ORDER — ONDANSETRON 2 MG/ML
4 INJECTION INTRAMUSCULAR; INTRAVENOUS
Status: DISCONTINUED | OUTPATIENT
Start: 2021-12-15 | End: 2021-12-16 | Stop reason: HOSPADM

## 2021-12-15 RX ORDER — ALBUTEROL SULFATE 0.83 MG/ML
2.5 SOLUTION RESPIRATORY (INHALATION) AS NEEDED
Status: DISCONTINUED | OUTPATIENT
Start: 2021-12-15 | End: 2021-12-15 | Stop reason: HOSPADM

## 2021-12-15 RX ORDER — DEXAMETHASONE SODIUM PHOSPHATE 100 MG/10ML
INJECTION INTRAMUSCULAR; INTRAVENOUS AS NEEDED
Status: DISCONTINUED | OUTPATIENT
Start: 2021-12-15 | End: 2021-12-15 | Stop reason: HOSPADM

## 2021-12-15 RX ORDER — GLYCOPYRROLATE 0.2 MG/ML
INJECTION INTRAMUSCULAR; INTRAVENOUS AS NEEDED
Status: DISCONTINUED | OUTPATIENT
Start: 2021-12-15 | End: 2021-12-15 | Stop reason: HOSPADM

## 2021-12-15 RX ORDER — OXYCODONE HYDROCHLORIDE 5 MG/1
5 TABLET ORAL
Status: DISCONTINUED | OUTPATIENT
Start: 2021-12-15 | End: 2021-12-15 | Stop reason: HOSPADM

## 2021-12-15 RX ORDER — LANOLIN ALCOHOL/MO/W.PET/CERES
500 CREAM (GRAM) TOPICAL DAILY
Status: DISCONTINUED | OUTPATIENT
Start: 2021-12-16 | End: 2021-12-16 | Stop reason: HOSPADM

## 2021-12-15 RX ORDER — HYDROMORPHONE HYDROCHLORIDE 1 MG/ML
1 INJECTION, SOLUTION INTRAMUSCULAR; INTRAVENOUS; SUBCUTANEOUS
Status: DISCONTINUED | OUTPATIENT
Start: 2021-12-15 | End: 2021-12-16 | Stop reason: HOSPADM

## 2021-12-15 RX ORDER — ASPIRIN 81 MG/1
81 TABLET ORAL EVERY 12 HOURS
Status: DISCONTINUED | OUTPATIENT
Start: 2021-12-15 | End: 2021-12-16 | Stop reason: HOSPADM

## 2021-12-15 RX ORDER — ONDANSETRON 2 MG/ML
4 INJECTION INTRAMUSCULAR; INTRAVENOUS
Status: DISCONTINUED | OUTPATIENT
Start: 2021-12-15 | End: 2021-12-15 | Stop reason: HOSPADM

## 2021-12-15 RX ORDER — TRANEXAMIC ACID 650 1/1
1300 TABLET ORAL
Status: COMPLETED | OUTPATIENT
Start: 2021-12-15 | End: 2021-12-15

## 2021-12-15 RX ORDER — LANOLIN ALCOHOL/MO/W.PET/CERES
200 CREAM (GRAM) TOPICAL DAILY
Status: DISCONTINUED | OUTPATIENT
Start: 2021-12-16 | End: 2021-12-16 | Stop reason: HOSPADM

## 2021-12-15 RX ORDER — CARBIDOPA AND LEVODOPA 25; 250 MG/1; MG/1
1 TABLET ORAL ONCE
Status: DISCONTINUED | OUTPATIENT
Start: 2021-12-15 | End: 2021-12-15 | Stop reason: CLARIF

## 2021-12-15 RX ORDER — CLONAZEPAM 0.5 MG/1
0.5 TABLET ORAL
Status: DISCONTINUED | OUTPATIENT
Start: 2021-12-15 | End: 2021-12-16 | Stop reason: HOSPADM

## 2021-12-15 RX ORDER — CARBIDOPA AND LEVODOPA 25; 250 MG/1; MG/1
1 TABLET ORAL
Status: DISCONTINUED | OUTPATIENT
Start: 2021-12-15 | End: 2021-12-16 | Stop reason: HOSPADM

## 2021-12-15 RX ORDER — AMOXICILLIN 250 MG
2 CAPSULE ORAL DAILY
Status: DISCONTINUED | OUTPATIENT
Start: 2021-12-16 | End: 2021-12-16 | Stop reason: HOSPADM

## 2021-12-15 RX ORDER — ALLOPURINOL 100 MG/1
100 TABLET ORAL DAILY
Status: DISCONTINUED | OUTPATIENT
Start: 2021-12-16 | End: 2021-12-15

## 2021-12-15 RX ORDER — ATORVASTATIN CALCIUM 40 MG/1
80 TABLET, FILM COATED ORAL DAILY
Status: DISCONTINUED | OUTPATIENT
Start: 2021-12-16 | End: 2021-12-16 | Stop reason: HOSPADM

## 2021-12-15 RX ORDER — TROSPIUM CHLORIDE 20 MG/1
20 TABLET, FILM COATED ORAL 2 TIMES DAILY
Status: DISCONTINUED | OUTPATIENT
Start: 2021-12-16 | End: 2021-12-16 | Stop reason: HOSPADM

## 2021-12-15 RX ORDER — SODIUM CHLORIDE 0.9 % (FLUSH) 0.9 %
5-40 SYRINGE (ML) INJECTION AS NEEDED
Status: DISCONTINUED | OUTPATIENT
Start: 2021-12-15 | End: 2021-12-16 | Stop reason: HOSPADM

## 2021-12-15 RX ORDER — FENTANYL CITRATE 50 UG/ML
INJECTION, SOLUTION INTRAMUSCULAR; INTRAVENOUS AS NEEDED
Status: DISCONTINUED | OUTPATIENT
Start: 2021-12-15 | End: 2021-12-15 | Stop reason: HOSPADM

## 2021-12-15 RX ORDER — MAGNESIUM 200 MG
200 TABLET ORAL DAILY
Status: DISCONTINUED | OUTPATIENT
Start: 2021-12-16 | End: 2021-12-15

## 2021-12-15 RX ORDER — ROPIVACAINE HYDROCHLORIDE 2 MG/ML
INJECTION, SOLUTION EPIDURAL; INFILTRATION; PERINEURAL AS NEEDED
Status: DISCONTINUED | OUTPATIENT
Start: 2021-12-15 | End: 2021-12-15 | Stop reason: HOSPADM

## 2021-12-15 RX ORDER — SODIUM CHLORIDE 9 MG/ML
100 INJECTION, SOLUTION INTRAVENOUS CONTINUOUS
Status: DISCONTINUED | OUTPATIENT
Start: 2021-12-15 | End: 2021-12-16 | Stop reason: HOSPADM

## 2021-12-15 RX ORDER — CARVEDILOL 6.25 MG/1
6.25 TABLET ORAL 2 TIMES DAILY WITH MEALS
Status: DISCONTINUED | OUTPATIENT
Start: 2021-12-15 | End: 2021-12-16 | Stop reason: HOSPADM

## 2021-12-15 RX ORDER — ONDANSETRON 8 MG/1
8 TABLET, ORALLY DISINTEGRATING ORAL
Status: DISCONTINUED | OUTPATIENT
Start: 2021-12-15 | End: 2021-12-16 | Stop reason: HOSPADM

## 2021-12-15 RX ORDER — LIDOCAINE HYDROCHLORIDE 20 MG/ML
INJECTION, SOLUTION EPIDURAL; INFILTRATION; INTRACAUDAL; PERINEURAL AS NEEDED
Status: DISCONTINUED | OUTPATIENT
Start: 2021-12-15 | End: 2021-12-15 | Stop reason: HOSPADM

## 2021-12-15 RX ORDER — ALBUTEROL SULFATE 0.83 MG/ML
2.5 SOLUTION RESPIRATORY (INHALATION)
Status: DISCONTINUED | OUTPATIENT
Start: 2021-12-15 | End: 2021-12-16 | Stop reason: HOSPADM

## 2021-12-15 RX ADMIN — ONDANSETRON 4 MG: 2 INJECTION INTRAMUSCULAR; INTRAVENOUS at 14:16

## 2021-12-15 RX ADMIN — SACUBITRIL AND VALSARTAN 1 TABLET: 24; 26 TABLET, FILM COATED ORAL at 21:03

## 2021-12-15 RX ADMIN — PHENYLEPHRINE HYDROCHLORIDE 25 MCG/MIN: 10 INJECTION INTRAVENOUS at 13:24

## 2021-12-15 RX ADMIN — CARVEDILOL 6.25 MG: 6.25 TABLET, FILM COATED ORAL at 21:03

## 2021-12-15 RX ADMIN — CARBIDOPA AND LEVODOPA 2 TABLET: 25; 100 TABLET, EXTENDED RELEASE ORAL at 21:03

## 2021-12-15 RX ADMIN — Medication 10 ML: at 18:21

## 2021-12-15 RX ADMIN — HYDROMORPHONE HYDROCHLORIDE 0.5 MG: 1 INJECTION, SOLUTION INTRAMUSCULAR; INTRAVENOUS; SUBCUTANEOUS at 15:20

## 2021-12-15 RX ADMIN — SODIUM CHLORIDE, SODIUM LACTATE, POTASSIUM CHLORIDE, AND CALCIUM CHLORIDE: 600; 310; 30; 20 INJECTION, SOLUTION INTRAVENOUS at 13:55

## 2021-12-15 RX ADMIN — SODIUM CHLORIDE, SODIUM LACTATE, POTASSIUM CHLORIDE, AND CALCIUM CHLORIDE 1000 ML: 600; 310; 30; 20 INJECTION, SOLUTION INTRAVENOUS at 11:39

## 2021-12-15 RX ADMIN — MELOXICAM 7.5 MG: 7.5 TABLET ORAL at 18:19

## 2021-12-15 RX ADMIN — HYDROMORPHONE HYDROCHLORIDE 2 MG: 2 TABLET ORAL at 22:12

## 2021-12-15 RX ADMIN — Medication 3 MG: at 15:04

## 2021-12-15 RX ADMIN — GLYCOPYRROLATE 0.4 MG: 0.2 INJECTION, SOLUTION INTRAMUSCULAR; INTRAVENOUS at 15:04

## 2021-12-15 RX ADMIN — HYDROMORPHONE HYDROCHLORIDE 0.5 MG: 1 INJECTION, SOLUTION INTRAMUSCULAR; INTRAVENOUS; SUBCUTANEOUS at 15:35

## 2021-12-15 RX ADMIN — PHENYLEPHRINE HYDROCHLORIDE 100 MCG: 10 INJECTION INTRAVENOUS at 13:10

## 2021-12-15 RX ADMIN — PHENYLEPHRINE HYDROCHLORIDE 50 MCG: 10 INJECTION INTRAVENOUS at 14:48

## 2021-12-15 RX ADMIN — BUDESONIDE AND FORMOTEROL FUMARATE DIHYDRATE 2 PUFF: 160; 4.5 AEROSOL RESPIRATORY (INHALATION) at 20:00

## 2021-12-15 RX ADMIN — TRANEXAMIC ACID 1300 MG: 650 TABLET ORAL at 18:19

## 2021-12-15 RX ADMIN — DEXAMETHASONE SODIUM PHOSPHATE 8 MG: 10 INJECTION INTRAMUSCULAR; INTRAVENOUS at 14:15

## 2021-12-15 RX ADMIN — CARBIDOPA AND LEVODOPA 1 TABLET: 25; 250 TABLET ORAL at 19:00

## 2021-12-15 RX ADMIN — ASPIRIN 81 MG: 81 TABLET, COATED ORAL at 21:03

## 2021-12-15 RX ADMIN — ACETAMINOPHEN 1000 MG: 500 TABLET, FILM COATED ORAL at 11:39

## 2021-12-15 RX ADMIN — FENTANYL CITRATE 100 MCG: 50 INJECTION INTRAMUSCULAR; INTRAVENOUS at 13:09

## 2021-12-15 RX ADMIN — MIDAZOLAM 1 MG: 1 INJECTION INTRAMUSCULAR; INTRAVENOUS at 12:31

## 2021-12-15 RX ADMIN — KETOROLAC TROMETHAMINE 15 MG: 15 INJECTION, SOLUTION INTRAMUSCULAR; INTRAVENOUS at 21:04

## 2021-12-15 RX ADMIN — PROPOFOL 130 MG: 10 INJECTION, EMULSION INTRAVENOUS at 13:10

## 2021-12-15 RX ADMIN — TRANEXAMIC ACID 1000 MG: 100 INJECTION, SOLUTION INTRAVENOUS at 14:15

## 2021-12-15 RX ADMIN — TRANEXAMIC ACID 1000 MG: 100 INJECTION, SOLUTION INTRAVENOUS at 13:13

## 2021-12-15 RX ADMIN — TRANEXAMIC ACID 1300 MG: 650 TABLET ORAL at 21:03

## 2021-12-15 RX ADMIN — ROCURONIUM BROMIDE 30 MG: 50 INJECTION, SOLUTION INTRAVENOUS at 13:10

## 2021-12-15 RX ADMIN — CEFAZOLIN SODIUM 2 G: 10 INJECTION, POWDER, FOR SOLUTION INTRAVENOUS at 21:03

## 2021-12-15 RX ADMIN — ACETAMINOPHEN 1000 MG: 500 TABLET, FILM COATED ORAL at 18:19

## 2021-12-15 RX ADMIN — CLONAZEPAM 0.5 MG: 0.5 TABLET ORAL at 21:03

## 2021-12-15 RX ADMIN — LIDOCAINE HYDROCHLORIDE 100 MG: 20 INJECTION, SOLUTION EPIDURAL; INFILTRATION; INTRACAUDAL; PERINEURAL at 13:10

## 2021-12-15 RX ADMIN — Medication 3 AMPULE: at 11:39

## 2021-12-15 RX ADMIN — Medication 1 AMPULE: at 21:03

## 2021-12-15 NOTE — H&P
Patient ID:  Richar Ross.  [id number]  77 y.o.  1945    Today: December 15, 2021          CC:  Right  Knee pain    HPI:   The patient has end stage arthritis of the right knee. The patient was evaluated and examined during a consultation prior to today. The patient complains of knee pain with activities, reports pain as mostly occurring along the joint lines, reports stiffness of the knee with prolonged inactivity, and swelling/pain at the end of the day and after increased physical activity. The pain affects the patients activities of daily living and quality of life. The patient has attempted and exhausted conservative treatment. There have been no changes to the patient's orthopedic condition since the last office visit. Past Medical History:  Past Medical History:   Diagnosis Date    Acquired spondylolisthesis 6/16/2015    Actinic keratosis 8/9/2016    Arthritis     generalized OA, managed with OTC medication     Back pain 8/9/2016    Basal cell carcinoma dx 2/2018    small pea-sized lesion on right side of nose    BPH (benign prostatic hypertrophy) 8/9/2016    Cardiomyopathy ([facility] 08/02/2016    [facility] by Dr. Jose [facility]ley at [facility]     Chronic obstructive pulmonary disease [facility])     managed with inhalers-Followed by PCP, Dr. Jesse Rhodes Chronic renal insufficiency 08/09/2016    patient denies, states Creatinine elevated one time.      Controlled diabetes mellitus type II without complication ([address] 43/19/6404    Patient denies-states BS was elevated one time-patient does not take any medications for diabetes     COPD exacerbation ([facility] [address] 8/9/2016    Coronary artery disease involving native coronary artery of native heart without angina pectoris 8/2/2016    Dermatitis, seborrheic 8/9/2016    ZAFAR (dyspnea on exertion) 05/31/2016    due to COPD     Dyspnea     ED (erectile dysfunction) 8/9/2016    Elevated PSA 8/9/2016    Emphysemal COPD     Encounter for long-term (current) use of medications 8/9/2016    Enlarged prostate     managed with medication     Fatigue 8/9/2016    Finger swelling 8/9/2016    H/O echocardiogram 08/04/2020    LVEF 35-40%    Hiatal hernia     History of kidney stones     several with surgical interventions    Hyperlipidemia     Hypertension     managed with medication     Hypophonia with hoarseness 8/8/2016    Kidney problem     Kidney stone 06/2015    states he presently has 3 kidney stones monitored by Urology    Knee effusion 8/9/2016    Low vitamin B12 level 8/9/2016    Neurogenic bladder 8/9/2016    Obesity (BMI 30-39. 9)     BMI 34.5    Osteoarthritis 8/9/2016    Parkinson disease ([facility] [address]     Followed by Dr. Payne Mail with medication     Presence of combination internal cardiac defibrillator (ICD) and pacemaker     Pt states he has never had a shock as of 12/21/20    Primary osteoarthritis of knee 8/9/2016    Prostatitis 8/9/2016    Pseudogout of hand 8/9/2016    Rosacea 8/9/2016    S/P placement of cardiac pacemaker 10/31/2016    Sciatica 8/9/2016    Seborrhea 8/9/2016    Sleep apnea     Does not use c-pap. C-pap broken.      Slurred speech 08/09/2016    per patient \"mostly in the evenings\" from parkinsons     SOB (shortness of breath) 08/09/2016    With exertion only     Spinal stenosis, lumbar region, with neurogenic claudication 6/16/2015    Syncope, near 8/9/2016    Tremor 8/8/2016    Urinary frequency 08/09/2016    controlled with medication     Urinary retention 8/9/2016    Urinary urgency 8/9/2016    Wears dentures     upper and lower    Wears glasses        Past Surgical History:  Past Surgical History:   Procedure Laterality Date    HX ACL RECONSTRUCTION Right 1991    HX APPENDECTOMY      age 22    HX CYSTOSTOMY      HX HIP REPLACEMENT Left 2018    HX KNEE ARTHROSCOPY Right     HX KNEE REPLACEMENT Left 01/2021    HX LITHOTRIPSY  1998    x 2    HX LUMBAR LAMINECTOMY  06/2015    Laminectomy with fusion; lower back; with pins    HX PACEMAKER  10/31/2016    ICD/pacemaker    HX UROLOGICAL Right     open removal of kidney stones        Medications:     Prior to Admission medications    Medication Sig Start Date End Date Taking? Authorizing Provider   clonazePAM (KlonoPIN) 0.5 mg tablet 1/2-1 tab QHS 10/28/21   Marlo AQUINO, DO   atorvastatin (LIPITOR) 80 mg tablet Take 1 Tablet by mouth daily. 7/29/21   Porfirio Fenton MD   mirabegron ER (Myrbetriq) 25 mg ER tablet Take 1 Tablet by mouth daily. 7/20/21   Kaley Kaur DO   allopurinoL (ZYLOPRIM) 100 mg tablet  5/7/21   Provider, Historical   carbidopa-levodopa (SINEMET)  mg per tablet TAKE 1 TABLET FIVE TIMES A DAY (EVERY 3 HOURS)  Patient taking differently: q 3 hrs 5/13/21   Xiomara Chow MD   carbidopa-levodopa (SINEMET)  mg per tablet Take 0.5-1 Tabs by mouth daily as needed (worsening OFF time related to PD). Take 1/2 tab as needed for worsening OFF time related to PD. 5/7/21   Rome Laughlni MD   carbidopa-levodopa ER (SINEMET CR)  mg per tablet TAKE 2 TABLETS AT BEDTIME 3/30/21   Xiomara Chow MD   carvediloL (COREG) 6.25 mg tablet Take 1 Tab by mouth two (2) times daily (with meals). 3/30/21   Porfirio Fenton MD   sacubitriL-valsartan [facility]) 24-26 mg tablet Take 1 Tab by mouth two (2) times a day. 3/30/21   Porfirio Fenton MD   cyanocobalamin, vitamin B-12, (VITAMIN B-12 PO) Take  by mouth daily. Provider, Historical   tamsulosin (FLOMAX) 0.4 mg capsule Take 1 Cap by mouth daily. 8/26/20   LISA Suarez   MAGNESIUM PO Take  by mouth daily. Provider, Historical   clotrimazole-betamethasone (LOTRISONE) topical cream Apply  to affected area two (2) times daily as needed. Provider, Historical   doxycycline (VIBRA-TABS) 100 mg tablet Take 100 mg by mouth daily.     Provider, Historical   DISABLED PLACARD (DISABLED PLACARD) DMV Dx: Parkinson's Disease 4/11/19   Isak Espinoza NP tiotropium (SPIRIVA WITH HANDIHALER) 18 mcg inhalation capsule Take 1 Cap by inhalation every morning. 17   Raul Jaquez MD   fluticasone-salmeterol (ADVAIR) 250-50 mcg/dose diskus inhaler Take 1 Puff by inhalation daily. Patient taking differently: Take 1 Puff by inhalation daily. Instructed to bring to the hospital on the DOS per anesthesia protocol. 17   Raul Jaquez MD   acetaminophen (TYLENOL) 650 mg CR tablet Take 650 mg by mouth every six (6) hours as needed for Pain. Provider, Historical   albuterol (PROAIR HFA) 90 mcg/actuation inhaler Take 2 Puffs by inhalation every four (4) hours as needed for Wheezing. Instructed to bring to the hospital on the DOS per anesthesia protocol. Provider, Historical       Family History:     Family History   Problem Relation Age of Onset    Heart Disease Mother     Lung Disease Mother     COPD Mother     Stroke Father     Heart Attack Father 52    Heart Disease Father     Hypertension Other     Diabetes Other         paternal uncle    Heart Attack Other 45        son    Thyroid Disease Sister     Heart Disease Paternal Uncle     Other Paternal Grandmother         Aneurysm    Diabetes Son     Heart Attack Son 45       Social History:      Social History     Tobacco Use    Smoking status: Former Smoker     Packs/day: 1.50     Years: 45.00     Pack years: 67.50     Quit date: 2007     Years since quittin.4    Smokeless tobacco: Never Used    Tobacco comment: stopped    Substance Use Topics    Alcohol use: No         Allergies: Allergies   Allergen Reactions    Oxycodone Nausea and Vomiting    Phenergan [Promethazine] Other (comments)     Confusion    Polyurethane-39 Rash     Harsh soaps         Vitals: There were no vitals taken for this visit.       Objective:         General: No Acute distress                   HEENT: Normocephalic/atramatic                   Lungs:  Breathing non-labored Heart:   RRR                    Abdomen: soft       Extremities:  Prior exam done in office has been consistent with end-stage knee arthritis. We have noted pain with ROM of the right knee. Trace effusion. Crepitus present. Distally the patient shows no neurologic deficit. Antalgic gait appreciated. Meds:   No current facility-administered medications for this encounter. Current Outpatient Medications   Medication Sig    clonazePAM (KlonoPIN) 0.5 mg tablet 1/2-1 tab QHS    atorvastatin (LIPITOR) 80 mg tablet Take 1 Tablet by mouth daily.  mirabegron ER (Myrbetriq) 25 mg ER tablet Take 1 Tablet by mouth daily.  allopurinoL (ZYLOPRIM) 100 mg tablet     carbidopa-levodopa (SINEMET)  mg per tablet TAKE 1 TABLET FIVE TIMES A DAY (EVERY 3 HOURS) (Patient taking differently: q 3 hrs)    carbidopa-levodopa (SINEMET)  mg per tablet Take 0.5-1 Tabs by mouth daily as needed (worsening OFF time related to PD). Take 1/2 tab as needed for worsening OFF time related to PD.  carbidopa-levodopa ER (SINEMET CR)  mg per tablet TAKE 2 TABLETS AT BEDTIME    carvediloL (COREG) 6.25 mg tablet Take 1 Tab by mouth two (2) times daily (with meals).  sacubitriL-valsartan (Entresto) 24-26 mg tablet Take 1 Tab by mouth two (2) times a day.  cyanocobalamin, vitamin B-12, (VITAMIN B-12 PO) Take  by mouth daily.  tamsulosin (FLOMAX) 0.4 mg capsule Take 1 Cap by mouth daily.  MAGNESIUM PO Take  by mouth daily.  clotrimazole-betamethasone (LOTRISONE) topical cream Apply  to affected area two (2) times daily as needed.  doxycycline (VIBRA-TABS) 100 mg tablet Take 100 mg by mouth daily.  DISABLED PLACARD (DISABLED PLACARD) DMV Dx: Parkinson's Disease    tiotropium (SPIRIVA WITH HANDIHALER) 18 mcg inhalation capsule Take 1 Cap by inhalation every morning.  fluticasone-salmeterol (ADVAIR) 250-50 mcg/dose diskus inhaler Take 1 Puff by inhalation daily.  (Patient taking differently: Take 1 Puff by inhalation daily. Instructed to bring to the hospital on the DOS per anesthesia protocol.)    acetaminophen (TYLENOL) 650 mg CR tablet Take 650 mg by mouth every six (6) hours as needed for Pain.  albuterol (PROAIR HFA) 90 mcg/actuation inhaler Take 2 Puffs by inhalation every four (4) hours as needed for Wheezing. Instructed to bring to the hospital on the DOS per anesthesia protocol. Patient Active Problem List   Diagnosis Code    Obesity (BMI 30-39. 9) E66.9    Hypertension I10    Chronic obstructive pulmonary disease (HCC) J44.9    Enlarged prostate N40.0    Spinal stenosis, lumbar region, with neurogenic claudication M48.062    Acquired spondylolisthesis M43.10    Parkinson's disease ([facility] [address] G20    Hyperlipidemia E78.5    ZAFAR (dyspnea on exertion) R06.00    Arthritis M19.90    Cardiomyopathy ([facility] I42.9    Coronary artery disease involving native coronary artery of native heart without angina pectoris I25.10    Tremor R25.1    Hoarseness of voice R49.0    Benign prostatic hyperplasia without lower urinary tract symptoms N40.0    Dermatitis, seborrheic L21.9    Encounter for long-term (current) use of medications Z79.899    Controlled diabetes mellitus type II without complication ([facility]) K61.5    Urinary retention R33.9    Primary osteoarthritis of knee M17.10    Chronic renal insufficiency N18.9    ED (erectile dysfunction) N52.9    Rosacea L71.9    Neurogenic bladder N31.9    Sciatica M54.30    COPD exacerbation ([facility]) J44.1    Postural imbalance R29.3    Abnormal posture R29.3    Biventricular AICD malfunction T82.118A    Multifactorial gait disorder R26.89    Hypophonia R49.8    Arthritis of left hip M16.12    Biventricular ICD (implantable cardioverter-defibrillator) in place Z95.810    ANTHONY (obstructive sleep apnea) G47.33    Neurogenic orthostatic hypotension (HCC) G90.3    RBD (REM behavioral disorder) G47.52    Cognitive changes R41.89    Arthritis of left knee M17.12       Assessment:   1. Arthritis of the Right knee    Plan:   The patient has failed previous conservative treatment for this condition including anti-inflammatories, injections and lifestyle modifications. The necessity for joint replacement is present. Risks, benefits, alternatives and possible complications of right knee arthroplasty have been discussed with the patient including but not limited to potential for infection, bleeding, damage to nerves and/or blood vessels, stiffness of the knee after surgery, knee instability after surgery, patellar maltracking, potential for fracture both intra-op and post-op, polyethylene wear, implant loosening, and risk for revision surgery secondary to but not limited to these discussed risks. Further, we have discussed potential for venous thrombo-embolism including deep vein thrombosis and pulmonary embolism despite being on prophylaxis. Additionally, we have discussed potential for continued pain after surgery and patient has voiced understanding that I can make no guarantees regarding the pain relief of outcomes after surgery. We have also previously discussed the potential of morbidity and mortality associated with, but not limited to, the actual surgical procedure, anesthesia, prior medical conditions, and/or the administration of medications perioperatively. The patient has been given the opportunity to ask questions all of which have been answered and the patient wishes to proceed. The patient will be admitted the day of surgery for right total knee replacement. The patient was counseled at length about the risks of driss Covid-19 during their perioperative period and any recovery window from their procedure. The patient was made aware that driss Covid-19  may worsen their prognosis for recovering from their procedure and lend to a higher morbidity and/or mortality risk.   All material risks, benefits, and reasonable alternatives including postponing the procedure were discussed. The patient does  wish to proceed with the procedure at this time.         Signed By: India Martell MD  December 15, 2021

## 2021-12-15 NOTE — ANESTHESIA PREPROCEDURE EVALUATION
Anesthetic History   No history of anesthetic complications            Review of Systems / Medical History  Patient summary reviewed and pertinent labs reviewed    Pulmonary    COPD: moderate    Sleep apnea: No treatment           Neuro/Psych   Within defined limits           Cardiovascular    Hypertension: well controlled          Pacemaker (ICD in place), CAD and hyperlipidemia    Exercise tolerance: <4 METS  Comments: EF 35%-40% has defibrillator/pacer. Never discharged. Hx cardiomyopathy down to 10-15%  Denies recent CP or changes in functional status  Pt appears optimized   GI/Hepatic/Renal         Renal disease: stones       Endo/Other    Diabetes (denies): type 2    Obesity and arthritis     Other Findings   Comments: Parkinson's last meds at 0730 and 1230  Spinal stenosis  Urinary retention  ED  LBP           Physical Exam    Airway  Mallampati: II  TM Distance: 4 - 6 cm  Neck ROM: normal range of motion   Mouth opening: Normal     Cardiovascular    Rhythm: regular  Rate: normal      Pertinent negatives: No murmur   Dental    Dentition: Lower partial plate and Upper partial plate     Pulmonary  Breath sounds clear to auscultation          Prolonged expiration     Abdominal  GI exam deferred       Other Findings            Anesthetic Plan    ASA: 3  Anesthesia type: general      Post-op pain plan if not by surgeon: peripheral nerve block single    Induction: Intravenous  Anesthetic plan and risks discussed with: Patient      Magnet available. Discussed risks in detail and pt desires to proceed. Plan for GETA.

## 2021-12-15 NOTE — PERIOP NOTES
TRANSFER - OUT REPORT:    Verbal report given to [facility] SERV RN on Richar Ross.  being transferred to   for routine post - op       Report consisted of patients Situation, Background, Assessment and   Recommendations(SBAR). Information from the following report(s) SBAR was reviewed with the receiving nurse. Opportunity for questions and clarification was provided.       Patient transported with:   [facility]

## 2021-12-15 NOTE — PROGRESS NOTES
Care Management Interventions  PCP Verified by CM: Yes  Mode of Transport at Discharge: Self  Transition of Care Consult (CM Consult): [address]: No  Reason Outside [place]: Out of service area  Physical Therapy Consult: Yes  Support Systems: Spouse/Significant Other  Confirm Follow Up Transport: Family  The Plan for Transition of Care is Related to the Following Treatment Goals : improve mobility  The Patient and/or Patient Representative was Provided with a Choice of Provider and Agrees with the Discharge Plan?: Yes  Freedom of Choice List was Provided with Basic Dialogue that Supports the Patient's Individualized Plan of Care/Goals, Treatment Preferences and Shares the Quality Data Associated with the Providers?: Yes  Discharge Location  Discharge Placement: Home with home health      [place] referral called and faxed to [facility] in [place]. Office # [phone] and fax # [phone]. They cover [address] [place] [address] where pt lives. Patient is a 77y.o. year old male admitted for right TKA . Patient plans to return to his home in [place]. Order received to arrange home health. Patient requesting [facility] in [facility] , the same agency used after his last joint replacement. Office now closed. Will call in am and verify fax # and whether referral need to be sent to their [place] office or the one in [place].

## 2021-12-15 NOTE — OP NOTES
[facility]  Total Knee Arthroplasty  Patient:Kayden Sahni. : 1945  Medical Record Number:[id number]    Pre-operative Diagnosis: Primary osteoarthritis of right knee [M17.11]  Acquired deformity of right knee [M21.961]    Post-operative Diagnosis: Same    Location: [facility]    Date of Procedure: 12/15/2021    Surgeon: Ariana Ni MD    Assistant: Kym Brewer, CFA and Brandy Henry NP CFA    Anesthesia: General + adductor nerve block    Procedure:  IRVING-Assisted Right Total Knee Arthroplasty    Tourniquet Time: Tourniquet Not Used    BMI: Body mass index is 28.48 kg/m². EBL: 101UF    Complications: None    Patient Condition upon Completion of Procedure: Stable    Implants:   Implant Name Type Inv. Item Serial No.  Lot No. LRB No. Used Action   COMPNT FEM CR TRIATHLN 5 R PA --  - [id number]  COMPNT FEM CR TRIATHLN 5 R PA --   [employer] [facility]_ [id number] Right 1 Implanted   BASEPLATE TIB SZ 6 [id number] ML77MM KNEE TRITANIUM 4 CRUCFRM - [id number]  BASEPLATE TIB SZ 6 GE18AM ML77MM KNEE TRITANIUM 4 CRUCFRM  [employer] HOW_ [id number] Right 1 Implanted   INSERT TIB SZ 6 THK9MM UNIV KNEE POLYETH CNDYL STBL MERLE NEUT - [id number]  INSERT TIB SZ 6 THK9MM UNIV KNEE POLYETH CNDYL STBL MERLE NEUT  [employer] HOW_ [id number] Right 1 Implanted       Pre-Operative Plan/Implants:   - #5 Femoral Component   - #6 Tibial Component   - 9 mm Polyethylene Component    Intra-Operative Findings: Prior to bony resection we found that the patient's knee lacked approximately 10 degrees of extension. Also prior to bony resection we noted a valgus coronal deformity. Intra-operatively we noted that the articular surfaces were arthritic with cartilage loss of both the medial and lateral compartments. The patella was examined and found to be in good condition with minimal degenerative changes and was left unresurfaced. . With trial components in place we assessed knee motion and found that the knee was able to fully extend. In addition we felt we had achieved acceptable ligament balance between the medial and lateral side of the knee in both extension and flexion. Description of Procedure: The complexity of the total joint surgery requires the use of a first assistant for positioning, retraction and assistance in closure. Live Poole had undergone adductor canal block in the preoperative holding area. Live Poole was brought to the operating room, positioned on the operating room table, and after appropriate identification underwent General anesthesia. IV antibiotics were administered per proticol. The right leg was then prepped and draped in the usual sterile manner. Timeout was taken identifying the patient, procedure ,operative side and surgeon. Prior to incision one gram of IV transexamic acid was administered intravenously. An anterior longitudinal incision was made just medial to the tibial tubercle and extending proximal.  A subvastas capsular incision was performed. The medial capsular flap was elevated around to the midcoronal plane. The patella was subluxed laterally and patellar fat pat partially excised. The knee was flexed and externally rotated. The articular surface revealed cartilage loss with exposed bone and bone spurs throughout all three compartments. The anterior cruciate ligament was resected. We then placed both our femoral and tibial check points followed by the femoral and tibial array pins. The femoral arrays pins were placed intra-incisional whereas the tibial pins were placed extra-incisional approximately 4-5cm below the tibial tubercle. Both arrays were placed and were verified to be visible to the IRVING sensory array. We then proceeded with point acquisition of both the femur and tibia.  Finally, we captured stress views of the knee in extension and 90 degrees of flexion for our ligament balancing after medial and/or lateral osteophytes were removed. We then adjusted our planned femoral and tibial component placement parameters to obtain acceptable ligament balance while ensuring that we stayed within acceptable alignment criteria as well as resection depths/parameters for both the femur and tibia. Ultimately, we opted for a #5 femoral component, a #6 tibial component and a 9mm polyethylene component. Retractors were placed and we proceed with our bony preparation. We first addressed our distal femur and posterior chamfer cuts using the 90 degrees blade. We then performed our posterior condylar, anterior femoral, anterior chamfer, and proximal tibial cuts with the straight IRVING blade. Bony wedges were removed after these cuts as were any residual osteophytes. The PCL was assessed and felt to be intact and stable. We felt given this that we would be could proceed with a cruciate retaining implant. . Medial and lateral meniscus' were removed along with any redundant tissue. Any posterior osteophytes were removed. The posteromedial and posterolateral capsule as well as the medial and lateral periosteum of the distal femur and proximal tibia were injected with periarticular cocktail containing local anesthetic and toradol. Trial components were then placed. We then performed a trial of the knee with trial components in place. We felt that with a 9 mm polyethylene trial in place the knee had acceptable varus and valgus stability throughout arc of motion, was able to fully extend, was not too tight in flexion, and had acceptable stability with anterior  Drawer testing. No additional surgical releases were required. The patella was then everted. The patella was examined and found to be in good condition with minimal degenerative changes and was left unresurfaced. At this point the trial polyethylene component and trial femoral component were removed.  We again assessed our tibial tray and verified that we were satisfied with its position. We did not have to adjust its position. The proximal tibia was punched and drilled per protocol for the system. We irrigated and debrided all bony surfaces of residual tissue and bone. We then inserted the tibial and femoral components and noted them to be well seated. We then inserted our final polyethylene component which was a 9mm thick. Henok Suárez Jr.'s knee was placed through a range of motion and noted to be stable as mentioned above with the trial components. In additional we checked patellar tracking one last time which was felt to be appropriate. A lavage of diluted betadine solution of 17.5 ml Betadine in 500 of 0.9% normal saline was allowed to soak in the wound for 3 minutes after implanting of the prosthesis. During the time of the Betadine soak we removed the previously placed femoral and tibial sensors and array pins and finished injection our periarticular cocktail. The wound was irrigated with normal saline again before closure. The capsular layer was closed using a combination of 0-Stratafix bidirectional barbed suture and #2 Fiberwire. After capsular closure one gram of topical  transexemic acid was injected into the capsule. The subcutaneous layer was closed using a 2-0 Monocril interrupted suture. The skin was closed using staples. A sterile dressing was applied. The sponge count and needle counts were correct. Patient was transferred to the hospital stretcher and transported to recovery in stable condition.     Signed By: Leslie August MD

## 2021-12-15 NOTE — PROGRESS NOTES
TRANSFER - IN REPORT:    Verbal report received from FREDERICK Ruvalcaba rn(name) on Flip Barraza.  being received from [facility]) for routine progression of care      Report consisted of patients Situation, Background, Assessment and   Recommendations(SBAR). Information from the following report(s) SBAR, Procedure Summary, Intake/Output, MAR and Recent Results was reviewed with the receiving nurse. Opportunity for questions and clarification was provided. Assessment completed upon patients arrival to unit and care assumed.

## 2021-12-15 NOTE — ANESTHESIA POSTPROCEDURE EVALUATION
Procedure(s):  RIGHT IRVING KNEE ARTHROPLASTY TOTAL ROBOTIC ASSISTED/ MEL/ PT HAS ICD.     general, regional    Anesthesia Post Evaluation      Multimodal analgesia: multimodal analgesia used between 6 hours prior to anesthesia start to PACU discharge  Patient location during evaluation: bedside  Patient participation: complete - patient participated  Level of consciousness: awake and responsive to light touch  Pain management: adequate  Airway patency: patent  Anesthetic complications: no  Cardiovascular status: acceptable, hemodynamically stable, blood pressure returned to baseline and stable  Respiratory status: acceptable, unassisted, spontaneous ventilation and nonlabored ventilation  Hydration status: acceptable  Post anesthesia nausea and vomiting:  controlled      INITIAL Post-op Vital signs:   Vitals Value Taken Time   /68 12/15/21 1516   Temp 37 °C (98.6 °F) 12/15/21 1516   Pulse 70 12/15/21 1516   Resp 16 12/15/21 1516   SpO2 96 % 12/15/21 1516

## 2021-12-16 VITALS
SYSTOLIC BLOOD PRESSURE: 130 MMHG | DIASTOLIC BLOOD PRESSURE: 74 MMHG | BODY MASS INDEX: 28.44 KG/M2 | HEIGHT: 72 IN | OXYGEN SATURATION: 98 % | WEIGHT: 210 LBS | HEART RATE: 75 BPM | TEMPERATURE: 98 F | RESPIRATION RATE: 20 BRPM

## 2021-12-16 LAB — HGB BLD-MCNC: 12.7 G/DL (ref 13.6–17.2)

## 2021-12-16 PROCEDURE — 85018 HEMOGLOBIN: CPT

## 2021-12-16 PROCEDURE — 97535 SELF CARE MNGMENT TRAINING: CPT

## 2021-12-16 PROCEDURE — 97161 PT EVAL LOW COMPLEX 20 MIN: CPT

## 2021-12-16 PROCEDURE — 74011000250 HC RX REV CODE- 250: Performed by: NURSE PRACTITIONER

## 2021-12-16 PROCEDURE — 77030041075 HC DRSG AG OPTIFRM MDII -B

## 2021-12-16 PROCEDURE — 74011250636 HC RX REV CODE- 250/636: Performed by: NURSE PRACTITIONER

## 2021-12-16 PROCEDURE — 74011250637 HC RX REV CODE- 250/637: Performed by: NURSE PRACTITIONER

## 2021-12-16 PROCEDURE — 97165 OT EVAL LOW COMPLEX 30 MIN: CPT

## 2021-12-16 PROCEDURE — 99024 POSTOP FOLLOW-UP VISIT: CPT | Performed by: NURSE PRACTITIONER

## 2021-12-16 PROCEDURE — 94760 N-INVAS EAR/PLS OXIMETRY 1: CPT

## 2021-12-16 PROCEDURE — 97530 THERAPEUTIC ACTIVITIES: CPT

## 2021-12-16 PROCEDURE — 36415 COLL VENOUS BLD VENIPUNCTURE: CPT

## 2021-12-16 PROCEDURE — 94640 AIRWAY INHALATION TREATMENT: CPT

## 2021-12-16 RX ADMIN — TAMSULOSIN HYDROCHLORIDE 0.4 MG: 0.4 CAPSULE ORAL at 09:00

## 2021-12-16 RX ADMIN — PANTOPRAZOLE SODIUM 40 MG: 40 TABLET, DELAYED RELEASE ORAL at 05:08

## 2021-12-16 RX ADMIN — ASPIRIN 81 MG: 81 TABLET, COATED ORAL at 09:00

## 2021-12-16 RX ADMIN — CARVEDILOL 6.25 MG: 6.25 TABLET, FILM COATED ORAL at 09:00

## 2021-12-16 RX ADMIN — ACETAMINOPHEN 1000 MG: 500 TABLET, FILM COATED ORAL at 05:08

## 2021-12-16 RX ADMIN — HYDROMORPHONE HYDROCHLORIDE 2 MG: 2 TABLET ORAL at 09:13

## 2021-12-16 RX ADMIN — MELOXICAM 7.5 MG: 7.5 TABLET ORAL at 09:01

## 2021-12-16 RX ADMIN — TROSPIUM CHLORIDE 20 MG: 20 TABLET, FILM COATED ORAL at 09:00

## 2021-12-16 RX ADMIN — Medication 1 AMPULE: at 08:57

## 2021-12-16 RX ADMIN — HYDROMORPHONE HYDROCHLORIDE 2 MG: 2 TABLET ORAL at 05:09

## 2021-12-16 RX ADMIN — TIOTROPIUM BROMIDE INHALATION SPRAY 2 PUFF: 3.12 SPRAY, METERED RESPIRATORY (INHALATION) at 09:05

## 2021-12-16 RX ADMIN — CARBIDOPA AND LEVODOPA 1 TABLET: 25; 250 TABLET ORAL at 06:45

## 2021-12-16 RX ADMIN — KETOROLAC TROMETHAMINE 15 MG: 15 INJECTION, SOLUTION INTRAMUSCULAR; INTRAVENOUS at 05:08

## 2021-12-16 RX ADMIN — CEFAZOLIN SODIUM 2 G: 10 INJECTION, POWDER, FOR SOLUTION INTRAVENOUS at 05:07

## 2021-12-16 NOTE — PROGRESS NOTES
Problem: Self Care Deficits Care Plan (Adult)  Goal: *Acute Goals and Plan of Care (Insert Text)  Outcome: Progressing Towards Goal  Note: GOALS:   DISCHARGE GOALS (in preparation for going home/rehab):  3 days all goals met 12/16/2021   1. Mr. Doris Huizar will perform one lower body dressing activity with minimal assistance required to demonstrate improved functional mobility and safety. 2.  Mr. Doris Huizar will perform one lower body bathing activity with minimal assistance required to demonstrate improved functional mobility and safety. 3.  Mr. Doris Huizar will perform toileting/toilet transfer with contact guard assistance to demonstrate improved functional mobility and safety. 4.  Mr. Doris Huizar will perform shower transfer with contact guard assistance to demonstrate improved functional mobility and safety. JOINT CAMP OCCUPATIONAL THERAPY TKA: Initial Assessment, Daily Note, Discharge, and AM 12/16/2021  INPATIENT: Hospital Day: 2  [place] / [place] / Product Type: Medicare /      NAME/AGE/GENDER: Ronald Zamora is a 77 y.o. male   PRIMARY DIAGNOSIS:  Primary osteoarthritis of right knee [M17.11]  Acquired deformity of right knee [M21.961]   Procedure(s) and Anesthesia Type:     * RIGHT IRVING KNEE ARTHROPLASTY TOTAL ROBOTIC ASSISTED/ MEL/ PT HAS ICD - General (Right)  ICD-10: Treatment Diagnosis:    · Pain in Right Knee (M25.561)  · Stiffness of Right Knee, Not elsewhere classified (Z70.267)      ASSESSMENT:     Mr. Doris Huizar is s/p R TKA and presents with decreased weight bearing on R LE and decreased independence with functional mobility and activities of daily living. Initial evaluation. Patient completed shower and dressing as charted below in ADL grid and is ambulating with rolling walker and contact guard assist.  Patient has met 4/4 goals and plans to return home with good family support.   Family able to provide patient with appropriate level of assistance at this time. OT reviewed safety precautions throughout session and therapy schedule for the remainder of today. Patient instructed to call for assistance when needing to get up from recliner and all needs in reach. Patient verbalized understanding of call light. This section established at most recent assessment   PROBLEM LIST (Impairments causing functional limitations):  1. Decreased Strength  2. Decreased ADL/Functional Activities  3. Decreased Transfer Abilities  4. Increased Pain  5. Increased Fatigue  6. Decreased Flexibility/Joint Mobility  7. Decreased Knowledge of Precautions   INTERVENTIONS PLANNED: (Benefits and precautions of occupational therapy have been discussed with the patient.)  1. Activities of daily living training  2. Adaptive equipment training  3. Balance training  4. Clothing management  5. Donning&doffing training  6. Theraputic activity     TREATMENT PLAN: Frequency/Duration: Follow patient qd to address above goals. Rehabilitation Potential For Stated Goals: Good     RECOMMENDED REHABILITATION/EQUIPMENT: (at time of discharge pending progress): Continue Skilled Therapy and Home Health: Physical Therapy. OCCUPATIONAL PROFILE AND HISTORY:   History of Present Injury/Illness (Reason for Referral): Pt presents this date s/p (R) TKA.     Past Medical History/Comorbidities:   Mr. Clive Murphy  has a past medical history of Acquired spondylolisthesis (6/16/2015), Actinic keratosis (8/9/2016), Arthritis, Back pain (8/9/2016), Basal cell carcinoma (dx 2/2018), BPH (benign prostatic hypertrophy) (8/9/2016), Cardiomyopathy ([address] (08/02/2016), Chronic obstructive pulmonary disease ([facility] [address] Chronic renal insufficiency (08/09/2016), Controlled diabetes mellitus type II without complication ([address] ([id number]/[id number]), COPD exacerbation ([address] (8/9/2016), Coronary artery disease involving native coronary artery of native heart without angina pectoris (8/2/2016), Dermatitis, seborrheic (8/9/2016), ZAFAR (dyspnea on exertion) (05/31/2016), Dyspnea, ED (erectile dysfunction) (8/9/2016), Elevated PSA (8/9/2016), Emphysemal COPD, Encounter for long-term (current) use of medications (8/9/2016), Enlarged prostate, Fatigue (8/9/2016), Finger swelling (8/9/2016), H/O echocardiogram (08/04/2020), Hiatal hernia, History of kidney stones, Hyperlipidemia, Hypertension, Hypophonia with hoarseness (8/8/2016), Kidney problem, Kidney stone (06/2015), Knee effusion (8/9/2016), Low vitamin B12 level (8/9/2016), Neurogenic bladder (8/9/2016), Obesity (BMI 30-39.9), Osteoarthritis (8/9/2016), Parkinson disease ([facility] [address] 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 Mr. Ronel Smith  has a past surgical history that includes hx lithotripsy (1998); hx cystostomy; hx urological (Right); hx appendectomy; hx knee arthroscopy (Right); hx acl reconstruction (Right, 1991); hx pacemaker (10/31/2016); hx lumbar laminectomy (06/2015); hx hip replacement (Left, 2018); and hx knee replacement (Left, 01/2021).   Social History/Living Environment:   Home Environment: Private residence  # Steps to Enter: 0 (ramp)  Wheelchair Ramp: Yes  One/Two Story Residence: One story  # of Interior Steps: 12  Living Alone: No  Support Systems: Spouse/Significant Other  Patient Expects to be Discharged toF Cor[de-identified]ration  Current DME Used/Available at Home: None  Tub or Shower Type: Tub/Shower combination    Prior Level of Function/Work/Activity:  independent     Number of Personal Factors/Comorbidities that affect the Plan of Care: Brief history (0):  LOW COMPLEXITY   ASSESSMENT OF OCCUPATIONAL PERFORMANCE[de-identified]   Most Recent Physical Functioning:   Balance  Sitting: Intact  Standing: With support       Gross Assessment: Yes  Gross Assessment  AROM: Within functional limits (BUE)  Strength: Within functional limits (BUE)  Coordination: Within functional limits (BUE)  Tone: Normal  Sensation: Intact                 Vision  Acuity: Within Defined Limits    Mental Status  Neurologic State: Alert  Orientation Level: Oriented X4  Cognition: Follows commands  Perception: Appears intact  Perseveration: No perseveration noted  Safety/Judgement: Fall prevention                Basic ADLs (From Assessment) Complex ADLs (From Assessment)   Basic ADL  Feeding: Setup  Oral Facial Hygiene/Grooming: Setup  Bathing: Setup  Type of Bath: Chlorhexidine (CHG),Full,Shower  Upper Body Dressing: Setup  Lower Body Dressing: Setup  Toileting: Setup     Grooming/Bathing/Dressing Activities of Daily Living   Grooming  Grooming Assistance: Set-up  Position Performed: Seated in chair  Washing Face: Set-up Cognitive Retraining  Safety/Judgement: Fall prevention   Upper Body Bathing  Bathing Assistance: Set-up  Position Performed: Seated in chair  Cues: Verbal cues provided  Adaptive Equipment: Grab bar; Shower chair Feeding  Feeding Assistance: Set-up   Lower Body Bathing  Bathing Assistance: Set-up  Perineal  : Set-up  Position Performed: Standing  Cues: Verbal cues provided  Adaptive Equipment: Grab bar  Lower Body : Set-up  Position Performed: Seated in chair  Cues: Verbal cues provided  Adaptive Equipment: Grab bar; Shower chair Toileting  Toileting Assistance: Set-up  Bladder Hygiene: Set-up   Upper Body Dressing Assistance  Dressing Assistance: Set-up  Pullover Shirt: Set-up Functional Transfers  Bathroom Mobility: Contact guard assistance  Toilet Transfer : Contact guard assistance  Shower Transfer: Contact guard assistance  Adaptive Equipment: Grab bars; Bedside commode; Shower chair with back; Walker (comment)   Lower Body Dressing Assistance  Dressing Assistance: Set-up  Underpants: Set-up  Pants With Elastic Waist: Set-up  Socks: Compensatory technique training  Slip on Shoes with Back: Set-up  Adaptive Equipment Used: Grab bar;Walker Bed/Mat Mobility  Supine to Sit: Stand-by assistance  Sit to Stand: Contact guard assistance  Stand to Sit: Contact guard assistance  Bed to Chair: Contact guard assistance  Scooting: Stand-by assistance         Physical Skills Involved:  1. Balance  2. Strength  3. Activity Tolerance Cognitive Skills Affected (resulting in the inability to perform in a timely and safe manner): 1. none Psychosocial Skills Affected:  1. none   Number of elements that affect the Plan of Care: 1-3:  LOW COMPLEXITY   CLINICAL DECISION MAKING:   [facility] AM-PAC 6 Clicks   Daily Activity Inpatient Short Form  How much help from another person does the patient currently need. .. Total A Lot A Little None   1. Putting on and taking off regular lower body clothing? [] 1   [] 2   [x] 3   [] 4   2. Bathing (including washing, rinsing, drying)? [] 1   [] 2   [] 3   [x] 4   3. Toileting, which includes using toilet, bedpan or urinal?   [] 1   [] 2   [] 3   [x] 4   4. Putting on and taking off regular upper body clothing? [] 1   [] 2   [] 3   [x] 4   5. Taking care of personal grooming such as brushing teeth? [] 1   [] 2   [] 3   [x] 4   6. Eating meals? [] 1   [] 2   [] 3   [x] 4   © 2007, Trustees of [employer], under license to [employer]. All rights reserved     Score:  Initial: 23 Most Recent: X (Date: -- )    Interpretation of Tool:  Represents activities that are increasingly more difficult (i.e. Bed mobility, Transfers, Gait).     Medical Necessity:     · Patient is expected to demonstrate progress in   · strength, balance, coordination, and functional technique  ·  to   · increase independence with self care and functional mobility  · . Reason for Services/Other Comments:  · Patient continues to require skilled intervention due to   · Decrease self care and functional mobility  · . Use of outcome tool(s) and clinical judgement create a POC that gives a: LOW COMPLEXITY            TREATMENT:   (In addition to Assessment/Re-Assessment sessions the following treatments were rendered)     Pre-treatment Symptoms/Complaints:  tolerated shower  Pain: Initial:   Pain Intensity 1: 0  Post Session:  0     Self Care: (55): Procedure(s) (per grid) utilized to improve and/or restore self-care/home management as related to dressing, bathing, toileting, grooming, and functional transfers . Required minimal verbal and   cueing to facilitate activities of daily living skills and compensatory activities. Assessment    Treatment/Session Assessment:     Response to Treatment:  tolerated well. Education:  [] Home Exercises  [x] Fall Precautions  [] Hip Precautions [] Going Home Video  [x] Knee/Hip Prosthesis Review  [x] Walker Management/Safety [x] Adaptive Equipment as Needed       Interdisciplinary Collaboration:   o Physical Therapist  o Occupational Therapist  o Registered Nurse    After treatment position/precautions:   o Up in chair  o Bed/Chair-wheels locked  o Caregiver at bedside  o Call light within reach  o RN notified     Compliance with Program/Exercises: Compliant all of the time. Recommendations/Intent for next treatment session: Pt doing well all goals met and will do well at home with support from family. Patient will be discharged home with home health PT. No further Occupational Therapy warranted, will discharge Occupational Therapy services.        Total Treatment Duration:  OT Patient Time In/Time Out  Time In: 0800  Time Out: 0900     Hiwot Wong OT

## 2021-12-16 NOTE — PROGRESS NOTES
Pt. Discharge summary and home medication sheet reviewed and signed by pt. Copy given for take home use. All goals met. Assessment unchanged. Pt left [facility] via w/c with staff member and wife.

## 2021-12-16 NOTE — PROGRESS NOTES
2021         Post Op day: 1 Day Post-Op     Admit Date: 12/15/2021    Admit Diagnosis: Primary osteoarthritis of right knee [M17.11]  Acquired deformity of right knee [M21.961]  Osteoarthritis of right knee [M17.11]        Subjective: Patient stable. No acute events. Objective:     Visit Vitals  BP (!) 152/81   Pulse 77   Temp 98.6 °F (37 °C)   Resp 16   Ht 6' (1.829 m)   Wt 210 lb (95.3 kg)   SpO2 95%   BMI 28.48 kg/m²    Temp (24hrs), Av.4 °F (36.9 °C), Min:98.1 °F (36.7 °C), Max:98.6 °F (37 °C)      Lab Results   Component Value Date/Time    HGB 12.7 (L) 2021 03:59 AM       Patient Active Problem List   Diagnosis Code    Obesity (BMI 30-39. 9) E66.9    Hypertension I10    Chronic obstructive pulmonary disease (HCC) J44.9    Enlarged prostate N40.0    Spinal stenosis, lumbar region, with neurogenic claudication M48.062    Acquired spondylolisthesis M43.10    Parkinson's disease ([address] G20    Hyperlipidemia E78.5    ZAFAR (dyspnea on exertion) R06.00    Arthritis M19.90    Cardiomyopathy ([facility] [address] I42.9    Coronary artery disease involving native coronary artery of native heart without angina pectoris I25.10    Tremor R25.1    Hoarseness of voice R49.0    Benign prostatic hyperplasia without lower urinary tract symptoms N40.0    Dermatitis, seborrheic L21.9    Encounter for long-term (current) use of medications Z79.899    Controlled diabetes mellitus type II without complication ([facility]) H45.7    Urinary retention R33.9    Primary osteoarthritis of knee M17.10    Chronic renal insufficiency N18.9    ED (erectile dysfunction) N52.9    Rosacea L71.9    Neurogenic bladder N31.9    Sciatica M54.30    COPD exacerbation ([facility]) J44.1    Postural imbalance R29.3    Abnormal posture R29.3    Biventricular AICD malfunction T82.118A    Multifactorial gait disorder R26.89    Hypophonia R49.8    Arthritis of left hip M16.12    Biventricular ICD (implantable cardioverter-defibrillator) in place Z95.810    ANTHONY (obstructive sleep apnea) G47.33    Neurogenic orthostatic hypotension (HCC) G90.3    RBD (REM behavioral disorder) G47.52    Cognitive changes R41.89    Arthritis of left knee M17.12    Osteoarthritis of right knee M17.11       Current Facility-Administered Medications   Medication Dose Route Frequency    albuterol (PROVENTIL VENTOLIN) nebulizer solution 2.5 mg  2.5 mg Nebulization Q6H PRN    atorvastatin (LIPITOR) tablet 80 mg  80 mg Oral DAILY    carbidopa-levodopa (SINEMET)  mg per tablet 0.5-1 Tablet  0.5-1 Tablet Oral DAILY PRN    carbidopa-levodopa (SINEMET)  mg per tablet 1 Tablet  1 Tablet Oral 5XD    carbidopa-levodopa ER (SINEMET CR)  mg per tablet 2 Tablet  2 Tablet Oral QHS    carvediloL (COREG) tablet 6.25 mg  6.25 mg Oral BID WITH MEALS    clonazePAM (KlonoPIN) tablet 0.5 mg  0.5 mg Oral QHS    clotrimazole-betamethasone (LOTRISONE) 1-0.05 % cream   Topical BID PRN    cyanocobalamin tablet 500 mcg  500 mcg Oral DAILY    doxycycline (ADOXA) tablet 100 mg  (Patient Supplied)  100 mg Oral DAILY    budesonide-formoteroL (SYMBICORT) 160-4.5 mcg/actuation HFA inhaler 2 Puff  2 Puff Inhalation BID RT    trospium (SANCTURA) tablet 20 mg  20 mg Oral BID    pantoprazole (PROTONIX) tablet 40 mg  40 mg Oral ACB    sacubitriL-valsartan (ENTRESTO) 24-26 mg tablet 1 Tablet  1 Tablet Oral BID    tamsulosin (FLOMAX) capsule 0.4 mg  0.4 mg Oral DAILY    tiotropium bromide (SPIRIVA RESPIMAT) 2.5 mcg /actuation  2 Puff Inhalation DAILY    alcohol 62% (NOZIN) nasal  1 Ampule  1 Ampule Topical Q12H    0.9% sodium chloride infusion  100 mL/hr IntraVENous CONTINUOUS    sodium chloride (NS) flush 5-40 mL  5-40 mL IntraVENous Q8H    sodium chloride (NS) flush 5-40 mL  5-40 mL IntraVENous PRN    acetaminophen (TYLENOL) tablet 1,000 mg  1,000 mg Oral Q6H    HYDROmorphone (DILAUDID) tablet 2 mg  2 mg Oral Q4H PRN    HYDROmorphone (DILAUDID) injection 1 mg  1 mg IntraVENous Q3H PRN    naloxone (NARCAN) injection 0.2-0.4 mg  0.2-0.4 mg IntraVENous Q10MIN PRN    dexamethasone (DECADRON) 10 mg/mL injection 10 mg  10 mg IntraVENous ONCE    ondansetron (ZOFRAN) injection 4 mg  4 mg IntraVENous Q4H PRN    diphenhydrAMINE (BENADRYL) capsule 25 mg  25 mg Oral Q4H PRN    senna-docusate (PERICOLACE) 8.6-50 mg per tablet 2 Tablet  2 Tablet Oral DAILY    aspirin delayed-release tablet 81 mg  81 mg Oral Q12H    ketorolac (TORADOL) injection 15 mg  15 mg IntraVENous Q8H    meloxicam (MOBIC) tablet 7.5 mg  7.5 mg Oral BID    ondansetron (ZOFRAN ODT) tablet 8 mg  8 mg Oral Q8H PRN    pantoprazole (PROTONIX) tablet 40 mg  40 mg Oral ACB    magnesium oxide (MAG-OX) tablet 200 mg  200 mg Oral DAILY       Extremity Exam  Dressing clean and dry   Tibialis Anterior and Gastroc-Soleus functioning normally right lower extremity  Sensation intact to light touch on operative limb  Extremity perfused  TEDS/SCDS in place  No sign of DVT     Assessment / Plan :  WBAT RLE  Continue PT/OT  Continue current DVT prophylaxis in house.  Discharge on ASA BID  DIspo-HH         Signed By: Arnold German NP

## 2021-12-16 NOTE — PROGRESS NOTES
Problem: Mobility Impaired (Adult and Pediatric)  Goal: *Acute Goals and Plan of Care (Insert Text)  Outcome: Progressing Towards Goal  Note: GOALS (1-4 days):  (1.)Mr. Dulce Wick will move from supine to sit and sit to supine  in bed with INDEPENDENT. (2.)Mr. Dulce Wick will transfer from bed to chair and chair to bed with SUPERVISION using the least restrictive device. (3.)Mr. Dulce Wick will ambulate with SUPERVISION for 300 feet with the least restrictive device. (4.)Mr. Dulce Wick will increase right knee ROM to 0°-120°.  ________________________________________________________________________________________________       PHYSICAL THERAPY JOINT CAMP TKA: Initial Assessment and AM 12/16/2021  INPATIENT: Hospital Day: 2  [place] / [place] / Product Type: Medicare /      NAME/AGE/GENDER: Grace Rivers. is a 77 y.o. male   PRIMARY DIAGNOSIS:  Primary osteoarthritis of right knee [M17.11]  Acquired deformity of right knee [M21.961]   Procedure(s) and Anesthesia Type:     * RIGHT IRVING KNEE ARTHROPLASTY TOTAL ROBOTIC ASSISTED/ MEL/ PT HAS ICD - General (Right)  ICD-10: Treatment Diagnosis:    Pain in Right Knee (M25.561)  Stiffness of Right Knee, Not elsewhere classified (M25.661)  Difficulty in walking, Not elsewhere classified (R26.2)      ASSESSMENT:     Mr. Dulce Wick presents s/p R TKA. Patient demonstrates decreased R LE strength and ROM and decreased independence with mobility. He has a history of L GILMA and L TKA. Patient also has a diagnosis of Parkinsons and uses a rollator or cane for mobility. Patient would benefit from therapy to address above deficits. He will return home at d/c with assist from his spouse. Patient did well this am.  Ambulating pretty well with good step through pattern but occasional scissoring with R LE.  Great knee flexion and extension and did well with all exercises.   Patient anxious to d/c home and should progress well with continuation of HEP and HHPT. This section established at most recent assessment   PROBLEM LIST (Impairments causing functional limitations):  Decreased Strength  Decreased ADL/Functional Activities  Decreased Transfer Abilities  Decreased Ambulation Ability/Technique  Decreased Balance  Increased Pain  Decreased Flexibility/Joint Mobility  Edema/Girth  Decreased [place] with Home Exercise Program   INTERVENTIONS PLANNED: (Benefits and precautions of physical therapy have been discussed with the patient.)  Bed Mobility  Cold  Gait Training  Home Exercise Program (HEP)  Range of Motion (ROM)  Therapeutic Activites  Therapeutic Exercise/Strengthening  Transfer Training     TREATMENT PLAN: Frequency/Duration: Follow patient BID for duration of hospital stay to address above goals. Rehabilitation Potential For Stated Goals: Good     RECOMMENDED REHABILITATION/EQUIPMENT: (at time of discharge pending progress): Continue Skilled Therapy.               HISTORY:   History of Present Injury/Illness (Reason for Referral):  R TKA  Past Medical History/Comorbidities:   Mr. Sandra Maya  has a past medical history of Acquired spondylolisthesis (6/16/2015), Actinic keratosis (8/9/2016), Arthritis, Back pain (8/9/2016), Basal cell carcinoma (dx 2/2018), BPH (benign prostatic hypertrophy) (8/9/2016), Cardiomyopathy ([facility] [address] (08/02/2016), Chronic obstructive pulmonary disease ([facility] [address] Chronic renal insufficiency (08/09/2016), Controlled diabetes mellitus type II without complication ([facility] [address] (36/94/8962), COPD exacerbation ([facility] [address] (8/9/2016), Coronary artery disease involving native coronary artery of native heart without angina pectoris (8/2/2016), Dermatitis, seborrheic (8/9/2016), ZAFAR (dyspnea on exertion) (05/31/2016), Dyspnea, ED (erectile dysfunction) (8/9/2016), Elevated PSA (8/9/2016), Emphysemal COPD, Encounter for long-term (current) use of medications (8/9/2016), Enlarged prostate, Fatigue (8/9/2016), Finger swelling (8/9/2016), H/O echocardiogram (08/04/2020), Hiatal hernia, History of kidney stones, Hyperlipidemia, Hypertension, Hypophonia with hoarseness (8/8/2016), Kidney problem, Kidney stone (06/2015), Knee effusion (8/9/2016), Low vitamin B12 level (8/9/2016), Neurogenic bladder (8/9/2016), Obesity (BMI 30-39.9), Osteoarthritis (8/9/2016), Parkinson disease ([facility] [address] 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 Mr. Pacheco Baron  has a past surgical history that includes hx lithotripsy (1998); hx cystostomy; hx urological (Right); hx appendectomy; hx knee arthroscopy (Right); hx acl reconstruction (Right, 1991); hx pacemaker (10/31/2016); hx lumbar laminectomy (06/2015); hx hip replacement (Left, 2018); and hx knee replacement (Left, 01/2021). Social History/Living Environment:   Home Environment: Private residence  # Steps to Enter: 0 (ramp)  Wheelchair Ramp: Yes  One/Two Story Residence: Two story, live on 1st floor  # of Interior Steps: 12  Living Alone: No  Support Systems: Spouse/Significant Other  Patient Expects to be Discharged to[de-identified] [employer]  Current DME Used/Available at Home: [place]; Walker, rollator; Walker, rolling; [place], straight; Commode, bedside;  Shower chair  Tub or Shower Type: Tub/Shower combination    Prior Level of Function/Work/Activity:  Ambulating with rollator or cane   Number of Personal Factors/Comorbidities that affect the Plan of Care: 1-2: MODERATE COMPLEXITY   EXAMINATION:   Most Recent Physical Functioning:   Gross Assessment: Yes  Gross Assessment  AROM: Generally decreased, functional  Strength: Generally decreased, functional  Coordination: Within functional limits  Tone: Normal  Sensation: Intact        RLE AROM  R Knee Flexion: 116  R Knee Extension: 0       RLE Strength  R Hip Flexion: 2+  R Knee Extension: 2+  R Ankle Dorsiflexion: 3+    Bed Mobility  Supine to Sit: Stand-by assistance  Scooting: Stand-by assistance    Transfers  Sit to Stand: Stand-by assistance  Stand to Sit: Stand-by assistance  Bed to Chair: Contact guard assistance    Balance  Sitting: Intact  Standing: With support              Weight Bearing Status  Right Side Weight Bearing: As tolerated  Distance (ft): 310 Feet (ft)  Ambulation - Level of Assistance: Contact guard assistance  Assistive Device: Walker, rolling  Base of Support: Center of gravity altered  Speed/Dana: Pace decreased (<100 feet/min)  Step Length: Left shortened;Right shortened  Stance: Right decreased  Gait Abnormalities: Antalgic;Scissoring  Interventions: Safety awareness training;Verbal cues     Braces/Orthotics: none    Right Knee Cold  Type: Cryocuff      Body Structures Involved:  Joints  Muscles Body Functions Affected: Movement Related Activities and Participation Affected: Mobility   Number of elements that affect the Plan of Care: 4+: HIGH COMPLEXITY   CLINICAL PRESENTATION:   Presentation: Stable and uncomplicated: LOW COMPLEXITY   CLINICAL DECISION MAKIN [facility] [address] [id number] AM-PAC 6 Clicks   Basic Mobility Inpatient Short Form  How much difficulty does the patient currently have. .. Unable A Lot A Little None   1. Turning over in bed (including adjusting bedclothes, sheets and blankets)? [] 1   [] 2   [x] 3   [] 4   2. Sitting down on and standing up from a chair with arms ( e.g., wheelchair, bedside commode, etc.)   [] 1   [] 2   [x] 3   [] 4   3.   Moving from lying on back to sitting on the side of the bed? [] 1   [] 2   [x] 3   [] 4   How much help from another person does the patient currently need. .. Total A Lot A Little None   4. Moving to and from a bed to a chair (including a wheelchair)? [] 1   [] 2   [x] 3   [] 4   5. Need to walk in hospital room? [] 1   [] 2   [x] 3   [] 4   6. Climbing 3-5 steps with a railing? [] 1   [] 2   [x] 3   [] 4   © 2007, Trustees of [address] [place] [address] [address] [address], under license to [employer]. All rights reserved     Score:  Initial: 18 Most Recent: X (Date: -- )    Interpretation of Tool:  Represents activities that are increasingly more difficult (i.e. Bed mobility, Transfers, Gait). Medical Necessity:     Patient is expected to demonstrate progress in   strength, range of motion, balance, and coordination   to   increase independence with mobility. .  Reason for Services/Other Comments:  Patient continues to require skilled intervention due to   Decreased R LE strength and ROM and decreased independence with mobility. .   Use of outcome tool(s) and clinical judgement create a POC that gives a: Clear prediction of patient's progress: LOW COMPLEXITY            TREATMENT:   (In addition to Assessment/Re-Assessment sessions the following treatments were rendered)     Pre-treatment Symptoms/Complaints:  patient agreeable. Pain Initial:   Pain Intensity 1: 3  Post Session:  3     Therapeutic Activity: (    25 minutes): Therapeutic activities including Chair transfers, Ambulation on level ground, exercises as below, and d/c education to improve mobility, strength, balance, and coordination. Required minimal Safety awareness training;Verbal cues to promote static and dynamic balance in standing and promote coordination of right, lower extremity(s).      assessment    Date:  12/16/21 Date:   Date:     ACTIVITY/EXERCISE AM PM AM PM AM PM   GROUP THERAPY  []  []  []  []  []  []   Ankle Pumps 20        Quad Sets 20        Gluteal Sets 20        Hip ABd/ADduction 20        Straight Leg Raises 20        Knee Slides 20        Short Arc Quads 20        Long Arc Quads         Chair Slides 20                 B = bilateral; AA = active assistive; A = active; P = passive      Treatment/Session Assessment:     Response to Treatment:  Patient participated well and moving well. Education:  [x] Home Exercises  [x] Fall Precautions  [x] Use of Cold Therapy Unit [x] D/C Instruction Review  [] Knee Prosthesis Review  [x] Walker Management/Safety [] Adaptive Equipment as Needed       Interdisciplinary Collaboration:   Physical Therapist  Registered Nurse    After treatment position/precautions:   Up in chair  Bed/Chair-wheels locked  Caregiver at bedside  Call light within reach    Compliance with Program/Exercises: Will assess as treatment progresses. Recommendations/Intent for next treatment session:  Treatment next visit will focus on increasing Mr. Beans independence with bed mobility, transfers, gait training, strength/ROM exercises, modalities for pain, and patient education.       Total Treatment Duration:  PT Patient Time In/Time Out  Time In: 0925  Time Out: 1515 Suzanna Bergman, PT

## 2021-12-16 NOTE — DISCHARGE INSTRUCTIONS
[facility]   Patient Discharge Instructions    Ronald Sharif. / [id number] : 1945    Admitted 12/15/2021 Discharged: 2021     IF YOU HAVE ANY PROBLEMS ONCE YOU ARE AT HOME CALL THE FOLLOWING NUMBERS:   Main office number: [phone]    Take Home Medications     · It is important that you take the medication exactly as they are prescribed. · Keep your medication in the bottles provided by the pharmacist and keep a list of the medication names, dosages, and times to be taken in your wallet. · Do not take other medications without consulting your doctor. What to do at [address] your prehospital diet. If you have excessive nausea or vomitting call your doctor's office     Home Physical Therapy is arranged. Use rolling walker when walking. Patients who have had a joint replacement should not drive until you are seen for your follow up appointment by Dr. Claudia Beard. When to Call    - Call if you have a temperature greater then 101  - Unable to keep food down  - Loose control of your bladder or bowel function  - Are unable to bear any weight   - Need a pain medication refill     Patient Education        Total Knee Replacement: What to Expect at [address] had a total knee replacement. The doctor replaced the worn ends of the bones that connect to your knee (thighbone and lower leg bone) with plastic and metal parts. When you leave the hospital, you should be able to move around with a walker or crutches. But you will need someone to help you at home until you have more energy and can move around better. You will go home with a bandage and stitches, staples, skin glue, or tape strips. Change the bandage as your doctor tells you to. If you have stitches or staples, your doctor will remove them about 2 weeks after your surgery. Glue or tape strips will fall off on their own over time.  You may still have some mild pain, and the area may be swollen for a few months after surgery. Your knee will continue to improve for up to a year. You will probably use a walker for some time after surgery. When you are ready, you can use a cane. You may be able to walk without support after a couple weeks, or when you are comfortable. You will need to do months of physical rehabilitation (rehab) after a knee replacement. Rehab will help you strengthen the muscles of the knee and help you regain movement. After you recover, your artificial knee will allow you to do normal daily activities with less pain or no pain at all. You may be able to hike, dance, or ride a bike. Talk to your doctor about whether you can do more strenuous activities. Always tell your caregivers that you have an artificial knee. How long it will take to walk on your own, return to normal activities, and go back to work depends on your health and how well your rehabilitation (rehab) program goes. The better you do with your rehab exercises, the quicker you will get your strength and movement back. This care sheet gives you a general idea about how long it will take for you to recover. But each person recovers at a different pace. Follow the steps below to get better as quickly as possible. How can you care for yourself at home? Activity    · Rest when you feel tired. You may take a nap, but don't stay in bed all day. When you sit, use a chair with arms. You can use the arms to help you stand up.     · Work with your physical therapist to find the best way to exercise. What you can do as your knee heals will depend on whether your new knee is cemented or uncemented. You may not be able to do certain things for a while if your new knee is uncemented.     · After your knee has healed enough, you can do more strenuous activities with caution. ? You can golf, but you may want to use a golf cart for some time. And don't wear shoes with spikes. ? You can bike on a flat road or on a stationary bike.  Talk to your doctor before biking uphill. ? Your doctor may suggest that you stay away from activities that put stress on your knee. These include tennis, badminton, contact sports like football, jumping (such as in basketball), jogging, and running. ? Avoid activities where you might fall.     · Do not sit for more than 1 hour at a time. Get up and walk around for a while before you sit again. If you must sit for a long time, prop up your leg with a chair or footstool. This will help you avoid swelling.     · Ask your doctor when you can drive again. It may take several weeks after knee replacement surgery before it's safe for you to drive.     · When you get into a car, sit on the edge of the seat. Then pull in your legs, and turn to face the front.     · You should be able to do many everyday activities 3 to 6 weeks after your surgery. You will probably need to take 4 to 16 weeks off from work. When you can go back to work depends on the type of work you do and how you feel.     · Ask your doctor when it is okay for you to have sex.     · For 12 weeks, do not lift anything heavier than 10 pounds and do not lift weights. Diet    · By the time you leave the hospital, you should be eating your normal diet. If your stomach is upset, try bland, low-fat foods like plain rice, broiled chicken, toast, and yogurt. Your doctor may suggest that you take iron and vitamin supplements.     · Drink plenty of fluids (unless your doctor tells you not to).   · Eat healthy foods, and watch your portion sizes. Try to stay at your ideal weight. Too much weight puts more stress on your new knee.     · You may notice that your bowel movements are not regular right after your surgery. This is common. Try to avoid constipation and straining with bowel movements. Drinking enough fluids, taking a stool softener, and eating foods that are good sources of fiber can help you avoid constipation.  If you have not had a bowel movement after a couple of days, talk to your doctor. Medicines    · Your doctor will tell you if and when you can restart your medicines. You will also get instructions about taking any new medicines.     · If you take aspirin or some other blood thinner, ask your doctor if and when to start taking it again. Make sure that you understand exactly what your doctor wants you to do.     · Your doctor may give you a blood-thinning medicine to prevent blood clots. If you take a blood thinner, be sure you get instructions about how to take your medicine safely. Blood thinners can cause serious bleeding problems. This medicine could be in pill form or as a shot (injection). If a shot is needed, your doctor will tell you how to do this.     · Be safe with medicines. Take pain medicines exactly as directed. ? If the doctor gave you a prescription medicine for pain, take it as prescribed. ? If you are not taking a prescription pain medicine, ask your doctor if you can take an over-the-counter medicine. ? Plan to take your pain medicine 30 minutes before exercises. It is easier to prevent pain before it starts than to stop it after it has started.     · If you think your pain medicine is making you sick to your stomach:  ? Take your medicine after meals (unless your doctor has told you not to). ? Ask your doctor for a different pain medicine.     · If your doctor prescribed antibiotics, take them as directed. Do not stop taking them just because you feel better. You need to take the full course of antibiotics. Incision care    · If your doctor told you how to care for your cut (incision), follow your doctor's instructions. You will have a dressing over the cut. A dressing helps the incision heal and protects it. Your doctor will tell you how to take care of this.     · If you did not get instructions, follow this general advice:  ?  If you have strips of tape on the cut the doctor made, leave the tape on for a week or until it falls off.  ? If you have stitches or staples, your doctor will tell you when to come back to have them removed. ? If you have skin glue on the cut, leave it on until it falls off. Skin glue is also called skin adhesive or liquid stitches. ? Change the bandage every day. ? Wash the area daily with warm water, and pat it dry. Don't use hydrogen peroxide or alcohol. They can slow healing. ? You may cover the area with a gauze bandage if it oozes fluid or rubs against clothing. ? You may shower 24 to 48 hours after surgery. Pat the incision dry. Don't swim or take a bath for the first 2 weeks, or until your doctor tells you it is okay. Exercise    · Your rehab program will give you a number of exercises to do to help you get back your knee's range of motion and strength. Always do them as your therapist tells you. Ice    · For pain and swelling, put ice or a cold pack on the area for 10 to 20 minutes at a time. Put a thin cloth between the ice and your skin. Other instructions    · Wear compression stockings if your doctor told you to. These may help to prevent blood clots. Your doctor will tell you how long you need to keep wearing the compression stockings.     · Carry a medical alert card that says you have an artificial joint. You have metal pieces in your knee. These may set off some airport metal detectors. Follow-up care is a key part of your treatment and safety. Be sure to make and go to all appointments, and call your doctor if you are having problems. It's also a good idea to know your test results and keep a list of the medicines you take. When should you call for help? Call 911 anytime you think you may need emergency care. For example, call if:    · You passed out (lost consciousness).     · You have severe trouble breathing.     · You have sudden chest pain and shortness of breath, or you cough up blood.    Call your doctor now or seek immediate medical care if:    · You have signs of infection, such as:  ? Increased pain, swelling, warmth, or redness. ? Red streaks leading from the incision. ? Pus draining from the incision. ? A fever.     · You have signs of a blood clot, such as:  ? Pain in your calf, back of the knee, thigh, or groin. ? Redness and swelling in your leg or groin.     · Your incision comes open and begins to bleed, or the bleeding increases.     · You have pain that does not get better after you take pain medicine. Watch closely for changes in your health, and be sure to contact your doctor if:    · You do not have a bowel movement after taking a laxative. Where can you learn more? Go to http://www.gray.com/  Enter T054 in the search box to learn more about \"Total Knee Replacement: What to Expect at Home. \"  Current as of: July 1, 2021               Content Version: 13.0  © 2006-2021 [employer]. Care instructions adapted under license by [employer] (which disclaims liability or warranty for this information). If you have questions about a medical condition or this instruction, always ask your healthcare professional. [place] [address] any warranty or liability for your use of this information. Information obtained by :  I understand that if any problems occur once I am at home I am to contact my physician. I understand and acknowledge receipt of the instructions indicated above.                                                                                                                                            Physician's or R.N.'s Signature                                                                  Date/Time                                                                                                                                              Patient or Representative Signature                                                          Date/Time

## 2021-12-17 NOTE — PROGRESS NOTES
Physician Progress Note      PATIENT:               Kasey Lloyd  CSN #:                  [id number]  :                       1945  ADMIT DATE:       12/15/2021 10:03 AM  DISCH DATE:        2021 11:08 AM  RESPONDING  PROVIDER #:        Edson Durán MD        QUERY TEXT:    Stage of Chronic Kidney Disease: Please provide further specificity, if known. Clinical indicators include: chronic renal insufficiency, creatinine, cr  Options provided:  -- Chronic kidney disease stage 1  -- Chronic kidney disease stage 2  -- Chronic kidney disease stage 3  -- Chronic kidney disease stage 3a  -- Chronic kidney disease stage 3b  -- Chronic kidney disease stage 4  -- Chronic kidney disease stage 5  -- Chronic kidney disease stage 5, requiring dialysis  -- End stage renal disease  -- Other - I will add my own diagnosis  -- Disagree - Not applicable / Not valid  -- Disagree - Clinically Unable to determine / Unknown        PROVIDER RESPONSE TEXT:    Provider was unable to determine a response for this query.       Electronically signed by:  Edson Durán MD 2021 7:05 AM

## 2021-12-20 NOTE — DISCHARGE SUMMARY
Total Joint Discharge Summary      Patient ID:  Shellie Bolaños  [id number]  77 y.o.  1945    Admit date: 12/15/2021  Discharge date: 12/16/2021 11:08 AM  Admitting Surgeon: Jori Bee MD  Admission Diagnoses: Primary osteoarthritis of right knee [M17.11]; Acquired deformity of right knee [M21.961]; Osteoarthritis of right knee [M17.11]  Discharge Diagnoses: Active Problems:    Osteoarthritis of right knee (12/15/2021)      Procedure: Procedure(s) (LRB):  RIGHT IRVING KNEE ARTHROPLASTY TOTAL ROBOTIC ASSISTED/ MEL/ PT HAS ICD (Right)    Brief History: Was admitted 12/15/2021 for Procedure(s) (LRB):  RIGHT IRVING KNEE ARTHROPLASTY TOTAL ROBOTIC ASSISTED/ MEL/ PT HAS ICD (Right)    Hospital Course: Patient underwent surgery 12/15/2021 and under went Procedure(s) (LRB):  RIGHT IRVING KNEE ARTHROPLASTY TOTAL ROBOTIC ASSISTED/ MEL/ PT HAS ICD (Right). Patient did well postop. Pain was well controlled postop. Physical therapy and occupational therapy were initiated the day of surgery. The patient was placed on dual both chemical and mechanical DVT prophylaxis after surgery. The patient progressed well with therapy postop and was deemed safe and appropriate for discharge after surgery. Patient was discharged 12/16/2021 08:95 AM.    Complications in Hospital: None                             Perioperative Antibiotics: Antibiotics per administered per protocol based on weight and prior drug allergies. If the patient was undergoing revision surgery or had a positive MRSA nasal swab the patient also received vancomycin. Hospital Medications given:   No current facility-administered medications for this encounter. Current Outpatient Medications   Medication Sig    acetaminophen (TYLENOL) 500 mg tablet Take 2 Tablets by mouth every six (6) hours.  aspirin delayed-release 81 mg tablet Take 1 Tablet by mouth two (2) times a day.     senna-docusate (PERICOLACE) 8.6-50 mg per tablet Take 1 Tablet by mouth daily.  clonazePAM (KlonoPIN) 0.5 mg tablet 1/2-1 tab QHS    atorvastatin (LIPITOR) 80 mg tablet Take 1 Tablet by mouth daily.  mirabegron ER (Myrbetriq) 25 mg ER tablet Take 1 Tablet by mouth daily.  allopurinoL (ZYLOPRIM) 100 mg tablet     carbidopa-levodopa (SINEMET)  mg per tablet TAKE 1 TABLET FIVE TIMES A DAY (EVERY 3 HOURS) (Patient taking differently: q 3 hrs)    carbidopa-levodopa (SINEMET)  mg per tablet Take 0.5-1 Tabs by mouth daily as needed (worsening OFF time related to PD). Take 1/2 tab as needed for worsening OFF time related to PD.  carbidopa-levodopa ER (SINEMET CR)  mg per tablet TAKE 2 TABLETS AT BEDTIME    carvediloL (COREG) 6.25 mg tablet Take 1 Tab by mouth two (2) times daily (with meals).  sacubitriL-valsartan (Entresto) 24-26 mg tablet Take 1 Tab by mouth two (2) times a day.  MAGNESIUM PO Take  by mouth daily.  tiotropium (SPIRIVA WITH HANDIHALER) 18 mcg inhalation capsule Take 1 Cap by inhalation every morning.  fluticasone-salmeterol (ADVAIR) 250-50 mcg/dose diskus inhaler Take 1 Puff by inhalation daily. (Patient taking differently: Take 1 Puff by inhalation daily. Instructed to bring to the hospital on the DOS per anesthesia protocol.)    albuterol (PROAIR HFA) 90 mcg/actuation inhaler Take 2 Puffs by inhalation every four (4) hours as needed for Wheezing. Instructed to bring to the hospital on the DOS per anesthesia protocol.  omeprazole (PRILOSEC) 40 mg capsule Take 1 Capsule by mouth daily.  ondansetron (ZOFRAN ODT) 8 mg disintegrating tablet Take 1 Tablet by mouth every eight (8) hours as needed for Nausea or Vomiting.  cyclobenzaprine (FLEXERIL) 5 mg tablet Take 1 Tablet by mouth three (3) times daily as needed for Muscle Spasm(s).  acetaminophen (TYLENOL) 500 mg tablet Take 2 Tablets by mouth every six (6) hours.  aspirin delayed-release 81 mg tablet Take 1 Tablet by mouth two (2) times a day.     omeprazole (PRILOSEC) 40 mg capsule Take 1 Capsule by mouth daily.  ondansetron (ZOFRAN ODT) 8 mg disintegrating tablet Take 1 Tablet by mouth every eight (8) hours as needed for Nausea or Vomiting.  senna-docusate (PERICOLACE) 8.6-50 mg per tablet Take 1 Tablet by mouth daily.  HYDROmorphone (DILAUDID) 2 mg tablet Take 1 Tablet by mouth every four (4) hours as needed for Pain for up to 7 days. Max Daily Amount: 12 mg.  aspirin delayed-release 81 mg tablet Take 1 Tablet by mouth two (2) times a day.  meloxicam (MOBIC) 15 mg tablet Take 1 Tablet by mouth daily.  omeprazole (PRILOSEC) 40 mg capsule Take 1 Capsule by mouth daily.  ondansetron (ZOFRAN ODT) 8 mg disintegrating tablet Take 1 Tablet by mouth every eight (8) hours as needed for Nausea or Vomiting.  senna-docusate (PERICOLACE) 8.6-50 mg per tablet Take 1 Tablet by mouth daily.  cyanocobalamin, vitamin B-12, (VITAMIN B-12 PO) Take  by mouth daily.  tamsulosin (FLOMAX) 0.4 mg capsule Take 1 Cap by mouth daily.  clotrimazole-betamethasone (LOTRISONE) topical cream Apply  to affected area two (2) times daily as needed.  doxycycline (VIBRA-TABS) 100 mg tablet Take 100 mg by mouth daily.  DISABLED PLACARD (DISABLED PLACARD) DMV Dx: Parkinson's Disease         Postoperative transfusions: None    Objective    Hemoglobin at discharge:   Lab Results   Component Value Date/Time    HGB 12.7 (L) 12/16/2021 03:59 AM       Extremity Exam  Dressing Clean, dry intact. Positive tibialis anterior and gastroc-soleus function in operative extremity. Sensation grossly intact  Positive PT pulse  No sign of DVT  TEDS/SCDS in place    Physical Therapy started on the day of surgery and progressed.    PT/OT:          Assistive Device: Walker (comment)  RLE AROM  R Knee Flexion: 116  R Knee Extension: 0             Discharge instructions:  -WBAT operative extremity  - Anticoagulate for 4 week period  -Resume pre hospital diet            -Resume home medications per medical reconciliation form     -Ambulate with walker, appropriate total joint protocol. Follow posterior hip precautions for 6 weeks if patient is status post total hip replacement  -Follow up in office as scheduled     IF YOU HAVE ANY PROBLEMS ONCE YOU ARE  [facility] [address]:   Main office number: [phone]    Take Home Medications     Cannot display discharge medications since this patient is not currently admitted. · It is important that you take the medication exactly as they are prescribed. · Keep your medication in the bottles provided by the pharmacist and keep a list of the medication names, dosages, and times to be taken in your wallet. · Do not take other medications without consulting your doctor. What to do at [address] your prehospital diet. If you have excessive nausea or vomiting call your doctor's office. Home Physical Therapy is arranged. Use rolling walker when walking. Physical therapy will help advise you when it is safe to transition to a cane. Patients who have had a joint replacement should not drive until you are seen for your follow up appointment by Dr. Manisha Bethea. Total Knee patients keep knee elevated above heart level to prevent and resolve swelling. Continue to ice the surgical site a few times a day until your follow-up appointment    Leave dressing alone until checked by home health. Patients with Prineo wound closure mesh may shower once at home but do not soak the wound (bath, swimming pool, hot tub, etc.). Gently pat dry wound after shower. No aggressive scrubbing as this may rub off adhesive used to keep wound sealed and irritate the wound. Further do not wear tight clothing such a spandex, yoga pants, or biker shorts as this type of clothing may pull the mesh dressing off. Wear loose fitting clothes only. Finally, do not allow anyone to remove this dressing.  Dr Manisha Bethea will remove the dressing in his office at your first postoperative visit. If your wound was closed with staples Home Health or the nursing facility will assist with dressing changes. Take it easy, you have just had major surgery. Too much activity will cause additional soreness and swelling. When to Call    - Call if you have a temperature greater then 101  - Have increased wound drainage or wound drainage that persists after 7 days.  - Begin to notice increased redness, swelling, or pus coming from the incision  - Unable to keep food down  - Lose control of your bladder or bowel function  - Are unable to bear any weight on operative leg  - Need a pain medication refill - please have pharmacy phone number available. DO NOT WAIT UNTIL YOU ARE OUT OF MEDICATION TO CALL FOR A REFILL! WE OFTEN CANNOT FILL NARCOTIC PRESCRIPTIONS THE SAME DAY! IF YOU ARE GETTING LOW AND FEEL YOU WILL NEED MORE MEDICATION CALL AHEAD OF TIME    I have reviewed the patients controlled substance prescription history, as maintained in the [place] prescription monitoring program, so that the prescription(s) for a  controlled substance can be given.     Signed:  Alethea Hartman MD  12/20/2021  4:23 PM

## 2022-01-06 PROBLEM — F11.99 OPIOID USE, UNSPECIFIED WITH UNSPECIFIED OPIOID-INDUCED DISORDER (HCC): Status: ACTIVE | Noted: 2022-01-06

## 2022-01-24 NOTE — ADDENDUM NOTE
Addendum  created 01/24/22 0758 by Julia Wallace MD    Child order released for a procedure order, Clinical Note Signed, Intraprocedure Blocks edited

## 2022-01-24 NOTE — ANESTHESIA PROCEDURE NOTES
Peripheral Block    Start time: 12/15/2021 12:31 PM  End time: 12/15/2021 12:35 PM  Performed by: Azar Brooks MD  Authorized by: Azar Brooks MD       Pre-procedure: Indications: at surgeon's request and post-op pain management    Preanesthetic Checklist: patient identified, risks and benefits discussed, site marked, timeout performed, anesthesia consent given and patient being monitored    Timeout Time: 12:31 EST          Block Type:   Block Type: Adductor canal  Laterality:  Right  Monitoring:  Continuous pulse ox, frequent vital sign checks, heart rate, oxygen and responsive to questions  Injection Technique:  Single shot  Procedures: ultrasound guided    Patient Position: supine  Prep: chlorhexidine    Location:  Mid thigh  Needle Gauge:  20 G  Needle Localization:  Ultrasound guidance    Assessment:  Number of attempts:  1  Injection Assessment:  Incremental injection every 5 mL, local visualized surrounding nerve on ultrasound, negative aspiration for blood, no intravascular symptoms, no paresthesia and ultrasound image on chart  Patient tolerance:  Patient tolerated the procedure well with no immediate complications  Note entered late after mismatch between chart and pre-op plan observed. Adductor performed. Ropiv 0.2% 20cc with 4mg of decadron administered.

## 2022-01-27 ENCOUNTER — TRANSCRIBE ORDER (OUTPATIENT)
Dept: SCHEDULING | Age: 77
End: 2022-01-27

## 2022-01-27 DIAGNOSIS — R14.0 BLOATING: ICD-10-CM

## 2022-01-27 DIAGNOSIS — R11.2 NAUSEA AND VOMITING IN ADULT: ICD-10-CM

## 2022-01-27 DIAGNOSIS — R10.13 ABDOMINAL PAIN, EPIGASTRIC: Primary | ICD-10-CM

## 2022-02-16 ENCOUNTER — HOSPITAL ENCOUNTER (OUTPATIENT)
Dept: CT IMAGING | Age: 77
Discharge: HOME | End: 2022-02-16
Attending: INTERNAL MEDICINE
Payer: MEDICARE

## 2022-02-16 DIAGNOSIS — R10.13 ABDOMINAL PAIN, EPIGASTRIC: ICD-10-CM

## 2022-02-16 DIAGNOSIS — R14.0 BLOATING: ICD-10-CM

## 2022-02-16 DIAGNOSIS — R11.2 NAUSEA AND VOMITING IN ADULT: ICD-10-CM

## 2022-02-16 LAB — CREAT BLD-MCNC: 0.65 MG/DL (ref 0.8–1.5)

## 2022-02-16 PROCEDURE — 74011000636 HC RX REV CODE- 636: Performed by: INTERNAL MEDICINE

## 2022-02-16 PROCEDURE — 74177 CT ABD & PELVIS W/CONTRAST: CPT

## 2022-02-16 PROCEDURE — 74011000258 HC RX REV CODE- 258: Performed by: INTERNAL MEDICINE

## 2022-02-16 PROCEDURE — 82565 ASSAY OF CREATININE: CPT

## 2022-02-16 RX ORDER — SODIUM CHLORIDE 0.9 % (FLUSH) 0.9 %
10 SYRINGE (ML) INJECTION
Status: COMPLETED | OUTPATIENT
Start: 2022-02-16 | End: 2022-02-16

## 2022-02-16 RX ADMIN — SODIUM CHLORIDE 100 ML: 9 INJECTION, SOLUTION INTRAVENOUS at 11:11

## 2022-02-16 RX ADMIN — IOPAMIDOL 100 ML: 755 INJECTION, SOLUTION INTRAVENOUS at 11:11

## 2022-02-16 RX ADMIN — DIATRIZOATE MEGLUMINE AND DIATRIZOATE SODIUM 15 ML: 660; 100 LIQUID ORAL; RECTAL at 11:12

## 2022-02-16 RX ADMIN — Medication 10 ML: at 11:11

## 2022-03-18 PROBLEM — Z95.810 BIVENTRICULAR ICD (IMPLANTABLE CARDIOVERTER-DEFIBRILLATOR) IN PLACE: Status: ACTIVE | Noted: 2018-03-29

## 2022-03-18 PROBLEM — F11.99 OPIOID USE, UNSPECIFIED WITH UNSPECIFIED OPIOID-INDUCED DISORDER (HCC): Status: ACTIVE | Noted: 2022-01-06

## 2022-03-18 PROBLEM — M17.11 OSTEOARTHRITIS OF RIGHT KNEE: Status: ACTIVE | Noted: 2021-12-15

## 2022-03-18 PROBLEM — R26.89 MULTIFACTORIAL GAIT DISORDER: Status: ACTIVE | Noted: 2017-03-02

## 2022-03-18 PROBLEM — G47.52 RBD (REM BEHAVIORAL DISORDER): Status: ACTIVE | Noted: 2019-04-11

## 2022-03-19 PROBLEM — G47.33 OSA (OBSTRUCTIVE SLEEP APNEA): Status: ACTIVE | Noted: 2018-04-18

## 2022-03-19 PROBLEM — R49.8 HYPOPHONIA: Status: ACTIVE | Noted: 2018-01-24

## 2022-03-19 PROBLEM — G90.3 NEUROGENIC ORTHOSTATIC HYPOTENSION (HCC): Status: ACTIVE | Noted: 2019-04-11

## 2022-03-19 PROBLEM — M17.12 ARTHRITIS OF LEFT KNEE: Status: ACTIVE | Noted: 2021-01-05

## 2022-03-19 PROBLEM — T82.118A BIVENTRICULAR AICD MALFUNCTION: Status: ACTIVE | Noted: 2017-01-17

## 2022-03-19 PROBLEM — R41.89 COGNITIVE CHANGES: Status: ACTIVE | Noted: 2020-11-18

## 2022-03-19 PROBLEM — M16.12 ARTHRITIS OF LEFT HIP: Status: ACTIVE | Noted: 2018-02-27

## 2022-04-07 PROBLEM — K59.01 CONSTIPATION BY DELAYED COLONIC TRANSIT: Status: ACTIVE | Noted: 2022-04-07

## 2022-04-11 PROBLEM — K59.01 CONSTIPATION BY DELAYED COLONIC TRANSIT: Status: ACTIVE | Noted: 2022-04-07

## 2022-06-21 RX ORDER — CARBIDOPA AND LEVODOPA 50; 200 MG/1; MG/1
TABLET, EXTENDED RELEASE ORAL
Qty: 180 TABLET | Refills: 3 | Status: SHIPPED | OUTPATIENT
Start: 2022-06-21

## 2022-07-11 ENCOUNTER — TELEPHONE (OUTPATIENT)
Dept: ORTHOPEDIC SURGERY | Age: 77
End: 2022-07-11

## 2022-07-11 DIAGNOSIS — N40.1 BENIGN PROSTATIC HYPERPLASIA WITH URINARY FREQUENCY: Primary | ICD-10-CM

## 2022-07-11 DIAGNOSIS — R35.0 BENIGN PROSTATIC HYPERPLASIA WITH URINARY FREQUENCY: Primary | ICD-10-CM

## 2022-07-13 ENCOUNTER — NURSE ONLY (OUTPATIENT)
Dept: UROLOGY | Age: 77
End: 2022-07-13

## 2022-07-13 ENCOUNTER — TELEPHONE (OUTPATIENT)
Dept: ORTHOPEDIC SURGERY | Age: 77
End: 2022-07-13

## 2022-07-13 DIAGNOSIS — N40.1 BENIGN PROSTATIC HYPERPLASIA WITH URINARY FREQUENCY: ICD-10-CM

## 2022-07-13 DIAGNOSIS — R35.0 BENIGN PROSTATIC HYPERPLASIA WITH URINARY FREQUENCY: ICD-10-CM

## 2022-07-13 LAB — PSA SERPL-MCNC: 1.1 NG/ML

## 2022-07-13 NOTE — TELEPHONE ENCOUNTER
I called the patient in regards to his appointment on the 19th. He can be moved to 7/14 at 10:25 am at [facility], if he returns my call.

## 2022-07-15 DIAGNOSIS — G20 PARKINSON'S DISEASE (HCC): Primary | ICD-10-CM

## 2022-07-15 RX ORDER — LEVODOPA AND CARBIDOPA 195; 48.75 MG/1; MG/1
CAPSULE, EXTENDED RELEASE ORAL
Qty: 360 CAPSULE | Refills: 3 | Status: SHIPPED | OUTPATIENT
Start: 2022-07-15

## 2022-07-15 RX ORDER — LEVODOPA AND CARBIDOPA 245; 61.25 MG/1; MG/1
CAPSULE, EXTENDED RELEASE ORAL
Qty: 720 CAPSULE | Refills: 3 | Status: SHIPPED | OUTPATIENT
Start: 2022-07-15

## 2022-07-19 ENCOUNTER — OFFICE VISIT (OUTPATIENT)
Dept: CARDIOLOGY CLINIC | Age: 77
End: 2022-07-19
Payer: MEDICARE

## 2022-07-19 VITALS
WEIGHT: 204.6 LBS | HEART RATE: 78 BPM | DIASTOLIC BLOOD PRESSURE: 62 MMHG | BODY MASS INDEX: 27.71 KG/M2 | SYSTOLIC BLOOD PRESSURE: 106 MMHG | HEIGHT: 72 IN

## 2022-07-19 DIAGNOSIS — I25.10 CORONARY ARTERY DISEASE INVOLVING NATIVE CORONARY ARTERY OF NATIVE HEART WITHOUT ANGINA PECTORIS: Primary | ICD-10-CM

## 2022-07-19 DIAGNOSIS — Z95.810 BIVENTRICULAR ICD (IMPLANTABLE CARDIOVERTER-DEFIBRILLATOR) IN PLACE: ICD-10-CM

## 2022-07-19 DIAGNOSIS — I25.5 ISCHEMIC CARDIOMYOPATHY: ICD-10-CM

## 2022-07-19 DIAGNOSIS — G90.3 NEUROGENIC ORTHOSTATIC HYPOTENSION (HCC): ICD-10-CM

## 2022-07-19 DIAGNOSIS — I10 PRIMARY HYPERTENSION: ICD-10-CM

## 2022-07-19 PROCEDURE — 93000 ELECTROCARDIOGRAM COMPLETE: CPT | Performed by: INTERNAL MEDICINE

## 2022-07-19 PROCEDURE — 3017F COLORECTAL CA SCREEN DOC REV: CPT | Performed by: INTERNAL MEDICINE

## 2022-07-19 PROCEDURE — G8428 CUR MEDS NOT DOCUMENT: HCPCS | Performed by: INTERNAL MEDICINE

## 2022-07-19 PROCEDURE — G8417 CALC BMI ABV UP PARAM F/U: HCPCS | Performed by: INTERNAL MEDICINE

## 2022-07-19 PROCEDURE — 1123F ACP DISCUSS/DSCN MKR DOCD: CPT | Performed by: INTERNAL MEDICINE

## 2022-07-19 PROCEDURE — 99214 OFFICE O/P EST MOD 30 MIN: CPT | Performed by: INTERNAL MEDICINE

## 2022-07-19 PROCEDURE — 4004F PT TOBACCO SCREEN RCVD TLK: CPT | Performed by: INTERNAL MEDICINE

## 2022-07-19 RX ORDER — SACUBITRIL AND VALSARTAN 24; 26 MG/1; MG/1
1 TABLET, FILM COATED ORAL 2 TIMES DAILY
Qty: 180 TABLET | Refills: 3 | Status: SHIPPED | OUTPATIENT
Start: 2022-07-19

## 2022-07-19 RX ORDER — CARVEDILOL 6.25 MG/1
6.25 TABLET ORAL 2 TIMES DAILY WITH MEALS
Qty: 180 TABLET | Refills: 3 | Status: SHIPPED | OUTPATIENT
Start: 2022-07-19

## 2022-07-19 ASSESSMENT — PATIENT HEALTH QUESTIONNAIRE - PHQ9
SUM OF ALL RESPONSES TO PHQ QUESTIONS 1-9: 0
1. LITTLE INTEREST OR PLEASURE IN DOING THINGS: 0
SUM OF ALL RESPONSES TO PHQ QUESTIONS 1-9: 0
2. FEELING DOWN, DEPRESSED OR HOPELESS: 0
SUM OF ALL RESPONSES TO PHQ QUESTIONS 1-9: 0
SUM OF ALL RESPONSES TO PHQ QUESTIONS 1-9: 0
SUM OF ALL RESPONSES TO PHQ9 QUESTIONS 1 & 2: 0

## 2022-07-19 ASSESSMENT — ENCOUNTER SYMPTOMS
COUGH: 0
SHORTNESS OF BREATH: 0

## 2022-07-19 NOTE — PROGRESS NOTES
[address], [address] [facility], [address] [place] [address]  PHONE: [phone]    Jenifer López.  1945      SUBJECTIVE:   Jenifer Hatfield is a 77 y.o. male seen for a follow up visit regarding the following:     Chief Complaint   Patient presents with    Cardiomyopathy     4 month visit and med review       HPI:    45-year-old male comes back for follow-up with history of a cardiomyopathy from 22 disease he has an ICD in place which has been stable. He has had progressive Parkinson's disease which is got worse since I last saw him and change some of his meds but is having to use more changes up to walk with at this time. He has not had any chest pain. Sometimes he has some loss of hearing left ear and some throbbing but no ringing in his ear. He has had problems with orthostatic hypotension fast but that is been stable we have him on Entresto      Past Medical History, Past Surgical History, Family history, Social History, and Medications were all reviewed with the patient today and updated as necessary. Allergies   Allergen Reactions    Promethazine Other (See Comments)     Confusion    Oxycodone Nausea And Vomiting     Past Medical History:   Diagnosis Date    Acquired spondylolisthesis 6/16/2015    Actinic keratosis 8/9/2016    Arthritis     generalized OA, managed with OTC medication     Back pain 8/9/2016    Basal cell carcinoma dx 2/2018    small pea-sized lesion on right side of nose    BPH (benign prostatic hypertrophy) 8/9/2016    Cardiomyopathy ([facility] [address] 08/02/2016    Followed by Dr. Cj Logan at [facility]     Chronic obstructive pulmonary disease [facility])     managed with inhalers-Followed by PCP, Dr. Cherri Harvey     Chronic renal insufficiency 08/09/2016    patient denies, states Creatinine elevated one time.      Controlled diabetes mellitus type II without complication ([address] 53/09/8471    Patient denies-states BS was elevated one time-patient does not take any medications for diabetes     COPD exacerbation ([facility] [address] 8/9/2016    COPD with emphysema (HCC)     Coronary artery disease involving native coronary artery of native heart without angina pectoris 8/2/2016    Dermatitis, seborrheic 8/9/2016    HARGROVE (dyspnea on exertion) 05/31/2016    due to COPD     Dyspnea     ED (erectile dysfunction) 8/9/2016    Elevated PSA 8/9/2016    Encounter for long-term (current) use of medications 8/9/2016    Enlarged prostate     managed with medication     Fatigue 8/9/2016    Finger swelling 8/9/2016    H/O echocardiogram 08/04/2020    LVEF 35-40%    Hiatal hernia     History of kidney stones     several with surgical interventions    Hyperlipidemia     Hypertension     managed with medication     Hypophonia with hoarseness 8/8/2016    Kidney problem     Kidney stone 06/2015    states he presently has 3 kidney stones monitored by Urology    Knee effusion 8/9/2016    Low vitamin B12 level 8/9/2016    Neurogenic bladder 8/9/2016    Obesity (BMI 30-39. 9)     BMI 34.5    Osteoarthritis 8/9/2016    Parkinson disease ([facility] [address]     Followed by Dr. Anastasia Simpson with medication     Presence of combination internal cardiac defibrillator (ICD) and pacemaker     Pt states he has never had a shock as of 12/21/20    Primary osteoarthritis of knee 8/9/2016    Prostatitis 8/9/2016    Pseudogout of hand 8/9/2016    Rosacea 8/9/2016    S/P placement of cardiac pacemaker 10/31/2016    Sciatica 8/9/2016    Seborrhea 8/9/2016    Sleep apnea     Does not use c-pap. C-pap broken.      Slurred speech 08/09/2016    per patient \"mostly in the evenings\" from parkinsons     SOB (shortness of breath) 08/09/2016    With exertion only     Spinal stenosis, lumbar region, with neurogenic claudication 6/16/2015    Syncope, near 8/9/2016    Tremor 8/8/2016    Urinary frequency 08/09/2016    controlled with medication     Urinary retention 8/9/2016    Urinary urgency 8/9/2016    Wears dentures     upper and lower    Wears glasses Past Surgical History:   Procedure Laterality Date    ANTERIOR CRUCIATE LIGAMENT REPAIR Right 13    APPENDECTOMY      age 22    CYSTOSTOMY      KNEE ARTHROSCOPY Right     LITHOTRIPSY  1998    x 2    LUMBAR LAMINECTOMY  06/2015    Laminectomy with fusion; lower back; with pins    PACEMAKER  10/31/2016    ICD/pacemaker    TOTAL HIP ARTHROPLASTY Left 2018    TOTAL KNEE ARTHROPLASTY Left 01/2021    UROLOGICAL SURGERY Right     open removal of kidney stones     Family History   Problem Relation Age of Onset    Diabetes Son     Heart Attack Son 45    Other Paternal Grandmother         Aneurysm    Heart Disease Paternal Uncle     Heart Disease Mother     Lung Disease Mother     Thyroid Disease Sister     Stroke Father     Heart Attack Father 52    Heart Disease Father     Hypertension Other     Diabetes Other         paternal uncle    Heart Attack Other 45        son    COPD Mother       Social History     Tobacco Use    Smoking status: Former     Packs/day: 1.50     Types: Cigarettes     Quit date: 7/12/2007     Years since quitting: 15.0    Smokeless tobacco: Never    Tobacco comments:     Quit smoking: stopped 2007   Substance Use Topics    Alcohol use: No       ROS:    Review of Systems   Constitutional: Negative for decreased appetite. Cardiovascular:  Negative for chest pain, dyspnea on exertion and leg swelling. Respiratory:  Negative for cough and shortness of breath. Hematologic/Lymphatic: Negative for bleeding problem. Neurological:  Positive for disturbances in coordination, loss of balance and tremors.          PHYSICAL EXAM:    /62   Pulse 78   Ht 6' (1.829 m)   Wt 204 lb 9.6 oz (92.8 kg) Comment: with shoes  BMI 27.75 kg/m²        Wt Readings from Last 3 Encounters:   07/19/22 204 lb 9.6 oz (92.8 kg)   04/07/22 212 lb 9.6 oz (96.4 kg)   03/29/22 207 lb (93.9 kg)     BP Readings from Last 3 Encounters:   07/19/22 106/62   04/07/22 124/73   03/29/22 (!) 118/56         Physical Exam  Constitutional:       General: He is not in acute distress. Neck:      Vascular: No carotid bruit. Cardiovascular:      Rate and Rhythm: Regular rhythm. Heart sounds: No murmur heard. No gallop. Pulmonary:      Effort: Pulmonary effort is normal.      Breath sounds: Normal breath sounds. Musculoskeletal:         General: No swelling. Skin:     General: Skin is warm. Neurological:      Mental Status: He is alert. Comments: Patient has a lot of motion and movement with his Parkinson's       Medical problems and test results were reviewed with the patient today. Results for orders placed or performed in visit on 07/19/22   EKG 12 Lead - Clinic Performed    Impression    Sinus rhythm 78 with BiV pacing     Lab Results   Component Value Date/Time     12/09/2021 11:38 AM    K 3.9 12/09/2021 11:38 AM     12/09/2021 11:38 AM    CO2 29 12/09/2021 11:38 AM    BUN 16 12/09/2021 11:38 AM    GFRAA >60 02/16/2022 11:09 AM         ASSESSMENT and Wendy Ramirez was seen today for cardiomyopathy. Diagnoses and all orders for this visit:    Coronary artery disease involving native coronary artery of native heart without angina pectoris. Patient will continue medical treatment EKG shows a paced rhythm  -     EKG 12 Lead - Clinic Performed    Cardiomyopathy (HCC)-he is currently stable on his dose of Entresto as pressure on the lower range can hardly increase it any further than that is not short of breath and has no edema his lungs sound clear today  -     EKG 12 Lead - Clinic Performed    Hypertension is currently stable at this time  -     EKG 12 Lead - Clinic Performed    Neurogenic orthostatic hypotension (HCC)    Biventricular ICD (implantable cardioverter-defibrillator) in place recent device check showed normal function and no shocks today's EKG shows BiV pacing    Other orders  -     sacubitril-valsartan (ENTRESTO) 24-26 MG per tablet;  Take 1 tablet by mouth in the morning and 1 tablet before bedtime. -     carvedilol (COREG) 6.25 MG tablet; Take 1 tablet by mouth in the morning and 1 tablet in the evening. Take with meals. [unfilled]      No follow-up provider specified.     Apurva Wing MD  7/19/2022  2:12 PM

## 2022-07-21 ENCOUNTER — TELEPHONE (OUTPATIENT)
Dept: UROLOGY | Age: 77
End: 2022-07-21

## 2022-08-09 ENCOUNTER — OFFICE VISIT (OUTPATIENT)
Dept: ORTHOPEDIC SURGERY | Age: 77
End: 2022-08-09
Payer: MEDICARE

## 2022-08-09 DIAGNOSIS — Z09 FOLLOW-UP EXAMINATION FOLLOWING SURGERY: Primary | ICD-10-CM

## 2022-08-09 PROCEDURE — G8417 CALC BMI ABV UP PARAM F/U: HCPCS | Performed by: ORTHOPAEDIC SURGERY

## 2022-08-09 PROCEDURE — 4004F PT TOBACCO SCREEN RCVD TLK: CPT | Performed by: ORTHOPAEDIC SURGERY

## 2022-08-09 PROCEDURE — 3017F COLORECTAL CA SCREEN DOC REV: CPT | Performed by: ORTHOPAEDIC SURGERY

## 2022-08-09 PROCEDURE — 1123F ACP DISCUSS/DSCN MKR DOCD: CPT | Performed by: ORTHOPAEDIC SURGERY

## 2022-08-09 PROCEDURE — G8428 CUR MEDS NOT DOCUMENT: HCPCS | Performed by: ORTHOPAEDIC SURGERY

## 2022-08-09 PROCEDURE — 99213 OFFICE O/P EST LOW 20 MIN: CPT | Performed by: ORTHOPAEDIC SURGERY

## 2022-08-09 NOTE — PROGRESS NOTES
Patient ID:  Jenifer López.  [id number]  77 y.o.  1945    Today: August 9, 2022    CHIEF COMPLAINT:  Follow-up right total knee replacement    HISTORY:  The patient presents today approximately 4 months after knee replacement. The patient is doing very well. The patient is able to perform most if not all required and desired activities. Continues to work on stretching and strengthening of the limb. Patient continues to do well with left TKA performed on 01/2021 by Dr. Charly Caba. Past Medical History:  Past Medical History:   Diagnosis Date    Acquired spondylolisthesis 6/16/2015    Actinic keratosis 8/9/2016    Arthritis     generalized OA, managed with OTC medication     Back pain 8/9/2016    Basal cell carcinoma dx 2/2018    small pea-sized lesion on right side of nose    BPH (benign prostatic hypertrophy) 8/9/2016    Cardiomyopathy ([address] 08/02/2016    Followed by Dr. Cj Logan at [facility]     Chronic obstructive pulmonary disease [facility])     managed with inhalers-Followed by PCP, Dr. Cherri Harvey     Chronic renal insufficiency 08/09/2016    patient denies, states Creatinine elevated one time.      Controlled diabetes mellitus type II without complication ([address] 64/94/6215    Patient denies-states BS was elevated one time-patient does not take any medications for diabetes     COPD exacerbation ([address] 8/9/2016    COPD with emphysema ([address]     Coronary artery disease involving native coronary artery of native heart without angina pectoris 8/2/2016    Dermatitis, seborrheic 8/9/2016    HARGROVE (dyspnea on exertion) 05/31/2016    due to COPD     Dyspnea     ED (erectile dysfunction) 8/9/2016    Elevated PSA 8/9/2016    Encounter for long-term (current) use of medications 8/9/2016    Enlarged prostate     managed with medication     Fatigue 8/9/2016    Finger swelling 8/9/2016    H/O echocardiogram 08/04/2020    LVEF 35-40%    Hiatal hernia     History of kidney stones     several with surgical interventions    Hyperlipidemia     Hypertension     managed with medication     Hypophonia with hoarseness 8/8/2016    Kidney problem     Kidney stone 06/2015    states he presently has 3 kidney stones monitored by Urology    Knee effusion 8/9/2016    Low vitamin B12 level 8/9/2016    Neurogenic bladder 8/9/2016    Obesity (BMI 30-39. 9)     BMI 34.5    Osteoarthritis 8/9/2016    Parkinson disease ([address]     Followed by Dr. Robin Rasheed with medication     Presence of combination internal cardiac defibrillator (ICD) and pacemaker     Pt states he has never had a shock as of 12/21/20    Primary osteoarthritis of knee 8/9/2016    Prostatitis 8/9/2016    Pseudogout of hand 8/9/2016    Rosacea 8/9/2016    S/P placement of cardiac pacemaker 10/31/2016    Sciatica 8/9/2016    Seborrhea 8/9/2016    Sleep apnea     Does not use c-pap. C-pap broken. Slurred speech 08/09/2016    per patient \"mostly in the evenings\" from parkinsons     SOB (shortness of breath) 08/09/2016    With exertion only     Spinal stenosis, lumbar region, with neurogenic claudication 6/16/2015    Syncope, near 8/9/2016    Tremor 8/8/2016    Urinary frequency 08/09/2016    controlled with medication     Urinary retention 8/9/2016    Urinary urgency 8/9/2016    Wears dentures     upper and lower    Wears glasses        Past Surgical History:  Past Surgical History:   Procedure Laterality Date    ANTERIOR CRUCIATE LIGAMENT REPAIR Right 13    APPENDECTOMY      age 22    CYSTOSTOMY      KNEE ARTHROSCOPY Right     LITHOTRIPSY  1998    x 2    LUMBAR LAMINECTOMY  06/2015    Laminectomy with fusion; lower back; with pins    PACEMAKER  10/31/2016    ICD/pacemaker    TOTAL HIP ARTHROPLASTY Left 2018    TOTAL KNEE ARTHROPLASTY Left 01/2021    UROLOGICAL SURGERY Right     open removal of kidney stones        Medications:     Prior to Admission medications    Medication Sig Start Date End Date Taking?  Authorizing Provider   sacubitril-valsartan Maximo Devine 24-26 MG per tablet Take 1 tablet by mouth in the morning and 1 tablet before bedtime. 7/19/22   Julio C Vuong MD   carvedilol (COREG) 6.25 MG tablet Take 1 tablet by mouth in the morning and 1 tablet in the evening. Take with meals.  7/19/22   Julio C Vuong MD   carbidopa-levodopa Fantasma Contes) 70. MG CPCR per extended release capsule Take 2 capsules at 8am, 12pm, 4pm and 8pm 7/15/22   Lis Plump, DO   Carbidopa-Levodopa ER (RYTARY) 76. MG CPCR Take 1 capsule at 8am, 12pm, 4pm and 8pm 7/15/22   Lis Plump, DO   carbidopa-levodopa (SINEMET CR)  MG per extended release tablet TAKE 2 TABLETS AT BEDTIME 6/21/22   Amara Avalos MD   MAGNESIUM PO Take by mouth daily    Ar Automatic Reconciliation   acetaminophen (TYLENOL) 500 MG tablet Take 1,000 mg by mouth every 6 hours 12/15/21   Ar Automatic Reconciliation   albuterol sulfate  (90 Base) MCG/ACT inhaler Inhale 2 puffs into the lungs every 4 hours as needed    Ar Automatic Reconciliation   allopurinol (ZYLOPRIM) 100 MG tablet 100 mg as needed 5/7/21   Ar Automatic Reconciliation   aspirin 81 MG EC tablet Take 81 mg by mouth 2 times daily 12/15/21   Ar Automatic Reconciliation   clonazePAM (KLONOPIN) 0.5 MG tablet 1 tab QHS 2/9/22   Ar Automatic Reconciliation   clotrimazole-betamethasone (LOTRISONE) 1-0.05 % cream Apply topically 2 times daily as needed    Ar Automatic Reconciliation   cyclobenzaprine (FLEXERIL) 5 MG tablet Take 5 mg by mouth 3 times daily as needed 12/15/21   Ar Automatic Reconciliation   doxycycline hyclate (VIBRA-TABS) 100 MG tablet Take 100 mg by mouth daily    Ar Automatic Reconciliation   fluticasone-salmeterol (ADVAIR) 250-50 MCG/DOSE AEPB Inhale 1 puff into the lungs daily 11/27/17   Ar Automatic Reconciliation   meloxicam (MOBIC) 15 MG tablet Take 15 mg by mouth daily 12/15/21   Ar Automatic Reconciliation   mirabegron (MYRBETRIQ) 25 MG TB24 Take 25 mg by mouth daily 2/21/22   Ar Automatic Reconciliation omeprazole (PRILOSEC) 40 MG delayed release capsule Take 40 mg by mouth daily 12/15/21   Ar Automatic Reconciliation   ondansetron (ZOFRAN-ODT) 8 MG TBDP disintegrating tablet Take 8 mg by mouth every 8 hours as needed 12/15/21   Ar Automatic Reconciliation   polyethylene glycol (GLYCOLAX) 17 GM/SCOOP powder Take 17 g by mouth daily    Ar Automatic Reconciliation   senna-docusate (PERICOLACE) 8.6-50 MG per tablet Take 1 tablet by mouth daily 12/15/21   Ar Automatic Reconciliation   tamsulosin (FLOMAX) 0.4 MG capsule Take 0.4 mg by mouth daily 8/26/20   Ar Automatic Reconciliation   tiotropium (SPIRIVA) 18 MCG inhalation capsule Inhale 18 mcg into the lungs 11/27/17   Ar Automatic Reconciliation       Family History:     Family History   Problem Relation Age of Onset    Diabetes Son     Heart Attack Son 45    Other Paternal Grandmother         Aneurysm    Heart Disease Paternal Uncle     Heart Disease Mother     Lung Disease Mother     Thyroid Disease Sister     Stroke Father     Heart Attack Father 52    Heart Disease Father     Hypertension Other     Diabetes Other         paternal uncle    Heart Attack Other 45        son    COPD Mother        Social History:      Social History     Tobacco Use    Smoking status: Former     Packs/day: 1.50     Types: Cigarettes     Quit date: 7/12/2007     Years since quitting: 15.0    Smokeless tobacco: Never    Tobacco comments:     Quit smoking: stopped 2007   Substance Use Topics    Alcohol use: No         Allergies: Allergies   Allergen Reactions    Promethazine Other (See Comments)     Confusion    Oxycodone Nausea And Vomiting          ROS:  Patient Health Form has been filled out, reviewed, signed and included in the chart. ROS findings related to musculoskeletal problems were discussed with the patient. Patient advised to discuss non-orthopedic complaints with primary care physician. PE:  Incision is examined which is well healed.   No erythema, induration or drainage. No significant fluid accumulation around the surgical site. ROM is 0-130 . Overall the knee appears stable to varus/valgus stress throughout arc of motion. Anterior drawer testing at 45 and 90º of flexion is stable. Posterior drawer testing is stable. Distally able to plantar and dorsiflex foot and ankle. Sensation intact. Limb is perfused. No sign of DVT. X-RAYS:  None      ASSESSMENT:  S/P Right Total Knee Replacement    PLAN:  Continue activity and weight bearing as tolerated. Continue to work on strengthening of the limb. May continue to take pain medication as directed. The patient has previously been given a script for antibiotics to be taken before dental prophylaxis today. I will plan to check them back for 1 year follow after total knee replacement.           Signed By: KENDALL Ryder - CNP  August 9, 2022

## 2022-08-11 ENCOUNTER — OFFICE VISIT (OUTPATIENT)
Dept: NEUROLOGY | Age: 77
End: 2022-08-11
Payer: MEDICARE

## 2022-08-11 VITALS
DIASTOLIC BLOOD PRESSURE: 64 MMHG | HEIGHT: 72 IN | WEIGHT: 204 LBS | HEART RATE: 78 BPM | SYSTOLIC BLOOD PRESSURE: 124 MMHG | BODY MASS INDEX: 27.63 KG/M2

## 2022-08-11 DIAGNOSIS — G47.52 RBD (REM BEHAVIORAL DISORDER): ICD-10-CM

## 2022-08-11 DIAGNOSIS — Z79.899 ENCOUNTER FOR LONG-TERM (CURRENT) USE OF HIGH-RISK MEDICATION: ICD-10-CM

## 2022-08-11 DIAGNOSIS — G90.3 NEUROGENIC ORTHOSTATIC HYPOTENSION (HCC): ICD-10-CM

## 2022-08-11 DIAGNOSIS — G20 PSYCHOSIS DUE TO PARKINSON'S DISEASE (HCC): ICD-10-CM

## 2022-08-11 DIAGNOSIS — R13.19 DYSPHAGIA, NEUROLOGIC: ICD-10-CM

## 2022-08-11 DIAGNOSIS — K59.01 CONSTIPATION BY DELAYED COLONIC TRANSIT: ICD-10-CM

## 2022-08-11 DIAGNOSIS — R25.1 TREMOR: ICD-10-CM

## 2022-08-11 DIAGNOSIS — G20 PARKINSON'S DISEASE (HCC): Primary | ICD-10-CM

## 2022-08-11 DIAGNOSIS — R49.8 HYPOPHONIA: ICD-10-CM

## 2022-08-11 PROCEDURE — 1036F TOBACCO NON-USER: CPT | Performed by: PSYCHIATRY & NEUROLOGY

## 2022-08-11 PROCEDURE — 3017F COLORECTAL CA SCREEN DOC REV: CPT | Performed by: PSYCHIATRY & NEUROLOGY

## 2022-08-11 PROCEDURE — G8427 DOCREV CUR MEDS BY ELIG CLIN: HCPCS | Performed by: PSYCHIATRY & NEUROLOGY

## 2022-08-11 PROCEDURE — 1123F ACP DISCUSS/DSCN MKR DOCD: CPT | Performed by: PSYCHIATRY & NEUROLOGY

## 2022-08-11 PROCEDURE — 99215 OFFICE O/P EST HI 40 MIN: CPT | Performed by: PSYCHIATRY & NEUROLOGY

## 2022-08-11 PROCEDURE — G8417 CALC BMI ABV UP PARAM F/U: HCPCS | Performed by: PSYCHIATRY & NEUROLOGY

## 2022-08-11 RX ORDER — CARBIDOPA AND LEVODOPA 50; 200 MG/1; MG/1
TABLET, EXTENDED RELEASE ORAL
Qty: 180 TABLET | Refills: 3 | Status: SHIPPED | OUTPATIENT
Start: 2022-08-11

## 2022-08-11 RX ORDER — QUETIAPINE FUMARATE 25 MG/1
TABLET, FILM COATED ORAL
Qty: 90 TABLET | Refills: 3 | Status: SHIPPED | OUTPATIENT
Start: 2022-08-11 | End: 2022-11-02 | Stop reason: SDUPTHER

## 2022-08-11 ASSESSMENT — ENCOUNTER SYMPTOMS
EYES NEGATIVE: 1
RESPIRATORY NEGATIVE: 1
ALLERGIC/IMMUNOLOGIC NEGATIVE: 1

## 2022-08-29 ENCOUNTER — TELEPHONE (OUTPATIENT)
Dept: CARDIOLOGY CLINIC | Age: 77
End: 2022-08-29

## 2022-08-29 NOTE — TELEPHONE ENCOUNTER
Chris Pane on his chest and has a pacemaker. Hurting really bad where the pacemaker is.  Please call

## 2022-09-28 ENCOUNTER — TELEPHONE (OUTPATIENT)
Dept: NEUROLOGY | Age: 77
End: 2022-09-28

## 2022-09-28 DIAGNOSIS — G20 PARKINSON'S DISEASE (HCC): Primary | ICD-10-CM

## 2022-09-28 NOTE — TELEPHONE ENCOUNTER
Patient reports that he is having a lot of issues recently. He states that in the last 1-2 months he has begun falling a lot. He says he is not sleeping well & is fatigued a lot which is causing him to feel like he can barely walk. He states the only change that was made was that his dosage of medication with Dr. Pablo Paniagua was adjusted at his last visit. Other than that he reports no other changes.

## 2022-09-29 NOTE — TELEPHONE ENCOUNTER
Oksana Guzman MD  You 22 hours ago (5:08 PM)     KW  I had explained that after his knee surgery he would need save PT time for balance therapy. PT at ortho do not do balance and he will need to see Collin Cee here for that. Please send a referral for evaluation. Also he does not have a follow up appt since I saw him in August. He will need to get an appt set. Check BP and make sure his dose is not wearing off between doses. Patient informed. Referral for PT sent.

## 2022-10-04 ENCOUNTER — TELEPHONE (OUTPATIENT)
Dept: NEUROLOGY | Age: 77
End: 2022-10-04

## 2022-10-17 PROCEDURE — 93295 DEV INTERROG REMOTE 1/2/MLT: CPT | Performed by: INTERNAL MEDICINE

## 2022-10-17 PROCEDURE — 93296 REM INTERROG EVL PM/IDS: CPT | Performed by: INTERNAL MEDICINE

## 2022-10-31 ENCOUNTER — OFFICE VISIT (OUTPATIENT)
Dept: NEUROLOGY | Age: 77
End: 2022-10-31
Payer: MEDICARE

## 2022-10-31 DIAGNOSIS — R53.1 GENERALIZED WEAKNESS: ICD-10-CM

## 2022-10-31 DIAGNOSIS — R29.6 FALLS FREQUENTLY: ICD-10-CM

## 2022-10-31 DIAGNOSIS — R26.81 UNSTEADINESS ON FEET: ICD-10-CM

## 2022-10-31 DIAGNOSIS — G20 PARKINSON'S DISEASE (HCC): Primary | ICD-10-CM

## 2022-10-31 DIAGNOSIS — Z91.81 AT HIGH RISK FOR FALLS: ICD-10-CM

## 2022-10-31 DIAGNOSIS — R26.9 GAIT ABNORMALITY: ICD-10-CM

## 2022-10-31 DIAGNOSIS — R29.3 ABNORMAL POSTURE: ICD-10-CM

## 2022-10-31 DIAGNOSIS — Z74.09 IMPAIRED TRANSFERS: ICD-10-CM

## 2022-10-31 PROCEDURE — 97161 PT EVAL LOW COMPLEX 20 MIN: CPT

## 2022-10-31 NOTE — PROGRESS NOTES
[address] [facility],5Th [address]  2 [facility] [address]  Dept: [phone]        Physical Therapy Initial Assessment     Referring MD: Lovely Wan MD  Diagnosis:     ICD-10-CM    1. Parkinson's disease ([facility] [address]  G20       2. Gait abnormality  R26.9       3. Unsteadiness on feet  R26.81       4. Generalized weakness  R53.1       5. At high risk for falls  Z91.81       6. Falls frequently  R29.6       7. Impaired transfers  Z74.09       8. Abnormal posture  R29.3          Therapy precautions:Fall risk and Gait belt for dynamic standing activity  Co-morbidities affecting plan of care: R>L shoulder dysfunction  Total Timed Procedure Codes: 0 min, Total Time: 60 min      CLINICAL DECISION MAKING/ASSESSMENT     Personal Factors/co-morbidities affecting POC (1-2 Medium/3+High): behavioral patterns  cognitive issues  habits/routines  co-morbidities as previously noted   Problem List: (1-2 Low/ 3 Medium/ 4+ High) Strength deficits  Motor control deficits  Impaired posture  Impaired transfers  Impaired gait  Impaired balance/proprioception  Decreased endurance/activity Tolerance  Decreased [place] with Home Exercise Program    Clinical decision making: low complexity with therapist able to easily and confidently determine and predict expectations and future outcomes for the POC. Prognosis: good   Benefits and precautions of treatment explained to patient. River Maharaj is a 77 y.o. male who presents to therapy today with c/o significant gait and balance deterioration over the last 6-9 months. He has had some adjustments made to his PD medications at his last neurology appointment as some of his gait/balance deficits appeared related to a wearing off effect. Unfortunately, Mr. Lisa Moe reports only mild improvement in his gait and balance since these medications.   During his PT evaluation today, he has found to have bilateral hip and core weakness, difficulty balancing on dynamic surfaces(especially with eyes closed), and moderate gait deviations with use of bilateral forearm crutches. Based on his performance today, he is considered to be at an increased risk for falls. It was discussed that a rollator is needed for ambulation instead of forearm crutches that he must sequence. Discussed the cognitive demand and negative effect on his gait pattern and safety; pt agreeable to this. In addition to the observed motor deficits during his evaluation today, he and his wife also described poor sleeping patterns that could also be impacting his mobility. This was discussed with his neurologist with plans to monitor sleep patterns while participating in PT and determine if MD needs to address it. Participation in PT is medically necessary at this time to decrease his fall risk. PLAN OF CARE     Effective Dates: 10/31/2022 TO 1/31/2023 (90 days). Frequency/Duration: 2x/week for 90 Day(s)  Interventions  may include but are not limited to: (72179) Therapeutic exercise to develop ROM, strength, endurance and flexibility  (75944) Therapeutic activities using dynamic activities to improve function  (30233) Gait training to address mechanics, proper step length and weight shifting to improve household and community mobility as well as overall safety with ADLs  (36898) Manual therapy techniques to improve joint and/or soft tissue mobility, ROM, and function as well as helping to decrease pain/spasms and swelling  (84224) Neuromuscular reeducation addressing impaired balance, coordination, kinesthetic sense, posture and proprioception  (15186/46227) Dry needling for the management of neuromusculoskeletal pain and movement impairment  Home exercise program (HEP) development    The referring physician has reviewed and approved this evaluation and plan of care as noted by the electronic signature attached to note.       GOALS      Long-term Goals:    Pt will report compliance with HEP at least 4 days per week to maximize benefits of participation in skilled outpatient physical therapy. Pt will ambulate with rollator >500 ft with Saba and good stability with turns in open and more confined spaces to decrease fall risk for household and limited community ambulation. Pt will improve LE and core strength to allow for completion of 5x sit<>stand test in less than 20 seconds to decrease fall risk. Pt will improve standing balance on dynamic surfaces to decrease fall risk as noted by a score >110/120 on the modified CTSIB. PERTINENT MEDICAL HISTORY     Past medical and surgical history:   Past Medical History:   Diagnosis Date    Acquired spondylolisthesis 6/16/2015    Actinic keratosis 8/9/2016    Arthritis     generalized OA, managed with OTC medication     Back pain 8/9/2016    Basal cell carcinoma dx 2/2018    small pea-sized lesion on right side of nose    BPH (benign prostatic hypertrophy) 8/9/2016    Cardiomyopathy ([address] 08/02/2016    Followed by Dr. Mia Pemberton at [facility]     Chronic obstructive pulmonary disease [facility])     managed with inhalers-Followed by PCP, Dr. Azael Melgoza     Chronic renal insufficiency 08/09/2016    patient denies, states Creatinine elevated one time.      Controlled diabetes mellitus type II without complication ([address] 75/62/5450    Patient denies-states BS was elevated one time-patient does not take any medications for diabetes     COPD exacerbation ([address] 8/9/2016    COPD with emphysema ([facility] [address]     Coronary artery disease involving native coronary artery of native heart without angina pectoris 8/2/2016    Dermatitis, seborrheic 8/9/2016    HARGROVE (dyspnea on exertion) 05/31/2016    due to COPD     Dyspnea     ED (erectile dysfunction) 8/9/2016    Elevated PSA 8/9/2016    Encounter for long-term (current) use of medications 8/9/2016    Enlarged prostate     managed with medication     Fatigue 8/9/2016    Finger swelling 8/9/2016    H/O echocardiogram 08/04/2020 LVEF 35-40%    Hiatal hernia     History of kidney stones     several with surgical interventions    Hyperlipidemia     Hypertension     managed with medication     Hypophonia with hoarseness 8/8/2016    Kidney problem     Kidney stone 06/2015    states he presently has 3 kidney stones monitored by Urology    Knee effusion 8/9/2016    Low vitamin B12 level 8/9/2016    Neurogenic bladder 8/9/2016    Obesity (BMI 30-39. 9)     BMI 34.5    Osteoarthritis 8/9/2016    Parkinson disease ([address]     Followed by Dr. Nadeen Contreras with medication     Presence of combination internal cardiac defibrillator (ICD) and pacemaker     Pt states he has never had a shock as of 12/21/20    Primary osteoarthritis of knee 8/9/2016    Prostatitis 8/9/2016    Pseudogout of hand 8/9/2016    Rosacea 8/9/2016    S/P placement of cardiac pacemaker 10/31/2016    Sciatica 8/9/2016    Seborrhea 8/9/2016    Sleep apnea     Does not use c-pap. C-pap broken. Slurred speech 08/09/2016    per patient \"mostly in the evenings\" from parkinsons     SOB (shortness of breath) 08/09/2016    With exertion only     Spinal stenosis, lumbar region, with neurogenic claudication 6/16/2015    Syncope, near 8/9/2016    Tremor 8/8/2016    Urinary frequency 08/09/2016    controlled with medication     Urinary retention 8/9/2016    Urinary urgency 8/9/2016    Wears dentures     upper and lower    Wears glasses       Past Surgical History:   Procedure Laterality Date    ANTERIOR CRUCIATE LIGAMENT REPAIR Right 1991    APPENDECTOMY      age 22    CYSTOSTOMY      KNEE ARTHROSCOPY Right     LITHOTRIPSY  1998    x 2    LUMBAR LAMINECTOMY  06/2015    Laminectomy with fusion; lower back; with pins    PACEMAKER  10/31/2016    ICD/pacemaker    TOTAL HIP ARTHROPLASTY Left 2018    TOTAL KNEE ARTHROPLASTY Left 01/2021    UROLOGICAL SURGERY Right     open removal of kidney stones     Medications: reviewed in chart   Allergies:    Allergies   Allergen Reactions Promethazine Other (See Comments)     Confusion    Oxycodone Nausea And Vomiting        SUBJECTIVE     Chief complaints/history of injury: Diagnosed with Parkinson's disease in 2015 with tremor being the predominant feature at onset. Sinement CR added at last neurology appointment to help with wearing offf however pt reporting only slight improvement in gait/balance. Reports there are days of now falls and days where he has multiple falls. Wife reports he almost had 2 falls coming to his appointment today. Wife reports she can tell a difference in his abilities when he sleeps well versus sleeps poorly. Wife reports over the past 6-12 months his falling frequency has increased. Reports he has had bilateral knees and one hip replaced. Says his most recent knee replacement was in 1/2022 but reports he recovered well. It was after he recovered this is when he started noticing changes. Says over the past year he has started having hallucinations and this appears to be related to the change in his gait/balance. Reports decline in memory/thinking that started ~2-3 years ago, initially just word finding issues. Wife reports this past year \"the memory is getting less and less. \"    Current PD medication:  Rytary -- 8:00, 12:00, 4:00 and 8:00 (wife: \"When he remembers\")  Sinemet CR -- 12:00 PM    Last bout of PT for Parkinson's disease was years ago. Last bout of PT was following R TKA in 1/2022. Diagnostic exams (per chart review): n/a    Pain Assessment:  Pain location: none      Social/Functional Hx: How would you rate your overall health? fair  Pt lives with independent spouse in a(n) 2+ story house with  ramp to enter and 1st floor set-up .    Current DME:  forearm crutches, RW, rollator, standard walker, manual w/c  Work Status: Retired   Sleep:  wife reports he frequently wakes to acting out dreams    CLOF PLOF   ADLs: indep with most bathing,dressing and grooming but takes increased time to perform; no driving as wife does not allow ADLs: --   Gait: uses forearm crutches or trekking poles with community ambulation since 1/2022 as recommended by PT --> previously using rollator but he broke a wheel; most falls occur with walking; wife reports he trips over his feet sometimes(told his left ankle is weak); has tripped over his crutch on the L side once resulting in fall; uses RW with all household ambulation; wife reports average of 1-2 falls per week Gait: --   Bed mobility/Transfer: increased time with bed mobility--> uses bed cane; has to rock to get up from a chair Bed Mobility/Transfers: --   Exercise: not currently exercising Exercise: --   Occupation / Hobbies: \"walk a little bit\" Occupation / Diane Dill: 20 years in the [employer]; long walks with neighbors dog       Patient Stated Goals: Stop falling and better balance    OBJECTIVE   HAND/SIDE DOMINANCE: right handed      SENSATION:    Light touch:   Right Left   Impaired C4 Impaired C4      Proprioception:Intact      SKIN INTEGRITY: heeling would L elbow    NEUROMUSCULAR/MOTOR:  MUSCLE TONE: Rigidity:   Right Left Comments   UE: 0 0    LE: 0 0    Axial:           COORDINATION:    Right Left   Heel to shin test     Finger-to-nose test     Unanchored heel-toe test 9.5 9.5   LE Square tracing     Finger tapping (MDS-UPDRS)     Hand movements (MDS-UPDRS)  1 1   Toe tapping (MDS-UPDRS) 1 1   Leg agility (MDS-UPDRS)     Comments: Slightly diminished but good fluidity of isolated extremity movements       VISION:   Pursuits: appropriate  Saccades: appropriate  VOR: appropriate    POSTURE:  head/neck dyskinesias noted in sitting      EXTREMITIES STRENGTH / ROM:  LE STRENGTH/ROM:    Strength PROM (deg)   Action Right Left Right Left   Hip Flex 4 4     Hip Ext 4 4     Hip Abd 4 4     Hip Add 5 5     Knee Flexion 5 5     Knee Ext. 5 5     Ankle DF 5 4     Ankle PF       Ankle Inv       Ankle Ev 5 4     **Ankle DF ROM represented as knee (flexed/extended)    UE STRENGTH/ROM:    Strength PROM   Action Right Left     Shoulder flex 2+ 2     Shoulder abd 2 4     Shoulder IR 3 4     Shoulder ER 3 4     Elbow flex 4 5     Elbow ext 4 5     Wrist flex       Wrist ext                    TRANSFERS AND BED MOBILITY:      Functional Status     Independent   Modified Maikel- dent   Stand-by Assist   Contact Guard   Minimal Assist   Moderate Assist   Maximum Assist   Total Assist   Comments   Supine > L S/L []  []  []  []  []  []  []  []  NT   Supine >R S/L []  []  []  []    []    []    []  []  NT   Supine<>prone []  []  []  []  []  []  []  []  NT   Supine > sit []  []  []  []  []  []  []  []  NT   Sit > supine []  []  []  []  []  []  []  []  NT   Sit > stand []  []  []  []  []  []  []  []  Uses UE support   Stand > sit []  []  []  []  []  []  []  []  Uses UE support   Bed > chair transfers []  []  []  []  []  []  []  []  Uses UE support     Standardized Functional Mobility Assessments:  1. Five Time Sit to Stand Test:    Time Interpretation   27.28 seconds without UE support Cut off score of 16 seconds discriminates fallers from non-fallers  Estimated values for normal performance in community dwelling older adults (Gonzalo, 2006)  19-49 years: 4.1-11.5 seconds  50-59 years: 4.4-9.1 seconds  60-69 years: 4.0-15.1 seconds   70-79 years: 4.5-15.5 seconds  80-89 years: 7.8-16.0 seconds       BALANCE:       Balance   Good   Fair   Poor   Unable   Comments   Sitting static [x]  []  []  []     Sitting dynamic [x]  []  []  []     Standing static []  [x]  []  []     Standing dynamic []  []  [x]  []     Reaction Time []  [x]  []  []       Standardized Balance Assessments:  1.  Modified Clinical Test of Sensory Interaction in Balance(CTSIB-M):   Condition One: Eyes Open, Firm Surface Time    Trial One 30 / 30 seconds    Trial Two -- / 30 seconds   Condition Two: Eyes Closed, Firm Surface     Trial One 16.75 / 30 seconds    Trial Two -- / 30 seconds   Condition Three: Eyes Open,  Foam Surface     Trial One 26.96 / 30 seconds    Trial Two -- / 30 seconds   Condition Four: Eyes Closed, Foam Surface     Trial One 0.91 / 30 seconds    Trial Two -- / 30 seconds     Total: 74.62 / 120   **High scores indicated better balance. GAIT /  MOBILITY:  Gait: With use of bilateral forearm crutches, narrow FREDDY, downward gaze, inconsistently appropriate L crutch sequencing, R pelvic drop during R stance phase    --Walking While Talking Test:  0.56 m/sec    Stairs:   NT      Standardized Gait Assessments:  1. 10 Meter Walk Test (\"gait speed\"):   Self- selected Fast Interpretation   0.59 m/sec NT m/sec Normal Values:      Self-Selected Velocity      Men Women     20s 1.36 1.34     30s 1.43 1.34     40s 1.43 1.39     50s 1.43 1.31     60s 1.33 1.24     70s 1.26 1.13     80s 0.97 0.94   **Parkinson's Disease MDC = SS - 0.18 m/sec, F - 0.25 m/sec. Cliff Molina and Luz, 2008)  **Geriatric MCID = SS - 0.13 m/sec Chon Wheat et al., 2006)  **Stroke MCID = SS - 0.14 m/sec Chon Wheat et al., 2006)  **SCI MCID = SS - 0.15 m/sec, F - 0.25 m/sec (Finn and Chano, 2004)  2. Timed Up and Go(TUG) test:   Basic - 18.79 seconds with bilateral forearm crutches (Greater than 11.5 seconds associated with increased risk for falling in people living with PD - Bre et al., 2011) (**Parkinson's Disease MDC = 4.85 points; Christy Terrazas et al., 2011, PD)  Cognitive - 21.25 seconds with forearm crutches and 2 errors(serial retro-counting by 3's)(Greater than 15 seconds associated with increased risk for falls in older adults - Roderick et al., 2000)  Manual --- seconds (Greater than a 4.5 second difference between TUG manual and TUG basic indicates an increased risk for falls - Andreia Merrill et al, 2011)        PARKINSON'S SPECIFIC STANDARDIZED ASSESSMENTS:  1.  Parkinson's Disease Questionnaire - 39 (PDQ-39):   Section Sub-score Minimal Detectable Change Scores   Mobility (items 1-10) 27 /40 12.24   ADL (items 11-16) 12 /24 16.72 Emotional Well-Being (items 17-22) 9 /24 14.22   Stigma (items 23-26) 3 /16 21.21   Social Support (items 27-29) 6 /12 25.50   Cognition (items 30-33) 10 /12 22.12   Communication (items 34-36) 6 /12 21.04   Bodily Discomfort (items 37-39) 5 /12 24.48     2. Parkinson's Fatigue Scale - 16 (PFS-16):  68  (Higher scores associated with greater impact of fatigue on daily activities.)  3. Stages of Readiness for Change: Contemplation -- need to increase the likelihood of taking steps to become physically active  4. Parkinson's Disease Exercise Self-Assessment: Not administered. COMMUNICATION/EDUCATION:   Educated pt and his wife on the roles/goals of outpatient neurological physical therapy with both verbalizing understanding and agreeable to proceeding.

## 2022-11-01 ENCOUNTER — TELEPHONE (OUTPATIENT)
Dept: NEUROLOGY | Age: 77
End: 2022-11-01

## 2022-11-01 DIAGNOSIS — G20 PARKINSON'S DISEASE (HCC): Primary | ICD-10-CM

## 2022-11-01 NOTE — TELEPHONE ENCOUNTER
Patient states he has had hallucinations for the past few months but the past 4-5 days they have gotten worse. He is currently taking Seroquel 25mg 1 tab at bedtime. One night he took 2 tabs and it did help the hallucinations. He is also having a lot of falls but he saw Yulissa Thompson yesterday for a PT eval and has his next 2 appointments set up with him.

## 2022-11-01 NOTE — TELEPHONE ENCOUNTER
Pt called and stated that he talked with Carlos A Fraire several times regarding having falls and losing balance. Pt is also experiencing hallucination problems. Pt is needing a nurse callback regarding medication.

## 2022-11-02 ENCOUNTER — TELEPHONE (OUTPATIENT)
Dept: NEUROLOGY | Age: 77
End: 2022-11-02

## 2022-11-02 RX ORDER — QUETIAPINE FUMARATE 25 MG/1
TABLET, FILM COATED ORAL
Qty: 180 TABLET | Refills: 3 | Status: SHIPPED | OUTPATIENT
Start: 2022-11-02

## 2022-11-02 NOTE — TELEPHONE ENCOUNTER
Mynor Hope MD  You 27 minutes ago (9:30 AM)     [facility]  He needs to increase to 2 tabs and adjust the Rx please        LVM informing patient.

## 2022-11-04 DIAGNOSIS — Z95.810 BIVENTRICULAR ICD (IMPLANTABLE CARDIOVERTER-DEFIBRILLATOR) IN PLACE: ICD-10-CM

## 2022-11-04 DIAGNOSIS — I42.9 CARDIOMYOPATHY (HCC): Primary | ICD-10-CM

## 2022-11-04 DIAGNOSIS — I42.9 CARDIOMYOPATHY (HCC): ICD-10-CM

## 2022-11-08 ENCOUNTER — OFFICE VISIT (OUTPATIENT)
Dept: NEUROLOGY | Age: 77
End: 2022-11-08
Payer: MEDICARE

## 2022-11-08 DIAGNOSIS — G20 PARKINSON'S DISEASE (HCC): ICD-10-CM

## 2022-11-08 DIAGNOSIS — Z74.09 IMPAIRED TRANSFERS: ICD-10-CM

## 2022-11-08 DIAGNOSIS — Z91.81 AT HIGH RISK FOR FALLS: ICD-10-CM

## 2022-11-08 DIAGNOSIS — R26.9 GAIT ABNORMALITY: ICD-10-CM

## 2022-11-08 DIAGNOSIS — R53.1 GENERALIZED WEAKNESS: ICD-10-CM

## 2022-11-08 DIAGNOSIS — R29.3 ABNORMAL POSTURE: ICD-10-CM

## 2022-11-08 DIAGNOSIS — R26.9 GAIT ABNORMALITY: Primary | ICD-10-CM

## 2022-11-08 DIAGNOSIS — R26.81 UNSTEADINESS ON FEET: ICD-10-CM

## 2022-11-08 DIAGNOSIS — R29.6 FALLS FREQUENTLY: ICD-10-CM

## 2022-11-08 DIAGNOSIS — G20 PARKINSON'S DISEASE (HCC): Primary | ICD-10-CM

## 2022-11-08 PROCEDURE — 97110 THERAPEUTIC EXERCISES: CPT

## 2022-11-08 PROCEDURE — 97530 THERAPEUTIC ACTIVITIES: CPT

## 2022-11-08 NOTE — PROGRESS NOTES
[address] [facility],[address]  2 [facility] [place] [address] [address]  Dept: [phone]        Physical Therapy Daily Note     Referring MD: Enio Vergara MD  Diagnosis:     ICD-10-CM    1. Parkinson's disease ([facility] [address]  G20       2. Gait abnormality  R26.9       3. Unsteadiness on feet  R26.81       4. Generalized weakness  R53.1       5. At high risk for falls  Z91.81       6. Falls frequently  R29.6       7. Impaired transfers  Z74.09       8. Abnormal posture  R29.3          Therapy precautions:Fall risk and Gait belt for dynamic standing activity  Co-morbidities affecting plan of care: R>L shoulder dysfunction  Total Timed Procedure Codes: 60 min, Total Time: 60 min  Visit Count: 2                                          Visits since last evaluative note: 1  POC Expiration: 1/31/2023    ASSESSMENT:     Dual tasking interference with ambulation makes it unsafe for Mr. Jose Booth to ambulate with a device that requires sequencing. With that said, a rollator was trialed today with good performance and improvements in fluidity however training will be needed to help prevent anteropulsive festination, instability with turns and negotiating obstacles. PLAN OF CARE:   PLAN FOR FUTURE VISITS:   Progress gait stability with use of rollator, standing balance LE functional strength. SUBJECTIVE:     Pt arrived wearing L carbon fiber AFO that he reports getting in the past when participating in PT. Says he was wearing this AFO some before his knee surgery however reports he is not doing as much walking for exercise now and has not returned to wearing it. Pt reports 2-3 falls since his last visit with most of them ocurring when walking with RW and x 1 when switching from crutches to walker. OBJECTIVE:     THERAPEUTIC EXERCISE: (25 minutes):    LE strengthening to establish HEP:  Hooklying bridging 2 x 12 reps  Hooklying clamshells wit RTB, 2 x 12 reps.   Supine hip flexion with RTB around knees, 2 x 12 reps  Supine unilateral hip abduction with RTB around knees, 2 x 12 reps. Seated anti-rotation press, 2 x 10 reps     THERAPEUTIC ACTIVITY: ( 35 minutes):    Sit<>stand repeats from raised plinth without UE support  23\" height x 5 reps with SBA.  21\" height x 5 reps with CGA  20\" height, 2 x 5 reps with CGA to Velia on last rep. Standing functional reach to [facility]  Comfortable stance with gloria-lateral reach, 2 x 15 reps with CGA. Romberg stance with gloria-lateral reach, 2 x 15 reps with CGA. Airex pad with gloria-lateral reach, 2 x 15 reps with CGA. Overground with clinic rollator throughout clinic with VC to keep rollator close, especially with turns; ~120 ft and 80 ft with seated rest between bouts, SBA. PATIENT EDUCATION:     M[employer]y [employer] HEP     Patient was issued a hard copy of their HEP which they demonstrated with adequate form and technique. Tactile and verbal cuing provided to facilitate proper form and technique. [employer] was used to create this HEP. In addition to the hard copy of this HEP, the patient was also educated on accessing their personalized HEP virtually through the [employer] website or the mobile marian.       HEP Access Code: [email or web address]

## 2022-11-14 ENCOUNTER — OFFICE VISIT (OUTPATIENT)
Dept: NEUROLOGY | Age: 77
End: 2022-11-14
Payer: MEDICARE

## 2022-11-14 DIAGNOSIS — R29.6 FALLS FREQUENTLY: ICD-10-CM

## 2022-11-14 DIAGNOSIS — G20 PARKINSON'S DISEASE (HCC): Primary | ICD-10-CM

## 2022-11-14 DIAGNOSIS — R53.1 GENERALIZED WEAKNESS: ICD-10-CM

## 2022-11-14 DIAGNOSIS — R26.9 GAIT ABNORMALITY: ICD-10-CM

## 2022-11-14 DIAGNOSIS — Z91.81 AT HIGH RISK FOR FALLS: ICD-10-CM

## 2022-11-14 DIAGNOSIS — Z74.09 IMPAIRED TRANSFERS: ICD-10-CM

## 2022-11-14 DIAGNOSIS — R26.81 UNSTEADINESS ON FEET: ICD-10-CM

## 2022-11-14 DIAGNOSIS — R29.3 ABNORMAL POSTURE: ICD-10-CM

## 2022-11-14 PROCEDURE — 97530 THERAPEUTIC ACTIVITIES: CPT

## 2022-11-14 PROCEDURE — 97116 GAIT TRAINING THERAPY: CPT

## 2022-11-14 NOTE — PROGRESS NOTES
[address] [facility],5[address]  [address] [address]  Dept: [phone]        Physical Therapy Daily Note     Referring MD: Pauline Dotson MD  Diagnosis:     ICD-10-CM    1. Parkinson's disease ([address]  G20       2. Gait abnormality  R26.9       3. Unsteadiness on feet  R26.81       4. Generalized weakness  R53.1       5. At high risk for falls  Z91.81       6. Falls frequently  R29.6       7. Impaired transfers  Z74.09       8. Abnormal posture  R29.3            Therapy precautions:Fall risk and Gait belt for dynamic standing activity  Co-morbidities affecting plan of care: R>L shoulder dysfunction  Total Timed Procedure Codes: 60 min, Total Time: 60 min  Visit Count: 3                                          Visits since last evaluative note: 2  POC Expiration: 1/31/2023    ASSESSMENT:     Pt arrived to clinic today with use of RW in place of forearm crutches, which were used at his previous visits. Though his stability was better with the RW, the fluidity of his stepping was not as good. When comparing clinic rollator to personal RW, Mr. Modesta Ramirez demonstrated similar stability but improved fluidity with turning. He needed some VC to slow speed and keep rollator closer than he self-selects. He was c/o increased LE fatigue today however oxygen saturation and HR appeared appropriate throughout his visit. Cognitive changes with could be contributing to his leg fatigue in addition to his mobility issues. PLAN OF CARE:   PLAN FOR FUTURE VISITS:   Progress gait stability with use of rollator, standing balance LE functional strength. SUBJECTIVE:     Pt reports his legs have been giving out on him. Says they are getting tired more easily and he is more \"wobbly. \"  Wife made him use RW today instead of crutches. Says he has done his issued at his last visit \"some\" but when questioned about it he says his dyskinesias have been bad.   Says his dyskinesias and legs given out has gradually been getting worse over the past several weeks. Says he started taking Rytary several months ago; says he is . .. ... Says he has been coughing more than usual and more SOB, especially at night. OBJECTIVE:   **Assessment of vitals   -Sitting: HR - 53 bpm, Oxygen saturation - 96%   -After walking ~30 ft: HR - 83 bpm, Oxygen saturation - 88%   -After walking ~100 ft: HR - 90 bpm, Oxygen saturation - 96%    GAIT TRAINING: (30 minutes):    Overground with clinic rollator:  Education on use of rollator for sit<>stand, general ambulation, ambulatory turns and how to manage rollator when using rollator as seated surface; pt verbalized understanding. Overground with rollator in open space with large sweeping turns and minimal obstacle negotiation, x 5-6 minutes throughout clinic. Repeated in tight spaces marked out by bedside table, chairs, roller stools and stackable therapy cones on the floor. Size of floor space progressively lessened to encourage closer rollator management in tight spaces, VC provided to perform marching steps when turning. Turning to sit in rollator x 5 reps to ensure safety with transition, SBA. THERAPEUTIC ACTIVITY: ( 15 minutes):    Sit<>stand with UUE on rollator and other on plinth:  X 5 reps from 20\" plinth  X 5 reps from 18\" plinth  Sit<>stand without rollator  EO, 22\"--> 20\"-->19\" plinth, x 5 reps each. EC for eccentric phase only, x 10 reps with Velia  Nustep level 3 x 6:00 with LE, oxygen saturation high 90's througout. PATIENT EDUCATION:     Encouraged continue compliance with previous issued HEP. Discussed with pt and his wife the need for use of rollator vs other ambulatory devices; both verbalized understanding.

## 2022-11-15 ENCOUNTER — OFFICE VISIT (OUTPATIENT)
Dept: CARDIOLOGY CLINIC | Age: 77
End: 2022-11-15
Payer: MEDICARE

## 2022-11-15 ENCOUNTER — TELEPHONE (OUTPATIENT)
Dept: NEUROLOGY | Age: 77
End: 2022-11-15

## 2022-11-15 VITALS
HEART RATE: 80 BPM | SYSTOLIC BLOOD PRESSURE: 110 MMHG | HEIGHT: 72 IN | DIASTOLIC BLOOD PRESSURE: 70 MMHG | BODY MASS INDEX: 26.95 KG/M2 | WEIGHT: 199 LBS

## 2022-11-15 DIAGNOSIS — Z95.810 BIVENTRICULAR ICD (IMPLANTABLE CARDIOVERTER-DEFIBRILLATOR) IN PLACE: ICD-10-CM

## 2022-11-15 DIAGNOSIS — I25.10 CORONARY ARTERY DISEASE INVOLVING NATIVE CORONARY ARTERY OF NATIVE HEART WITHOUT ANGINA PECTORIS: ICD-10-CM

## 2022-11-15 DIAGNOSIS — G90.3 NEUROGENIC ORTHOSTATIC HYPOTENSION (HCC): ICD-10-CM

## 2022-11-15 DIAGNOSIS — I25.5 ISCHEMIC CARDIOMYOPATHY: Primary | ICD-10-CM

## 2022-11-15 PROCEDURE — 99213 OFFICE O/P EST LOW 20 MIN: CPT | Performed by: INTERNAL MEDICINE

## 2022-11-15 PROCEDURE — 3078F DIAST BP <80 MM HG: CPT | Performed by: INTERNAL MEDICINE

## 2022-11-15 PROCEDURE — 3074F SYST BP LT 130 MM HG: CPT | Performed by: INTERNAL MEDICINE

## 2022-11-15 PROCEDURE — 1123F ACP DISCUSS/DSCN MKR DOCD: CPT | Performed by: INTERNAL MEDICINE

## 2022-11-15 RX ORDER — SPIRONOLACTONE 25 MG/1
12.5 TABLET ORAL DAILY
Qty: 45 TABLET | Refills: 3 | Status: SHIPPED | OUTPATIENT
Start: 2022-11-15

## 2022-11-15 ASSESSMENT — ENCOUNTER SYMPTOMS
WHEEZING: 0
COUGH: 0

## 2022-11-15 NOTE — PROGRESS NOTES
[address], [address] [employer] [facility], [address] [place] [address]  PHONE: [phone]    Jorge Minor.  1945      SUBJECTIVE:   Jorge Rojas is a 77 y.o. male seen for a follow up visit regarding the following:     Chief Complaint   Patient presents with    Hypertension    Shortness of Breath       HPI:    70-year-old male comes back for follow-up for history of a cardiomyopathy with an ICD CAD progressive Parkinson's disease. Since last visit he has had numerous falls and has had more difficulty walking with his Parkinson's. He has no significant swelling. Occasional have some shortness of breath no chest pain reported      Past Medical History, Past Surgical History, Family history, Social History, and Medications were all reviewed with the patient today and updated as necessary. Allergies   Allergen Reactions    Promethazine Other (See Comments)     Confusion    Oxycodone Nausea And Vomiting     Past Medical History:   Diagnosis Date    Acquired spondylolisthesis 6/16/2015    Actinic keratosis 8/9/2016    Arthritis     generalized OA, managed with OTC medication     Back pain 8/9/2016    Basal cell carcinoma dx 2/2018    small pea-sized lesion on right side of nose    BPH (benign prostatic hypertrophy) 8/9/2016    Cardiomyopathy ([address] 08/02/2016    Followed by Dr. Rj Hillman at [facility]     Chronic obstructive pulmonary disease [facility])     managed with inhalers-Followed by PCP, Dr. Chuck Presley     Chronic renal insufficiency 08/09/2016    patient denies, states Creatinine elevated one time.      Controlled diabetes mellitus type II without complication ([address] 46/08/7713    Patient denies-states BS was elevated one time-patient does not take any medications for diabetes     COPD exacerbation ([address] 8/9/2016    COPD with emphysema ([address]     Coronary artery disease involving native coronary artery of native heart without angina pectoris 8/2/2016    Dermatitis, seborrheic 8/9/2016    HARGROVE (dyspnea on exertion) 05/31/2016    due to COPD     Dyspnea     ED (erectile dysfunction) 8/9/2016    Elevated PSA 8/9/2016    Encounter for long-term (current) use of medications 8/9/2016    Enlarged prostate     managed with medication     Fatigue 8/9/2016    Finger swelling 8/9/2016    H/O echocardiogram 08/04/2020    LVEF 35-40%    Hiatal hernia     History of kidney stones     several with surgical interventions    Hyperlipidemia     Hypertension     managed with medication     Hypophonia with hoarseness 8/8/2016    Kidney problem     Kidney stone 06/2015    states he presently has 3 kidney stones monitored by Urology    Knee effusion 8/9/2016    Low vitamin B12 level 8/9/2016    Neurogenic bladder 8/9/2016    Obesity (BMI 30-39. 9)     BMI 34.5    Osteoarthritis 8/9/2016    Parkinson disease ([address]     Followed by Dr. Jennifer Adam with medication     Presence of combination internal cardiac defibrillator (ICD) and pacemaker     Pt states he has never had a shock as of 12/21/20    Primary osteoarthritis of knee 8/9/2016    Prostatitis 8/9/2016    Pseudogout of hand 8/9/2016    Rosacea 8/9/2016    S/P placement of cardiac pacemaker 10/31/2016    Sciatica 8/9/2016    Seborrhea 8/9/2016    Sleep apnea     Does not use c-pap. C-pap broken.      Slurred speech 08/09/2016    per patient \"mostly in the evenings\" from parkinsons     SOB (shortness of breath) 08/09/2016    With exertion only     Spinal stenosis, lumbar region, with neurogenic claudication 6/16/2015    Syncope, near 8/9/2016    Tremor 8/8/2016    Urinary frequency 08/09/2016    controlled with medication     Urinary retention 8/9/2016    Urinary urgency 8/9/2016    Wears dentures     upper and lower    Wears glasses      Past Surgical History:   Procedure Laterality Date    ANTERIOR CRUCIATE LIGAMENT REPAIR Right 1991    APPENDECTOMY      age 22    CYSTOSTOMY      KNEE ARTHROSCOPY Right     LITHOTRIPSY  1998    x 2    LUMBAR LAMINECTOMY  06/2015 Laminectomy with fusion; lower back; with pins    PACEMAKER  10/31/2016    ICD/pacemaker    TOTAL HIP ARTHROPLASTY Left 2018    TOTAL KNEE ARTHROPLASTY Left 01/2021    UROLOGICAL SURGERY Right     open removal of kidney stones     Family History   Problem Relation Age of Onset    Diabetes Son     Heart Attack Son 45    Other Paternal Grandmother         Aneurysm    Heart Disease Paternal Uncle     Heart Disease Mother     Lung Disease Mother     Thyroid Disease Sister     Stroke Father     Heart Attack Father 52    Heart Disease Father     Hypertension Other     Diabetes Other         paternal uncle    Heart Attack Other 45        son    COPD Mother       Social History     Tobacco Use    Smoking status: Former     Packs/day: 1.50     Types: Cigarettes     Quit date: 7/12/2007     Years since quitting: 15.3    Smokeless tobacco: Never    Tobacco comments:     Quit smoking: stopped 2007   Substance Use Topics    Alcohol use: No       ROS:    Review of Systems   Constitutional: Negative for weight loss. Cardiovascular:  Positive for dyspnea on exertion. Negative for chest pain, leg swelling and near-syncope. Respiratory:  Negative for cough and wheezing. Hematologic/Lymphatic: Negative for bleeding problem. PHYSICAL EXAM:    /70   Pulse 80   Ht 6' (1.829 m)   Wt 199 lb (90.3 kg)   BMI 26.99 kg/m²        Wt Readings from Last 3 Encounters:   11/15/22 199 lb (90.3 kg)   08/11/22 204 lb (92.5 kg)   07/19/22 204 lb 9.6 oz (92.8 kg)     BP Readings from Last 3 Encounters:   11/15/22 110/70   08/11/22 124/64   07/19/22 106/62         Physical Exam  Constitutional:       General: He is not in acute distress. Cardiovascular:      Rate and Rhythm: Normal rate. Heart sounds: No murmur heard. No gallop. Pulmonary:      Effort: Pulmonary effort is normal.      Breath sounds: Normal breath sounds. No rales. Musculoskeletal:         General: No swelling.    Neurological:      Mental Status: He is alert. Medical problems and test results were reviewed with the patient today. No results found for any visits on 11/15/22. Pacing device shows normal function  ASSESSMENT and PLAN    Hazel Lal was seen today for hypertension and shortness of breath. Diagnoses and all orders for this visit:    Ischemic cardiomyopathy is currently stable. Monitor and will spironolactone 15/day check electrolytes in a few weeks  -     Basic Metabolic Panel; Future    Coronary artery disease involving native coronary artery of native heart without angina pectoris currently stable no major angina noted  -     Basic Metabolic Panel; Future    Neurogenic orthostatic hypotension (HCC) has not had any real drops in pressure or syncopal spells. Biventricular ICD (implantable cardioverter-defibrillator) in place his device is stable  Parkinson disease which is progressing is very weak and has multiple falls he is getting a new walker recommended to use a wheelchair more. Other orders  -     spironolactone (ALDACTONE) 25 MG tablet; Take 0.5 tablets by mouth daily      [unfilled]      No follow-up provider specified.     Dhiraj Fried MD  11/15/2022  3:18 PM

## 2022-11-15 NOTE — TELEPHONE ENCOUNTER
----- Message from Tushar Coker PT sent at 11/15/2022  8:44 AM EST -----  Regarding: Rytary medication issue  Lesly,    Can you please call Mr. Luann Thornton wife to find out dosage of Rytary that he is current taking. Wife told me yesterday that the pharmacy did not have 195 dose so instead they are taking 245 in it's place. Per Dr. Carly Grigsby, he should be taking 245 x 2 and 195 x 1. Thinking back on the conversation I had with them yesterday, I think she was telling me that he is taking an additional 245 in place of the 195 that they can not get from the pharmacy. Mr. Jose Booth is more dyskinetic and having more issues with gait and balance as a result.

## 2022-11-28 ENCOUNTER — OFFICE VISIT (OUTPATIENT)
Dept: NEUROLOGY | Age: 77
End: 2022-11-28
Payer: MEDICARE

## 2022-11-28 DIAGNOSIS — R29.6 FALLS FREQUENTLY: ICD-10-CM

## 2022-11-28 DIAGNOSIS — R26.9 GAIT ABNORMALITY: ICD-10-CM

## 2022-11-28 DIAGNOSIS — R53.1 GENERALIZED WEAKNESS: ICD-10-CM

## 2022-11-28 DIAGNOSIS — Z74.09 IMPAIRED TRANSFERS: ICD-10-CM

## 2022-11-28 DIAGNOSIS — G20 PARKINSON'S DISEASE (HCC): Primary | ICD-10-CM

## 2022-11-28 DIAGNOSIS — R26.81 UNSTEADINESS ON FEET: ICD-10-CM

## 2022-11-28 DIAGNOSIS — Z91.81 AT HIGH RISK FOR FALLS: ICD-10-CM

## 2022-11-28 DIAGNOSIS — R29.3 ABNORMAL POSTURE: ICD-10-CM

## 2022-11-28 PROCEDURE — 97110 THERAPEUTIC EXERCISES: CPT

## 2022-11-28 PROCEDURE — 97112 NEUROMUSCULAR REEDUCATION: CPT

## 2022-11-28 PROCEDURE — 97116 GAIT TRAINING THERAPY: CPT

## 2022-11-28 NOTE — PROGRESS NOTES
[address] [facility],5Th Floor  2 [facility] [address] [address]  Dept: [phone]        Physical Therapy Daily Note     Referring MD: Merlin Jump, MD  Diagnosis:     ICD-10-CM    1. Parkinson's disease ([facility]  G20       2. Gait abnormality  R26.9       3. Unsteadiness on feet  R26.81       4. Generalized weakness  R53.1       5. At high risk for falls  Z91.81       6. Falls frequently  R29.6       7. Impaired transfers  Z74.09       8. Abnormal posture  R29.3           Therapy precautions:Fall risk and Gait belt for dynamic standing activity  Co-morbidities affecting plan of care: R>L shoulder dysfunction  Total Timed Procedure Codes: 60 min, Total Time: 60 min  Visit Count: 3                                          Visits since last evaluative note: 2  POC Expiration: 1/31/2023    ASSESSMENT:     Pt continues to demonstrate significantly better gait mechanics with clinic rollator however has lost previous script for rollator and therefore not acquired on for use outside of PT. A new script was requested from provider today and will be mailed to him once signed. He continues to demonstrate LE strength deficits which were addressed some during his visit today. Continues to also have significant dyskinesias and was encouraged to contact pharmacy to get Rytary 195(as prescribed) versus taking Rytary 245 in it's place; discussed with neurologist.     PLAN OF CARE:   PLAN FOR FUTURE VISITS:   Progress gait stability with use of rollator, standing balance LE functional strength. SUBJECTIVE:     Pt reporting that he was given a new medication for \"water retention\" following his recent cardiology appointment. Pt reporting 0/10 pain and no falls since his last visit. OBJECTIVE:     THERAPEUTIC EXERCISE: (25 minutes):    Nustep level 4 x 8:00 with BLE only to improve LE strength and endurance.   Seated LE/core strengthening:  Seated LE marching without UE support, 3 x 10 reps Seated alternating LE hip abduction with YTB around knees, 3 x 10 reps each. Seated heel-toe raises, 2 x 15 reps BLE     GAIT TRAINING: (10 minutes):    Overground with clinic rollator to improve rollator management,fluidity and stability:  Simulated TUG set-up(sit<>stand with 3 meters of walking before returning to seat), x multiple reps with VC for rollator brake management with stand>sit. Obstacle negotiation over ~20 ft distance with progressively more congested space, VC provided to keep rollator closer and to slow down in more congested spaces to avoid contacting obstacles, SBA. NEUROMUSCULAR RE-EDUCATION: (20 minutes):    Standing functional reach to [employer](x6)  Wide FREDDY, firm surface, 2 x 15 reps, CGA  Romberg FREDDY, firm surface, 2 x 15 reps, CGA  Wide FREDDY, Airex pad, 2 x 15 reps, Velia  Romberg FREDDY, Airex pad, 2 x 15 reps, Velia  Sit<>stand from raised plinth:  EC on firm surface x 10 reps, CGA  EO on airex pad x 10 reps with, Velia       PATIENT EDUCATION:     Reinforced need for using rollator over alternative devices for all ambulation as other devices are not appropriate and may place him at an increase risk; pt and wife verbalized understanding and indicated they would work to get rollator over the next week.

## 2022-12-12 ENCOUNTER — OFFICE VISIT (OUTPATIENT)
Dept: NEUROLOGY | Age: 77
End: 2022-12-12
Payer: MEDICARE

## 2022-12-12 DIAGNOSIS — R25.1 TREMOR: ICD-10-CM

## 2022-12-12 DIAGNOSIS — R53.1 GENERALIZED WEAKNESS: ICD-10-CM

## 2022-12-12 DIAGNOSIS — G20 PARKINSON'S DISEASE (HCC): Primary | ICD-10-CM

## 2022-12-12 DIAGNOSIS — R29.6 FALLS FREQUENTLY: ICD-10-CM

## 2022-12-12 DIAGNOSIS — R26.9 GAIT ABNORMALITY: ICD-10-CM

## 2022-12-12 DIAGNOSIS — R26.81 UNSTEADINESS ON FEET: ICD-10-CM

## 2022-12-12 DIAGNOSIS — R29.3 ABNORMAL POSTURE: ICD-10-CM

## 2022-12-12 DIAGNOSIS — Z91.81 AT HIGH RISK FOR FALLS: ICD-10-CM

## 2022-12-12 DIAGNOSIS — Z74.09 IMPAIRED TRANSFERS: ICD-10-CM

## 2022-12-12 PROCEDURE — 97110 THERAPEUTIC EXERCISES: CPT

## 2022-12-12 NOTE — PROGRESS NOTES
[address] [facility],5Th Floor  2 [facility] [address] [address]  Dept: [phone]        Physical Therapy Daily Note     Referring MD: Mark Turpin MD  Diagnosis:     ICD-10-CM    1. Parkinson's disease ([address]  G20       2. Gait abnormality  R26.9       3. Unsteadiness on feet  R26.81       4. Generalized weakness  R53.1       5. At high risk for falls  Z91.81       6. Falls frequently  R29.6       7. Impaired transfers  Z74.09       8. Abnormal posture  R29.3       9. Tremor  R25.1             Therapy precautions:Fall risk and Gait belt for dynamic standing activity  Co-morbidities affecting plan of care: R>L shoulder dysfunction  Total Timed Procedure Codes: 60 min, Total Time: 60 min  Visit Count: 3                                          Visits since last evaluative note: 2  POC Expiration: 1/31/2023    ASSESSMENT:     Pt demonstrated significantly improved gait mechanics with use of rollator however also demonstrating L knee hyperextension(inconsistently) and R trendelenburg gait pattern. With isolated strength training, Mr. Aide Han fatigues quickly and thus many adaptations/changes were made to his previous issued HEP to better accommodate these challenges. Strength deficits appeared to be his greatest impairment to improved gait mechanics today and f/u with PT is needed to further improve up this. PLAN OF CARE:   PLAN FOR FUTURE VISITS:   Progress gait stability with use of rollator, standing balance LE functional strength. SUBJECTIVE:     Pt reporting 3/10 lower back pain and 3/10 shoulder pain at start of visit. Says last Monday his wife was not able to get him out of bed because he was in so much pain. Wife purchased new rollator from [employer] and the patient started using it yesterday. Denies any falls since last visit.        OBJECTIVE:     THERAPEUTIC EXERCISE: (55 minutes):    Nustep level 4 x 8:00 with BLE only to improve LE strength and endurance. Overground ambulation with new personal rollator (after making adjustments to tighten brakes), VC to keep rollator close(especially with turning), 150 ft with SBA. LE strengthening to improve gait mechanics:  Supine SLR bridge, 2 x 12 reps on 8\" step  Sidelying hip abduction x 10 reps each LE --> supine hip abduction with yellow t-band around mid-calf  Sidelying clamshells x 12 reps RLE side--> hooklying clamshell 2 x 20 reps LLE with YTB around knees  Hooklying LTR x 12 reps each side  Sit<>stand repeats without UE support at 24\", 22\" and 20\" heights x 12 reps each; rollator in front if UE support needed. PATIENT EDUCATION:     [employer] HEP     Patient was issued a hard copy of their HEP which they demonstrated with adequate form and technique. Tactile and verbal cuing provided to facilitate proper form and technique. [employer] was used to create this HEP. In addition to the hard copy of this HEP, the patient was also educated on accessing their personalized HEP virtually through the [employer] [address] [place] [address] website or the mobile marian.       HEP Access Code: [email or web address]

## 2022-12-22 ENCOUNTER — OFFICE VISIT (OUTPATIENT)
Dept: NEUROLOGY | Age: 77
End: 2022-12-22
Payer: MEDICARE

## 2022-12-22 DIAGNOSIS — R26.9 GAIT ABNORMALITY: ICD-10-CM

## 2022-12-22 DIAGNOSIS — R53.1 GENERALIZED WEAKNESS: ICD-10-CM

## 2022-12-22 DIAGNOSIS — R29.6 FALLS FREQUENTLY: ICD-10-CM

## 2022-12-22 DIAGNOSIS — Z74.09 IMPAIRED TRANSFERS: ICD-10-CM

## 2022-12-22 DIAGNOSIS — Z91.81 AT HIGH RISK FOR FALLS: ICD-10-CM

## 2022-12-22 DIAGNOSIS — G20 PARKINSON'S DISEASE (HCC): Primary | ICD-10-CM

## 2022-12-22 DIAGNOSIS — R26.81 UNSTEADINESS ON FEET: ICD-10-CM

## 2022-12-22 DIAGNOSIS — R29.3 ABNORMAL POSTURE: ICD-10-CM

## 2022-12-22 PROCEDURE — 97110 THERAPEUTIC EXERCISES: CPT

## 2022-12-22 PROCEDURE — 97750 PHYSICAL PERFORMANCE TEST: CPT

## 2022-12-22 NOTE — PROGRESS NOTES
[address] [facility],[address]  2 [facility] CARLOS [facility] [address]  Dept: [phone]        Physical Therapy Daily Note     Referring MD: Janie Melgar MD  Diagnosis:     ICD-10-CM    1. Parkinson's disease ([address]  G20       2. Gait abnormality  R26.9       3. Unsteadiness on feet  R26.81       4. Generalized weakness  R53.1       5. At high risk for falls  Z91.81       6. Falls frequently  R29.6       7. Impaired transfers  Z74.09       8. Abnormal posture  R29.3               Therapy precautions:Fall risk and Gait belt for dynamic standing activity  Co-morbidities affecting plan of care: R>L shoulder dysfunction  Total Timed Procedure Codes: 60 min, Total Time: 60 min  Visit Count: 6                                          Visits since last evaluative note: 5  POC Expiration: 1/31/2023    ASSESSMENT:     Progress towards LTG assessed today with pt achieving 1 LTG and making good progress towards LTG #2 and #3. Less progress was made towards LTG #4 as there has been little change with static standing balance on dynamic surfaces and with eyes closed. His greatest progress has been observed with walking and this is largely in part to using a more appropriate assistive device, which is a rollator over forearm crutches. He continues to have fairly profound LE strength deficits, especially across the hips which significantly impairs postural control. Given his continued LE strength deficits, mild safety concerns with gait and impaired posture control, continuation of PT is medically necessary at this time.      PLAN OF CARE:     GOALS       Long-term Goals:     Pt will report compliance with HEP at least 4 days per week to maximize benefits of participation in skilled outpatient physical therapy. (MET)  Pt will ambulate with rollator >500 ft with Saba and good stability with turns in open and more confined spaces to decrease fall risk for household and limited community ambulation. (12/22/22: Estela with rollator with walking forward but still needs some Vcing for managing rollator with turns)  Pt will improve LE and core strength to allow for completion of 5x sit<>stand test in less than 20 seconds to decrease fall risk. (12/22/22: 21.44 seconds)  Pt will improve standing balance on dynamic surfaces to decrease fall risk as noted by a score >110/120 on the modified CTSIB. (12/22/22: 77.96 seconds, >3 sec improvement from initial eval)      PLAN FOR FUTURE VISITS:   Progress gait stability with use of rollator, standing balance LE functional strength. SUBJECTIVE:     Pt reporting 3/10 lower back pain and 3/10 shoulder pain at start of visit. Says last Monday his wife was not able to get him out of bed because he was in so much pain. Wife purchased new rollator from [employer] and the patient started using it yesterday. Denies any falls since last visit.        OBJECTIVE:   HAND/SIDE DOMINANCE: right handed      SENSATION:    Light touch:   Right Left   Impaired C4 Impaired C4      Proprioception:Intact      SKIN INTEGRITY: heeling wound L elbow    NEUROMUSCULAR/MOTOR:  MUSCLE TONE: Rigidity:   Right Left Comments   UE: 0 0    LE: 0 0    Axial:           COORDINATION:    Right Left   Heel to shin test     Finger-to-nose test     Unanchored heel-toe test 9.5 9.5   LE Square tracing     Finger tapping (MDS-UPDRS)     Hand movements (MDS-UPDRS)  1 1   Toe tapping (MDS-UPDRS) 1 1   Leg agility (MDS-UPDRS)     Comments: Slightly diminished but good fluidity of isolated extremity movements       VISION:   Pursuits: appropriate  Saccades: appropriate  VOR: appropriate    POSTURE: head/neck dyskinesias noted in sitting      EXTREMITIES STRENGTH / ROM:  LE STRENGTH/ROM:    Strength PROM (deg)   Action Right Left Right Left   Hip Flex 4 4     Hip Ext 3- 3-     Hip Abd 4- 3-     Hip Add 5 5     Knee Flexion 5 5     Knee Ext. 5 5     Ankle DF 5 4     Ankle PF 2 2     Ankle Inv Ankle Ev 5 4     **Ankle DF ROM represented as knee (flexed/extended)    UE STRENGTH/ROM:    Strength PROM   Action Right Left     Shoulder flex 2+ 2     Shoulder abd 2 4     Shoulder IR 3 4     Shoulder ER 3 4     Elbow flex 4 5     Elbow ext 4 5     Wrist flex       Wrist ext                    TRANSFERS AND BED MOBILITY:      Functional Status     Independent   Modified Indepen- dent   Stand-by Assist   Contact Guard   Minimal Assist   Moderate Assist   Maximum Assist   Total Assist   Comments   Supine > L S/L []  []  []  []  []  []  []  []  NT   Supine >R S/L []  []  []  []    []    []    []  []  NT   Supine<>prone []  []  []  []  []  []  []  []  NT   Supine > sit []  []  []  []  []  []  []  []  NT   Sit > supine []  []  []  []  []  []  []  []  NT   Sit > stand []  []  []  []  []  []  []  []  Uses UE support   Stand > sit []  []  []  []  []  []  []  []  Uses UE support   Bed > chair transfers []  []  []  []  []  []  []  []  Uses UE support     Standardized Functional Mobility Assessments:  1. Five Time Sit to Stand Test:    Time Interpretation   21.44 seconds    27.28 seconds without UE support Cut off score of 16 seconds discriminates fallers from non-fallers  Estimated values for normal performance in community dwelling older adults (Gonzalo, 2006)  19-49 years: 4.1-11.5 seconds  50-59 years: 4.4-9.1 seconds  60-69 years: 4.0-15.1 seconds   70-79 years: 4.5-15.5 seconds  80-89 years: 7.8-16.0 seconds       BALANCE:       Balance   Good   Fair   Poor   Unable   Comments   Sitting static [x]  []  []  []     Sitting dynamic [x]  []  []  []     Standing static []  [x]  []  []     Standing dynamic []  [x]  []  []     Reaction Time []  [x]  []  []       Standardized Balance Assessments:  1.  Modified Clinical Test of Sensory Interaction in Balance(CTSIB-M):   Condition One: Eyes Open, Firm Surface Time    Trial One 30 / 30 seconds    Trial Two -- / 30 seconds   Condition Two: Eyes Closed, Firm Surface Trial One 16.84 / 30 seconds    Trial Two -- / 30 seconds   Condition Three: Eyes Open,  Foam Surface     Trial One 30 / 30 seconds    Trial Two -- / 30 seconds   Condition Four: Eyes Closed, Foam Surface     Trial One 1.12 / 30 seconds    Trial Two -- / 30 seconds     Total: 77.96 / 120   **High scores indicated better balance. GAIT /  MOBILITY:  Gait: With use of personal rollator, narrow FREDDY, downward gaze, R pelvic drop during R stance phase, discontinuous gait pattern with increased time spent in double limb support phase    --Walking While Talking Test:  0.65 m/sec with 3 errors; Previously 0.56 m/sec    Stairs:  NT      Standardized Gait Assessments:  1. 10 Meter Walk Test (\"gait speed\"):   Self- selected Backward Fast Interpretation   0.69 m/sec    Previously: 0.59 m/sec NT 0.90 m/sec    Previously: NT m/sec Normal Values:       Self-Selected Velocity       Men Women      20s 1.36 1.34      30s 1.43 1.34      40s 1.43 1.39      50s 1.43 1.31      60s 1.33 1.24      70s 1.26 1.13      80s 0.97 0.94   **Parkinson's Disease MDC = SS - 0.18 m/sec, F - 0.25 m/sec. Charisse Enrike and Luz, 2008)  **Geriatric MCID = SS - 0.13 m/sec Rick Johnson et al., 2006)  **Stroke MCID = SS - 0.14 m/sec Rick Johnson et al., 2006)  **SCI MCID = SS - 0.15 m/sec, F - 0.25 m/sec (Finn and Chano, 2004)    2.  Timed Up and Go(TUG) test:   Basic - 18.50 seconds; Previously 18.79 seconds with bilateral forearm crutches (Greater than 11.5 seconds associated with increased risk for falling in people living with PD - Bre et al., 2011) (**Parkinson's Disease MDC = 4.85 points; Katie Abdullahi et al., 2011, PD)  Cognitive - 24.06 seconds with 2 errors; Previously 21.25 seconds with forearm crutches and 2 errors(serial retro-counting by 3's)(Greater than 15 seconds associated with increased risk for falls in older adults - Roderick et al., 2000)  Manual --- seconds (Greater than a 4.5 second difference between TUG manual and TUG basic indicates an increased risk for falls - Maile et al, 2011)          THERAPEUTIC EXERCISE: (10 minutes):    Nustep level 4 x 10:00 with BLE only to improve LE strength and endurance. PATIENT EDUCATION:     Discussed progress towards LTG and need for continuation of skilled outpatient PT with pt and his wife; both verbalized understanding.

## 2022-12-28 PROCEDURE — 93296 REM INTERROG EVL PM/IDS: CPT | Performed by: INTERNAL MEDICINE

## 2022-12-28 PROCEDURE — 93295 DEV INTERROG REMOTE 1/2/MLT: CPT | Performed by: INTERNAL MEDICINE

## 2023-01-10 ENCOUNTER — TELEPHONE (OUTPATIENT)
Dept: NEUROLOGY | Age: 78
End: 2023-01-10

## 2023-01-10 NOTE — TELEPHONE ENCOUNTER
Pt called to cancel PT appointment today due to lower back pain. Says he had a fall yesterday that seems to have made his pain worse in his lower back and does not feel he can participate in PT. Discussed with pt that he has had multiple recent cancellations due to lower back pain. Discussed having f/u with PCP or ortho to discuss back pain before returning to PT to address his gait,balance and mobility deficits related to Parkinson's Disease; pt verbalized understanding and stated he would contact PT once he has f/u with his medical team regarding his LBP.

## 2023-01-26 ENCOUNTER — OFFICE VISIT (OUTPATIENT)
Dept: NEUROLOGY | Age: 78
End: 2023-01-26

## 2023-01-26 VITALS
SYSTOLIC BLOOD PRESSURE: 110 MMHG | WEIGHT: 192.6 LBS | HEART RATE: 83 BPM | BODY MASS INDEX: 26.12 KG/M2 | DIASTOLIC BLOOD PRESSURE: 60 MMHG

## 2023-01-26 DIAGNOSIS — G20 PARKINSON'S DISEASE (HCC): Primary | ICD-10-CM

## 2023-01-26 DIAGNOSIS — Z79.899 ENCOUNTER FOR LONG-TERM (CURRENT) USE OF MEDICATIONS: ICD-10-CM

## 2023-01-26 DIAGNOSIS — G24.9 DYSKINESIA DUE TO PARKINSON'S DISEASE (HCC): ICD-10-CM

## 2023-01-26 DIAGNOSIS — G20 DYSKINESIA DUE TO PARKINSON'S DISEASE (HCC): ICD-10-CM

## 2023-01-26 DIAGNOSIS — R49.8 HYPOPHONIA: ICD-10-CM

## 2023-01-26 DIAGNOSIS — R29.6 FREQUENT FALLS: ICD-10-CM

## 2023-01-26 DIAGNOSIS — K59.01 CONSTIPATION BY DELAYED COLONIC TRANSIT: ICD-10-CM

## 2023-01-26 DIAGNOSIS — R13.19 DYSPHAGIA, NEUROLOGIC: ICD-10-CM

## 2023-01-26 DIAGNOSIS — R29.3 POSTURAL IMBALANCE: ICD-10-CM

## 2023-01-26 DIAGNOSIS — G47.52 RBD (REM BEHAVIORAL DISORDER): ICD-10-CM

## 2023-01-26 DIAGNOSIS — Z79.899 ENCOUNTER FOR LONG-TERM (CURRENT) USE OF HIGH-RISK MEDICATION: ICD-10-CM

## 2023-01-26 DIAGNOSIS — G20 PSYCHOSIS DUE TO PARKINSON'S DISEASE (HCC): ICD-10-CM

## 2023-01-26 DIAGNOSIS — G90.3 NEUROLOGIC ORTHOSTATIC HYPOTENSION (HCC): ICD-10-CM

## 2023-01-26 RX ORDER — AMANTADINE HYDROCHLORIDE 100 MG/1
100 TABLET ORAL 2 TIMES DAILY
Qty: 60 TABLET | Refills: 0 | Status: SHIPPED | OUTPATIENT
Start: 2023-01-26

## 2023-01-26 RX ORDER — CARBIDOPA AND LEVODOPA 50; 200 MG/1; MG/1
200 TABLET, EXTENDED RELEASE ORAL NIGHTLY
COMMUNITY
Start: 2022-11-30

## 2023-01-26 RX ORDER — QUETIAPINE FUMARATE 25 MG/1
TABLET, FILM COATED ORAL
Qty: 270 TABLET | Refills: 3 | Status: SHIPPED | OUTPATIENT
Start: 2023-01-26

## 2023-01-26 ASSESSMENT — ENCOUNTER SYMPTOMS
RECTAL PAIN: 0
ABDOMINAL DISTENTION: 0
CONSTIPATION: 1
BACK PAIN: 1
WHEEZING: 1
DIARRHEA: 0
COUGH: 0
ABDOMINAL PAIN: 0
SHORTNESS OF BREATH: 1
APNEA: 0
ANAL BLEEDING: 0
NAUSEA: 1
CHOKING: 0
EYES NEGATIVE: 1
VOMITING: 1
STRIDOR: 0
BLOOD IN STOOL: 0
ALLERGIC/IMMUNOLOGIC NEGATIVE: 1
CHEST TIGHTNESS: 0

## 2023-01-26 NOTE — PROGRESS NOTES
01/26/23  Miguel Villegas. Chief Complaint:  Chief Complaint   Patient presents with    Follow-up     Parkinson's Disease       Parkinson's Disease Diagnosed: 2015 with tremors at onset. HPI:   Miguel Villegas., 69 y.o.,male here in clinic follow up for continued management of Parkinson's Disease with wearing off dyskinesia. Has some persistent mouth movements at times. He is again having lots of falls. He is having lightheadedness and at times he feels he just gets weak and goes down. His BP tends to run low. He has coreg and entresto for BP. He is running out of energy in the afternoons. He may take a nap in the afternoon and then will wake up feeling stronger. He is drinking more water than before. PCP feels that he should lower his BP meds. I am concerned that his falls are due to the BP dropping. He does not have an appt until March with new cardiologists. He has a pacemaker but they are unsure that he needed it. We have also noted that his left knee will hyperextend when he stands at times. And today it did this when he stood and it caused him to jerk a bit and lose balance. I feel that this is also contributing to his falls. As does his hip. He has been working to strengthen his left leg. Constipation is not as bad as before but still an issue. He takes Miralax and benefiber. He does not take it daily to avoid the constipation. He is losing weight and is eating all day long. His swallowing is getting bad at this time of day ( everything slows down at lunch). Having hallucinations rarely now since we switched to Rytary, seroquel also helped. Will monitor. Current Neuro related meds:  Rytary 61.25/245mg- 4x a day (9am, 1pm, 5pm, 9pm)   Rytary 48.75/195mg- 4x a day (9am, 1pm, 5pm, 9pm)   Seroquel 25mg-2 tabs at bedtime, may take an extra mid day if it comes back.    Sinemet 50/200mg- once at bedtime        Review of Systems:    Review of Systems Constitutional:  Positive for fatigue and unexpected weight change. Negative for activity change, appetite change, chills, diaphoresis and fever. HENT: Negative. Eyes: Negative. Respiratory:  Positive for shortness of breath and wheezing. Negative for apnea, cough, choking, chest tightness and stridor. Cardiovascular: Negative. Gastrointestinal:  Positive for constipation, nausea and vomiting. Negative for abdominal distention, abdominal pain, anal bleeding, blood in stool, diarrhea and rectal pain. Endocrine: Negative. Genitourinary: Negative. Musculoskeletal:  Positive for back pain, joint swelling (Right Hand), neck pain and neck stiffness. Negative for arthralgias, gait problem and myalgias. Skin: Negative. Allergic/Immunologic: Negative. Neurological:  Positive for dizziness, speech difficulty, weakness, light-headedness and numbness (Bilateral Feet). Negative for tremors, seizures, syncope, facial asymmetry and headaches. Hematological:  Negative for adenopathy. Bruises/bleeds easily. Psychiatric/Behavioral:  Positive for agitation, confusion, decreased concentration, hallucinations and sleep disturbance. Negative for behavioral problems, dysphoric mood, self-injury and suicidal ideas. The patient is nervous/anxious and is hyperactive. 3 FALLS with 2 hospital visits recently. Patient denies:  dizziness or light headedness,  drooling or swallowing issues  constipation,   hallucinations/ visual illusions or impulse control disorder   recent falls.    RBD     RLS    Past Medical History:   Diagnosis Date    Acquired spondylolisthesis 6/16/2015    Actinic keratosis 8/9/2016    Arthritis     generalized OA, managed with OTC medication     Back pain 8/9/2016    Basal cell carcinoma dx 2/2018    small pea-sized lesion on right side of nose    BPH (benign prostatic hypertrophy) 8/9/2016    Cardiomyopathy ([facility] 08/02/2016    Followed by Dr. Marvin Vang at [facility] Chronic obstructive pulmonary disease ([facility]     managed with inhalers-Followed by PCP, Dr. Yulisa Monsivais     Chronic renal insufficiency 08/09/2016    patient denies, states Creatinine elevated one time. Controlled diabetes mellitus type II without complication ([facility] 69/88/4522    Patient denies-states BS was elevated one time-patient does not take any medications for diabetes     COPD exacerbation ([facility] 8/9/2016    COPD with emphysema (HCC)     Coronary artery disease involving native coronary artery of native heart without angina pectoris 8/2/2016    Dermatitis, seborrheic 8/9/2016    HARGROVE (dyspnea on exertion) 05/31/2016    due to COPD     Dyspnea     ED (erectile dysfunction) 8/9/2016    Elevated PSA 8/9/2016    Encounter for long-term (current) use of medications 8/9/2016    Enlarged prostate     managed with medication     Fatigue 8/9/2016    Finger swelling 8/9/2016    H/O echocardiogram 08/04/2020    LVEF 35-40%    Hiatal hernia     History of kidney stones     several with surgical interventions    Hyperlipidemia     Hypertension     managed with medication     Hypophonia with hoarseness 8/8/2016    Kidney problem     Kidney stone 06/2015    states he presently has 3 kidney stones monitored by Urology    Knee effusion 8/9/2016    Low vitamin B12 level 8/9/2016    Neurogenic bladder 8/9/2016    Obesity (BMI 30-39. 9)     BMI 34.5    Osteoarthritis 8/9/2016    Parkinson disease ([facility]     Followed by Dr. Piedra Body with medication     Presence of combination internal cardiac defibrillator (ICD) and pacemaker     Pt states he has never had a shock as of 12/21/20    Primary osteoarthritis of knee 8/9/2016    Prostatitis 8/9/2016    Pseudogout of hand 8/9/2016    Rosacea 8/9/2016    S/P placement of cardiac pacemaker 10/31/2016    Sciatica 8/9/2016    Seborrhea 8/9/2016    Sleep apnea     Does not use c-pap. C-pap broken.      Slurred speech 08/09/2016    per patient \"mostly in the evenings\" from parkinsons SOB (shortness of breath) 08/09/2016    With exertion only     Spinal stenosis, lumbar region, with neurogenic claudication 6/16/2015    Syncope, near 8/9/2016    Tremor 8/8/2016    Urinary frequency 08/09/2016    controlled with medication     Urinary retention 8/9/2016    Urinary urgency 8/9/2016    Wears dentures     upper and lower    Wears glasses         Past Surgical History:   Procedure Laterality Date    ANTERIOR CRUCIATE LIGAMENT REPAIR Right 1991    APPENDECTOMY      age 22    CYSTOSTOMY      KNEE ARTHROSCOPY Right     LITHOTRIPSY  1998    x 2    LUMBAR LAMINECTOMY  06/2015    Laminectomy with fusion; lower back; with pins    PACEMAKER  10/31/2016    ICD/pacemaker    TOTAL HIP ARTHROPLASTY Left 2018    TOTAL KNEE ARTHROPLASTY Left 01/2021    UROLOGICAL SURGERY Right     open removal of kidney stones       Family History   Problem Relation Age of Onset    Diabetes Son     Heart Attack Son 45    Other Paternal Grandmother         Aneurysm    Heart Disease Paternal Uncle     Heart Disease Mother     Lung Disease Mother     Thyroid Disease Sister     Stroke Father     Heart Attack Father 52    Heart Disease Father     Hypertension Other     Diabetes Other         paternal uncle    Heart Attack Other 45        son    COPD Mother        Social History     Socioeconomic History    Marital status:      Spouse name: Not on file    Number of children: Not on file    Years of education: Not on file    Highest education level: Not on file   Occupational History    Not on file   Tobacco Use    Smoking status: Former     Packs/day: 1.50     Types: Cigarettes     Quit date: 7/12/2007     Years since quitting: 15.5    Smokeless tobacco: Never    Tobacco comments:     Quit smoking: stopped 2007   Substance and Sexual Activity    Alcohol use: No    Drug use: Not Currently    Sexual activity: Not on file   Other Topics Concern    Not on file   Social History Narrative    Not on file     Social Determinants of Health     Financial Resource Strain: Not on file   Food Insecurity: Not on file   Transportation Needs: Not on file   Physical Activity: Not on file   Stress: Not on file   Social Connections: Not on file   Intimate Partner Violence: Not on file   Housing Stability: Not on file       Current Outpatient Medications on File Prior to Visit   Medication Sig Dispense Refill    TIOTROPIUM BROMIDE MONOHYDRATE IN Inhale 18 mcg into the lungs daily      carbidopa-levodopa (SINEMET CR)  MG per extended release tablet Take 200 mg by mouth at bedtime      mirabegron (MYRBETRIQ) 25 MG TB24 Take 25 mg by mouth daily      UNABLE TO FIND Rollator 1 Device 0    sacubitril-valsartan (ENTRESTO) 24-26 MG per tablet Take 1 tablet by mouth in the morning and 1 tablet before bedtime. 180 tablet 3    carvedilol (COREG) 6.25 MG tablet Take 1 tablet by mouth in the morning and 1 tablet in the evening. Take with meals.  180 tablet 3    carbidopa-levodopa (RYTARY) 61. MG CPCR per extended release capsule Take 2 capsules at 8am, 12pm, 4pm and 8pm 720 capsule 3    Carbidopa-Levodopa ER (RYTARY) 48. MG CPCR Take 1 capsule at 8am, 12pm, 4pm and 8pm 360 capsule 3    MAGNESIUM PO Take by mouth daily      acetaminophen (TYLENOL) 500 MG tablet Take 1,000 mg by mouth every 6 hours      albuterol sulfate  (90 Base) MCG/ACT inhaler Inhale 2 puffs into the lungs every 4 hours as needed      allopurinol (ZYLOPRIM) 100 MG tablet 100 mg as needed      aspirin 81 MG EC tablet Take 81 mg by mouth 2 times daily      clonazePAM (KLONOPIN) 0.5 MG tablet 1 tab QHS      clotrimazole-betamethasone (LOTRISONE) 1-0.05 % cream Apply topically 2 times daily as needed      cyclobenzaprine (FLEXERIL) 5 MG tablet Take 5 mg by mouth 3 times daily as needed      doxycycline hyclate (VIBRA-TABS) 100 MG tablet Take 100 mg by mouth daily      fluticasone-salmeterol (ADVAIR) 250-50 MCG/DOSE AEPB Inhale 1 puff into the lungs daily      omeprazole (PRILOSEC) 40 MG delayed release capsule Take 40 mg by mouth daily      ondansetron (ZOFRAN-ODT) 8 MG TBDP disintegrating tablet Take 8 mg by mouth every 8 hours as needed      polyethylene glycol (GLYCOLAX) 17 GM/SCOOP powder Take 17 g by mouth daily      tamsulosin (FLOMAX) 0.4 MG capsule Take 0.4 mg by mouth daily       No current facility-administered medications on file prior to visit. Allergies   Allergen Reactions    Promethazine Other (See Comments)     Confusion    Oxycodone Nausea And Vomiting           Physical Examination    Vitals:    01/26/23 1515 01/26/23 1530   BP: 119/65 110/60   Site: Left Upper Arm Left Upper Arm   Position: Sitting Standing   Pulse: 82 83   Weight: 192 lb 9.6 oz (87.4 kg)          General: No acute distress  Psychiatric: well oriented, normal mood and affect  Cardiovascular: no peripheral edema, no JVD, no carotid bruits, RRR  Pulmonary: CTAB  Skin: No rashes, lesions or ulcers  Ext: no C/C/E      Neurologic Exam  Patient oriented to Person, Place and Time. Language and fund of knowledge intact. CN:  Extraocular movements are intact,facial sensation  Intact and strength is intact, , palate elevates normally, tongue protrudes midline, shoulder shrug is normal.    Motor:RUE 5/5, RLE 5/5, LUE 5/5, LLE 5/5 ,  normal bulk and tone    Reflexes: Patellar reflexes are +2 , Achilles reflexes are 1+ , toes are downgoing. Sensation: Normal  light touch, temperature and vibration throughout     Cerebellar: FTN, HTS intact    Motor Examination: Last dose of Rytary was 3 hrs ago.     Voice tremor       Chin tremor         RIGHT LEFT   Tremor at rest 0 0   Postural Tremor of hands 0 0   Action Tremor of hands 1 1   Tremor of Lower extremity 0 0   Open/Close 0 0   Rapid Alternating Movements of Hands 1 1   Finger Taps 1 1   Rigidity - Upper extremity 1 1   Rigidity- Lower extremity 1 1   Foot tapping/LE agility 2 2         Hypophonia 3   Hypomimia 3   Arising from Chair slow        Assessment/Plan:   Diagnosis Orders   1. Parkinson's disease ([facility]  QUEtiapine (SEROQUEL) 25 MG tablet    [facility], [address] [place] [address] (Neurology & Oncology Only)      2. Dysphagia, neurologic  [facility] [place] (Neurology & Oncology Only)      3. Postural imbalance        4. Encounter for long-term (current) use of high-risk medication        5. Dyskinesia due to Parkinson's disease ([facility]        6. RBD (REM behavioral disorder)        7. Constipation by delayed colonic transit        8. Hypophonia        9. Neurologic orthostatic hypotension (HCC)        10. Encounter for long-term (current) use of medications        11. Psychosis due to Parkinson's disease ([address]                PD psychosis, increase dose to 3 tabs Qjs  Losing weight despite eating well. Unsure if swallow is an issue. Will have speech see him. Having dyskinesia when he is upset or tired. Otherwise it is not bothersome. But today on exam I see mouth dyskinesia. I will start him on amantadine to see if this helps. And monitor his weight. Recommend that he discuss his knee suddenly hyperextending as this and the probability of his BP dropping playing into the frequent falls. He needs to speak to cardiology about lowering his BP meds dose. Recommend that he add lower dose of his miralax but daily to help with constipation. I have spent greater than 50% of the patient's 60 minute visit  in counseling for importance of exercise,  medications and education of disease and disease progression. Patient is to continue all other medications as directed by prescribing physicians unless addressed above in plan. Continuation of these medications from today's visit are made based on the patient's report of current medications.      Rg Harry MD  [facility] Neurology  Director Movement Disorders Program  [address]   [employer] [address] [facility]  Phone: [phone]  Fax: [phone]

## 2023-02-06 ENCOUNTER — TELEPHONE (OUTPATIENT)
Age: 78
End: 2023-02-06

## 2023-02-06 NOTE — TELEPHONE ENCOUNTER
Speech Pathology    Patient called to confirm his appointment for tomorrow at 3:00 pm. SLP called and LVM confirming tomorrow's appointment for 3:00 on home telephone. Mailbox was full on cell phone, unable to LVM. Gely Cade

## 2023-02-07 ENCOUNTER — OFFICE VISIT (OUTPATIENT)
Age: 78
End: 2023-02-07
Payer: MEDICARE

## 2023-02-07 DIAGNOSIS — G20 PARKINSON'S DISEASE (HCC): ICD-10-CM

## 2023-02-07 DIAGNOSIS — R13.12 OROPHARYNGEAL DYSPHAGIA: Primary | ICD-10-CM

## 2023-02-07 DIAGNOSIS — R13.19 DYSPHAGIA, NEUROLOGIC: Primary | ICD-10-CM

## 2023-02-07 PROCEDURE — 92610 EVALUATE SWALLOWING FUNCTION: CPT | Performed by: SPEECH-LANGUAGE PATHOLOGIST

## 2023-02-07 NOTE — PROGRESS NOTES
SPEECH LANGUAGE PATHOLOGY: DYSPHAGIA- Initial Assessment    NAME/AGE/GENDER: Mitzy Lorenzana.   69 y.o.   male  [id number]  1945    DATE: 2/7/2023  ICD-10: Treatment Diagnosis: Oropharyngeal Dysphagia R13.12  Allergies   Allergen Reactions    Promethazine Other (See Comments)     Confusion    Oxycodone Nausea And Vomiting   DATE OF ONSET: within the last year        SUBJECTIVE   Patient is a 69year old male who was referred for dysphagia evaluation due to weight loss but he's eating all day. He's reported changes in his swallow function for about 3-4 years. He is not having much issues with foods, his major issues is pills. He does have a history of hiatal hernia. He does on occasion have GERD in which he will take OTC Tums. Wife reports liquids is not a big issue but with solids, he eats slower and he doesn't tend to eat as much. He is snacking throughout the day with crackers and chips. He's lost about 40 lbs in the last couple of years. His weight loss has been gradual. He will occasionally choke on meats more so than anything. He endorsing dry mouth off an on all day. He can drink up to 32 ounces of water daily. He is a soda drinker and that is his beverage of choice. He did have a MBSS in 2021 which did not indicate a swallowing issue/concern at that time. Completed EAT 10 (Eating Tool Assessment): Prior to p.o trials, patient completed the Eating Assessment Tool (EAT-10) which helps measure swallowing problems with ranges from 0=no problem and 4=severe problem. A score greater than 3 would indicate a swallowing problem. Patient total score was 14. SUMMARY OF EVALUATION   Dysphagia clinical evaluation was completed in office today. Patient has upper dentures and lower partials. He had fair oral hygiene. He does have ulcers along base of his tongue and mild redness along pharyngeal wall. Decreased lingual strength on the left with decreased palatal elevation during ah phonation.  Hoarse vocal quality. He was given trials of thin liquids via cup/straw, puree, mixed consistencies and solids. With thin liquids trials, patient with (+) clinical s/sx of dysphagia evidenced by immediate cough on 1/3 trials. No overt clinical s/sx of dysphagia observed with puree. With mixed consistencies, patient with oral holding of 1-2 minutes that required verbal and tactile cues to swallow. (+) throat clear observed x's 2 observed. With solids, increased time for mastication observed but no overt clinical s/sx of dysphagia. He does take big bites and large sips so we discussed the importance of small bites and sips. Of note, he reports that he is utilizing a chin tuck because he said a nurse at some point told him to do this. We discussed this strategy and because this has not been tested during MBSS to see if it's an adequate strategy, I have advised him not to until we can determine if it's beneficial. Of note, he did not utilize this strategy during his evaluation. During trials, patient had increased head dyskinesias which I feel is impacting his swallow function. Per wife, his dyskinesias increases has the day goes on and he seems to have more issues with swallowing later in the day. Due to his dyskinesias, I advised him to eat more soft and easy to chew foods after lunch and eat his more challenging foods earlier in the day since his dyskinesias are not that bad. Dr. Michael Kwan has added Amantadine to help with this and I will verify that he's taking this at the right time to see if there is any improvement . Due to some clinical s/sx of dysphagia during his evaluation, I have requested/ordered a MBSS to re-assess his swallow function. Results and recommendations were discussed with patient and wife. Both verbalized understanding of the above aforementioned and my recommendations which is outlined below.      Patient will be scheduled for MBSS and will f/u with me after the study to discuss results and if further skilled treatment is warranted. Of note, he is having some dry mouth so we discussed some strategies to help combat this. I do think he needs to make sure he is staying hydrated and certainly needs to make sure he takes a sip of liquid before eating dry foods or taking his medications. DIET RECOMMENDATIONS:    Dysphagia 3, Soft/advanced (Soft & Bite-sized) solids   thin liquids (IDDSI level 0)    Administer medication whole, ONE AT A TIME, with sips of water or float in puree consistency such as yogurt or applesauce         FEEDING RECOMMENDATIONS:     Assistance level:  Supervision is needed during all oral intake to ensure that he's taking small bites and sips. Also ensure that he is swallowing and not orally holding his p.o.              Compensatory strategies recommended:   Small bites/sips    Alternate solids and liquids          OBJECTIVE     Previous Dysphagia: no  Diet Prior to Evaluation: 2021      Oral Motor:   ulcers along base of tongue  Upper dentures and lower partial     Consistencies Administered During the Evaluation   Liquids: thin liquid   Solids:  pureed foods, mixed consistencies  and solid foods      Method of Intake:   cup, straw, spoon  Self fed      Position:   Seated, upright   RESULTS  Oral Stage:         Decreased mastication due to:  decreased lingual control and disorganized chewing pattern      Pharyngeal Stage:      Throat clearing present after presentation of mixed consistencies   Immediate wet cough was noted after presentation of thin liquid on 1/3 trials  Delayed initiation of the pharyngeal swallow noted with mixed consistencies         Current Medications:   Current Outpatient Rx   Medication Sig Dispense Refill    TIOTROPIUM BROMIDE MONOHYDRATE IN Inhale 18 mcg into the lungs daily      carbidopa-levodopa (SINEMET CR)  MG per extended release tablet Take 200 mg by mouth at bedtime      mirabegron (MYRBETRIQ) 25 MG TB24 Take 25 mg by mouth daily Amantadine (SYMMETREL) 100 MG TABS tablet Take 100 mg by mouth 2 times daily 60 tablet 0    QUEtiapine (SEROQUEL) 25 MG tablet 3 tabs  tablet 3    UNABLE TO FIND Rollator 1 Device 0    sacubitril-valsartan (ENTRESTO) 24-26 MG per tablet Take 1 tablet by mouth in the morning and 1 tablet before bedtime. 180 tablet 3    carvedilol (COREG) 6.25 MG tablet Take 1 tablet by mouth in the morning and 1 tablet in the evening. Take with meals.  180 tablet 3    carbidopa-levodopa (RYTARY) 61. MG CPCR per extended release capsule Take 2 capsules at 8am, 12pm, 4pm and 8pm 720 capsule 3    Carbidopa-Levodopa ER (RYTARY) 48. MG CPCR Take 1 capsule at 8am, 12pm, 4pm and 8pm 360 capsule 3    MAGNESIUM PO Take by mouth daily      acetaminophen (TYLENOL) 500 MG tablet Take 1,000 mg by mouth every 6 hours      albuterol sulfate  (90 Base) MCG/ACT inhaler Inhale 2 puffs into the lungs every 4 hours as needed      allopurinol (ZYLOPRIM) 100 MG tablet 100 mg as needed      aspirin 81 MG EC tablet Take 81 mg by mouth 2 times daily      clonazePAM (KLONOPIN) 0.5 MG tablet 1 tab QHS      clotrimazole-betamethasone (LOTRISONE) 1-0.05 % cream Apply topically 2 times daily as needed      cyclobenzaprine (FLEXERIL) 5 MG tablet Take 5 mg by mouth 3 times daily as needed      doxycycline hyclate (VIBRA-TABS) 100 MG tablet Take 100 mg by mouth daily      fluticasone-salmeterol (ADVAIR) 250-50 MCG/DOSE AEPB Inhale 1 puff into the lungs daily      omeprazole (PRILOSEC) 40 MG delayed release capsule Take 40 mg by mouth daily      ondansetron (ZOFRAN-ODT) 8 MG TBDP disintegrating tablet Take 8 mg by mouth every 8 hours as needed      polyethylene glycol (GLYCOLAX) 17 GM/SCOOP powder Take 17 g by mouth daily      tamsulosin (FLOMAX) 0.4 MG capsule Take 0.4 mg by mouth daily       PLAN    Problem List:  (Impairments causing functional limitations):  Oropharyngeal Dysphagia   NTERVENTIONS PLANNED: (Benefits and precautions of therapy have been discussed with the patient.)  The Speech Language Pathologist (SLP) completed education with the patient regarding results of evaluation. Explained that Speech Pathology intervention is warranted  at this time   Prognosis for improvements is fair +     This plan will be re-evaluated and revised in 1 week  if warranted. Patient stated goals: Agreed with above,   Treatment goals discussed with Patient and Family   The Patient and Family understand(s) the diagnosis, prognosis and plan of care   TREATMENT PLAN EFFECTIVE DATES:  2/7/2023 TO 4/7/2023. FREQUENCY/DURATION: Continue to follow patient 1 x week8 weeks if warranted after MBSS is completed      GOALS    Short term goals:   Participate in MBSS with 100% participation. Patient will demonstrate adequate use of  (supraglottic swallow,Mendelson maneuver, effortful swallow, etc.) to eliminate s/s of laryngeal penetration and/or aspiration of (pureed, mechanical soft, etc.) diet/liquids with min verbal cues during 80% of (8 out of 10 swallows) therapeutic trials. Patient  will demonstrate the ability to adequately self-monitor swallowing skills and perform appropriate compensatory techniques to reduce s/s of aspiration at [address] [place] [address]. Patient will demonstrate adequate use of the following compensatory strategies (chin tuck,multiple swallows, head turn, etc.) to eliminate s/s of laryngeal penetration and/or aspiration of  (pureed, mechanical soft, etc.) diet/liquids with min verbal cues during 80% of (8 out of 10 swallows) therapeutic trials    Long term goals:   Patient will maintain adequate hydration/nutrition with optimum safety and efficiency of  swallowing function on P.O. intake without overt signs and symptoms of aspiration for the  highest appropriate diet level.   2. Patient  will utilize compensatory strategies with optimum safety and efficiency of  swallowing function on P.O. intake without overt signs and symptoms of aspiration for the  highest appropriate diet level. Regarding , I certify that the treatment plan above will be carried out by a therapist or under their direction. Thank you for this referral,  Lizandro Ahmadi. Ed CCC-SLP        Total Treatment Duration:   60 minutes

## 2023-02-08 ENCOUNTER — OFFICE VISIT (OUTPATIENT)
Dept: ORTHOPEDIC SURGERY | Age: 78
End: 2023-02-08

## 2023-02-08 DIAGNOSIS — M54.50 LOW BACK PAIN, UNSPECIFIED BACK PAIN LATERALITY, UNSPECIFIED CHRONICITY, UNSPECIFIED WHETHER SCIATICA PRESENT: Primary | ICD-10-CM

## 2023-02-08 DIAGNOSIS — M48.062 LUMBAR STENOSIS WITH NEUROGENIC CLAUDICATION: ICD-10-CM

## 2023-02-08 DIAGNOSIS — M47.816 FACET ARTHROPATHY, LUMBAR: ICD-10-CM

## 2023-02-08 DIAGNOSIS — M51.36 DDD (DEGENERATIVE DISC DISEASE), LUMBAR: ICD-10-CM

## 2023-02-08 RX ORDER — LIDOCAINE 50 MG/G
1 PATCH TOPICAL DAILY
Qty: 30 PATCH | Refills: 0 | Status: SHIPPED | OUTPATIENT
Start: 2023-02-08 | End: 2023-03-10

## 2023-02-08 NOTE — PROGRESS NOTES
Name: Beronica Flores. YOB: 1945  Gender: male  MRN: [id number]    CC: Back Pain (Low back pain )       HPI: This is a 69y.o. year old male who has history of prior lumbar fusion with Dr. Serafin Marques 2015 and L2-L5 laminectomy and fusion. He also has diagnosis of Parkinson's disease that seems to have progressed over the past year. He has pacemaker, COPD cardiomyopathy. He is here with his wife he is present because he is having more pain in the lower back more in the left side of the lower back and when he walks he is having a popping sensation on the left of the lower back. His gait and balance and extraparametal motions are progressing. He has a hyperextension of his left knee. This has had a total knee replacement previously. He tells me that Dr. Antonietta Trujillo has mentioned revision of this need to help with the hyperextension. He feels that the hyperextension of the knee the abnormal balance is aggravating his lower back. When he stands up straight if he can do this for long, he does get pain that radiates in the bilateral posterior legs. History was obtained by patient and wife    This patient  has not had lumbar surgery in the past.      AMB PAIN ASSESSMENT 2/8/2023   Location of Pain Back   Severity of Pain 5   Quality of Pain Sharp   Duration of Pain Persistent   Frequency of Pain Constant   Date Pain First Started 9/15/2021   Aggravating Factors Other (Comment)   Limiting Behavior Some   Relieving Factors Nsaids; Other (Comment)   Result of Injury No   Work-Related Injury No   Are there other pain locations you wish to document? No            ROS/Meds/PSH/PMH/FH/SH: I personally reviewed the patient's collected intake data.   Below are the pertinents:    Allergies   Allergen Reactions    Promethazine Other (See Comments)     Confusion    Oxycodone Nausea And Vomiting         Current Outpatient Medications:     lidocaine (LIDODERM) 5 %, Place 1 patch onto the skin daily 12 hours on, 12 hours off., Disp: 30 patch, Rfl: 0    traMADol-acetaminophen (ULTRACET) 37.5-325 MG per tablet, tramadol 37.5 mg-acetaminophen 325 mg tablet, Disp: , Rfl:     TIOTROPIUM BROMIDE MONOHYDRATE IN, Inhale 18 mcg into the lungs daily, Disp: , Rfl:     carbidopa-levodopa (SINEMET CR)  MG per extended release tablet, Take 200 mg by mouth at bedtime, Disp: , Rfl:     mirabegron (MYRBETRIQ) 25 MG TB24, Take 25 mg by mouth daily, Disp: , Rfl:     Amantadine (SYMMETREL) 100 MG TABS tablet, Take 100 mg by mouth 2 times daily, Disp: 60 tablet, Rfl: 0    QUEtiapine (SEROQUEL) 25 MG tablet, 3 tabs QHS, Disp: 270 tablet, Rfl: 3    UNABLE TO FIND, Rollator, Disp: 1 Device, Rfl: 0    sacubitril-valsartan (ENTRESTO) 24-26 MG per tablet, Take 1 tablet by mouth in the morning and 1 tablet before bedtime. , Disp: 180 tablet, Rfl: 3    carvedilol (COREG) 6.25 MG tablet, Take 1 tablet by mouth in the morning and 1 tablet in the evening. Take with meals. , Disp: 180 tablet, Rfl: 3    carbidopa-levodopa (RYTARY) 61. MG CPCR per extended release capsule, Take 2 capsules at 8am, 12pm, 4pm and 8pm, Disp: 720 capsule, Rfl: 3    Carbidopa-Levodopa ER (RYTARY) 48. MG CPCR, Take 1 capsule at 8am, 12pm, 4pm and 8pm, Disp: 360 capsule, Rfl: 3    MAGNESIUM PO, Take by mouth daily, Disp: , Rfl:     acetaminophen (TYLENOL) 500 MG tablet, Take 1,000 mg by mouth every 6 hours, Disp: , Rfl:     albuterol sulfate  (90 Base) MCG/ACT inhaler, Inhale 2 puffs into the lungs every 4 hours as needed, Disp: , Rfl:     allopurinol (ZYLOPRIM) 100 MG tablet, 100 mg as needed, Disp: , Rfl:     aspirin 81 MG EC tablet, Take 81 mg by mouth 2 times daily, Disp: , Rfl:     clonazePAM (KLONOPIN) 0.5 MG tablet, 1 tab QHS, Disp: , Rfl:     clotrimazole-betamethasone (LOTRISONE) 1-0.05 % cream, Apply topically 2 times daily as needed, Disp: , Rfl:     cyclobenzaprine (FLEXERIL) 5 MG tablet, Take 5 mg by mouth 3 times daily as needed, Disp: , Rfl: doxycycline hyclate (VIBRA-TABS) 100 MG tablet, Take 100 mg by mouth daily, Disp: , Rfl:     fluticasone-salmeterol (ADVAIR) 250-50 MCG/DOSE AEPB, Inhale 1 puff into the lungs daily, Disp: , Rfl:     omeprazole (PRILOSEC) 40 MG delayed release capsule, Take 40 mg by mouth daily, Disp: , Rfl:     ondansetron (ZOFRAN-ODT) 8 MG TBDP disintegrating tablet, Take 8 mg by mouth every 8 hours as needed, Disp: , Rfl:     polyethylene glycol (GLYCOLAX) 17 GM/SCOOP powder, Take 17 g by mouth daily, Disp: , Rfl:     tamsulosin (FLOMAX) 0.4 MG capsule, Take 0.4 mg by mouth daily, Disp: , Rfl:     Past Surgical History:   Procedure Laterality Date    ANTERIOR CRUCIATE LIGAMENT REPAIR Right 13    APPENDECTOMY      age 22    CYSTOSTOMY      KNEE ARTHROSCOPY Right     LITHOTRIPSY  1998    x 2    LUMBAR LAMINECTOMY  06/2015    Laminectomy with fusion; lower back; with pins    PACEMAKER  10/31/2016    ICD/pacemaker    TOTAL HIP ARTHROPLASTY Left 2018    TOTAL KNEE ARTHROPLASTY Left 01/2021    UROLOGICAL SURGERY Right     open removal of kidney stones       Patient Active Problem List   Diagnosis    Coronary artery disease involving native coronary artery of native heart without angina pectoris    Cardiomyopathy ([address]    Osteoarthritis of right knee    RBD (REM behavioral disorder)    ED (erectile dysfunction)    Opioid use, unspecified with unspecified opioid-induced disorder ([address]    Tremor    Urinary retention    Parkinson's disease ([address]    Biventricular ICD (implantable cardioverter-defibrillator) in place    Enlarged prostate    Multifactorial gait disorder    Biventricular AICD malfunction    Sciatica    Benign prostatic hyperplasia without lower urinary tract symptoms    Neurogenic orthostatic hypotension (HCC)    COPD exacerbation ([address]    Spinal stenosis, lumbar region, with neurogenic claudication    Postural imbalance    Arthritis    Arthritis of left knee    Primary osteoarthritis of knee    Chronic renal insufficiency Hoarseness of voice    Controlled diabetes mellitus type II without complication (HCC)    Chronic obstructive pulmonary disease (HCC)    MAXIMO (obstructive sleep apnea)    Arthritis of left hip    Obesity (BMI 30-39. 9)    Encounter for long-term (current) use of medications    Hypertension    Hyperlipidemia    Hypophonia    Cognitive changes    Abnormal posture    Acquired spondylolisthesis    Rosacea    Dermatitis, seborrheic    Neurogenic bladder    HARGROVE (dyspnea on exertion)    Constipation by delayed colonic transit         Tobacco:  reports that he quit smoking about 15 years ago. His smoking use included cigarettes. He smoked an average of 1.5 packs per day. He has never used smokeless tobacco.  Alcohol:   Social History     Substance and Sexual Activity   Alcohol Use No        Physical Exam:   @VS1@   GENERAL:  Adult in no acute distress, well developed, well nourished Patient is appropriately conversant  MSK:  Examination of the lumbar spine reveals paraspinal tenderness left facet agenic tenderness. The patient ambulates with a unsteady, assisted with a walker, and shuffling gait. ROM of bilateral hip(s) reveals no irritability. Bilateral knees show no effusion. Left knee does have some mild extension mild posterior instability. NEURO:  Cranial nerves grossly intact. No motor deficits. Straight leg testing is negative bilateral  Sensory testing reveals intact sensation to light touch and in the distribution of the L3-S1 dermatomes bilaterally  Ankle jerk is negative for clonus  Strength testing in the lower extremity reveals the following based on the 5 point grading scale:     HF (L2) H Ab (L5) KE (L3/4) ADF (L4) EHL (L5) A Ev (S1) APF (S1)   Right 5 5 5 5 5 5 5   Left 5 5 5 5 5 5 5     PSYCH:  Alert and oriented X 3. Appropriate affect. Intact judgment and insight.          Radiographic Studies:     AP, lateral and spot views of the lumbar spine:  2/8/23    AP of the lumbar spine shows normal alignment. Instrumentation is intact from L2-L5. There is been no change in the instrumentation compared to prior x-ray 1 year ago. No loosening or pulling out of the screws rods. No significant decompensation above the fusion compared to previous. There are degenerative changes L5 is 1 below the fusion and facet arthropathy is noted. X-ray impression: L5-S1 degenerative disc disease with facet arthropathy, stable fusion. I have independently reviewed a CT of the abdomen pelvis from February 16, 2022. I am able to visualize lumbar spine. There is facet arthropathy at L1-2 about the fusion. Instrumentation is intact. There is bulky facet arthropathy bilateral L5-S1 and spinal stenosis more prominent on the left. Assessment/Plan:         Diagnosis Orders   1. Low back pain, unspecified back pain laterality, unspecified chronicity, unspecified whether sciatica present  XR LUMBAR SPINE (2-3 VIEWS)      2. Facet arthropathy, lumbar        3. DDD (degenerative disc disease), lumbar        4. Lumbar stenosis with neurogenic claudication              This patient's clinical history and physical exam is consistent with neurogenic claudication which is likely due to lumbar stenosis. Discussed that his spinal stenosis and facet arthropathy is at L5-S1. Given his progression of Parkinson's disease, he would not be spinal surgery candidate for extension of his fusion as it would be increased risk of hardware failure and fusion failure. I have recommended that he continue to work on his balance with physical therapy, medications with neurologist, discussed with total knee surgeons regarding stability of the knees and refer him to nonsurgical spine provider for lumbar injections. The injections that I think would be most beneficial at this time would be L5-S1 epidural steroid injection. Possibly even consider L5-S1 facet injections bilaterally.     - Injection: The patient will be referred for a lumbar steroid injection to help reduce the symptoms. The patient understands the risks including hyperglycemia, immunosuppression, meningitis, cerebrospinal fluid leak, epidural hematoma, and reaction to medication. The patient may benefit from additional injections depending on the response. The injection should be a L5-S1 NATALIA. Can also consider L5-S1 facets  -Referral to [facility] non-surgical spine provider for evaluation and treatment. Orders Placed This Encounter   Medications    lidocaine (LIDODERM) 5 %     Sig: Place 1 patch onto the skin daily 12 hours on, 12 hours off. Dispense:  30 patch     Refill:  0        Orders Placed This Encounter   Procedures    XR LUMBAR SPINE (2-3 VIEWS)        4 This is a chronic illness/condition with exacerbation and progression      Return for lumbar injection with [facility], eval/treat appt with Dr. Baron Conteh. Kaley Duke PA-C  02/08/23      Elements of this note were created using speech recognition software. As such, errors of speech recognition may be present.

## 2023-02-08 NOTE — PROGRESS NOTES
A referral to non surgical spine doctor was ordered. A referral for spine injection has been ordered.

## 2023-02-08 NOTE — PATIENT INSTRUCTIONS
Epidural Steroid Injection / Selective Nerve Root Block  What is it? An epidural steroid injection (NATALIA) is an injection of an anti-inflammatory medication directly on the sac that covers the spinal cord and nerves. A Selective Nerve Root Block (SNRB) is an injection that is directed around a specific nerve. Your physician usually orders an injection  when you have symptoms of an irritated spinal nerve. These symptoms can include back, buttock or leg pain, weakness, or numbness. Irritated spinal nerves are often caused by disc herniations or a tight spinal canal (stenosis). The goal of the steroid injection is to reduce the inflammation around the spinal cord and nerves, which should reduce the amount of back and leg pain you are experiencing. Epidural injections are often done in series. It would not be unusual to have two or three injections in a row 10 to 14 days apart. The reason for multiple injections is that the relief from the injection may wear off over time. And sometimes it takes multiple doses of steroid injection to obtain the most anti-inflammatory effect around the nerves. How is it performed? You will be asked to lie on your side or stomach on the x-ray table. This will allow the physician to position you in the best way possible to access your back. Next, the skin will be cleaned and numbed with a local anesthetic. An X-ray machine will then be used to guide a small gauge needle into the space over your spinal sac. A small amount of dye will then be injected to insure the needle is in the proper position. Finally, a mixture of numbing medicine and anti-inflammatory (steroid/cortisone) will be injected. How long does it take? All together it should take about 1 hour. The back and legs may feel weak or numb for a couple of hours after the injection. Plan the have someone drive you home. Are there any restrictions after the NATALIA?    Try to spend the remainder of the evening resting as much as possible. You may return to your normal activities the day after the procedure, including returning to work. What are the risks? The most common risk is a spinal headache. This happens when the needle passes through the spinal sac and can result in leakage of spinal fluid. This is usually treated with lying in a flat position and high fluid intake. Rarely, spinal headaches lasting more than a few days can be treated with a small procedure called a blood patch. Another risk, though very small, is the injection of the medication into one of the tiny blood vessels in the spinal canal.  This can result in serious complications such as seizures, cardiac arrest and even death. Fortunately, these complications are quite rare. Other rare complications include infection, bleeding into the spinal canal (epidural hematoma), loss of bladder control, and injury to a nerve with the needle. Who should not have an NATALIA? The injection of a steroid anywhere in the body can cause a significant increase in the blood sugar level. Diabetics are very sensitive to steroids and should have them administered with caution. Diabetic patients can have injections but need to watch their blood sugar after the injection and discuss with their Primary Care Physician a plan to manage elevations in blood sugar. Steroids also decrease the body's ability to fight infection. Thus, any person with an active infection should not take a steroid medication until the infection has been cleared completely. If you are taking an antibiotic for an active infection, please notify our office.    Additionally, some patients may have anatomic abnormalities or have had previous back surgeries which may not allow the needle to pass into the spinal canal.     Medications that result in thinning of the blood such as coumadin, aspirin, Plavix, Eliquis, Xarelto and many anti-inflammatory medications need to be discontinued 5-7 days prior to the injection. Check with your doctor regarding specific drugs you are taking. Med    [facility]    MD Kaleb Laguna MD Amy Francesca All, PA-C Nicolas Antigua, NP    Main Office  [address] [facility],[address], [facility], [address]        For Appointments at either location, please call [phone] that result in thinning of blood such as Coumadin, Aspirin, Plavix, and many anti-inflammatories, need to Pre procedure teaching sheet: Epidural spinal injection, selective nerve root block injection, sacroiliac injection and facet block injections. You have been scheduled for spinal injection. This injection is to help decrease her back and or leg pain. A local anesthetic will be used to numb the area. Then, a spinal needle will be placed in the appropriate place according to the type of injection ordered by your physician. A steroid with or without local anesthetic will be injected. A small amount of contrast dye will be used to better visualize the injection site. You will be lying on your stomach. A series of 3 injections may be performed as these are ordered 2 to 3 weeks apart. Be aware, steroids can take 2 to 3 days to begin working, but can take 7 to 10 days for full effectiveness. Therefore, you may not have relief immediately. Please be patient and give the injection time to work. You should not resume any type of strenuous workout routine for at least 3 days following the procedure. Walking is fine. Prior to your procedure    - Please call your primary care physician if you are on blood thinners such as Coumadin, warfarin, heparin, Plavix, Lovenox, Effient, Eliquis, Xarelto, Brilinta, or aspirin or other blood thinner as these will need to be stopped for 3 to 7 days before the injections.   We will need verbal approval to stop the thinners and proceed with the injection from the physician treating you with the blood thinners prior to the appointment date. If your physician tells you it is not safe to stop the blood thinner, you must call our office and discuss this with us. We may need to cancel the procedure. - Please do not take any nonsteroidal anti-inflammatory medications (NSAIDs) such as Advil, ibuprofen, Celebrex, meloxicam or Aleve for 3 days before your injection.    - We cannot give you an injection if you are presently taking an antibiotic. - Please continue your other medications as prescribed. -You must bring someone to drive you home. - You may eat prior to your procedure, but we asked that you do not eat a heavy meal within 2 to 4 hours of your procedure. You may drink liquids up to 1 to 2 hours before your appointment time. - If you are diabetic, please adjust your medications as appropriate. If steroid is used be aware that they may increase your blood sugar. Closely monitor your blood sugars after the procedure. - If you are sick, running a fever, have a virus or are put on antibiotics during the week before your procedure, please call to reschedule. We cannot do injections if you are sick. Day of procedure    - Arrive 15 minutes before your procedure time, register at the . - A staff member will call you from the waiting area and bring you to the exam room. - You may or may not be asked to change into shorts. Please leave valuables at home. If you wear clothing with elastic waist, you will not be required to change. - The nurse will talk with you and have you sign a consent form before your injection. If you have any allergies to Betadine, iodine, shellfish or x-ray dye, please tell the nurse at this time. - You will be positioned on the table on your stomach. A special x-ray machine is used to talon the correct position of the needle. We will clean the area with Betadine or Hibiclens.   Sterile towels were placed around the area to be injected. - The doctor will use a local anesthetic to numb the skin. This burns like a bee sting for about 20 seconds. As the needle is advanced, you will feel pressure. - Once the needle is in the appropriate space, the medication will be injected. Once the needle is removed, your back will be cleaned. You will move back to the exam room. - You are required to stay 20 to 30 minutes following the procedure. Although not expected you may have some numbness from the local anesthetic. If this were to interfere with her walking, you will not be discharged from the office until it is safe for you to leave. - Your discharge instructions and follow-up care will be discussed with you prior to leaving the office.           PRODUCTS THAT MAY THIN THE BLOOD    Medications, over-the-counter medications and herbal supplements    Aches-N-Pain    Disalcid   Meclenofenamate   Plavix  Acetylsalicylic acid (ASA)  Andrew's Pills   Meclomen    Ponstel  Actron     Dolobid   Medipren    Relefen  Advil     Dristan    Mefenamic acid   Robaxisal  Aleve     Ecotrin    Meloxicam    Rufen  Margarita-Gl[place]ale    Empirin   Menadol    Saleto  Anacin     Equagesic   Methocarbamol   Salsalate  Anaprox    Etodolac   Midol     Sine-aid  Ansaid     Excedrin   Mobic     Sine-off  Arthritis Pain formula   Feldene   Motrin     Sominex  Ascriptin    Fenoprofen   Nabumetone    Stanback  Aspergum    Feverfew   Nalfon     Sulindac  Aspirin     Florinal   Naprosyn    Talwin Compound  Tangela     Flurbiprofen   Naproxen    Ticlid  BC Powders    Genpril    Norgesic    iclopidine  Bufferin    Goody's   Nuprin     Tolectin  Cataflam    Haltrin    Nyquil     Tolmetin  Clinoril     IBU    Orudis     Toradol  Clopidogrel    Ibuprofen   Oruvail     Trental  Coricidin    Indocin    Oxaprozin    Triclosan  Coumadin    Indomethacin   Pamprin    Vanquish  Darvon Compound   Ketoprofen   Pepto Bismol    Vitamin - E  Daypro     Ketorolac   Percodan    Voltaren  Diflunisal Kaopectate   Lovenox   Piroxicam    Warfarin    Diclofenac Lodine       Phenaphen    Xarelto  Eliquis       Enoxaparin

## 2023-02-09 ENCOUNTER — TELEPHONE (OUTPATIENT)
Dept: NEUROLOGY | Age: 78
End: 2023-02-09

## 2023-02-09 NOTE — TELEPHONE ENCOUNTER
Pt called in stating that he is needing clarification on Rytary and Amantadine. Pt is requesting a callback.

## 2023-02-14 ENCOUNTER — OFFICE VISIT (OUTPATIENT)
Dept: ORTHOPEDIC SURGERY | Age: 78
End: 2023-02-14

## 2023-02-14 DIAGNOSIS — M54.17 LUMBOSACRAL RADICULOPATHY: Primary | ICD-10-CM

## 2023-02-14 RX ORDER — TRIAMCINOLONE ACETONIDE 40 MG/ML
100 INJECTION, SUSPENSION INTRA-ARTICULAR; INTRAMUSCULAR ONCE
Status: COMPLETED | OUTPATIENT
Start: 2023-02-14 | End: 2023-02-14

## 2023-02-14 RX ADMIN — TRIAMCINOLONE ACETONIDE 100 MG: 40 INJECTION, SUSPENSION INTRA-ARTICULAR; INTRAMUSCULAR at 12:15

## 2023-02-14 NOTE — PROGRESS NOTES
Date: 02/14/23   Name: Roxana Marley. Pre-Procedural Diagnosis:    Diagnosis Orders   1. Lumbosacral radiculopathy  XR INJ SPINE THER SUBST LUM/SAC W IMG    triamcinolone acetonide (KENALOG-40) injection 100 mg          Procedure: Lumbar Epidural Steroid Injection (NATALIA)    Precautions: [facility] Precautions spine injections: None. Patient denies any prior sensitivity to steroid, local anesthetic, contrast dye, iodine or shellfish. The procedure was discussed at length with the patient and informed consent was signed. The patient was placed in a prone position on the fluoroscopy table and the skin was prepped and draped in a routine sterile fashion. The area to be injected was anesthetized with approximately 5 cc of 1% Lidocaine. Initially a 22-gauge 3.5 inch inch spinal needle was carefully advanced under fluoroscopic guidance to the left L5-S1 epidural space. At this time 0.25 cc of omnipaque administered. . Once proper placement was confirmed, 1.5 cc of sterile water and 100 mg of Kenalog were injected through the spinal needle. Fluoroscopic guidance was used intermittently over a 10-minute period to insure proper needle placement and patient safety. A hard copy of the fluoroscopic  images has been placed in the patient's chart. The patient was monitored after the procedure and discharged home without complication.      Resume Meds:  Patient to resume asa 81 mg in the AM.    Edwin Brizuela MD  02/14/23

## 2023-02-15 DIAGNOSIS — I42.9 CARDIOMYOPATHY (HCC): ICD-10-CM

## 2023-02-15 DIAGNOSIS — Z95.810 BIVENTRICULAR ICD (IMPLANTABLE CARDIOVERTER-DEFIBRILLATOR) IN PLACE: ICD-10-CM

## 2023-03-03 RX ORDER — AMANTADINE HYDROCHLORIDE 100 MG/1
TABLET ORAL
Qty: 60 TABLET | Refills: 11 | OUTPATIENT
Start: 2023-03-03

## 2023-03-09 ENCOUNTER — HOSPITAL ENCOUNTER (OUTPATIENT)
Dept: GENERAL RADIOLOGY | Age: 78
Discharge: HOME | End: 2023-03-09
Payer: MEDICARE

## 2023-03-09 DIAGNOSIS — R13.19 DYSPHAGIA, NEUROLOGIC: ICD-10-CM

## 2023-03-09 PROCEDURE — 2500000003 HC RX 250 WO HCPCS: Performed by: PSYCHIATRY & NEUROLOGY

## 2023-03-09 PROCEDURE — 74230 X-RAY XM SWLNG FUNCJ C+: CPT

## 2023-03-09 PROCEDURE — 92611 MOTION FLUOROSCOPY/SWALLOW: CPT

## 2023-03-09 RX ADMIN — BARIUM SULFATE 30 ML: 0.81 POWDER, FOR SUSPENSION ORAL at 13:47

## 2023-03-09 RX ADMIN — BARIUM SULFATE 15 ML: 400 SUSPENSION ORAL at 13:47

## 2023-03-09 RX ADMIN — BARIUM SULFATE 30 ML: 400 PASTE ORAL at 13:47

## 2023-03-09 NOTE — THERAPY EVALUATION
Dony Houston. : 1945  Primary: Medicare Part A And B  Secondary:  [employer] MEDICARE SUPP  MRN: [id number] [facility] SPECIALTY [facility] RADIOLOGY  [facility] [id number]  Phone: [phone]    Visit Info:    Date 3/9/2023   SPEECH LANGUAGE PATHOLOGY: MODIFIED BARIUM SWALLOW STUDY     Appt Desk   Episode      Treatment Diagnosis:  R13.14 Dysphagia, Pharyngoesophageal Phase    Medical/Referring Diagnosis:  Dysphagia, neurologic [R13.19]  Referring Physician:  Reva Costa MD  Radiologist: Dr. Peg Trevino  Fluoroscopy completed by: ARLEEN Alvarez Ra, MD Orders: Modified Barium Swallow  Date of Onset:  No data recorded   Allergies:  Promethazine and Oxycodone         PAST MEDICAL HISTORY:   Mr. Malvin Mcconnell is a 69 y.o. male who  has a past medical history of Acquired spondylolisthesis, Actinic keratosis, Arthritis, Back pain, Basal cell carcinoma, BPH (benign prostatic hypertrophy), Cardiomyopathy ([address] Chronic obstructive pulmonary disease ([address] Chronic renal insufficiency, Controlled diabetes mellitus type II without complication ([address] COPD exacerbation ([address] COPD with emphysema ([address] Coronary artery disease involving native coronary artery of native heart without angina pectoris, Dermatitis, seborrheic, HARGROVE (dyspnea on exertion), Dyspnea, ED (erectile dysfunction), Elevated PSA, Encounter for long-term (current) use of medications, Enlarged prostate, Fatigue, Finger swelling, H/O echocardiogram, Hiatal hernia, History of kidney stones, Hyperlipidemia, Hypertension, Hypophonia with hoarseness, Kidney problem, Kidney stone, Knee effusion, Low vitamin B12 level, Neurogenic bladder, Obesity (BMI 30-39.9), Osteoarthritis, Parkinson disease ([address] Presence of combination internal cardiac defibrillator (ICD) and pacemaker, Primary osteoarthritis of knee, Prostatitis, Pseudogout of hand, Rosacea, S/P placement of cardiac pacemaker, Sciatica, Seborrhea, Sleep apnea, Slurred speech, SOB (shortness of breath), Spinal stenosis, lumbar region, with neurogenic claudication, Syncope, near, Tremor, Urinary frequency, Urinary retention, Urinary urgency, Wears dentures, and Wears glasses. He also  has a past surgical history that includes Total hip arthroplasty (Left, 2018); lumbar laminectomy (06/2015); pacemaker (10/31/2016); Anterior cruciate ligament repair (Right, 1991); Total knee arthroplasty (Left, 01/2021); Appendectomy; Urological Surgery (Right); cystostomy; Lithotripsy (1998); and Knee arthroscopy (Right). ASSESSMENT/PLAN OF CARE   Based on the objective data described below, Mr. Nina Patrick presents with mild pharyngeal dysphagia characterized by adequate hyolaryngeal elevation but decreased excursion and intermittently absent epiglottic inversion resulting in silent aspiration of thin liquids. No aspiration of thin liquids by cup when using chin tuck, but continued aspiration when using chin tuck with straw presentation. Otherwise, no laryngeal penetration or aspiration observed with mildly thick liquids, pudding, mixed consistency and cracker. Mild residue in valleculae after solids clears with final liquid rinse of thin liquids presented by cup using chin tuck.     RECOMMENDATIONS AND PLANNED INTERVENTIONS  DIET:   Soft and Bite-Sized  Thin Liquids   MEDICATIONS: Whole with thin liquids and one at a time, using chin tuck    Compensatory Swallowing Strategies/Modifications:  Upright for all PO  Effortful swallow  Chin tuck  Small bites and sips  No straws  Remain upright for 20-30 min after any PO  Slow rate of PO intake    Additional Recommendations/Referrals:  Treatment to improve/facilitate oral/pharyngeal skills   Training in laryngeal strengthening and coordination exercises  Training in use of compensatory safe swallowing strategies/feeding guidelines  Patient/family education  Follow-up MBS as deemed necessary following therapy  Further assessment of esophageal phase of swallow      GENERAL    Patient pleasant and cooperative. He is accompanied by his niece. Present dysphagia symptoms: He currently complains of globus sensation with solids . Current dietary status prior to evaluation today: soft solids, thin liquids  Previous dysphagia or speech therapy intervention (including previous MBS studies): [facility] [place] essentially [facility]    OBJECTIVE    Modified barium swallow study was performed in the radiology suite using c-arm with Mr. Glenn Gan in the lateral plane. Oral Motor Assessment  Labial: No impairment  Lingual: No impairment  Dentition: Upper and Lower Dentures  Oral Hygiene: Dry    PO trials  Patient was presented with trials of thin liquids (by spoon, cup sip, cup gulp, and straw sip), mildly-thick liquids (by cup sip), pudding, mixed consistency, and cracker. Oral phase:  no significant oral issues observed  piecemeal deglutition    Pharyngeal phase:  Swallows of thin liquids were timely. There was silent aspiration observed during the swallow  that did not trigger a cough or throat clear response. No pharyngeal residue after swallow. No aspiration observed with thin liquids presented by cup when using chin tuck. Chin tuck technique did not prevent aspiration when liquids were presented by straw. Swallows of mildly-thick liquids were piecemealed. No laryngeal penetration or aspiration was observed. Residue was observed after the swallow in valleculae that was slight. Swallows of pudding were timely. No laryngeal penetration or aspiration was observed. Residue was observed after the swallow at base of tongue that was mild. Swallows of mixed consistencies were piecemealed and triggered at valleculae. No laryngeal penetration or aspiration was observed. Residue was observed after the swallow in valleculae that was mild. Swallows of cracker were timely. No laryngeal penetration or aspiration was observed.  Residue was observed after the swallow in valleculae that was mild to moderate. Residue cleared with liquid rinse using chin tuck. Pharyngeal characteristics:  adequate retraction of base of tongue  incoordinated and reduced hyolaryngeal elevation/excursion  Occasionally absent epiglottic inversion  reduced constriction of posterior pharyngeal wall  reduced laryngeal closure  Aspiration/Penetration Scale:   8 (Silent aspiration. Contrast passes below the folds/glottis with no effort to eject.)    Upper Esophageal Screen:   signs of reduced bolus clearance through cervical esophagus   a limited view. Therefore, unable to rule out an esophageal component of dysphagia  *Distal esophagus not assessed due to limitations of modified barium swallow study. National Outcomes Measure:   SWALLOWING: Level 5:  Swallowing is safe with minimal diet restriction and/or occasionally requires minimal cueing to use compensatory strategies. The individual may occasionally self-cue. All nutrition and hydration needs are met by mouth at mealtime. Patient Education regarding results and recommendations:  Mr. Leif Arce was educated on the following topics: anatomy and physiology of the swallowing mechanism and results and recommendations from this assessment. All questions were answered and comprehension of education was expressed.     Therapy Time  SLP Individual Minutes  Time In: 1330  Time Out: 1400  Minutes: 9400 No Name Jamaal Plummer CCC-SLP

## 2023-03-13 ENCOUNTER — OFFICE VISIT (OUTPATIENT)
Dept: CARDIOLOGY CLINIC | Age: 78
End: 2023-03-13
Payer: MEDICARE

## 2023-03-13 VITALS
WEIGHT: 183.6 LBS | HEIGHT: 71 IN | DIASTOLIC BLOOD PRESSURE: 58 MMHG | HEART RATE: 80 BPM | SYSTOLIC BLOOD PRESSURE: 92 MMHG | BODY MASS INDEX: 25.7 KG/M2

## 2023-03-13 DIAGNOSIS — I25.10 CORONARY ARTERY DISEASE INVOLVING NATIVE CORONARY ARTERY OF NATIVE HEART WITHOUT ANGINA PECTORIS: ICD-10-CM

## 2023-03-13 DIAGNOSIS — G90.3 NEUROGENIC ORTHOSTATIC HYPOTENSION (HCC): ICD-10-CM

## 2023-03-13 DIAGNOSIS — Z95.810 BIVENTRICULAR ICD (IMPLANTABLE CARDIOVERTER-DEFIBRILLATOR) IN PLACE: Primary | ICD-10-CM

## 2023-03-13 DIAGNOSIS — I10 PRIMARY HYPERTENSION: ICD-10-CM

## 2023-03-13 DIAGNOSIS — I25.5 ISCHEMIC CARDIOMYOPATHY: ICD-10-CM

## 2023-03-13 PROCEDURE — 3078F DIAST BP <80 MM HG: CPT | Performed by: INTERNAL MEDICINE

## 2023-03-13 PROCEDURE — 1123F ACP DISCUSS/DSCN MKR DOCD: CPT | Performed by: INTERNAL MEDICINE

## 2023-03-13 PROCEDURE — G8484 FLU IMMUNIZE NO ADMIN: HCPCS | Performed by: INTERNAL MEDICINE

## 2023-03-13 PROCEDURE — G8417 CALC BMI ABV UP PARAM F/U: HCPCS | Performed by: INTERNAL MEDICINE

## 2023-03-13 PROCEDURE — 1036F TOBACCO NON-USER: CPT | Performed by: INTERNAL MEDICINE

## 2023-03-13 PROCEDURE — 99214 OFFICE O/P EST MOD 30 MIN: CPT | Performed by: INTERNAL MEDICINE

## 2023-03-13 PROCEDURE — G8428 CUR MEDS NOT DOCUMENT: HCPCS | Performed by: INTERNAL MEDICINE

## 2023-03-13 PROCEDURE — 3074F SYST BP LT 130 MM HG: CPT | Performed by: INTERNAL MEDICINE

## 2023-03-13 RX ORDER — OLMESARTAN MEDOXOMIL 5 MG/1
5 TABLET ORAL DAILY
Qty: 30 TABLET | Refills: 1 | Status: SHIPPED | OUTPATIENT
Start: 2023-03-13

## 2023-03-13 RX ORDER — OLMESARTAN MEDOXOMIL 5 MG/1
5 TABLET ORAL DAILY
Qty: 90 TABLET | Refills: 1 | Status: SHIPPED | OUTPATIENT
Start: 2023-03-13 | End: 2023-03-13 | Stop reason: SDUPTHER

## 2023-03-13 ASSESSMENT — ENCOUNTER SYMPTOMS
COUGH: 0
ABDOMINAL PAIN: 0
APHONIA: 0
STRIDOR: 0
EYE PAIN: 0
NAIL CHANGES: 0

## 2023-03-13 NOTE — PROGRESS NOTES
[address], [address] [place] [address]  PHONE: [phone]    SUBJECTIVE:   Ling Rodriguez is a 69 y.o. male 1945   seen for a follow up visit regarding the following:     Chief Complaint   Patient presents with    Coronary Artery Disease    Cardiomyopathy    Hypertension    Established New Doctor           History of present illness: 69 y.o. male presented for follow-up 3/13/23 history of cardiomyopathy ICD coronary disease as well as Parkinson disease who is followed in neurology for Parkinson's disease. He has had numerous falls and difficulty walking. He was formally followed by Dr. Mallory Ortiz prior to his [facility]    Cardiac history:  7/2016 cardiac catheterization mild nonobstructive coronary disease in the left dominant system with severe nonischemic cardiomyopathy  8/2020 echocardiogram ejection fraction 35 to 40% mild aortic sclerosis mild mitral regurgitation  History of [place] CRT-D       Assessment:   Falls   Has hit head now hypotensive. Change entresto to very low dose benicar. History of cardiomyopathy  Key CAD CHF Meds            olmesartan (BENICAR) 5 MG tablet (Taking)    Sig - Route: Take 1 tablet by mouth daily - Oral    Prior authorization: Closed - Prior Authorization not required for patient/medication    carvedilol (COREG) 6.25 MG tablet (Taking)    Sig - Route: Take 1 tablet by mouth in the morning and 1 tablet in the evening.  Take with meals. - Oral           Coronary disease  Controlled  Hypertension  Aortic stenosis  Mild    Current Outpatient Medications   Medication Sig    olmesartan (BENICAR) 5 MG tablet Take 1 tablet by mouth daily    traMADol-acetaminophen (ULTRACET) 37.5-325 MG per tablet tramadol 37.5 mg-acetaminophen 325 mg tablet    TIOTROPIUM BROMIDE MONOHYDRATE IN Inhale 18 mcg into the lungs daily    carbidopa-levodopa (SINEMET CR)  MG per extended release tablet Take 200 mg by mouth at bedtime mirabegron (MYRBETRIQ) 25 MG TB24 Take 25 mg by mouth daily    Amantadine (SYMMETREL) 100 MG TABS tablet Take 100 mg by mouth 2 times daily    QUEtiapine (SEROQUEL) 25 MG tablet 3 tabs QHS    UNABLE TO FIND Rollator    carvedilol (COREG) 6.25 MG tablet Take 1 tablet by mouth in the morning and 1 tablet in the evening. Take with meals. carbidopa-levodopa (RYTARY) 61. MG CPCR per extended release capsule Take 2 capsules at 8am, 12pm, 4pm and 8pm    Carbidopa-Levodopa ER (RYTARY) 48. MG CPCR Take 1 capsule at 8am, 12pm, 4pm and 8pm    MAGNESIUM PO Take by mouth daily    acetaminophen (TYLENOL) 500 MG tablet Take 1,000 mg by mouth every 6 hours    albuterol sulfate  (90 Base) MCG/ACT inhaler Inhale 2 puffs into the lungs every 4 hours as needed    allopurinol (ZYLOPRIM) 100 MG tablet 100 mg as needed    aspirin 81 MG EC tablet Take 81 mg by mouth 2 times daily    clonazePAM (KLONOPIN) 0.5 MG tablet 1 tab QHS    clotrimazole-betamethasone (LOTRISONE) 1-0.05 % cream Apply topically 2 times daily as needed    cyclobenzaprine (FLEXERIL) 5 MG tablet Take 5 mg by mouth 3 times daily as needed    doxycycline hyclate (VIBRA-TABS) 100 MG tablet Take 100 mg by mouth daily    fluticasone-salmeterol (ADVAIR) 250-50 MCG/DOSE AEPB Inhale 1 puff into the lungs daily    omeprazole (PRILOSEC) 40 MG delayed release capsule Take 40 mg by mouth daily    ondansetron (ZOFRAN-ODT) 8 MG TBDP disintegrating tablet Take 8 mg by mouth every 8 hours as needed    polyethylene glycol (GLYCOLAX) 17 GM/SCOOP powder Take 17 g by mouth daily    tamsulosin (FLOMAX) 0.4 MG capsule Take 0.4 mg by mouth daily     No current facility-administered medications for this visit. Past Medical History, Past Surgical History, Family history, Social History, and Medications were all reviewed with the patient today and updated as necessary.        Allergies   Allergen Reactions    Promethazine Other (See Comments)     Confusion    Oxycodone Nausea And Vomiting     Past Medical History:   Diagnosis Date    Acquired spondylolisthesis 6/16/2015    Actinic keratosis 8/9/2016    Arthritis     generalized OA, managed with OTC medication     Back pain 8/9/2016    Basal cell carcinoma dx 2/2018    small pea-sized lesion on right side of nose    BPH (benign prostatic hypertrophy) 8/9/2016    Cardiomyopathy ([facility] 08/02/2016    Followed by Dr. Héctor Jackson at [facility]     Chronic obstructive pulmonary disease [facility])     managed with inhalers-Followed by PCP, Dr. Clem Cuadra     Chronic renal insufficiency 08/09/2016    patient denies, states Creatinine elevated one time. Controlled diabetes mellitus type II without complication ([facility] 52/39/1325    Patient denies-states BS was elevated one time-patient does not take any medications for diabetes     COPD exacerbation ([facility] 8/9/2016    COPD with emphysema (HCC)     Coronary artery disease involving native coronary artery of native heart without angina pectoris 8/2/2016    Dermatitis, seborrheic 8/9/2016    HARGROVE (dyspnea on exertion) 05/31/2016    due to COPD     Dyspnea     ED (erectile dysfunction) 8/9/2016    Elevated PSA 8/9/2016    Encounter for long-term (current) use of medications 8/9/2016    Enlarged prostate     managed with medication     Fatigue 8/9/2016    Finger swelling 8/9/2016    H/O echocardiogram 08/04/2020    LVEF 35-40%    Hiatal hernia     History of kidney stones     several with surgical interventions    Hyperlipidemia     Hypertension     managed with medication     Hypophonia with hoarseness 8/8/2016    Kidney problem     Kidney stone 06/2015    states he presently has 3 kidney stones monitored by Urology    Knee effusion 8/9/2016    Low vitamin B12 level 8/9/2016    Neurogenic bladder 8/9/2016    Obesity (BMI 30-39. 9)     BMI 34.5    Osteoarthritis 8/9/2016    Parkinson disease ([facility]     Followed by Dr. Carlyle Dewitt with medication     Presence of combination internal cardiac defibrillator (ICD) and pacemaker     Pt states he has never had a shock as of 12/21/20    Primary osteoarthritis of knee 8/9/2016    Prostatitis 8/9/2016    Pseudogout of hand 8/9/2016    Rosacea 8/9/2016    S/P placement of cardiac pacemaker 10/31/2016    Sciatica 8/9/2016    Seborrhea 8/9/2016    Sleep apnea     Does not use c-pap. C-pap broken.     Slurred speech 08/09/2016    per patient \"mostly in the evenings\" from parkinsons     SOB (shortness of breath) 08/09/2016    With exertion only     Spinal stenosis, lumbar region, with neurogenic claudication 6/16/2015    Syncope, near 8/9/2016    Tremor 8/8/2016    Urinary frequency 08/09/2016    controlled with medication     Urinary retention 8/9/2016    Urinary urgency 8/9/2016    Wears dentures     upper and lower    Wears glasses      Past Surgical History:   Procedure Laterality Date    ANTERIOR CRUCIATE LIGAMENT REPAIR Right 1991    APPENDECTOMY      age 21    CYSTOSTOMY      KNEE ARTHROSCOPY Right     LITHOTRIPSY  1998    x 2    LUMBAR LAMINECTOMY  06/2015    Laminectomy with fusion; lower back; with pins    PACEMAKER  10/31/2016    ICD/pacemaker    TOTAL HIP ARTHROPLASTY Left 2018    TOTAL KNEE ARTHROPLASTY Left 01/2021    UROLOGICAL SURGERY Right     open removal of kidney stones     Family History   Problem Relation Age of Onset    Diabetes Son     Heart Attack Son 38    Other Paternal Grandmother         Aneurysm    Heart Disease Paternal Uncle     Heart Disease Mother     Lung Disease Mother     Thyroid Disease Sister     Stroke Father     Heart Attack Father 49    Heart Disease Father     Hypertension Other     Diabetes Other         paternal uncle    Heart Attack Other 38        son    COPD Mother       Social History     Tobacco Use    Smoking status: Former     Packs/day: 1.50     Types: Cigarettes     Quit date: 7/12/2007     Years since quitting: 15.6    Smokeless tobacco: Never    Tobacco comments:     Quit smoking: stopped 2007   Substance Use  Topics    Alcohol use: No       ROS:    Review of Systems   Constitutional: Negative for fever. HENT:  Negative for stridor. Eyes:  Negative for pain. Cardiovascular:  Negative for chest pain. Respiratory:  Negative for cough. Endocrine: Negative for cold intolerance. Skin:  Negative for nail changes. Musculoskeletal:  Negative for arthritis. Gastrointestinal:  Negative for abdominal pain. Genitourinary:  Negative for dysuria. Neurological:  Negative for aphonia. Psychiatric/Behavioral:  Negative for altered mental status. Allergic/Immunologic: Negative for hives. PHYSICAL EXAM:    BP (!) 92/58   Pulse 80   Ht 5' 11\" (1.803 m)   Wt 183 lb 9.6 oz (83.3 kg)   BMI 25.61 kg/m²        Wt Readings from Last 3 Encounters:   03/13/23 183 lb 9.6 oz (83.3 kg)   01/26/23 192 lb 9.6 oz (87.4 kg)   11/15/22 199 lb (90.3 kg)     BP Readings from Last 3 Encounters:   03/13/23 (!) 92/58   01/26/23 110/60   11/15/22 110/70         Physical Exam  Vitals reviewed. HENT:      Head: Normocephalic. Right Ear: External ear normal.      Left Ear: External ear normal.      Nose: Nose normal.   Eyes:      General: No scleral icterus. Cardiovascular:      Heart sounds: Murmur heard. Systolic murmur is present. Pulmonary:      Effort: Pulmonary effort is normal.   Abdominal:      General: There is no distension. Musculoskeletal:      Cervical back: Neck supple. Skin:     General: Skin is warm. Neurological:      Mental Status: He is alert. Mental status is at baseline. Motor: Tremor present. Medical problems and test results were reviewed with the patient today. No results found for this or any previous visit (from the past 672 hour(s)). No results found for: CHOL, CHOLPOCT, CHOLX, CHLST, CHOLV, HDL, HDLPOC, HDLC, LDL, LDLC, VLDLC, VLDL, TGLX, TRIGL    No results found for any visits on 03/13/23.         Millie Houston was seen today for coronary artery disease, cardiomyopathy, hypertension and established new doctor. Diagnoses and all orders for this visit:    Biventricular ICD (implantable cardioverter-defibrillator) in place    Coronary artery disease involving native coronary artery of native heart without angina pectoris    Neurogenic orthostatic hypotension ([facility]    Primary hypertension    Ischemic cardiomyopathy    Other orders  -     olmesartan (BENICAR) 5 MG tablet; Take 1 tablet by mouth daily    Return in about 6 months (around 9/13/2023).        Ramila Verma MD  3/13/2023  4:02 PM

## 2023-03-14 ENCOUNTER — OFFICE VISIT (OUTPATIENT)
Dept: ORTHOPEDIC SURGERY | Age: 78
End: 2023-03-14
Payer: MEDICARE

## 2023-03-14 DIAGNOSIS — M54.16 LUMBAR RADICULOPATHY: ICD-10-CM

## 2023-03-14 DIAGNOSIS — M47.816 SPONDYLOSIS OF LUMBAR REGION WITHOUT MYELOPATHY OR RADICULOPATHY: Primary | ICD-10-CM

## 2023-03-14 DIAGNOSIS — M54.50 LUMBAR BACK PAIN: ICD-10-CM

## 2023-03-14 PROCEDURE — G8428 CUR MEDS NOT DOCUMENT: HCPCS | Performed by: PHYSICAL MEDICINE & REHABILITATION

## 2023-03-14 PROCEDURE — G8484 FLU IMMUNIZE NO ADMIN: HCPCS | Performed by: PHYSICAL MEDICINE & REHABILITATION

## 2023-03-14 PROCEDURE — G8417 CALC BMI ABV UP PARAM F/U: HCPCS | Performed by: PHYSICAL MEDICINE & REHABILITATION

## 2023-03-14 PROCEDURE — 1123F ACP DISCUSS/DSCN MKR DOCD: CPT | Performed by: PHYSICAL MEDICINE & REHABILITATION

## 2023-03-14 PROCEDURE — 99214 OFFICE O/P EST MOD 30 MIN: CPT | Performed by: PHYSICAL MEDICINE & REHABILITATION

## 2023-03-14 PROCEDURE — 1036F TOBACCO NON-USER: CPT | Performed by: PHYSICAL MEDICINE & REHABILITATION

## 2023-03-14 NOTE — PROGRESS NOTES
Date:  03/14/23     HPI:  I am seeing Conception Challenger. in evalution/folowup. He has had lumbar spine issues for over 8 years. In 2015 underwent lumbar fusion with laminectomy. He carries a diagnosis of Parkinson's disease, also pacemaker implantation, cardiomyopathy and COPD. He would not be considered a good spine surgical candidate. He has had pain in the lumbar spine for 8 years or more. Midline lower lumbar paraspinal pain, dull. Worse with standing, bending, twisting. Better with sitting or applying heat. The pain may occasionally gravitate down the posterior aspect of the left leg to the above the knee and sometimes below the knee. That does not appear to be very common. He has weakness in the legs that is not surprising. He has been on meloxicam with questionable benefit, takes Tylenol. He has had no spinal stimulation or radiofrequency ablations. No stronger pain medications and I am aware of. He has had spine injections in the past, was a few years back. Do not have the records of exactly what was done. He was more recently evaluated by Alissa Martell plain lumbosacral spine films will degenerative changes. Does not feel that further imaging was required since he would not be considered a good surgical candidate. He underwent a translaminar lumbar NATALIA that had some benefit. He does not feel any thing further needs to be done for that today. He does also have some tenderness in the upper lumbar paraspinal areas that is an issue but not requiring intervention right now.     Past Medical History:   Diagnosis Date    Acquired spondylolisthesis 6/16/2015    Actinic keratosis 8/9/2016    Arthritis     generalized OA, managed with OTC medication     Back pain 8/9/2016    Basal cell carcinoma dx 2/2018    small pea-sized lesion on right side of nose    BPH (benign prostatic hypertrophy) 8/9/2016    Cardiomyopathy ([address] 08/02/2016    Followed by Dr. Lizandro Go at [facility] Chronic obstructive pulmonary disease ([facility]     managed with inhalers-Followed by PCP, Dr. Pedro Luis Chiu     Chronic renal insufficiency 08/09/2016    patient denies, states Creatinine elevated one time. Controlled diabetes mellitus type II without complication ([facility] 28/99/4321    Patient denies-states BS was elevated one time-patient does not take any medications for diabetes     COPD exacerbation ([facility] 8/9/2016    COPD with emphysema (HCC)     Coronary artery disease involving native coronary artery of native heart without angina pectoris 8/2/2016    Dermatitis, seborrheic 8/9/2016    HARGROVE (dyspnea on exertion) 05/31/2016    due to COPD     Dyspnea     ED (erectile dysfunction) 8/9/2016    Elevated PSA 8/9/2016    Encounter for long-term (current) use of medications 8/9/2016    Enlarged prostate     managed with medication     Fatigue 8/9/2016    Finger swelling 8/9/2016    H/O echocardiogram 08/04/2020    LVEF 35-40%    Hiatal hernia     History of kidney stones     several with surgical interventions    Hyperlipidemia     Hypertension     managed with medication     Hypophonia with hoarseness 8/8/2016    Kidney problem     Kidney stone 06/2015    states he presently has 3 kidney stones monitored by Urology    Knee effusion 8/9/2016    Low vitamin B12 level 8/9/2016    Neurogenic bladder 8/9/2016    Obesity (BMI 30-39. 9)     BMI 34.5    Osteoarthritis 8/9/2016    Parkinson disease ([facility]     Followed by Dr. Jennifer Adam with medication     Presence of combination internal cardiac defibrillator (ICD) and pacemaker     Pt states he has never had a shock as of 12/21/20    Primary osteoarthritis of knee 8/9/2016    Prostatitis 8/9/2016    Pseudogout of hand 8/9/2016    Rosacea 8/9/2016    S/P placement of cardiac pacemaker 10/31/2016    Sciatica 8/9/2016    Seborrhea 8/9/2016    Sleep apnea     Does not use c-pap. C-pap broken.      Slurred speech 08/09/2016    per patient \"mostly in the evenings\" from parkinsons SOB (shortness of breath) 08/09/2016    With exertion only     Spinal stenosis, lumbar region, with neurogenic claudication 6/16/2015    Syncope, near 8/9/2016    Tremor 8/8/2016    Urinary frequency 08/09/2016    controlled with medication     Urinary retention 8/9/2016    Urinary urgency 8/9/2016    Wears dentures     upper and lower    Wears glasses         Past Surgical History:   Procedure Laterality Date    ANTERIOR CRUCIATE LIGAMENT REPAIR Right 1991    APPENDECTOMY      age 22    CYSTOSTOMY      KNEE ARTHROSCOPY Right     LITHOTRIPSY  1998    x 2    LUMBAR LAMINECTOMY  06/2015    Laminectomy with fusion; lower back; with pins    PACEMAKER  10/31/2016    ICD/pacemaker    TOTAL HIP ARTHROPLASTY Left 2018    TOTAL KNEE ARTHROPLASTY Left 01/2021    UROLOGICAL SURGERY Right     open removal of kidney stones        Family History   Problem Relation Age of Onset    Diabetes Son     Heart Attack Son 45    Other Paternal Grandmother         Aneurysm    Heart Disease Paternal Uncle     Heart Disease Mother     Lung Disease Mother     Thyroid Disease Sister     Stroke Father     Heart Attack Father 52    Heart Disease Father     Hypertension Other     Diabetes Other         paternal uncle    Heart Attack Other 45        son    COPD Mother         Social History     Socioeconomic History    Marital status:      Spouse name: Not on file    Number of children: Not on file    Years of education: Not on file    Highest education level: Not on file   Occupational History    Not on file   Tobacco Use    Smoking status: Former     Packs/day: 1.50     Types: Cigarettes     Quit date: 7/12/2007     Years since quitting: 15.6    Smokeless tobacco: Never    Tobacco comments:     Quit smoking: stopped 2007   Substance and Sexual Activity    Alcohol use: No    Drug use: Not Currently    Sexual activity: Not on file   Other Topics Concern    Not on file   Social History Narrative    Not on file     Social Determinants of Health     Financial Resource Strain: Not on file   Food Insecurity: Not on file   Transportation Needs: Not on file   Physical Activity: Not on file   Stress: Not on file   Social Connections: Not on file   Intimate Partner Violence: Not on file   Housing Stability: Not on file        Review of Systems       Current Outpatient Medications   Medication Sig Dispense Refill    olmesartan (BENICAR) 5 MG tablet Take 1 tablet by mouth daily 30 tablet 1    traMADol-acetaminophen (ULTRACET) 37.5-325 MG per tablet tramadol 37.5 mg-acetaminophen 325 mg tablet      TIOTROPIUM BROMIDE MONOHYDRATE IN Inhale 18 mcg into the lungs daily      carbidopa-levodopa (SINEMET CR)  MG per extended release tablet Take 200 mg by mouth at bedtime      mirabegron (MYRBETRIQ) 25 MG TB24 Take 25 mg by mouth daily      Amantadine (SYMMETREL) 100 MG TABS tablet Take 100 mg by mouth 2 times daily 60 tablet 0    QUEtiapine (SEROQUEL) 25 MG tablet 3 tabs  tablet 3    UNABLE TO FIND Rollator 1 Device 0    carvedilol (COREG) 6.25 MG tablet Take 1 tablet by mouth in the morning and 1 tablet in the evening. Take with meals.  180 tablet 3    carbidopa-levodopa (RYTARY) 61. MG CPCR per extended release capsule Take 2 capsules at 8am, 12pm, 4pm and 8pm 720 capsule 3    Carbidopa-Levodopa ER (RYTARY) 48. MG CPCR Take 1 capsule at 8am, 12pm, 4pm and 8pm 360 capsule 3    MAGNESIUM PO Take by mouth daily      acetaminophen (TYLENOL) 500 MG tablet Take 1,000 mg by mouth every 6 hours      albuterol sulfate  (90 Base) MCG/ACT inhaler Inhale 2 puffs into the lungs every 4 hours as needed      allopurinol (ZYLOPRIM) 100 MG tablet 100 mg as needed      aspirin 81 MG EC tablet Take 81 mg by mouth 2 times daily      clonazePAM (KLONOPIN) 0.5 MG tablet 1 tab QHS      clotrimazole-betamethasone (LOTRISONE) 1-0.05 % cream Apply topically 2 times daily as needed      cyclobenzaprine (FLEXERIL) 5 MG tablet Take 5 mg by mouth 3 times daily as needed      doxycycline hyclate (VIBRA-TABS) 100 MG tablet Take 100 mg by mouth daily      fluticasone-salmeterol (ADVAIR) 250-50 MCG/DOSE AEPB Inhale 1 puff into the lungs daily      omeprazole (PRILOSEC) 40 MG delayed release capsule Take 40 mg by mouth daily      ondansetron (ZOFRAN-ODT) 8 MG TBDP disintegrating tablet Take 8 mg by mouth every 8 hours as needed      polyethylene glycol (GLYCOLAX) 17 GM/SCOOP powder Take 17 g by mouth daily      tamsulosin (FLOMAX) 0.4 MG capsule Take 0.4 mg by mouth daily       No current facility-administered medications for this visit. Allergies   Allergen Reactions    Promethazine Other (See Comments)     Confusion    Oxycodone Nausea And Vomiting         PHYSICAL EXAM    General: Alert and cooperative    HEENT: Clear speech    Motor Exam: Mild weakness LE's primarily distally and symmetric    Sensory Exam: No lateralizing findings    Deep Tendon Reflexes: Decreased reflexes in LE's    Cerebellar Exam: Dyskinesias. Extremities: Deferred    Gait / Station: Ambulates with walker rolling. Lumbar Spine: He has tenderness in the lower lumbar paraspinal areas also the upper lumbar paraspinal areas. No significant buttock tenderness. IMPRESSION/PLAN:   Predominantly Musculoskeletal Lumbosacral Spine Pain. Very nice response to a translaminar lumbar NATALIA. Spine injections would be the best option. Agree spine surgical not good idea. He also has some upper lumbar spine pain that could respond to trigger point injections or facet joint injections. Follow along and see how long we can go with with this injection and can repeat them as required. Follow-up as needed.       Criss Roach MD

## 2023-03-17 DIAGNOSIS — G20 PARKINSON'S DISEASE (HCC): Primary | ICD-10-CM

## 2023-03-17 NOTE — TELEPHONE ENCOUNTER
[facility] never received the Amantadine 100mg rx that was sent in Jan. Patient needs another rx sent in.

## 2023-03-20 ENCOUNTER — OFFICE VISIT (OUTPATIENT)
Age: 78
End: 2023-03-20
Payer: MEDICARE

## 2023-03-20 DIAGNOSIS — R13.13 PHARYNGEAL DYSPHAGIA: Primary | ICD-10-CM

## 2023-03-20 PROCEDURE — 92526 ORAL FUNCTION THERAPY: CPT | Performed by: SPEECH-LANGUAGE PATHOLOGIST

## 2023-03-20 RX ORDER — AMANTADINE HYDROCHLORIDE 100 MG/1
100 TABLET ORAL 2 TIMES DAILY
Qty: 180 TABLET | Refills: 3 | Status: SHIPPED | OUTPATIENT
Start: 2023-03-20

## 2023-03-20 RX ORDER — AMANTADINE HYDROCHLORIDE 100 MG/1
100 TABLET ORAL 2 TIMES DAILY
Qty: 60 TABLET | Refills: 3 | Status: SHIPPED | OUTPATIENT
Start: 2023-03-20

## 2023-03-20 NOTE — PROGRESS NOTES
TESTED    Honey    NOT TESTED    Puree    NOT TESTED    Solid    NOT TESTED      Impressions:  Patient completed laryngeal exercises with min to mod visual and verbal cues for improving technique. We discussed the importance of him completing his exercises twice a day. We also discussed the importance of him following dietary recommendations per MBSS (Chin tuck). He verbalized understanding. Tolerated trials of thin liquids with minimal cues to chin tuck but cues to take smaller sips. Throat clears noted on at least 50% of trials. Laryngeal exercises:   Chin Tuck. Results: 10/10 , Hard glottal attack Results: 10/10,  Supraglottic Swallow, Results: 5/5 , and Mendelsohn Maneuver,  Results: 5/5         Patient/Family/Caregiver Education:  supervision for eating (po) liquids  with chin tuck    Compensatory Strategies:  Eat with small bites (1/2 tsp; 1 tsp)    Assessment: [x] Progressing toward goals. [] No change. [] Other:  [x] Patient would continue to benefit from skilled physical therapy services in order to: improve swallow function       Plan: [x] Continue current frequency toward long and short term goals. [] Specific Instructions for subsequent treatments:     Codie Zamora. Ed CCC-SLP     Total Time 45 minutes

## 2023-03-24 ENCOUNTER — OFFICE VISIT (OUTPATIENT)
Dept: ORTHOPEDIC SURGERY | Age: 78
End: 2023-03-24

## 2023-03-24 DIAGNOSIS — M17.12 LOCALIZED OSTEOARTHRITIS OF LEFT KNEE: Primary | ICD-10-CM

## 2023-03-24 DIAGNOSIS — Z96.653 HISTORY OF TOTAL BILATERAL KNEE REPLACEMENT: ICD-10-CM

## 2023-03-24 NOTE — PROGRESS NOTES
Name: Judith Tinoco. YOB: 1945  Gender: male  MRN: [id number]    CC:   Chief Complaint   Patient presents with    Knee Pain     Left knee pain          DIAGNOSIS:   Encounter Diagnoses   Name Primary? Localized osteoarthritis of left knee Yes    History of total bilateral knee replacement         HPI:   The pain has been present for months and is becoming worse. It hurts at night when sleeping. The pain is located over the knee. It does hurt to walk and gets worse with increased distances. He needs a walker for ambulatory stability. The pain does not radiate down the leg. Numbness and tingling are not noted. Treatment so far has been lateral knee arthroplasties  He has Parkinson's disease. He has had spinal procedures which make his legs weak      Current Outpatient Medications:     Amantadine (SYMMETREL) 100 MG TABS tablet, Take 100 mg by mouth 2 times daily, Disp: 60 tablet, Rfl: 3    Amantadine (SYMMETREL) 100 MG TABS tablet, Take 100 mg by mouth 2 times daily, Disp: 180 tablet, Rfl: 3    olmesartan (BENICAR) 5 MG tablet, Take 1 tablet by mouth daily, Disp: 30 tablet, Rfl: 1    traMADol-acetaminophen (ULTRACET) 37.5-325 MG per tablet, tramadol 37.5 mg-acetaminophen 325 mg tablet, Disp: , Rfl:     TIOTROPIUM BROMIDE MONOHYDRATE IN, Inhale 18 mcg into the lungs daily, Disp: , Rfl:     carbidopa-levodopa (SINEMET CR)  MG per extended release tablet, Take 200 mg by mouth at bedtime, Disp: , Rfl:     mirabegron (MYRBETRIQ) 25 MG TB24, Take 25 mg by mouth daily, Disp: , Rfl:     QUEtiapine (SEROQUEL) 25 MG tablet, 3 tabs QHS, Disp: 270 tablet, Rfl: 3    UNABLE TO FIND, Rollator, Disp: 1 Device, Rfl: 0    carvedilol (COREG) 6.25 MG tablet, Take 1 tablet by mouth in the morning and 1 tablet in the evening. Take with meals. , Disp: 180 tablet, Rfl: 3    carbidopa-levodopa (RYTARY) 61. MG CPCR per extended release capsule, Take 2 capsules at 8am, 12pm, 4pm and 8pm, Disp:

## 2023-03-28 ENCOUNTER — OFFICE VISIT (OUTPATIENT)
Age: 78
End: 2023-03-28
Payer: MEDICARE

## 2023-03-28 DIAGNOSIS — R13.12 OROPHARYNGEAL DYSPHAGIA: Primary | ICD-10-CM

## 2023-03-28 PROCEDURE — 92526 ORAL FUNCTION THERAPY: CPT | Performed by: SPEECH-LANGUAGE PATHOLOGIST

## 2023-03-28 NOTE — PROGRESS NOTES
Maneuver,  Results: 5/5         Patient/Family/Caregiver Education:  supervision for eating (po) liquids  with chin tuck    Compensatory Strategies:  Eat with small bites (1/2 tsp; 1 tsp)    Assessment: [x] Progressing toward goals. [] No change. [] Other:  [x] Patient would continue to benefit from skilled physical therapy services in order to: improve swallow function       Plan: [x] Continue current frequency toward long and short term goals. [] Specific Instructions for subsequent treatments:     Junior Araya. Ed CCC-SLP     Total Time 45 minutes

## 2023-04-12 PROCEDURE — 93296 REM INTERROG EVL PM/IDS: CPT | Performed by: INTERNAL MEDICINE

## 2023-04-12 PROCEDURE — 93295 DEV INTERROG REMOTE 1/2/MLT: CPT | Performed by: INTERNAL MEDICINE

## 2023-04-25 ENCOUNTER — OFFICE VISIT (OUTPATIENT)
Age: 78
End: 2023-04-25
Payer: MEDICARE

## 2023-04-25 DIAGNOSIS — R13.12 OROPHARYNGEAL DYSPHAGIA: Primary | ICD-10-CM

## 2023-04-25 PROCEDURE — 92526 ORAL FUNCTION THERAPY: CPT | Performed by: SPEECH-LANGUAGE PATHOLOGIST

## 2023-04-25 RX ORDER — PIMAVANSERIN TARTRATE 34 MG/1
34 CAPSULE ORAL DAILY
Qty: 30 CAPSULE | Refills: 12 | Status: SHIPPED | OUTPATIENT
Start: 2023-04-25

## 2023-04-25 RX ORDER — PIMAVANSERIN TARTRATE 34 MG/1
34 CAPSULE ORAL DAILY
Qty: 30 CAPSULE | Refills: 12
Start: 2023-04-25 | End: 2023-04-25 | Stop reason: SDUPTHER

## 2023-04-25 NOTE — PROGRESS NOTES
DYSPHAGIA DAILY NOTE        NAME/AGE/GENDER:   Micah Harper.  [id number]  1945  ICD-10: Treatment Diagnosis: R13.13 Dysphagia, Pharyngeal Phase  Precautions/Allergies: Allergies   Allergen Reactions    Promethazine Other (See Comments)     Confusion    Oxycodone Nausea And Vomiting     TREATMENT PLAN:  Effective Dates: 2/7/2023  TO 4/7/2023. Frequency/Duration: once a week for 90 Day(s) MEDICAL/REFERRING DIAGNOSIS:  No diagnosis found. DATE OF ONSET: several months   No Maloney MD MD Orders: evaluate and treat       TREATMENT DATE: 04/25/23     PROBLEM LIST (Impairments causing functional limitations):  Pharyngeal Dysphagia INTERVENTIONS PLANNED:   [] Speech/Lang Therapy  17757 [x] Dysphagia Therapy  92942   [] TherIvntj Cog Funcj Contact (1st 15 min)  84128 [] TherIvntj Cog Funcj Contact (addt'l 15 min)  37683        SUBJECTIVE:   He reports that he's trying to be consistent with his chin tuck. Pain:  denies    Diet Tolerance:  Patient tolerating regular diet with thin liquids using chin tuck  with minimal clinical  signs/symptoms of penetration/aspiration. P.O. Trials: Thin    5/15 trials with (+) throat clear    Nectar    NOT TESTED    Honey    NOT TESTED    Puree    NOT TESTED    Solid    NOT TESTED      Impressions:  Patient completed laryngeal exercises with min to mod visual and verbal cues for improving technique. We discussed the importance of him completing his exercises twice a day. We also discussed the importance of him following dietary recommendations per MBSS (Chin tuck). He verbalized understanding. Tolerated trials of thin liquids without cues to chin tuck. (+) throat clears observed this date. Laryngeal exercises:   Chin Tuck.   Results: 10/10 , Hard glottal attack Results: 10/10,  Supraglottic Swallow, Results: 5/5 , and Mendelsohn Maneuver,  Results: 5/5         Patient/Family/Caregiver Education:  supervision for Measure In Units Or Cc's?: units

## 2023-04-25 NOTE — PROGRESS NOTES
DYSPHAGIA DAILY NOTE/Re-evaluation         NAME/AGE/GENDER:   Gwendolyn Ram  [id number]  1945  ICD-10: Treatment Diagnosis: R13.13 Dysphagia, Pharyngeal Phase  Precautions/Allergies: Allergies   Allergen Reactions    Promethazine Other (See Comments)     Confusion    Oxycodone Nausea And Vomiting     TREATMENT PLAN:  Effective Dates: 4/25/2023  TO 5/31/2023. Frequency/Duration: once a week for 90 Day(s) MEDICAL/REFERRING DIAGNOSIS:    ICD-10-CM    1. Oropharyngeal dysphagia  R13.12               DATE OF ONSET: several months   Cherylene Evans, MD MD Orders: evaluate and treat       TREATMENT DATE: 04/25/23     PROBLEM LIST (Impairments causing functional limitations):  Pharyngeal Dysphagia INTERVENTIONS PLANNED:   [] Speech/Lang Therapy  68692 [x] Dysphagia Therapy  64092   [] TherIvntj Cog Funcj Contact (1st 15 min)  31190 [] TherIvntj Cog Funcj Contact (addt'l 15 min)  13813        SUBJECTIVE:   He reports that he's trying to be consistent with his chin tuck. Has noticed improvements in his swallowing. Pain:  denies    Diet Tolerance:  Patient tolerating regular diet with thin liquids using chin tuck  with minimal clinical  signs/symptoms of penetration/aspiration. P.O. Trials: Thin    No overt clinical s/sx of aspiration on 20/20 trials   [place]    NOT TESTED    Honey    NOT TESTED    Puree    NOT TESTED    Solid    NOT TESTED      Impressions:  Overall patient is doing well. He is doing better with using his swallowing strategies. He is completing his HEP with minimal assist. Patient has two more scheduled visits and thereafter we will d/c from skilled treatment. Laryngeal exercises:   Chin Tuck.   Results: 10/10 , Hard glottal attack Results: 10/10,  Supraglottic Swallow, Results: 5/5 , and Mendelsohn Maneuver,  Results: 5/5         Patient/Family/Caregiver Education:  supervision for eating (po) liquids  with chin tuck    Compensatory

## 2023-05-02 ENCOUNTER — OFFICE VISIT (OUTPATIENT)
Age: 78
End: 2023-05-02
Payer: MEDICARE

## 2023-05-02 DIAGNOSIS — R13.12 OROPHARYNGEAL DYSPHAGIA: Primary | ICD-10-CM

## 2023-05-02 PROCEDURE — 92526 ORAL FUNCTION THERAPY: CPT | Performed by: SPEECH-LANGUAGE PATHOLOGIST

## 2023-05-02 NOTE — PROGRESS NOTES
DYSPHAGIA DAILY NOTE/DISCHARGE SUMMARY        NAME/AGE/GENDER:   Vilma Melara.  [id number]  1945  ICD-10: Treatment Diagnosis: R13.13 Dysphagia, Pharyngeal Phase  Precautions/Allergies: Allergies   Allergen Reactions    Promethazine Other (See Comments)     Confusion    Oxycodone Nausea And Vomiting     TREATMENT PLAN:  Effective Dates: 4/25/2023  TO 5/31/2023. Frequency/Duration: once a week for 90 Day(s) MEDICAL/REFERRING DIAGNOSIS:    ICD-10-CM    1. Oropharyngeal dysphagia  R13.12               DATE OF ONSET: several months   Hafsa Fitzpatrick MD MD Orders: evaluate and treat       TREATMENT DATE: 5/2/2023     PROBLEM LIST (Impairments causing functional limitations):  Pharyngeal Dysphagia INTERVENTIONS PLANNED:   [] Speech/Lang Therapy  23204 [x] Dysphagia Therapy  22266   [] TherIvntj Cog Funcj Contact (1st 15 min)  11235 [] TherIvntj Cog Funcj Contact (addt'l 15 min)  10967        SUBJECTIVE:   He reports that he's trying to be consistent with his chin tuck. Has noticed improvements in his swallowing. Pain:  denies    Diet Tolerance:  Patient tolerating regular diet with thin liquids using chin tuck  with minimal clinical  signs/symptoms of penetration/aspiration. P.O. Trials: Thin    No overt clinical s/sx of aspiration on 20/20 trials   [place]    NOT TESTED    Honey    NOT TESTED    Puree    NOT TESTED    Solid    NOT TESTED      Impressions:  Overall patient is doing well. He is doing better with using his swallowing strategies. He is completing his HEP with minimal assist.       Laryngeal exercises:   Chin Tuck.   Results: 10/10 , Hard glottal attack Results: 10/10,  Supraglottic Swallow, Results: 5/5 , and Mendelsohn Maneuver,  Results: 5/5         Patient/Family/Caregiver Education:  supervision for eating (po) liquids  with chin tuck    Compensatory Strategies:  Eat with small bites (1/2 tsp; 1 tsp)    Assessment: [x] Short and long term goals

## 2023-05-16 ENCOUNTER — APPOINTMENT (OUTPATIENT)
Dept: GENERAL RADIOLOGY | Age: 78
End: 2023-05-16
Payer: MEDICARE

## 2023-05-16 ENCOUNTER — APPOINTMENT (OUTPATIENT)
Dept: ULTRASOUND IMAGING | Age: 78
End: 2023-05-16
Payer: MEDICARE

## 2023-05-16 ENCOUNTER — HOSPITAL ENCOUNTER (INPATIENT)
Age: 78
LOS: 4 days | Discharge: NURSING, REHAB OR LONG-TERM CARE | End: 2023-05-20
Attending: EMERGENCY MEDICINE | Admitting: INTERNAL MEDICINE
Payer: MEDICARE

## 2023-05-16 DIAGNOSIS — M79.89 PAIN AND SWELLING OF LOWER EXTREMITY, LEFT: ICD-10-CM

## 2023-05-16 DIAGNOSIS — M79.605 PAIN AND SWELLING OF LOWER EXTREMITY, LEFT: ICD-10-CM

## 2023-05-16 DIAGNOSIS — E87.70 HYPERVOLEMIA, UNSPECIFIED HYPERVOLEMIA TYPE: ICD-10-CM

## 2023-05-16 DIAGNOSIS — I50.43 CHF (CONGESTIVE HEART FAILURE), NYHA CLASS I, ACUTE ON CHRONIC, COMBINED (HCC): ICD-10-CM

## 2023-05-16 DIAGNOSIS — R53.1 GENERALIZED WEAKNESS: Primary | ICD-10-CM

## 2023-05-16 PROBLEM — R41.89 COGNITIVE CHANGES: Status: ACTIVE | Noted: 2020-11-18

## 2023-05-16 PROBLEM — Z95.810 BIVENTRICULAR ICD (IMPLANTABLE CARDIOVERTER-DEFIBRILLATOR) IN PLACE: Status: ACTIVE | Noted: 2018-03-29

## 2023-05-16 LAB
ALBUMIN SERPL-MCNC: 3.2 G/DL (ref 3.2–4.6)
ALBUMIN/GLOB SERPL: 1 (ref 0.4–1.6)
ALP SERPL-CCNC: 104 U/L (ref 50–136)
ALT SERPL-CCNC: 9 U/L (ref 12–65)
ANION GAP SERPL CALC-SCNC: ABNORMAL MMOL/L (ref 2–11)
APPEARANCE UR: CLEAR
AST SERPL-CCNC: 33 U/L (ref 15–37)
BACTERIA URNS QL MICRO: NEGATIVE /HPF
BASOPHILS # BLD: 0 K/UL (ref 0–0.2)
BASOPHILS NFR BLD: 1 % (ref 0–2)
BILIRUB SERPL-MCNC: 0.8 MG/DL (ref 0.2–1.1)
BILIRUB UR QL: NEGATIVE
BUN SERPL-MCNC: 26 MG/DL (ref 8–23)
CALCIUM SERPL-MCNC: 9.1 MG/DL (ref 8.3–10.4)
CASTS URNS QL MICRO: ABNORMAL /LPF
CHLORIDE SERPL-SCNC: 112 MMOL/L (ref 101–110)
CO2 SERPL-SCNC: 28 MMOL/L (ref 21–32)
COLOR UR: ABNORMAL
CREAT SERPL-MCNC: 1 MG/DL (ref 0.8–1.5)
DIFFERENTIAL METHOD BLD: NORMAL
EKG ATRIAL RATE: 71 BPM
EKG DIAGNOSIS: NORMAL
EKG P AXIS: 85 DEGREES
EKG P-R INTERVAL: 126 MS
EKG Q-T INTERVAL: 480 MS
EKG QRS DURATION: 142 MS
EKG QTC CALCULATION (BAZETT): 521 MS
EKG R AXIS: 268 DEGREES
EKG T AXIS: 92 DEGREES
EKG VENTRICULAR RATE: 71 BPM
EOSINOPHIL # BLD: 0.1 K/UL (ref 0–0.8)
EOSINOPHIL NFR BLD: 1 % (ref 0.5–7.8)
EPI CELLS #/AREA URNS HPF: ABNORMAL /HPF
ERYTHROCYTE [DISTWIDTH] IN BLOOD BY AUTOMATED COUNT: 13.4 % (ref 11.9–14.6)
GLOBULIN SER CALC-MCNC: 3.1 G/DL (ref 2.8–4.5)
GLUCOSE SERPL-MCNC: 117 MG/DL (ref 65–100)
GLUCOSE UR STRIP.AUTO-MCNC: NEGATIVE MG/DL
HCT VFR BLD AUTO: 42.5 % (ref 41.1–50.3)
HGB BLD-MCNC: 13.8 G/DL (ref 13.6–17.2)
HGB UR QL STRIP: NEGATIVE
IMM GRANULOCYTES # BLD AUTO: 0 K/UL (ref 0–0.5)
IMM GRANULOCYTES NFR BLD AUTO: 0 % (ref 0–5)
KETONES UR QL STRIP.AUTO: ABNORMAL MG/DL
LEUKOCYTE ESTERASE UR QL STRIP.AUTO: ABNORMAL
LYMPHOCYTES # BLD: 1.1 K/UL (ref 0.5–4.6)
LYMPHOCYTES NFR BLD: 16 % (ref 13–44)
MAGNESIUM SERPL-MCNC: 1.8 MG/DL (ref 1.8–2.4)
MCH RBC QN AUTO: 32.3 PG (ref 26.1–32.9)
MCHC RBC AUTO-ENTMCNC: 32.5 G/DL (ref 31.4–35)
MCV RBC AUTO: 99.5 FL (ref 82–102)
MONOCYTES # BLD: 0.5 K/UL (ref 0.1–1.3)
MONOCYTES NFR BLD: 7 % (ref 4–12)
NEUTS SEG # BLD: 5.3 K/UL (ref 1.7–8.2)
NEUTS SEG NFR BLD: 75 % (ref 43–78)
NITRITE UR QL STRIP.AUTO: NEGATIVE
NRBC # BLD: 0 K/UL (ref 0–0.2)
NT PRO BNP: 5613 PG/ML
PH UR STRIP: 8 (ref 5–9)
PLATELET # BLD AUTO: 230 K/UL (ref 150–450)
PMV BLD AUTO: 10.1 FL (ref 9.4–12.3)
POTASSIUM SERPL-SCNC: 4 MMOL/L (ref 3.5–5.1)
PROT SERPL-MCNC: 6.3 G/DL (ref 6.3–8.2)
PROT UR STRIP-MCNC: NEGATIVE MG/DL
RBC # BLD AUTO: 4.27 M/UL (ref 4.23–5.6)
RBC #/AREA URNS HPF: ABNORMAL /HPF
SODIUM SERPL-SCNC: 139 MMOL/L (ref 133–143)
SP GR UR REFRACTOMETRY: 1.02 (ref 1–1.02)
UROBILINOGEN UR QL STRIP.AUTO: 1 EU/DL (ref 0.2–1)
WBC # BLD AUTO: 7 K/UL (ref 4.3–11.1)
WBC URNS QL MICRO: ABNORMAL /HPF

## 2023-05-16 PROCEDURE — 6360000002 HC RX W HCPCS: Performed by: PHYSICIAN ASSISTANT

## 2023-05-16 PROCEDURE — 1100000000 HC RM PRIVATE

## 2023-05-16 PROCEDURE — 93926 LOWER EXTREMITY STUDY: CPT

## 2023-05-16 PROCEDURE — 73630 X-RAY EXAM OF FOOT: CPT

## 2023-05-16 PROCEDURE — 80053 COMPREHEN METABOLIC PANEL: CPT

## 2023-05-16 PROCEDURE — 81003 URINALYSIS AUTO W/O SCOPE: CPT

## 2023-05-16 PROCEDURE — 6370000000 HC RX 637 (ALT 250 FOR IP): Performed by: INTERNAL MEDICINE

## 2023-05-16 PROCEDURE — 71046 X-RAY EXAM CHEST 2 VIEWS: CPT

## 2023-05-16 PROCEDURE — 96374 THER/PROPH/DIAG INJ IV PUSH: CPT

## 2023-05-16 PROCEDURE — 99285 EMERGENCY DEPT VISIT HI MDM: CPT

## 2023-05-16 PROCEDURE — 93005 ELECTROCARDIOGRAM TRACING: CPT | Performed by: PHYSICIAN ASSISTANT

## 2023-05-16 PROCEDURE — 93010 ELECTROCARDIOGRAM REPORT: CPT | Performed by: INTERNAL MEDICINE

## 2023-05-16 PROCEDURE — 81001 URINALYSIS AUTO W/SCOPE: CPT

## 2023-05-16 PROCEDURE — 93971 EXTREMITY STUDY: CPT

## 2023-05-16 PROCEDURE — 2580000003 HC RX 258: Performed by: INTERNAL MEDICINE

## 2023-05-16 PROCEDURE — 85025 COMPLETE CBC W/AUTO DIFF WBC: CPT

## 2023-05-16 PROCEDURE — 83735 ASSAY OF MAGNESIUM: CPT

## 2023-05-16 PROCEDURE — 83880 ASSAY OF NATRIURETIC PEPTIDE: CPT

## 2023-05-16 PROCEDURE — 6360000002 HC RX W HCPCS: Performed by: INTERNAL MEDICINE

## 2023-05-16 RX ORDER — ALBUTEROL SULFATE 90 UG/1
2 AEROSOL, METERED RESPIRATORY (INHALATION) EVERY 4 HOURS PRN
Status: DISCONTINUED | OUTPATIENT
Start: 2023-05-16 | End: 2023-05-20 | Stop reason: HOSPADM

## 2023-05-16 RX ORDER — ACETAMINOPHEN 650 MG/1
650 SUPPOSITORY RECTAL EVERY 6 HOURS PRN
Status: DISCONTINUED | OUTPATIENT
Start: 2023-05-16 | End: 2023-05-20 | Stop reason: HOSPADM

## 2023-05-16 RX ORDER — ACETAMINOPHEN 325 MG/1
650 TABLET ORAL EVERY 6 HOURS PRN
Status: DISCONTINUED | OUTPATIENT
Start: 2023-05-16 | End: 2023-05-20 | Stop reason: HOSPADM

## 2023-05-16 RX ORDER — CARBIDOPA AND LEVODOPA 25; 100 MG/1; MG/1
2 TABLET, EXTENDED RELEASE ORAL NIGHTLY
Status: DISCONTINUED | OUTPATIENT
Start: 2023-05-16 | End: 2023-05-20 | Stop reason: HOSPADM

## 2023-05-16 RX ORDER — TRAMADOL HYDROCHLORIDE 50 MG/1
50 TABLET ORAL EVERY 6 HOURS PRN
Status: DISCONTINUED | OUTPATIENT
Start: 2023-05-16 | End: 2023-05-20 | Stop reason: HOSPADM

## 2023-05-16 RX ORDER — ACETAMINOPHEN 500 MG
1000 TABLET ORAL EVERY 6 HOURS
Status: DISCONTINUED | OUTPATIENT
Start: 2023-05-16 | End: 2023-05-20 | Stop reason: HOSPADM

## 2023-05-16 RX ORDER — SODIUM CHLORIDE 9 MG/ML
INJECTION, SOLUTION INTRAVENOUS PRN
Status: DISCONTINUED | OUTPATIENT
Start: 2023-05-16 | End: 2023-05-20 | Stop reason: HOSPADM

## 2023-05-16 RX ORDER — DOXYCYCLINE HYCLATE 100 MG/1
100 CAPSULE ORAL EVERY 12 HOURS SCHEDULED
Status: DISCONTINUED | OUTPATIENT
Start: 2023-05-16 | End: 2023-05-20 | Stop reason: HOSPADM

## 2023-05-16 RX ORDER — QUETIAPINE FUMARATE 25 MG/1
75 TABLET, FILM COATED ORAL NIGHTLY
Status: DISCONTINUED | OUTPATIENT
Start: 2023-05-16 | End: 2023-05-20 | Stop reason: HOSPADM

## 2023-05-16 RX ORDER — FUROSEMIDE 10 MG/ML
40 INJECTION INTRAMUSCULAR; INTRAVENOUS ONCE
Status: COMPLETED | OUTPATIENT
Start: 2023-05-16 | End: 2023-05-16

## 2023-05-16 RX ORDER — LANOLIN ALCOHOL/MO/W.PET/CERES
200 CREAM (GRAM) TOPICAL DAILY
Status: DISCONTINUED | OUTPATIENT
Start: 2023-05-17 | End: 2023-05-20 | Stop reason: HOSPADM

## 2023-05-16 RX ORDER — LOSARTAN POTASSIUM 25 MG/1
12.5 TABLET ORAL DAILY
Status: DISCONTINUED | OUTPATIENT
Start: 2023-05-17 | End: 2023-05-20 | Stop reason: HOSPADM

## 2023-05-16 RX ORDER — CLOTRIMAZOLE AND BETAMETHASONE DIPROPIONATE 10; .64 MG/G; MG/G
CREAM TOPICAL 2 TIMES DAILY
COMMUNITY

## 2023-05-16 RX ORDER — MAGNESIUM OXIDE 400 MG/1
200 TABLET ORAL DAILY
Status: DISCONTINUED | OUTPATIENT
Start: 2023-05-16 | End: 2023-05-16

## 2023-05-16 RX ORDER — CARVEDILOL 6.25 MG/1
6.25 TABLET ORAL 2 TIMES DAILY WITH MEALS
Status: DISCONTINUED | OUTPATIENT
Start: 2023-05-17 | End: 2023-05-18

## 2023-05-16 RX ORDER — PANTOPRAZOLE SODIUM 40 MG/1
40 TABLET, DELAYED RELEASE ORAL
Status: DISCONTINUED | OUTPATIENT
Start: 2023-05-17 | End: 2023-05-20 | Stop reason: HOSPADM

## 2023-05-16 RX ORDER — ONDANSETRON 2 MG/ML
4 INJECTION INTRAMUSCULAR; INTRAVENOUS EVERY 6 HOURS PRN
Status: DISCONTINUED | OUTPATIENT
Start: 2023-05-16 | End: 2023-05-20 | Stop reason: HOSPADM

## 2023-05-16 RX ORDER — ASPIRIN 81 MG/1
81 TABLET ORAL DAILY
Status: DISCONTINUED | OUTPATIENT
Start: 2023-05-16 | End: 2023-05-20 | Stop reason: HOSPADM

## 2023-05-16 RX ORDER — TAMSULOSIN HYDROCHLORIDE 0.4 MG/1
0.4 CAPSULE ORAL DAILY
Status: DISCONTINUED | OUTPATIENT
Start: 2023-05-17 | End: 2023-05-20 | Stop reason: HOSPADM

## 2023-05-16 RX ORDER — POLYETHYLENE GLYCOL 3350 17 G/17G
17 POWDER, FOR SOLUTION ORAL DAILY PRN
Status: DISCONTINUED | OUTPATIENT
Start: 2023-05-16 | End: 2023-05-20 | Stop reason: HOSPADM

## 2023-05-16 RX ORDER — ONDANSETRON 4 MG/1
4 TABLET, ORALLY DISINTEGRATING ORAL EVERY 8 HOURS PRN
Status: DISCONTINUED | OUTPATIENT
Start: 2023-05-16 | End: 2023-05-20 | Stop reason: HOSPADM

## 2023-05-16 RX ORDER — POLYETHYLENE GLYCOL 3350 17 G/17G
17 POWDER, FOR SOLUTION ORAL DAILY
Status: DISCONTINUED | OUTPATIENT
Start: 2023-05-17 | End: 2023-05-20 | Stop reason: HOSPADM

## 2023-05-16 RX ORDER — SODIUM CHLORIDE 0.9 % (FLUSH) 0.9 %
5-40 SYRINGE (ML) INJECTION EVERY 12 HOURS SCHEDULED
Status: DISCONTINUED | OUTPATIENT
Start: 2023-05-16 | End: 2023-05-20 | Stop reason: HOSPADM

## 2023-05-16 RX ORDER — ENOXAPARIN SODIUM 100 MG/ML
40 INJECTION SUBCUTANEOUS EVERY 24 HOURS
Status: DISCONTINUED | OUTPATIENT
Start: 2023-05-16 | End: 2023-05-20 | Stop reason: HOSPADM

## 2023-05-16 RX ORDER — SODIUM CHLORIDE 0.9 % (FLUSH) 0.9 %
5-40 SYRINGE (ML) INJECTION PRN
Status: DISCONTINUED | OUTPATIENT
Start: 2023-05-16 | End: 2023-05-20 | Stop reason: HOSPADM

## 2023-05-16 RX ADMIN — ACETAMINOPHEN 1000 MG: 500 TABLET, FILM COATED ORAL at 18:31

## 2023-05-16 RX ADMIN — QUETIAPINE FUMARATE 75 MG: 25 TABLET ORAL at 20:54

## 2023-05-16 RX ADMIN — SODIUM CHLORIDE, PRESERVATIVE FREE 10 ML: 5 INJECTION INTRAVENOUS at 20:57

## 2023-05-16 RX ADMIN — FUROSEMIDE 40 MG: 10 INJECTION, SOLUTION INTRAMUSCULAR; INTRAVENOUS at 13:26

## 2023-05-16 RX ADMIN — ASPIRIN 81 MG: 81 TABLET ORAL at 18:31

## 2023-05-16 RX ADMIN — FUROSEMIDE 40 MG: 10 INJECTION, SOLUTION INTRAMUSCULAR; INTRAVENOUS at 18:31

## 2023-05-16 RX ADMIN — ENOXAPARIN SODIUM 40 MG: 100 INJECTION SUBCUTANEOUS at 18:31

## 2023-05-16 RX ADMIN — DOXYCYCLINE HYCLATE 100 MG: 100 CAPSULE ORAL at 20:55

## 2023-05-16 RX ADMIN — CARBIDOPA AND LEVODOPA 2 TABLET: 25; 100 TABLET, EXTENDED RELEASE ORAL at 20:54

## 2023-05-16 ASSESSMENT — ENCOUNTER SYMPTOMS
GASTROINTESTINAL NEGATIVE: 1
SHORTNESS OF BREATH: 1

## 2023-05-16 ASSESSMENT — PAIN - FUNCTIONAL ASSESSMENT: PAIN_FUNCTIONAL_ASSESSMENT: 0-10

## 2023-05-16 ASSESSMENT — LIFESTYLE VARIABLES
HOW MANY STANDARD DRINKS CONTAINING ALCOHOL DO YOU HAVE ON A TYPICAL DAY: 1 OR 2
HOW OFTEN DO YOU HAVE A DRINK CONTAINING ALCOHOL: 2-4 TIMES A MONTH

## 2023-05-16 ASSESSMENT — PAIN SCALES - GENERAL
PAINLEVEL_OUTOF10: 0
PAINLEVEL_OUTOF10: 0

## 2023-05-16 NOTE — ED PROVIDER NOTES
History     Socioeconomic History    Marital status:      Spouse name: None    Number of children: None    Years of education: None    Highest education level: None   Tobacco Use    Smoking status: Former     Packs/day: 1.50     Types: Cigarettes     Quit date: 7/12/2007     Years since quitting: 15.8    Smokeless tobacco: Never    Tobacco comments:     Quit smoking: stopped 2007   Substance and Sexual Activity    Alcohol use: No    Drug use: Not Currently        Previous Medications    ACETAMINOPHEN (TYLENOL) 500 MG TABLET    Take 1,000 mg by mouth every 6 hours    ALBUTEROL SULFATE  (90 BASE) MCG/ACT INHALER    Inhale 2 puffs into the lungs every 4 hours as needed    ALLOPURINOL (ZYLOPRIM) 100 MG TABLET    100 mg as needed    AMANTADINE (SYMMETREL) 100 MG TABS TABLET    Take 100 mg by mouth 2 times daily    AMANTADINE (SYMMETREL) 100 MG TABS TABLET    Take 100 mg by mouth 2 times daily    ASPIRIN 81 MG EC TABLET    Take 81 mg by mouth 2 times daily    CARBIDOPA-LEVODOPA (RYTARY) 61. MG CPCR PER EXTENDED RELEASE CAPSULE    Take 2 capsules at 8am, 12pm, 4pm and 8pm    CARBIDOPA-LEVODOPA (SINEMET CR)  MG PER EXTENDED RELEASE TABLET    Take 200 mg by mouth at bedtime    CARBIDOPA-LEVODOPA ER (RYTARY) 48. MG CPCR    Take 1 capsule at 8am, 12pm, 4pm and 8pm    CARVEDILOL (COREG) 6.25 MG TABLET    Take 1 tablet by mouth in the morning and 1 tablet in the evening. Take with meals.     CLONAZEPAM (KLONOPIN) 0.5 MG TABLET    1 tab QHS    CLOTRIMAZOLE-BETAMETHASONE (LOTRISONE) 1-0.05 % CREAM    Apply topically 2 times daily as needed    CYCLOBENZAPRINE (FLEXERIL) 5 MG TABLET    Take 5 mg by mouth 3 times daily as needed    DOXYCYCLINE HYCLATE (VIBRA-TABS) 100 MG TABLET    Take 100 mg by mouth daily    FLUTICASONE-SALMETEROL (ADVAIR) 250-50 MCG/DOSE AEPB    Inhale 1 puff into the lungs daily    MAGNESIUM PO    Take by mouth daily    MIRABEGRON (MYRBETRIQ) 25 MG TB24    Take 25 mg by mouth daily

## 2023-05-16 NOTE — CARE COORDINATION
Chart review complete, CM met with pt and spouse at bedside, pt found sitting up in bed alert and oriented x4, per spouse pt is normally independent with ADLS but today was too weak to pull his own pants up. Pt lives in [place] and travels to [place] to see doctors. Demographics, insurance and PCP confirmed with pt/spouse. CM staff will remain available to assist with dc needs. 05/16/23 1433   Service Assessment   Patient Orientation Alert and Oriented   Cognition Alert   History Provided By Patient   Primary Caregiver Self   Accompanied By/Relationship spouse   Support Systems Spouse/Significant Other   Patient's Healthcare Decision Maker is: Legal Next of Constantine 70   PCP Verified by CM Yes  Jerrell Nagel MD last visit 1 week ago)   Last Visit to PCP Within last 3 months   Prior Functional Level Independent in ADLs/IADLs   Current Functional Level Independent in ADLs/IADLs   Can patient return to prior living arrangement Yes   Ability to make needs known: Good   Family able to assist with home care needs: Yes   Would you like for me to discuss the discharge plan with any other family members/significant others, and if so, who? No   Financial Resources Medicare; (VA)   Community Resources None   CM/SW Referral Other (see comment)  (na)   Social/Functional History   Lives With Spouse   Type of Home House   Home Layout Two level   Bathroom Shower/Tub Tub/Shower unit   Bathroom Toilet Standard   Bathroom Accessibility Accessible   Home Equipment Cane;Crutches;Walker, standard; [address] Help From Family   ADL Assistance Independent   Ambulation Assistance Independent   Transfer Assistance Independent   Active  No   Patient's  Info spouse drives   Mode of Transportation Car   Occupation Retired   Discharge Planning   Type of [facility] [facility] Spouse/Significant Other   Current Services Prior To Admission None   [address]   DME Ordered?

## 2023-05-16 NOTE — ED NOTES
TRANSFER - OUT REPORT:    Verbal report given to Jasmeet Lovell RN on Shahab Conway.  being transferred to [facility] [phone] for routine progression of patient care       Report consisted of patient's Situation, Background, Assessment and   Recommendations(SBAR). Information from the following report(s) Nurse Handoff Report, ED Encounter Summary, ED SBAR, Adult Overview, MAR, and Recent Results was reviewed with the receiving nurse. [place] Assessment: Presents to emergency department  because of falls (Syncope, seizure, or loss of consciousness): No, Age > 79: Yes, Altered Mental Status, Intoxication with alcohol or substance confusion (Disorientation, impaired judgment, poor safety awaremess, or inability to follow instructions): No, Impaired Mobility: Ambulates or transfers with assistive devices or assistance; Unable to ambulate or transer.: Yes, Nursing Judgement: Yes  Lines:   Peripheral IV 05/16/23 Right Antecubital (Active)   Site Assessment Clean, dry & intact 05/16/23 1125   Line Status Blood return noted 05/16/23 1125   Phlebitis Assessment No symptoms 05/16/23 1125   Infiltration Assessment 0 05/16/23 1125        Opportunity for questions and clarification was provided.       Patient transported with:  Sheryl Quinn RN  05/16/23 7661

## 2023-05-16 NOTE — ED NOTES
Provider notified pt due his home medication 1300 & wife wants to give home meds to pt.  Provider okay for wife to give home medications     Cheryle Cruz RN  05/16/23 1257

## 2023-05-16 NOTE — ED TRIAGE NOTES
Pt presents for dyspnea and weakness, progressively worsened over last 2wks with bilateral feet swelling. Pt has Hx COPD.    A&Ox4

## 2023-05-16 NOTE — ED NOTES
External catheter leaked, linens changed, cleansed well,  depends placed     Billy Sue RN  05/16/23 2217

## 2023-05-16 NOTE — ED NOTES
Pt in the bathroom, responding to spouse and reporting feeling ok but unavailable for triage at this time.       Jasmeet Hunt RN  05/16/23 1037

## 2023-05-17 ENCOUNTER — APPOINTMENT (OUTPATIENT)
Dept: CT IMAGING | Age: 78
End: 2023-05-17
Payer: MEDICARE

## 2023-05-17 PROBLEM — E87.70 HYPERVOLEMIA: Status: ACTIVE | Noted: 2023-05-17

## 2023-05-17 PROBLEM — R53.1 GENERALIZED WEAKNESS: Status: ACTIVE | Noted: 2023-05-17

## 2023-05-17 LAB
ALBUMIN SERPL-MCNC: 3 G/DL (ref 3.2–4.6)
ALBUMIN/GLOB SERPL: 1 (ref 0.4–1.6)
ALP SERPL-CCNC: 109 U/L (ref 50–136)
ALT SERPL-CCNC: 13 U/L (ref 12–65)
ANION GAP SERPL CALC-SCNC: 5 MMOL/L (ref 2–11)
AST SERPL-CCNC: 26 U/L (ref 15–37)
BASOPHILS # BLD: 0 K/UL (ref 0–0.2)
BASOPHILS NFR BLD: 0 % (ref 0–2)
BILIRUB SERPL-MCNC: 0.7 MG/DL (ref 0.2–1.1)
BILIRUB UR QL: NEGATIVE
BUN SERPL-MCNC: 22 MG/DL (ref 8–23)
CALCIUM SERPL-MCNC: 8.6 MG/DL (ref 8.3–10.4)
CHLORIDE SERPL-SCNC: 106 MMOL/L (ref 101–110)
CO2 SERPL-SCNC: 27 MMOL/L (ref 21–32)
CORTIS BS SERPL-MCNC: 10.9 UG/DL
CREAT SERPL-MCNC: 0.9 MG/DL (ref 0.8–1.5)
DIFFERENTIAL METHOD BLD: NORMAL
EOSINOPHIL # BLD: 0.4 K/UL (ref 0–0.8)
EOSINOPHIL NFR BLD: 6 % (ref 0.5–7.8)
ERYTHROCYTE [DISTWIDTH] IN BLOOD BY AUTOMATED COUNT: 13.2 % (ref 11.9–14.6)
FLUAV RNA SPEC QL NAA+PROBE: NOT DETECTED
FLUBV RNA SPEC QL NAA+PROBE: NOT DETECTED
GLOBULIN SER CALC-MCNC: 2.9 G/DL (ref 2.8–4.5)
GLUCOSE SERPL-MCNC: 96 MG/DL (ref 65–100)
GLUCOSE UR QL STRIP.AUTO: NEGATIVE MG/DL
HCT VFR BLD AUTO: 42 % (ref 41.1–50.3)
HGB BLD-MCNC: 13.6 G/DL (ref 13.6–17.2)
IMM GRANULOCYTES # BLD AUTO: 0 K/UL (ref 0–0.5)
IMM GRANULOCYTES NFR BLD AUTO: 0 % (ref 0–5)
KETONES UR-MCNC: ABNORMAL MG/DL
LEUKOCYTE ESTERASE UR QL STRIP: NEGATIVE
LYMPHOCYTES # BLD: 1.5 K/UL (ref 0.5–4.6)
LYMPHOCYTES NFR BLD: 22 % (ref 13–44)
MAGNESIUM SERPL-MCNC: 1.7 MG/DL (ref 1.8–2.4)
MCH RBC QN AUTO: 31.9 PG (ref 26.1–32.9)
MCHC RBC AUTO-ENTMCNC: 32.4 G/DL (ref 31.4–35)
MCV RBC AUTO: 98.6 FL (ref 82–102)
MM INDURATION, POC: 0 MM (ref 0–5)
MONOCYTES # BLD: 0.6 K/UL (ref 0.1–1.3)
MONOCYTES NFR BLD: 9 % (ref 4–12)
NEUTS SEG # BLD: 4.3 K/UL (ref 1.7–8.2)
NEUTS SEG NFR BLD: 63 % (ref 43–78)
NITRITE UR QL: NEGATIVE
NRBC # BLD: 0 K/UL (ref 0–0.2)
PH UR: 7.5 (ref 5–9)
PLATELET # BLD AUTO: 217 K/UL (ref 150–450)
PMV BLD AUTO: 10.2 FL (ref 9.4–12.3)
POTASSIUM SERPL-SCNC: 3.5 MMOL/L (ref 3.5–5.1)
PPD, POC: NEGATIVE
PROT SERPL-MCNC: 5.9 G/DL (ref 6.3–8.2)
PROT UR QL: NEGATIVE MG/DL
RBC # BLD AUTO: 4.26 M/UL (ref 4.23–5.6)
RBC # UR STRIP: NEGATIVE
SARS-COV-2 RDRP RESP QL NAA+PROBE: NOT DETECTED
SARS-COV-2: NORMAL
SODIUM SERPL-SCNC: 138 MMOL/L (ref 133–143)
SOURCE: NORMAL
SP GR UR: 1.02 (ref 1–1.02)
TSH W FREE THYROID IF ABNORMAL: 0.78 UIU/ML (ref 0.36–3.74)
UROBILINOGEN UR QL: 1 EU/DL (ref 0.2–1)
WBC # BLD AUTO: 6.8 K/UL (ref 4.3–11.1)

## 2023-05-17 PROCEDURE — 84443 ASSAY THYROID STIM HORMONE: CPT

## 2023-05-17 PROCEDURE — 36415 COLL VENOUS BLD VENIPUNCTURE: CPT

## 2023-05-17 PROCEDURE — 87502 INFLUENZA DNA AMP PROBE: CPT

## 2023-05-17 PROCEDURE — 97112 NEUROMUSCULAR REEDUCATION: CPT

## 2023-05-17 PROCEDURE — 2580000003 HC RX 258: Performed by: INTERNAL MEDICINE

## 2023-05-17 PROCEDURE — 97530 THERAPEUTIC ACTIVITIES: CPT

## 2023-05-17 PROCEDURE — 85025 COMPLETE CBC W/AUTO DIFF WBC: CPT

## 2023-05-17 PROCEDURE — 80053 COMPREHEN METABOLIC PANEL: CPT

## 2023-05-17 PROCEDURE — 2500000003 HC RX 250 WO HCPCS: Performed by: INTERNAL MEDICINE

## 2023-05-17 PROCEDURE — 6370000000 HC RX 637 (ALT 250 FOR IP): Performed by: INTERNAL MEDICINE

## 2023-05-17 PROCEDURE — 92610 EVALUATE SWALLOWING FUNCTION: CPT

## 2023-05-17 PROCEDURE — 97165 OT EVAL LOW COMPLEX 30 MIN: CPT

## 2023-05-17 PROCEDURE — 99222 1ST HOSP IP/OBS MODERATE 55: CPT | Performed by: INTERNAL MEDICINE

## 2023-05-17 PROCEDURE — 82533 TOTAL CORTISOL: CPT

## 2023-05-17 PROCEDURE — 6360000002 HC RX W HCPCS: Performed by: INTERNAL MEDICINE

## 2023-05-17 PROCEDURE — 1100000000 HC RM PRIVATE

## 2023-05-17 PROCEDURE — 6370000000 HC RX 637 (ALT 250 FOR IP): Performed by: HOSPITALIST

## 2023-05-17 PROCEDURE — 87635 SARS-COV-2 COVID-19 AMP PRB: CPT

## 2023-05-17 PROCEDURE — 75635 CT ANGIO ABDOMINAL ARTERIES: CPT

## 2023-05-17 PROCEDURE — 97162 PT EVAL MOD COMPLEX 30 MIN: CPT

## 2023-05-17 PROCEDURE — 6360000002 HC RX W HCPCS: Performed by: PHYSICIAN ASSISTANT

## 2023-05-17 PROCEDURE — 83735 ASSAY OF MAGNESIUM: CPT

## 2023-05-17 PROCEDURE — 6360000004 HC RX CONTRAST MEDICATION: Performed by: INTERNAL MEDICINE

## 2023-05-17 PROCEDURE — 97535 SELF CARE MNGMENT TRAINING: CPT

## 2023-05-17 RX ORDER — FUROSEMIDE 10 MG/ML
40 INJECTION INTRAMUSCULAR; INTRAVENOUS DAILY
Status: DISCONTINUED | OUTPATIENT
Start: 2023-05-17 | End: 2023-05-18

## 2023-05-17 RX ORDER — SODIUM CHLORIDE 0.9 % (FLUSH) 0.9 %
10 SYRINGE (ML) INJECTION
Status: COMPLETED | OUTPATIENT
Start: 2023-05-17 | End: 2023-05-17

## 2023-05-17 RX ORDER — HYDROCODONE BITARTRATE AND HOMATROPINE METHYLBROMIDE ORAL SOLUTION 5; 1.5 MG/5ML; MG/5ML
5 LIQUID ORAL 3 TIMES DAILY PRN
Status: DISCONTINUED | OUTPATIENT
Start: 2023-05-17 | End: 2023-05-20 | Stop reason: HOSPADM

## 2023-05-17 RX ORDER — 0.9 % SODIUM CHLORIDE 0.9 %
100 INTRAVENOUS SOLUTION INTRAVENOUS
Status: COMPLETED | OUTPATIENT
Start: 2023-05-17 | End: 2023-05-17

## 2023-05-17 RX ADMIN — IOPAMIDOL 100 ML: 755 INJECTION, SOLUTION INTRAVENOUS at 11:29

## 2023-05-17 RX ADMIN — HYDROCODONE BITARTRATE AND HOMATROPINE METHYLBROMIDE 5 ML: 5; 1.5 SOLUTION ORAL at 02:16

## 2023-05-17 RX ADMIN — SODIUM CHLORIDE, PRESERVATIVE FREE 10 ML: 5 INJECTION INTRAVENOUS at 11:29

## 2023-05-17 RX ADMIN — DOXYCYCLINE HYCLATE 100 MG: 100 CAPSULE ORAL at 09:44

## 2023-05-17 RX ADMIN — ACETAMINOPHEN 1000 MG: 500 TABLET, FILM COATED ORAL at 00:27

## 2023-05-17 RX ADMIN — TAMSULOSIN HYDROCHLORIDE 0.4 MG: 0.4 CAPSULE ORAL at 09:44

## 2023-05-17 RX ADMIN — LOSARTAN POTASSIUM 12.5 MG: 25 TABLET, FILM COATED ORAL at 09:43

## 2023-05-17 RX ADMIN — FUROSEMIDE 40 MG: 10 INJECTION, SOLUTION INTRAMUSCULAR; INTRAVENOUS at 14:24

## 2023-05-17 RX ADMIN — DOXYCYCLINE HYCLATE 100 MG: 100 CAPSULE ORAL at 20:54

## 2023-05-17 RX ADMIN — POLYETHYLENE GLYCOL 3350 17 G: 17 POWDER, FOR SOLUTION ORAL at 09:58

## 2023-05-17 RX ADMIN — ACETAMINOPHEN 1000 MG: 500 TABLET, FILM COATED ORAL at 06:53

## 2023-05-17 RX ADMIN — MAGNESIUM GLUCONATE 500 MG ORAL TABLET 200 MG: 500 TABLET ORAL at 09:44

## 2023-05-17 RX ADMIN — ACETAMINOPHEN 1000 MG: 500 TABLET, FILM COATED ORAL at 13:09

## 2023-05-17 RX ADMIN — SODIUM CHLORIDE 100 ML: 9 INJECTION, SOLUTION INTRAVENOUS at 11:29

## 2023-05-17 RX ADMIN — ASPIRIN 81 MG: 81 TABLET ORAL at 09:42

## 2023-05-17 RX ADMIN — TUBERCULIN PURIFIED PROTEIN DERIVATIVE 5 UNITS: 5 INJECTION, SOLUTION INTRADERMAL at 16:59

## 2023-05-17 RX ADMIN — ENOXAPARIN SODIUM 40 MG: 100 INJECTION SUBCUTANEOUS at 17:00

## 2023-05-17 RX ADMIN — PANTOPRAZOLE SODIUM 40 MG: 40 TABLET, DELAYED RELEASE ORAL at 06:53

## 2023-05-17 RX ADMIN — SODIUM CHLORIDE, PRESERVATIVE FREE 10 ML: 5 INJECTION INTRAVENOUS at 13:16

## 2023-05-17 RX ADMIN — QUETIAPINE FUMARATE 75 MG: 25 TABLET ORAL at 20:55

## 2023-05-17 RX ADMIN — SODIUM CHLORIDE, PRESERVATIVE FREE 10 ML: 5 INJECTION INTRAVENOUS at 21:06

## 2023-05-17 RX ADMIN — CARVEDILOL 6.25 MG: 6.25 TABLET, FILM COATED ORAL at 09:44

## 2023-05-17 RX ADMIN — CARVEDILOL 6.25 MG: 6.25 TABLET, FILM COATED ORAL at 17:00

## 2023-05-17 RX ADMIN — CARBIDOPA AND LEVODOPA 2 TABLET: 25; 100 TABLET, EXTENDED RELEASE ORAL at 20:55

## 2023-05-17 ASSESSMENT — PAIN SCALES - GENERAL
PAINLEVEL_OUTOF10: 0
PAINLEVEL_OUTOF10: 0

## 2023-05-17 ASSESSMENT — PAIN DESCRIPTION - LOCATION: LOCATION: BACK

## 2023-05-17 ASSESSMENT — PAIN DESCRIPTION - ORIENTATION: ORIENTATION: LOWER

## 2023-05-17 NOTE — CARE COORDINATION
Pt discussed during IDR and chart screened by CM for d/c planning. PT/OT have evaluated pt and recommend STR at d/c.  CM met with pt and spouse at the bedside to discuss d/c planning. They requested time to discuss whether pt would like to go to STR or return home with [facility]. Spouse stated if pt does agree to STR they would like a facility [facility] as they travel here often to see family and pt's physicians. CM provided them with a list of SNFs and will check back after they have time to discuss. CM will continue to follow. LOS = 1 day. Update: CM spoke with pt's spouse at the bedside (pt was asleep). They have decided to d/c to STR. Spouse will continue to review the list of SNFs but first two choices are:   1) [facility]  2) [facility]  CM submitted referrals.

## 2023-05-17 NOTE — NURSE NAVIGATOR
Gone to CT. HF planner and educational material left at bedside. His RN aware. Asked to have patient call me for questions. Will follow later for teaching.

## 2023-05-17 NOTE — H&P
PCP, Dr. Cutler Brood     Chronic renal insufficiency 08/09/2016    patient denies, states Creatinine elevated one time. Controlled diabetes mellitus type II without complication ([facility] 90/84/5338    Patient denies-states BS was elevated one time-patient does not take any medications for diabetes     COPD exacerbation ([facility] 8/9/2016    COPD with emphysema (HCC)     Coronary artery disease involving native coronary artery of native heart without angina pectoris 8/2/2016    Dermatitis, seborrheic 8/9/2016    HARGROVE (dyspnea on exertion) 05/31/2016    due to COPD     Dyspnea     ED (erectile dysfunction) 8/9/2016    Elevated PSA 8/9/2016    Encounter for long-term (current) use of medications 8/9/2016    Enlarged prostate     managed with medication     Fatigue 8/9/2016    Finger swelling 8/9/2016    H/O echocardiogram 08/04/2020    LVEF 35-40%    Hiatal hernia     History of kidney stones     several with surgical interventions    Hyperlipidemia     Hypertension     managed with medication     Hypophonia with hoarseness 8/8/2016    Kidney problem     Kidney stone 06/2015    states he presently has 3 kidney stones monitored by Urology    Knee effusion 8/9/2016    Low vitamin B12 level 8/9/2016    Neurogenic bladder 8/9/2016    Obesity (BMI 30-39. 9)     BMI 34.5    Osteoarthritis 8/9/2016    Parkinson disease ([facility]     Followed by Dr. Roopa Valles with medication     Presence of combination internal cardiac defibrillator (ICD) and pacemaker     Pt states he has never had a shock as of 12/21/20    Primary osteoarthritis of knee 8/9/2016    Prostatitis 8/9/2016    Pseudogout of hand 8/9/2016    Rosacea 8/9/2016    S/P placement of cardiac pacemaker 10/31/2016    Sciatica 8/9/2016    Seborrhea 8/9/2016    Sleep apnea     Does not use c-pap. C-pap broken.      Slurred speech 08/09/2016    per patient \"mostly in the evenings\" from parkinsons     SOB (shortness of breath) 08/09/2016    With exertion only     Spinal stenosis,
Oral, DAILY    ondansetron (ZOFRAN-ODT) 8 mg, Oral, EVERY 8 HOURS PRN    polyethylene glycol (GLYCOLAX) 17 g, Oral, DAILY    QUEtiapine (SEROQUEL) 25 MG tablet 3 tabs QHS    tamsulosin (FLOMAX) 0.4 mg, Oral, DAILY    TIOTROPIUM BROMIDE MONOHYDRATE IN 18 mcg, Inhalation, DAILY    traMADol-acetaminophen (ULTRACET) 37.5-325 MG per tablet tramadol 37.5 mg-acetaminophen 325 mg tablet    UNABLE TO FIND Rollator         Objective:   Patient Vitals for the past 24 hrs:   Temp Pulse Resp BP SpO2   05/16/23 1410 -- 69 14 120/86 99 %   05/16/23 1348 -- 72 17 (!) 124/91 99 %   05/16/23 1331 -- 75 24 118/86 100 %   05/16/23 1326 -- -- -- 117/86 --   05/16/23 1253 -- 75 27 118/83 96 %   05/16/23 1039 98.5 °F (36.9 °C) 79 19 108/61 96 %       Oxygen Therapy  SpO2: 99 %  Pulse via Oximetry: 71 beats per minute  O2 Device: None (Room air)    Estimated body mass index is 26.5 kg/m² as calculated from the following:    Height as of this encounter: 5' 11\" (1.803 m). Weight as of this encounter: 190 lb (86.2 kg). No intake or output data in the 24 hours ending 05/16/23 1430      Physical Exam:    Blood pressure 120/86, pulse 69, temperature 98.5 °F (36.9 °C), temperature source Oral, resp. rate 14, height 5' 11\" (1.803 m), weight 190 lb (86.2 kg), SpO2 99 %. General:    Well nourished. Head:  Normocephalic, atraumatic  Eyes:  Sclerae appear normal.  Pupils equally round. ENT:  Nares appear normal.  Moist oral mucosa  Neck:  No restricted ROM. Trachea midline   CV:   RRR. No m/r/g. No jugular venous distension. Lungs:   CTAB. No wheezing, rhonchi, or rales. Symmetric expansion. Abdomen:   Soft, nontender, nondistended. Extremities: Bilateral LE +2 pitting edema with dusky discoloration of mid foot and base of toes. Both feet are cool to touch with adequate capillary refill but difficult to palpate pulse. Skin:     No rashes and normal coloration. Warm and dry. Neuro:  CN II-XII grossly intact. Sensation intact.

## 2023-05-18 ENCOUNTER — APPOINTMENT (OUTPATIENT)
Dept: NON INVASIVE DIAGNOSTICS | Age: 78
End: 2023-05-18
Payer: MEDICARE

## 2023-05-18 LAB
ANION GAP SERPL CALC-SCNC: 3 MMOL/L (ref 2–11)
BUN SERPL-MCNC: 24 MG/DL (ref 8–23)
CALCIUM SERPL-MCNC: 8.3 MG/DL (ref 8.3–10.4)
CHLORIDE SERPL-SCNC: 106 MMOL/L (ref 101–110)
CO2 SERPL-SCNC: 27 MMOL/L (ref 21–32)
CREAT SERPL-MCNC: 0.9 MG/DL (ref 0.8–1.5)
ECHO AO ASC DIAM: 3.1 CM
ECHO AO ASCENDING AORTA INDEX: 1.5 CM/M2
ECHO AO ROOT DIAM: 3.5 CM
ECHO AO ROOT INDEX: 1.7 CM/M2
ECHO AV AREA PEAK VELOCITY: 1.5 CM2
ECHO AV AREA VTI: 1.4 CM2
ECHO AV AREA/BSA PEAK VELOCITY: 0.7 CM2/M2
ECHO AV AREA/BSA VTI: 0.7 CM2/M2
ECHO AV MEAN GRADIENT: 3 MMHG
ECHO AV MEAN VELOCITY: 0.9 M/S
ECHO AV PEAK GRADIENT: 5 MMHG
ECHO AV PEAK VELOCITY: 1.1 M/S
ECHO AV VELOCITY RATIO: 0.45
ECHO AV VTI: 21.1 CM
ECHO BSA: 2.08 M2
ECHO EST RA PRESSURE: 5 MMHG
ECHO LA AREA 2C: 25.3 CM2
ECHO LA AREA 4C: 23.8 CM2
ECHO LA DIAMETER INDEX: 2.09 CM/M2
ECHO LA DIAMETER: 4.3 CM
ECHO LA MAJOR AXIS: 6.2 CM
ECHO LA MINOR AXIS: 6.3 CM
ECHO LA TO AORTIC ROOT RATIO: 1.23
ECHO LA VOL 2C: 81 ML (ref 18–58)
ECHO LA VOL 4C: 72 ML (ref 18–58)
ECHO LA VOL BP: 76 ML (ref 18–58)
ECHO LA VOL/BSA BIPLANE: 37 ML/M2 (ref 16–34)
ECHO LA VOLUME INDEX A2C: 39 ML/M2 (ref 16–34)
ECHO LA VOLUME INDEX A4C: 35 ML/M2 (ref 16–34)
ECHO LV E' LATERAL VELOCITY: 10 CM/S
ECHO LV E' SEPTAL VELOCITY: 7 CM/S
ECHO LV EDV A2C: 277 ML
ECHO LV EDV A4C: 294 ML
ECHO LV EDV INDEX A4C: 143 ML/M2
ECHO LV EDV NDEX A2C: 134 ML/M2
ECHO LV EJECTION FRACTION A2C: 27 %
ECHO LV EJECTION FRACTION A4C: 35 %
ECHO LV EJECTION FRACTION BIPLANE: 30 % (ref 55–100)
ECHO LV ESV A2C: 203 ML
ECHO LV ESV A4C: 190 ML
ECHO LV ESV INDEX A2C: 99 ML/M2
ECHO LV ESV INDEX A4C: 92 ML/M2
ECHO LV FRACTIONAL SHORTENING: 14 % (ref 28–44)
ECHO LV INTERNAL DIMENSION DIASTOLE INDEX: 3.16 CM/M2
ECHO LV INTERNAL DIMENSION DIASTOLIC: 6.5 CM (ref 4.2–5.9)
ECHO LV INTERNAL DIMENSION SYSTOLIC INDEX: 2.72 CM/M2
ECHO LV INTERNAL DIMENSION SYSTOLIC: 5.6 CM
ECHO LV IVSD: 0.8 CM (ref 0.6–1)
ECHO LV MASS 2D: 214.3 G (ref 88–224)
ECHO LV MASS INDEX 2D: 104 G/M2 (ref 49–115)
ECHO LV POSTERIOR WALL DIASTOLIC: 0.8 CM (ref 0.6–1)
ECHO LV RELATIVE WALL THICKNESS RATIO: 0.25
ECHO LVOT AREA: 3.1 CM2
ECHO LVOT AV VTI INDEX: 0.43
ECHO LVOT DIAM: 2 CM
ECHO LVOT MEAN GRADIENT: 1 MMHG
ECHO LVOT PEAK GRADIENT: 1 MMHG
ECHO LVOT PEAK VELOCITY: 0.5 M/S
ECHO LVOT STROKE VOLUME INDEX: 13.9 ML/M2
ECHO LVOT SV: 28.6 ML
ECHO LVOT VTI: 9.1 CM
ECHO MV AREA VTI: 1 CM2
ECHO MV E DECELERATION TIME (DT): 187 MS
ECHO MV E VELOCITY: 0.97 M/S
ECHO MV E/E' LATERAL: 9.7
ECHO MV E/E' RATIO (AVERAGED): 11.78
ECHO MV E/E' SEPTAL: 13.86
ECHO MV LVOT VTI INDEX: 3.08
ECHO MV MAX VELOCITY: 0.9 M/S
ECHO MV MEAN GRADIENT: 2 MMHG
ECHO MV MEAN VELOCITY: 0.6 M/S
ECHO MV PEAK GRADIENT: 3 MMHG
ECHO MV VTI: 28 CM
ECHO PULMONARY ARTERY END DIASTOLIC PRESSURE: 1 MMHG
ECHO PV ACCELERATION TIME (AT): 111 MS
ECHO PV MAX VELOCITY: 0.9 M/S
ECHO PV PEAK GRADIENT: 3 MMHG
ECHO PV REGURGITANT MAX VELOCITY: 0.6 M/S
ECHO RIGHT VENTRICULAR SYSTOLIC PRESSURE (RVSP): 21 MMHG
ECHO RV BASAL DIMENSION: 4.6 CM
ECHO RV FREE WALL PEAK S': 10 CM/S
ECHO RV TAPSE: 2.5 CM (ref 1.7–?)
ECHO TV REGURGITANT MAX VELOCITY: 2 M/S
ECHO TV REGURGITANT PEAK GRADIENT: 16 MMHG
ERYTHROCYTE [DISTWIDTH] IN BLOOD BY AUTOMATED COUNT: 13.2 % (ref 11.9–14.6)
GLUCOSE SERPL-MCNC: 95 MG/DL (ref 65–100)
HCT VFR BLD AUTO: 39.7 % (ref 41.1–50.3)
HGB BLD-MCNC: 13.2 G/DL (ref 13.6–17.2)
MAGNESIUM SERPL-MCNC: 1.8 MG/DL (ref 1.8–2.4)
MCH RBC QN AUTO: 32.9 PG (ref 26.1–32.9)
MCHC RBC AUTO-ENTMCNC: 33.2 G/DL (ref 31.4–35)
MCV RBC AUTO: 99 FL (ref 82–102)
NRBC # BLD: 0 K/UL (ref 0–0.2)
PLATELET # BLD AUTO: 216 K/UL (ref 150–450)
PMV BLD AUTO: 10 FL (ref 9.4–12.3)
POTASSIUM SERPL-SCNC: 3.5 MMOL/L (ref 3.5–5.1)
RBC # BLD AUTO: 4.01 M/UL (ref 4.23–5.6)
SODIUM SERPL-SCNC: 136 MMOL/L (ref 133–143)
WBC # BLD AUTO: 5.6 K/UL (ref 4.3–11.1)

## 2023-05-18 PROCEDURE — 2580000003 HC RX 258: Performed by: INTERNAL MEDICINE

## 2023-05-18 PROCEDURE — 97530 THERAPEUTIC ACTIVITIES: CPT

## 2023-05-18 PROCEDURE — 99222 1ST HOSP IP/OBS MODERATE 55: CPT | Performed by: SURGERY

## 2023-05-18 PROCEDURE — C8929 TTE W OR WO FOL WCON,DOPPLER: HCPCS

## 2023-05-18 PROCEDURE — 36415 COLL VENOUS BLD VENIPUNCTURE: CPT

## 2023-05-18 PROCEDURE — 83735 ASSAY OF MAGNESIUM: CPT

## 2023-05-18 PROCEDURE — 6360000004 HC RX CONTRAST MEDICATION: Performed by: INTERNAL MEDICINE

## 2023-05-18 PROCEDURE — 92526 ORAL FUNCTION THERAPY: CPT

## 2023-05-18 PROCEDURE — 85027 COMPLETE CBC AUTOMATED: CPT

## 2023-05-18 PROCEDURE — 6370000000 HC RX 637 (ALT 250 FOR IP): Performed by: INTERNAL MEDICINE

## 2023-05-18 PROCEDURE — 1100000000 HC RM PRIVATE

## 2023-05-18 PROCEDURE — 93306 TTE W/DOPPLER COMPLETE: CPT | Performed by: INTERNAL MEDICINE

## 2023-05-18 PROCEDURE — 80048 BASIC METABOLIC PNL TOTAL CA: CPT

## 2023-05-18 PROCEDURE — 6360000002 HC RX W HCPCS: Performed by: INTERNAL MEDICINE

## 2023-05-18 PROCEDURE — 99232 SBSQ HOSP IP/OBS MODERATE 35: CPT | Performed by: INTERNAL MEDICINE

## 2023-05-18 PROCEDURE — 6360000002 HC RX W HCPCS: Performed by: PHYSICIAN ASSISTANT

## 2023-05-18 RX ORDER — ALBUTEROL SULFATE 2.5 MG/3ML
2.5 SOLUTION RESPIRATORY (INHALATION) EVERY 6 HOURS PRN
Status: DISCONTINUED | OUTPATIENT
Start: 2023-05-18 | End: 2023-05-20 | Stop reason: HOSPADM

## 2023-05-18 RX ORDER — CARVEDILOL 3.12 MG/1
3.12 TABLET ORAL 2 TIMES DAILY WITH MEALS
Status: DISCONTINUED | OUTPATIENT
Start: 2023-05-18 | End: 2023-05-20 | Stop reason: HOSPADM

## 2023-05-18 RX ADMIN — PANTOPRAZOLE SODIUM 40 MG: 40 TABLET, DELAYED RELEASE ORAL at 07:36

## 2023-05-18 RX ADMIN — SODIUM CHLORIDE, PRESERVATIVE FREE 10 ML: 5 INJECTION INTRAVENOUS at 07:37

## 2023-05-18 RX ADMIN — DOXYCYCLINE HYCLATE 100 MG: 100 CAPSULE ORAL at 07:36

## 2023-05-18 RX ADMIN — CARBIDOPA AND LEVODOPA 2 TABLET: 25; 100 TABLET, EXTENDED RELEASE ORAL at 21:46

## 2023-05-18 RX ADMIN — TAMSULOSIN HYDROCHLORIDE 0.4 MG: 0.4 CAPSULE ORAL at 07:36

## 2023-05-18 RX ADMIN — POLYETHYLENE GLYCOL 3350 17 G: 17 POWDER, FOR SOLUTION ORAL at 07:36

## 2023-05-18 RX ADMIN — SODIUM CHLORIDE, PRESERVATIVE FREE 0.3 ML: 5 INJECTION INTRAVENOUS at 11:46

## 2023-05-18 RX ADMIN — DOXYCYCLINE HYCLATE 100 MG: 100 CAPSULE ORAL at 21:46

## 2023-05-18 RX ADMIN — SODIUM CHLORIDE, PRESERVATIVE FREE 10 ML: 5 INJECTION INTRAVENOUS at 21:43

## 2023-05-18 RX ADMIN — FUROSEMIDE 40 MG: 10 INJECTION, SOLUTION INTRAMUSCULAR; INTRAVENOUS at 07:36

## 2023-05-18 RX ADMIN — ENOXAPARIN SODIUM 40 MG: 100 INJECTION SUBCUTANEOUS at 17:02

## 2023-05-18 RX ADMIN — CARVEDILOL 6.25 MG: 6.25 TABLET, FILM COATED ORAL at 07:36

## 2023-05-18 RX ADMIN — CARVEDILOL 3.12 MG: 3.12 TABLET, FILM COATED ORAL at 16:56

## 2023-05-18 RX ADMIN — LOSARTAN POTASSIUM 12.5 MG: 25 TABLET, FILM COATED ORAL at 07:36

## 2023-05-18 RX ADMIN — ASPIRIN 81 MG: 81 TABLET ORAL at 07:36

## 2023-05-18 RX ADMIN — QUETIAPINE FUMARATE 75 MG: 25 TABLET ORAL at 21:46

## 2023-05-18 RX ADMIN — MAGNESIUM GLUCONATE 500 MG ORAL TABLET 200 MG: 500 TABLET ORAL at 07:36

## 2023-05-18 RX ADMIN — EMPAGLIFLOZIN 10 MG: 10 TABLET, FILM COATED ORAL at 16:56

## 2023-05-18 ASSESSMENT — PAIN SCALES - GENERAL
PAINLEVEL_OUTOF10: 0

## 2023-05-18 NOTE — PLAN OF CARE
Problem: Safety - Adult  Goal: Free from fall injury  5/18/2023 0726 by Aster Blanco RN  Outcome: Progressing  5/18/2023 0431 by Alo Farooq RN  Outcome: Progressing  Flowsheets  Taken 5/18/2023 0300  Free From Fall Injury: Instruct family/caregiver on patient safety  Taken 5/17/2023 2050  Free From Fall Injury: Instruct family/caregiver on patient safety     Problem: Pain  Goal: Verbalizes/displays adequate comfort level or baseline comfort level  5/18/2023 0726 by Aster Blanco RN  Outcome: Progressing  5/18/2023 0431 by Alo Farooq RN  Outcome: Progressing     Problem: Confusion  Goal: Confusion, delirium, dementia, or psychosis is improved or at baseline  Description: INTERVENTIONS:  1. Assess for possible contributors to thought disturbance, including medications, impaired vision or hearing, underlying metabolic abnormalities, dehydration, psychiatric diagnoses, and notify attending LIP  2. [place] high risk fall precautions, as indicated  3. Provide frequent short contacts to provide reality reorientation, refocusing and direction  4. Decrease environmental stimuli, including noise as appropriate  5. Monitor and intervene to maintain adequate nutrition, hydration, elimination, sleep and activity  6. If unable to ensure safety without constant attention obtain sitter and review sitter guidelines with assigned personnel  7. Initiate Psychosocial CNS and Spiritual Care consult, as indicated  Outcome: Progressing  Flowsheets (Taken 5/17/2023 2050 by Alo Farooq RN)  Effect of thought disturbance (confusion, delirium, dementia, or psychosis) are managed with adequate functional status: Assess for contributors to thought disturbance, including medications, impaired vision or hearing, underlying metabolic abnormalities, dehydration, psychiatric diagnoses, notify LIP     Problem: Skin/Tissue Integrity  Goal: Absence of new skin breakdown  Description: 1.   Monitor for areas of redness and/or skin

## 2023-05-18 NOTE — PLAN OF CARE
Problem: Safety - Adult  Goal: Free from fall injury  Outcome: Progressing  Flowsheets  Taken 5/18/2023 0300  Free From Fall Injury: Instruct family/caregiver on patient safety  Taken 5/17/2023 2050  Free From Fall Injury: Instruct family/caregiver on patient safety     Problem: Pain  Goal: Verbalizes/displays adequate comfort level or baseline comfort level  Outcome: Progressing     Problem: Skin/Tissue Integrity  Goal: Absence of new skin breakdown  Description: 1. Monitor for areas of redness and/or skin breakdown  2. Assess vascular access sites hourly  3. Every 4-6 hours minimum:  Change oxygen saturation probe site  4. Every 4-6 hours:  If on nasal continuous positive airway pressure, respiratory therapy assess nares and determine need for appliance change or resting period.   Outcome: Progressing

## 2023-05-18 NOTE — CONSULTS
Consult received for  PAD. Arterial duplex study was reviewed and was essentially normal. He has no significant arterial disease. A CTA abdomen with runoff was performed, however, there was some difficulty with assessing the popliteal and tibial vessels d/t the metal artifact from bilateral knee replacement. Even mild arterial disease would not cause leg swelling. No further evaluation needed from a vascular standpoint.     Valentin Frias, NP/ Dr. Terrance Weinstein    No charge billed to this pt
Systems  Constitutional: Negative for fever and chills. HENT: Negative for congestion and sore throat. Skin: Negative for rash and itching. Eyes: Negative for blurred vision and double vision. Respiratory: Negative for cough and shortness of breath. Cardiovascular: Negative for chest pain, palpitations, claudication and leg swelling. Gastrointestinal: Negative for nausea, vomiting and abdominal pain. Genitourinary: Negative for dysuria, hematuria and flank pain. Musculoskeletal: Negative for joint pain and falls. Neurological: Negative for dizziness, sensory change, focal weakness, loss of consciousness and headaches. PHYSICAL EXAM   [unfilled]     Constitutional: he appears well-developed. No distress. HENT: Hearing intact. Head: Atraumatic. Eyes: Pupils are equal, round, and reactive to light. Neck: Normal range of motion. Cardiovascular: Regular rhythm. Pulmonary/Chest: Effort normal and breath sounds normal. No respiratory distress. Abdominal: Soft. Bowel sounds are normal. he exhibits no distension. There is no tenderness. There is no guarding. No hernia. Musculoskeletal: Normal range of motion. Neurological: He is alert. CN II- XII grossly intact  Skin: Warm and dry. Vascular: Femoral pulses feet are warm motor and sensory intact      Imaging Results  Review arterial duplex study no evidence of any significant stenosis  CTA also reviewed no evidence of any significant stenosis    ASSESSMENT AND PLAN   Mr. Eli Ramos. is a 69y.o. year old male without any evidence of any claudication with a normal duplex study and mild plaque in his common femorals. No indication for any intervention no follow-up is needed. Elements of this note have been dictated using speech recognition software. As a result, errors of speech recognition may have occurred.     50 minutes of time was spent on this consult with greater than 50% of time spent face-to-face with the

## 2023-05-18 NOTE — CARE COORDINATION
Pt discussed during IDR. Continue to diurese pt. PT/OT recommending STR at d/c.  CM met with pt and spouse at the bedside to discuss d/c planning. [address] [facility] \"facility full. \"  Merged with [facility] has accepted the referral.  Pt and spouse are in agreement with pt being d/c for STR to this facility. CM has accepted the referral.  No pre-cert required. CM will continue to follow.   LOS = 2 days

## 2023-05-19 LAB
ANION GAP SERPL CALC-SCNC: 3 MMOL/L (ref 2–11)
BASOPHILS # BLD: 0 K/UL (ref 0–0.2)
BASOPHILS NFR BLD: 1 % (ref 0–2)
BUN SERPL-MCNC: 31 MG/DL (ref 8–23)
CALCIUM SERPL-MCNC: 8.5 MG/DL (ref 8.3–10.4)
CHLORIDE SERPL-SCNC: 108 MMOL/L (ref 101–110)
CO2 SERPL-SCNC: 26 MMOL/L (ref 21–32)
CREAT SERPL-MCNC: 0.9 MG/DL (ref 0.8–1.5)
DIFFERENTIAL METHOD BLD: ABNORMAL
EOSINOPHIL # BLD: 0.2 K/UL (ref 0–0.8)
EOSINOPHIL NFR BLD: 4 % (ref 0.5–7.8)
ERYTHROCYTE [DISTWIDTH] IN BLOOD BY AUTOMATED COUNT: 13.1 % (ref 11.9–14.6)
GLUCOSE SERPL-MCNC: 92 MG/DL (ref 65–100)
HCT VFR BLD AUTO: 41.3 % (ref 41.1–50.3)
HGB BLD-MCNC: 13.5 G/DL (ref 13.6–17.2)
IMM GRANULOCYTES # BLD AUTO: 0 K/UL (ref 0–0.5)
IMM GRANULOCYTES NFR BLD AUTO: 0 % (ref 0–5)
LYMPHOCYTES # BLD: 1.4 K/UL (ref 0.5–4.6)
LYMPHOCYTES NFR BLD: 22 % (ref 13–44)
MCH RBC QN AUTO: 32.5 PG (ref 26.1–32.9)
MCHC RBC AUTO-ENTMCNC: 32.7 G/DL (ref 31.4–35)
MCV RBC AUTO: 99.3 FL (ref 82–102)
MM INDURATION, POC: 0 MM (ref 0–5)
MONOCYTES # BLD: 0.7 K/UL (ref 0.1–1.3)
MONOCYTES NFR BLD: 11 % (ref 4–12)
NEUTS SEG # BLD: 4 K/UL (ref 1.7–8.2)
NEUTS SEG NFR BLD: 62 % (ref 43–78)
NRBC # BLD: 0 K/UL (ref 0–0.2)
PLATELET # BLD AUTO: 230 K/UL (ref 150–450)
PMV BLD AUTO: 10.1 FL (ref 9.4–12.3)
POTASSIUM SERPL-SCNC: 3.9 MMOL/L (ref 3.5–5.1)
PPD, POC: NEGATIVE
RBC # BLD AUTO: 4.16 M/UL (ref 4.23–5.6)
SODIUM SERPL-SCNC: 137 MMOL/L (ref 133–143)
WBC # BLD AUTO: 6.4 K/UL (ref 4.3–11.1)

## 2023-05-19 PROCEDURE — 80048 BASIC METABOLIC PNL TOTAL CA: CPT

## 2023-05-19 PROCEDURE — 36415 COLL VENOUS BLD VENIPUNCTURE: CPT

## 2023-05-19 PROCEDURE — 6370000000 HC RX 637 (ALT 250 FOR IP): Performed by: INTERNAL MEDICINE

## 2023-05-19 PROCEDURE — 2580000003 HC RX 258: Performed by: INTERNAL MEDICINE

## 2023-05-19 PROCEDURE — 94760 N-INVAS EAR/PLS OXIMETRY 1: CPT

## 2023-05-19 PROCEDURE — 97530 THERAPEUTIC ACTIVITIES: CPT

## 2023-05-19 PROCEDURE — 99232 SBSQ HOSP IP/OBS MODERATE 35: CPT | Performed by: INTERNAL MEDICINE

## 2023-05-19 PROCEDURE — 85025 COMPLETE CBC W/AUTO DIFF WBC: CPT

## 2023-05-19 PROCEDURE — 94664 DEMO&/EVAL PT USE INHALER: CPT

## 2023-05-19 PROCEDURE — 1100000000 HC RM PRIVATE

## 2023-05-19 PROCEDURE — 94640 AIRWAY INHALATION TREATMENT: CPT

## 2023-05-19 RX ORDER — SPIRONOLACTONE 25 MG/1
25 TABLET ORAL DAILY
Status: DISCONTINUED | OUTPATIENT
Start: 2023-05-19 | End: 2023-05-20 | Stop reason: HOSPADM

## 2023-05-19 RX ADMIN — EMPAGLIFLOZIN 10 MG: 10 TABLET, FILM COATED ORAL at 08:35

## 2023-05-19 RX ADMIN — CARVEDILOL 3.12 MG: 3.12 TABLET, FILM COATED ORAL at 17:47

## 2023-05-19 RX ADMIN — DOXYCYCLINE HYCLATE 100 MG: 100 CAPSULE ORAL at 20:34

## 2023-05-19 RX ADMIN — ACETAMINOPHEN 1000 MG: 500 TABLET, FILM COATED ORAL at 23:08

## 2023-05-19 RX ADMIN — MAGNESIUM GLUCONATE 500 MG ORAL TABLET 200 MG: 500 TABLET ORAL at 08:35

## 2023-05-19 RX ADMIN — SODIUM CHLORIDE, PRESERVATIVE FREE 10 ML: 5 INJECTION INTRAVENOUS at 20:36

## 2023-05-19 RX ADMIN — CARVEDILOL 3.12 MG: 3.12 TABLET, FILM COATED ORAL at 08:35

## 2023-05-19 RX ADMIN — QUETIAPINE FUMARATE 75 MG: 25 TABLET ORAL at 20:34

## 2023-05-19 RX ADMIN — CARBIDOPA AND LEVODOPA 2 TABLET: 25; 100 TABLET, EXTENDED RELEASE ORAL at 20:33

## 2023-05-19 RX ADMIN — LOSARTAN POTASSIUM 12.5 MG: 25 TABLET, FILM COATED ORAL at 08:35

## 2023-05-19 RX ADMIN — PANTOPRAZOLE SODIUM 40 MG: 40 TABLET, DELAYED RELEASE ORAL at 09:16

## 2023-05-19 RX ADMIN — TAMSULOSIN HYDROCHLORIDE 0.4 MG: 0.4 CAPSULE ORAL at 08:35

## 2023-05-19 RX ADMIN — ASPIRIN 81 MG: 81 TABLET ORAL at 08:35

## 2023-05-19 RX ADMIN — TIOTROPIUM BROMIDE INHALATION SPRAY 2 PUFF: 3.12 SPRAY, METERED RESPIRATORY (INHALATION) at 07:08

## 2023-05-19 RX ADMIN — SPIRONOLACTONE 25 MG: 25 TABLET ORAL at 08:35

## 2023-05-19 RX ADMIN — DOXYCYCLINE HYCLATE 100 MG: 100 CAPSULE ORAL at 08:35

## 2023-05-19 RX ADMIN — POLYETHYLENE GLYCOL 3350 17 G: 17 POWDER, FOR SOLUTION ORAL at 08:35

## 2023-05-19 ASSESSMENT — PAIN SCALES - GENERAL
PAINLEVEL_OUTOF10: 0
PAINLEVEL_OUTOF10: 0

## 2023-05-19 NOTE — CARE COORDINATION
Pt discussed during IDR. Per Dr. Sukh Chavira pt should be medically stable for d/c in the next 24-48 hours. CM informed the SNF liaison. This facility DOES accept weekend admissions. Rapid COVID-19 test no longer required at this facility per the liaison. CM will continue to follow.   LOS = 3 days    [facility]  Room: 106-D,  Report: [phone]

## 2023-05-19 NOTE — NURSE NAVIGATOR
CHF teaching completed to Pt's family @ BS. CHF background completed. Teaching emphasis on Call Cardiology STAT ,if any of the following are noted:  Short of Breath; with activity or without/ while reclined, Generalize weakness, edema are noted individually or grouped. Cardiac Diet  and salt/fluid restrictions covered. Daily WTs emphasized. Planner with summarization sheet provided with cardiology service and phone number and bathroom scale offered. Total time @ BS is 1 hour and pt verbalize understanding/past post test.  Pt instructed to call myself, if any questions occur.

## 2023-05-20 VITALS
HEART RATE: 71 BPM | BODY MASS INDEX: 26.63 KG/M2 | RESPIRATION RATE: 18 BRPM | SYSTOLIC BLOOD PRESSURE: 83 MMHG | HEIGHT: 71 IN | WEIGHT: 190.19 LBS | TEMPERATURE: 97.2 F | DIASTOLIC BLOOD PRESSURE: 62 MMHG | OXYGEN SATURATION: 98 %

## 2023-05-20 PROCEDURE — 6370000000 HC RX 637 (ALT 250 FOR IP): Performed by: INTERNAL MEDICINE

## 2023-05-20 PROCEDURE — 2580000003 HC RX 258: Performed by: INTERNAL MEDICINE

## 2023-05-20 PROCEDURE — 94760 N-INVAS EAR/PLS OXIMETRY 1: CPT

## 2023-05-20 PROCEDURE — 94640 AIRWAY INHALATION TREATMENT: CPT

## 2023-05-20 RX ORDER — SPIRONOLACTONE 25 MG/1
25 TABLET ORAL DAILY
Qty: 30 TABLET | Refills: 0 | Status: SHIPPED | OUTPATIENT
Start: 2023-05-20 | End: 2023-06-19

## 2023-05-20 RX ORDER — ASPIRIN 81 MG/1
81 TABLET ORAL DAILY
Qty: 30 TABLET | Refills: 3
Start: 2023-05-20

## 2023-05-20 RX ADMIN — EMPAGLIFLOZIN 10 MG: 10 TABLET, FILM COATED ORAL at 07:51

## 2023-05-20 RX ADMIN — TAMSULOSIN HYDROCHLORIDE 0.4 MG: 0.4 CAPSULE ORAL at 07:52

## 2023-05-20 RX ADMIN — POLYETHYLENE GLYCOL 3350 17 G: 17 POWDER, FOR SOLUTION ORAL at 07:56

## 2023-05-20 RX ADMIN — ASPIRIN 81 MG: 81 TABLET ORAL at 07:51

## 2023-05-20 RX ADMIN — ACETAMINOPHEN 1000 MG: 500 TABLET, FILM COATED ORAL at 05:51

## 2023-05-20 RX ADMIN — MAGNESIUM GLUCONATE 500 MG ORAL TABLET 200 MG: 500 TABLET ORAL at 08:01

## 2023-05-20 RX ADMIN — LOSARTAN POTASSIUM 12.5 MG: 25 TABLET, FILM COATED ORAL at 07:53

## 2023-05-20 RX ADMIN — TIOTROPIUM BROMIDE INHALATION SPRAY 2 PUFF: 3.12 SPRAY, METERED RESPIRATORY (INHALATION) at 07:42

## 2023-05-20 RX ADMIN — SPIRONOLACTONE 25 MG: 25 TABLET ORAL at 07:52

## 2023-05-20 RX ADMIN — PANTOPRAZOLE SODIUM 40 MG: 40 TABLET, DELAYED RELEASE ORAL at 05:51

## 2023-05-20 RX ADMIN — DOXYCYCLINE HYCLATE 100 MG: 100 CAPSULE ORAL at 07:52

## 2023-05-20 RX ADMIN — SODIUM CHLORIDE, PRESERVATIVE FREE 10 ML: 5 INJECTION INTRAVENOUS at 08:04

## 2023-05-20 RX ADMIN — CARVEDILOL 3.12 MG: 3.12 TABLET, FILM COATED ORAL at 07:53

## 2023-05-20 ASSESSMENT — PAIN SCALES - GENERAL
PAINLEVEL_OUTOF10: 0
PAINLEVEL_OUTOF10: 0

## 2023-05-20 NOTE — MANAGEMENT PLAN
Cardiology Management Plan    Plan as noted per Dr. Veatrice Cowden 5/19/23  Pt now off IV diuresis, volume status stable, labs stable  Pt on oral OMT for CHF, could add back oral lasix as needed  Cardiology will sign off, please call with questions or changes in clinical status

## 2023-05-20 NOTE — PROGRESS NOTES
's visit attempted. Mr. Garduno Patient was receiving care from staff this morning. I provided 's card to staff for sharing with patient. Chaplains remain available for support.     Frankie Zepeda 69  Board Certified 
8285- Nuplazid 34mg N/A but is patient supplied; Dr. Lissa Neely aware face to face. 65- Spoke to pharmacy- per pharmacy; Jacqueline Falk is not apart of the formulary. \"Easiest and quickest thing to do is for the MD to write a rx for outpatient and have a family member pick it up and pharmacy can verify it and utilize it inpatient. \" Dr. Jacinta Sicard aware via perfect serve. 0619- Order received to consult neurology.  aware.
ACUTE PHYSICAL THERAPY GOALS:   (Developed with and agreed upon by patient and/or caregiver.)  (1.) Hao Vivian.  will move from supine to sit and sit to supine  with STAND BY ASSIST within 7 treatment day(s). (2.) Hao Hardinost. will transfer from bed to chair and chair to bed with STAND BY ASSIST using the least restrictive device within 7 treatment day(s). (3.) Jessica Scott Slimmer. will ambulate with STAND BY ASSIST for 150 feet with the least restrictive device within 7 treatment day(s). (4.) Hao Vivian. will perform standing static and dynamic balance activities x 15 minutes with STAND BY ASSIST to improve safety within 7 treatment day(s). (5.) Hao Vivian. will perform therapeutic exercises x 20 min for HEP with STAND BY ASSIST to improve strength, endurance, and functional mobility within 7 treatment day(s). PHYSICAL THERAPY: Daily Note AM   (Link to Caseload Tracking: PT Visit Days : 3  Time In/Out PT Charge Capture  Rehab Caseload Tracker  Orders    Hao Prakash is a 69 y.o. male   PRIMARY DIAGNOSIS: CHF (congestive heart failure), NYHA class I, acute on chronic, combined ([facility])  Generalized weakness [R53.1]  CHF (congestive heart failure), NYHA class I, acute on chronic, combined ([facility] [address] [I50.43]  Hypervolemia, unspecified hypervolemia type [E87.70]       Inpatient: Payor: MEDICARE / Plan: MEDICARE PART A AND B / Product Type: *No Product type* /     ASSESSMENT:     REHAB RECOMMENDATIONS:   Recommendation to date pending progress:  Setting:  Short-term Rehab    Equipment:    To Be Determined     ASSESSMENT:  Mr. Clemente Baldwin is supine upon arrival and agreeable to therapy. Patient reports feeling better today and is excited to participate with therapy tasks. He transfers from supine to sitting with CGA and performs dynamic tasks on EOB to don his shoes with good sitting balance.  He then performs sit to stand from bed to 2WW with
ACUTE PHYSICAL THERAPY GOALS:   (Developed with and agreed upon by patient and/or caregiver.)  (1.) Shahab Poe  will move from supine to sit and sit to supine  with STAND BY ASSIST within 7 treatment day(s). (2.) Shahab Poe will transfer from bed to chair and chair to bed with STAND BY ASSIST using the least restrictive device within 7 treatment day(s). (3.) Sha Cochran will ambulate with STAND BY ASSIST for 150 feet with the least restrictive device within 7 treatment day(s). (4.) Shahab Poe will perform standing static and dynamic balance activities x 15 minutes with STAND BY ASSIST to improve safety within 7 treatment day(s). (5.) Shahab Poe will perform therapeutic exercises x 20 min for HEP with STAND BY ASSIST to improve strength, endurance, and functional mobility within 7 treatment day(s). PHYSICAL THERAPY: Daily Note AM   (Link to Caseload Tracking: PT Visit Days : 2  Time In/Out PT Charge Capture  Rehab Caseload Tracker  Orders    Shahab Poe is a 69 y.o. male   PRIMARY DIAGNOSIS: CHF (congestive heart failure), NYHA class I, acute on chronic, combined ([facility])  Generalized weakness [R53.1]  CHF (congestive heart failure), NYHA class I, acute on chronic, [facility] ([facility] [I50.43]  Hypervolemia, unspecified hypervolemia type [E87.70]       Inpatient: Payor: MEDICARE / Plan: MEDICARE PART A AND B / Product Type: *No Product type* /     ASSESSMENT:     REHAB RECOMMENDATIONS:   Recommendation to date pending progress:  Setting:  Short-term Rehab    Equipment:    To Be Determined     ASSESSMENT:  Mr. Carlton Sweet is seated up in chair upon arrival and agreeable to therapy. He performs sit to stand from chair with Velia and easily maintains static standing while performing alternating marches with BUE support on 2WW. Following rest he then ambulates in his room for 40 ft x2 using 2WW and CGA/Velia.  He demonstrates slowed
END OF SHIFT SUMMARY:    Significant vitals this shift:  Bp 93/55  Blood products given this shift:  N/A  Additional events this shift:   -Pt no complaints of pain, found resting in bed, no needs at this time  -0300 Pt up in chair, ambulated to bathroom  -0430 Pt back in bed, no complaints at this time    Deisy Emery RN
Hospitalist Progress Note   Admit Date:  2023 11:06 AM   Name:  Jonas Hidalgo. Age:  69 y.o. Sex:  male  :  1945   MRN:  [id number]   Room:  [facility]/    Presenting/Chief Complaint: Shortness of Breath     Reason(s) for Admission: Generalized weakness [R53.1]  CHF (congestive heart failure), NYHA class I, acute on chronic, combined ([facility] [address] [I50.43]  Hypervolemia, unspecified hypervolemia type [E87.70]     Hospital Course:   Jonas Chamberlain is a 69 y.o. male with medical history of COPD, CAD, HLP, HTN, Parkinsons disease,  who presented with one week history of increasing LE swelling, dyspnea, extreme fatigue and weakness. Subjective & 24hr Events (23): This morning still feeling weak. Also having some shortness of breath. Otherwise denies any nausea, no vomiting or diarrhea. Assessment & Plan:   69 y.o. male with medical history of COPD, CAD, HLP, HTN, Parkinsons disease,  who presented with one week history of increasing LE swelling, dyspnea, extreme fatigue and weakness    1. Acute decompensated heart failure with reduced ejection fraction  Continue IV Lasix  Monitor volume status  Oxygen as needed  Strict input and output  Daily weights  Echocardiogram pending  Cardiology consulted    2. Peripheral artery disease  Continue aspirin  CTA of lower extremities as below  Vascular surgery consult    3. Hypertension  Continue carvedilol, losartan    4. COPD  Currently not on exacerbation  Continue home inhalers  Albuterol as needed    5. Parkinson's disease  Continue Sinemet    6.   Bilateral lower extremity erythema ?cellulitis  Continue doxycycline    Diet:  ADULT DIET; Easy to Chew; Low Fat/Low Chol/High Fiber/2 gm Na; 2000 ml  VTE prophylaxis: Lovenox  Code status: DNR    Hospital Problems:  Principal Problem:    CHF (congestive heart failure), NYHA class I, acute on chronic, combined ([facility] [address]  Active Problems:    Coronary artery disease involving native
Hospitalist Progress Note   Admit Date:  2023 11:06 AM   Name:  Micah Harper. Age:  69 y.o. Sex:  male  :  1945   MRN:  [id number]   [facility]:  [facility]    Presenting/Chief Complaint: Shortness of Breath     Reason(s) for Admission: Generalized weakness [R53.1]  CHF (congestive heart failure), NYHA class I, acute on chronic, combined ([facility]) [I50.43]  Hypervolemia, unspecified hypervolemia type [E87.70]     Hospital Course:   Micah Álvarez is a 69 y.o. male with medical history of COPD, CAD, HLP, HTN, Parkinsons disease,  who presented with one week history of increasing LE swelling, dyspnea, extreme fatigue and weakness. Subjective & 24hr Events (23): This morning still feeling weak. Also having some shortness of breath. Otherwise denies any nausea, no vomiting or diarrhea. Assessment & Plan:   69 y.o. male with medical history of COPD, CAD, HLP, HTN, Parkinsons disease,  who presented with one week history of increasing LE swelling, dyspnea, extreme fatigue and weakness    1. Acute decompensated heart failure with reduced ejection fraction  Continue IV Lasix  Monitor volume status  Oxygen as needed  Strict input and output  Daily weights  Echocardiogram pending  Cardiology recs    2. Peripheral artery disease  Continue aspirin  CTA of lower extremities as below  Vascular surgery recs, no intervention    3. Hypertension  Continue carvedilol, losartan    4. COPD  Currently not on exacerbation  Continue home inhalers  Albuterol as needed    5. Parkinson's disease  Continue Sinemet    6.   Bilateral lower extremity erythema ?cellulitis  Continue doxycycline    Diet:  ADULT DIET; Easy to Chew; Low Fat/Low Chol/High Fiber/2 gm Na; 2000 ml  VTE prophylaxis: Lovenox  Code status: DNR    Hospital Problems:  Principal Problem:    CHF (congestive heart failure), NYHA class I, acute on chronic, combined ([facility])  Active Problems:    Coronary artery disease involving
LTG: Patient will tolerate least restrictive diet without overt signs or symptoms of airway compromise. STG: Patient will tolerate easy to chew foods and thin liquids without overt signs or symptoms of airway compromise. STG: Patient will complete pharyngeal exercises x10 each at SBA level (goal added 23)  STG: Patient will participate in modified barium swallow study as clinically indicated. SPEECH LANGUAGE PATHOLOGY: DYSPHAGIA  Daily Note #1    NAME: Gwendolyn Nagel. : 1945  MRN: [id number]    ADMISSION DATE: 2023  PRIMARY DIAGNOSIS: CHF (congestive heart failure), NYHA class I, acute on chronic, combined ([facility])  Generalized weakness [R53.1]  CHF (congestive heart failure), NYHA class I, acute on chronic, combined ([facility] [address] [I50.43]  Hypervolemia, unspecified hypervolemia type [E87.70]    ICD-10: Treatment Diagnosis: R13.12 Dysphagia, Oropharyngeal Phase    RECOMMENDATIONS   Diet:  Diet Solids Recommendation: Easy to Chew  Liquid Consistency Recommendation: Thin    Medications:  whole with liquid wash     Compensatory Swallowing Strategies:    CHIN TUCK  NO STRAWS    Therapeutic Interventions: Pharyngeal exercises; Patient/Family education;Diet tolerance monitoring (Patient reports performing home exercises as directed by outpatient speech therapy - [place]ita, Supraglottic Swallow. Patient encouraged to continue home exercise program.)   Patient continues to require skilled intervention:  Yes. Recommend ongoing speech therapy services during this hospitalization and next level of care      ASSESSMENT    Dysphagia Diagnosis: Mild oral stage dysphagia;Mild pharyngeal stage dysphagia  Dysphagia Impression : Patient exhibits mild oral and suspect mild pharyngeal phase dysphagia. Complains of difficulty masticating dry, coarse foods with improved tolerance with current easy to chew diet and good appetite with lunch.   Independently implementing chin tuck when drinking during the session
LTG: Patient will tolerate least restrictive diet without overt signs or symptoms of airway compromise. STG: Patient will tolerate easy to chew foods and thin liquids without overt signs or symptoms of airway compromise. STG: Patient will participate in modified barium swallow study as clinically indicated. SPEECH LANGUAGE PATHOLOGY: DYSPHAGIA  Initial Assessment    NAME: River Maharaj : 1945  MRN: [id number]    ADMISSION DATE: 2023  PRIMARY DIAGNOSIS: CHF (congestive heart failure), NYHA class I, acute on chronic, combined ([facility])  Generalized weakness [R53.1]  CHF (congestive heart failure), NYHA class I, acute on chronic, combined ([facility] [address] [I50.43]  Hypervolemia, unspecified hypervolemia type [E87.70]    ICD-10: Treatment Diagnosis: R13.12 Dysphagia, Oropharyngeal Phase    RECOMMENDATIONS   Diet:  Diet Solids Recommendation: Easy to Chew  Liquid Consistency Recommendation: Thin    Medications: PO     Compensatory Swallowing Strategies: Chin tuck; Small bites/sips; Set up assist;Upright as possible for all oral intake   Recommendations: Dysphagia treatment    Therapeutic Interventions: Pharyngeal exercises; Patient/Family education;Diet tolerance monitoring (Patient reports performing home exercises as directed by outpatient speech therapy - Catarina, Supraglottic Swallow. Patient encouraged to continue home exercise program.)   Patient continues to require skilled intervention:  Yes. Recommend ongoing speech therapy services during this hospitalization and next level of care      ASSESSMENT    Dysphagia Diagnosis: Mild oral stage dysphagia;Mild pharyngeal stage dysphagia  Dysphagia Impression : Patient exhibits mild oral and suspect mild pharyngeal phase dysphagia, as able to be assessed at bedside. Discussed options for PO diet (patient currently on regular and recommended for soft and bite sized foods post MBSS this year).  Patient states preferences is not for softer foods but
Medical Student Progress Note   Admit Date:  2023 11:06 AM   Name:  Shalom Lowe. Age:  69 y.o. Sex:  male  :  1945   MRN:  [id number]   Room:  [facility]/    Presenting Complaint: Shortness of Breath    Reason(s) for Admission: Generalized weakness [R53.1]  CHF (congestive heart failure), NYHA class I, acute on chronic, combined ([facility] [address] [I50.43]  Hypervolemia, unspecified hypervolemia type [E87.70]     Hospital Course & Interval History:   69year old male with past medical history significant for parkinson disease, coronary disease, COPD, hypertension, hyperlipidemia, pacemaker, cardiomyopathy presents with c/o 2 weeks of worsening dyspnea on exertion and fatigue and bilateral lower extremity swelling that is worse on the left. Upon ED admission the patient was given furosemide. Notable admission findings include BNP of 5,613, negative CXR, and LE vascular duplex ultrasound negative for DVT. Subjective (23): Patient appears awake and alert. He reports minimal improvement with shortness of breath. He reports that lower extremity swelling is the same as how he presented with no improvement. ROS is pertinent for fatigue, productive cough, heart palpitations, lightheadedness. He denies fever, chills, headache, vision changes.      Assessment & Plan:     Systolic CHF   - Fluid restrictions   - Continue coreg and cozaar  - Continue lasix   - TTE repeat   - Follow up with cardiology     Parkinson's Disease   - Following with neurology   - Continue at home medications     COPD   - Continue nebulizer treatment as needed and bronchodilators    CAD  - Continue at home medications     HTN   - Continue coreg 6.25 mg and cozaar 12.5 mg     Hospital Problems:  Principal Problem:    CHF (congestive heart failure), NYHA class I, [facility] chronic[facility]  Active Problems:    Coronary artery disease involving native coronary artery of native heart without angina pectoris    Cardiomyopathy
[facility] CARDIOLOGY PROGRESS NOTE    5/19/2023 7:15 AM    Admit Date: 5/16/2023        Subjective:   Stable overnight without angina, CHF, or palpitations. Vitals stable and controlled. No other complaints overnight. Tolerating meds well. Objective:      Vitals:    05/18/23 1905 05/18/23 2237 05/19/23 0223 05/19/23 0710   BP: 99/66 101/64 100/60    Pulse: 72 69 67 65   Resp: 18 18 18 18   Temp: 97.8 °F (36.6 °C) 97.9 °F (36.6 °C) 98 °F (36.7 °C)    TempSrc: Oral Oral Oral    SpO2: 97% 97% 97% 95%   Weight: 190 lb 3 oz (86.3 kg)      Height:           Physical Exam:  Neck- supple, 7 8 cm JVD at 60 degrees  CV- regular rate and rhythm no MRG  Lung- clear bilaterally  Abd- soft, nontender, nondistended  Ext- no distal edema  Skin- warm and dry    Data Review:   Pertinent lab and noninvasive imaging over the past 24 hours reviewed and evaluated. Recent Labs     05/17/23  0658 05/18/23  0654    136   K 3.5 3.5   MG 1.7* 1.8   BUN 22 24*   WBC 6.8 5.6   HGB 13.6 13.2*   HCT 42.0 39.7*    216     Echo 5/18/2023:  Left Ventricle Severely reduced left ventricular systolic function with a visually estimated EF of 25 - 30%. Left ventricle is moderately dilated. Normal wall thickness. Severe global hypokinesis present. Indeterminate diastolic function. Left Atrium Left atrium is dilated. Right Ventricle Right ventricle size is normal. Normal systolic function. Right Atrium Right atrium is dilated. Aortic Valve Valve structure is normal. Mild regurgitation with a centrally directed jet. No stenosis. Mitral Valve Valve structure is normal. Mild regurgitation. No stenosis noted. Tricuspid Valve Valve structure is normal. Trace regurgitation. No stenosis noted. Pulmonic Valve Valve structure is normal. No regurgitation. No stenosis noted. Aorta Normal sized aorta.    IVC/Hepatic Veins IVC diameter is less than or equal to 21 mm and decreases greater than 50% during inspiration; therefore the
Pt was eating breakfast while ct called making pt NPO for the CT. Provider is notified. Ok to delay for couples hours. CT notified. Made NPO at this time.
[facility] CARDIOLOGY PROGRESS NOTE           5/18/2023 3:15 PM    Admit Date: 5/16/2023      Subjective:   No cp or inc sob    Objective:      Vitals:    05/17/23 2304 05/18/23 0313 05/18/23 0738 05/18/23 1144   BP: 106/73 (!) 107/57 94/66 98/66   Pulse: 62 76 63 69   Resp: 20 20 20 20   Temp: 97.8 °F (36.6 °C) 97.7 °F (36.5 °C) 97.5 °F (36.4 °C) 98.6 °F (37 °C)   TempSrc: Oral Oral Oral Oral   SpO2: 96% 97% 93% 92%   Weight:       Height:           Physical Exam:  General-No Acute Distress  Neck- supple, no JVD  CV- regular rate and rhythm no MRG  Lung- clear bilaterally  Abd- soft, nontender, nondistended  Ext- no edema bilaterally.   Skin- warm and dry    Data Review:   Recent Labs     05/17/23  0658 05/18/23  0654    136   K 3.5 3.5   MG 1.7* 1.8   BUN 22 24*   WBC 6.8 5.6   HGB 13.6 13.2*   HCT 42.0 39.7*    216       Assessment/Plan:     HFrEF  Parkinson's  Hx orthostasis  Bph/Luts  ///  Stop lasix  Faby Villafana MD  5/18/2023 3:15 PM
using a rollator and having 5-6 recent falls. Typically manages ADL's and mobility independently. Pt presents oriented x 4 today but confusion noted throughout session. This date pt performs mobility including bed mobility with Velia and extended time. He was able to ambulate 10 ft x 2 with rollator and Velia of 2. Pt noted to have a very narrow FREDDY, difficulty with B step clearance, L LE buckling. 2 seated rest breaks required during session, noted to fatigue easily. He was then able to participate in ~3 minutes of standing balance activities and transfer to chair with Velia of 2. Recommend further rehab at d/c. Pt presents as functioning below his baseline, with deficits in mobility including transfers, gait, balance, and activity tolerance. Pt will benefit from skilled therapy services to address stated deficits to promote return to highest level of function, independence, and safety. Will continue to follow.      [facility] [address] [address] AM-PAC 6 Clicks Basic Mobility Inpatient Short Form  AM-PAC Basic Mobility - Inpatient   How much help is needed turning from your back to your side while in a flat bed without using bedrails?: A Little  How much help is needed moving from lying on your back to sitting on the side of a flat bed without using bedrails?: A Little  How much help is needed moving to and from a bed to a chair?: A Lot  How much help is needed standing up from a chair using your arms?: A Little  How much help is needed walking in hospital room?: A Lot  How much help is needed climbing 3-5 steps with a railing?: A Lot  AM-PAC Inpatient Mobility Raw Score : 15  AM-PAC Inpatient T-Scale Score : 39.45  Mobility Inpatient CMS 0-100% Score: 57.7  Mobility Inpatient CMS G-Code Modifier : CK    SUBJECTIVE:   Mr. Lisa Moe states, \"that wore me out\"     Social/Functional Lives With: Spouse  Type of Home: House  Home Layout: Two level  Bathroom Shower/Tub: Tub/Shower unit  Bathroom Toilet: Standard  Bathroom
EF BP 30 (A) 55 - 100 %    LVOT Area 3.1 cm2    LVOT SV 28.6 ml    LA Minor Axis 6.3 cm    LA Major Plessis 6.2 cm    LA Area 2C 25.3 cm2    LA Area 4C 23.8 cm2    LA Volume 2C 81 (A) 18 - 58 mL    LA Volume 4C 72 (A) 18 - 58 mL    LA Volume BP 76 (A) 18 - 58 mL    LA Diameter 4.3 cm    AV Mean Velocity 0.9 m/s    AV Mean Gradient 3 mmHg    AV VTI 21.1 cm    AV Peak Velocity 1.1 m/s    AV Peak Gradient 5 mmHg    AV Area by VTI 1.4 cm2    AV Area by Peak Velocity 1.5 cm2    Aortic Root 3.5 cm    Ascending Aorta 3.1 cm    MV E Wave Deceleration Time 187.0 ms    MV E Velocity 0.97 m/s    MV Mean Gradient 2 mmHg    MV VTI 28.0 cm    MV Mean Velocity 0.6 m/s    MV Max Velocity 0.9 m/s    MV Peak Gradient 3 mmHg    MV Area by VTI 1.0 cm2    MS Max Velocity 0.6 m/s    Pulmonary Artery EDP 1 mmHg    PV .0 ms    PV Max Velocity 0.9 m/s    PV Peak Gradient 3 mmHg    RV Basal Dimension 4.6 cm    RV Free Wall Peak S' 10 cm/s    TAPSE 2.5 1.7 cm    TR Max Velocity 2.00 m/s    TR Peak Gradient 16 mmHg    Body Surface Area 2.08 m2    Fractional Shortening 2D 14 28 - 44 %    LV ESV Index A4C 92 mL/m2    LV EDV Index A4C 143 mL/m2    LV ESV Index A2C 99 mL/m2    LV EDV Index A2C 134 mL/m2    LVIDd Index 3.16 cm/m2    LVIDs Index 2.72 cm/m2    LV RWT Ratio 0.25     LV Mass 2D 214.3 88 - 224 g    LV Mass 2D Index 104.0 49 - 115 g/m2    E/E' Ratio (Averaged) 11.78     E/E' Lateral 9.70     E/E' Septal 13.86     LA Volume Index BP 37 (A) 16 - 34 ml/m2    LVOT Stroke Volume Index 13.9 mL/m2    LA Volume Index 2C 39 (A) 16 - 34 mL/m2    LA Volume Index 4C 35 (A) 16 - 34 mL/m2    LA Size Index 2.09 cm/m2    LA/AO Root Ratio 1.23     Ao Root Index 1.70 cm/m2    Ascending Aorta Index 1.50 cm/m2    AV Velocity Ratio 0.45     LVOT:AV VTI Index 0.43     GREG/BSA VTI 0.7 cm2/m2    GREG/BSA Peak Velocity 0.7 cm2/m2    MV:LVOT VTI Index 3.08     Est. RA Pressure 5 mmHg    RVSP 21 mmHg   CBC with Auto Differential    Collection Time: 05/19/23
improve sitting balance, standing balance, posture, coordination, and dynamic balance in order to prepare for functional task, prepare for seated ADLs, prepare for standing ADLs, prepare for functional transfer, increase safety awareness, and prepare for self care. .   Self Care (10 minutes): Patient participated in lower body dressing and grooming ADLs in unsupported sitting and standing with minimal verbal cueing to increase independence, decrease assistance required, increase activity tolerance, and increase safety awareness. Patient also participated in functional mobility and functional transfer training to increase independence, decrease assistance required, increase activity tolerance, and increase safety awareness. TREATMENT GRID:  N/A    AFTER TREATMENT PRECAUTIONS: Call light within reach, Chair, Needs within reach, and RN notified    INTERDISCIPLINARY COLLABORATION:  RN/ PCT, PT/ PTA, and OT/ LOUIE    EDUCATION:  Education Given To: Patient  Education Provided: Role of Therapy;Plan of Care; Fall Prevention Strategies  Education Outcome: Verbalized understanding;Demonstrated understanding    TOTAL TREATMENT DURATION AND TIME:  Time In: 0906  Time Out: 0940  Minutes: [address], [place]

## 2023-05-20 NOTE — CARE COORDINATION
Patient has discharge orders for today. Patient has been accepted and approved for [facility]. Patient room# [address] has been changed to 303 / report # [phone]. CM met with patient / spouse at bedside and made them aware of the room change and transport. Farhana Barnes will transport patient. Transport has been scheduled for 1 pm but Farhana Barnes has changed the time to 12:30 pm.  Spouse / patient has been made aware of all changes and agreeable. Patient has met treatment goals / milestones. CM will continue to follow and remain available for any needs, concerns or questions that may arise. 05/16/23 1807   Service Assessment   Patient Orientation Alert and Oriented;Person;Place; Self   Cognition Alert   History Provided By Patient;Medical Record   Primary Caregiver Self   Accompanied By/Relationship spouse   Support Systems Spouse/Significant Other   Patient's Healthcare Decision Maker is: Legal Next of Constantine 70   PCP Verified by CM Yes  Jose Howard MD last visit 1 week ago)   Last Visit to PCP Within last 3 months   Prior Functional Level Independent in ADLs/IADLs   Current Functional Level Independent in ADLs/IADLs   Can patient return to prior living arrangement Yes   Ability to make needs known: Good   Family able to assist with home care needs: Yes   Would you like for me to discuss the discharge plan with any other family members/significant others, and if so, who? No   Financial Resources Medicare; (VA)  ()   Community Resources None   Social/Functional History   Lives With Spouse   Type of [address] Two level   Bathroom Shower/Tub Tub/Shower unit   Eloina Cane;Crutches;Walker, standard; [address] Help From Family   ADL Assistance Independent   Ambulation Assistance Needs assistance   Transfer Assistance Independent   Active  No   Patient's  Info spouse drives

## 2023-05-20 NOTE — DISCHARGE SUMMARY
Hospitalist Discharge Summary   Admit Date:  2023 11:06 AM   DC Note date: 2023  Name:  Claudean Shiner. Age:  69 y.o. Sex:  male  :  1945   MRN:  [id number]   Room:  [facility]  PCP:  Mya Hamilton    Presenting Complaint: Shortness of Breath     Initial Admission Diagnosis: Generalized weakness [R53.1]  CHF (congestive heart failure), NYHA class I, acute on chronic, combined ([address] [I50.43]  Hypervolemia, unspecified hypervolemia type [E87.70]     Problem List for this Hospitalization (present on admission):    Principal Problem:    CHF (congestive heart failure), NYHA class I, acute on chronic, combined ([address]  Active Problems:    Coronary artery disease involving native coronary artery of native heart without angina pectoris    Cardiomyopathy ([address]    Parkinson's disease ([address]    Biventricular ICD (implantable cardioverter-defibrillator) in place    Enlarged prostate    Benign prostatic hyperplasia without lower urinary tract symptoms    Chronic obstructive pulmonary disease ([address]    Hypertension    Hyperlipidemia    Cognitive changes    Generalized weakness    Hypervolemia  Resolved Problems:    * No resolved hospital problems. *    Claudean Shiner. is a 69 y.o. male with medical history of COPD, CAD, HLP, HTN, Parkinsons disease,  who presented with one week history of increasing LE swelling, dyspnea, extreme fatigue and weakness. Interval History (): patient examined at bedside. No acute overnight. Breathing has improved. No chest pain. No new shortness of breath. No swelling in his legs. No abdominal pain, nausea/vomiting. Hospital Course:  Patient admitted for acute decompensated systolic heart failure, acute on chronic. He was started on IV diuresis. Cardiology consulted and followed. Home carvedilol down-titrated held due to low blood pressures (patient with known Parkinson disease and has issues with hypotension as a result).  He was started on

## 2023-05-24 ENCOUNTER — OFFICE VISIT (OUTPATIENT)
Dept: NEUROLOGY | Age: 78
End: 2023-05-24
Payer: MEDICARE

## 2023-05-24 VITALS
HEIGHT: 71 IN | OXYGEN SATURATION: 96 % | HEART RATE: 60 BPM | SYSTOLIC BLOOD PRESSURE: 86 MMHG | DIASTOLIC BLOOD PRESSURE: 54 MMHG | BODY MASS INDEX: 25.09 KG/M2 | WEIGHT: 179.2 LBS

## 2023-05-24 DIAGNOSIS — G20 PSYCHOSIS DUE TO PARKINSON'S DISEASE (HCC): ICD-10-CM

## 2023-05-24 DIAGNOSIS — R25.1 TREMOR: ICD-10-CM

## 2023-05-24 DIAGNOSIS — G24.9 DYSKINESIA DUE TO PARKINSON'S DISEASE (HCC): ICD-10-CM

## 2023-05-24 DIAGNOSIS — G20 PARKINSON DISEASE (HCC): Primary | ICD-10-CM

## 2023-05-24 DIAGNOSIS — Z79.899 ENCOUNTER FOR LONG-TERM (CURRENT) USE OF HIGH-RISK MEDICATION: ICD-10-CM

## 2023-05-24 DIAGNOSIS — G47.52 RBD (REM BEHAVIORAL DISORDER): ICD-10-CM

## 2023-05-24 DIAGNOSIS — R26.89 MULTIFACTORIAL GAIT DISORDER: ICD-10-CM

## 2023-05-24 DIAGNOSIS — G90.3 NEUROGENIC ORTHOSTATIC HYPOTENSION (HCC): ICD-10-CM

## 2023-05-24 DIAGNOSIS — L21.9 DERMATITIS, SEBORRHEIC: ICD-10-CM

## 2023-05-24 DIAGNOSIS — G20 DYSKINESIA DUE TO PARKINSON'S DISEASE (HCC): ICD-10-CM

## 2023-05-24 PROBLEM — F06.8 PSYCHOSIS DUE TO PARKINSON'S DISEASE: Status: ACTIVE | Noted: 2023-05-24

## 2023-05-24 PROBLEM — G20.A1 PSYCHOSIS DUE TO PARKINSON'S DISEASE: Status: ACTIVE | Noted: 2023-05-24

## 2023-05-24 PROCEDURE — G8420 CALC BMI NORM PARAMETERS: HCPCS | Performed by: PSYCHIATRY & NEUROLOGY

## 2023-05-24 PROCEDURE — 1111F DSCHRG MED/CURRENT MED MERGE: CPT | Performed by: PSYCHIATRY & NEUROLOGY

## 2023-05-24 PROCEDURE — 3074F SYST BP LT 130 MM HG: CPT | Performed by: PSYCHIATRY & NEUROLOGY

## 2023-05-24 PROCEDURE — G8427 DOCREV CUR MEDS BY ELIG CLIN: HCPCS | Performed by: PSYCHIATRY & NEUROLOGY

## 2023-05-24 PROCEDURE — 99215 OFFICE O/P EST HI 40 MIN: CPT | Performed by: PSYCHIATRY & NEUROLOGY

## 2023-05-24 PROCEDURE — 1036F TOBACCO NON-USER: CPT | Performed by: PSYCHIATRY & NEUROLOGY

## 2023-05-24 PROCEDURE — 1123F ACP DISCUSS/DSCN MKR DOCD: CPT | Performed by: PSYCHIATRY & NEUROLOGY

## 2023-05-24 PROCEDURE — 3078F DIAST BP <80 MM HG: CPT | Performed by: PSYCHIATRY & NEUROLOGY

## 2023-05-24 RX ORDER — AMANTADINE 137 MG/1
CAPSULE, COATED PELLETS ORAL
Qty: 30 CAPSULE | Refills: 5 | Status: SHIPPED | OUTPATIENT
Start: 2023-05-24 | End: 2023-05-24 | Stop reason: SDUPTHER

## 2023-05-24 RX ORDER — AMANTADINE 137 MG/1
CAPSULE, COATED PELLETS ORAL
Qty: 30 CAPSULE | Refills: 5 | Status: SHIPPED | OUTPATIENT
Start: 2023-05-24

## 2023-05-24 NOTE — PROGRESS NOTES
05/24/23  Russell Lala. Chief Complaint:  Chief Complaint   Patient presents with    Follow-up     Follow up for Parkinson's disease        Parkinson's Disease Diagnosed: 2015 with tremors at onset. HPI:   Russell Lala., 69 y.o.,male here in clinic follow up for continued management of Parkinson's Disease and dyskinesia and wearing off dyskinesia. He has been at [address] [place] [address] in [facility]. They have not been able to get the care they need there. He did not get his PD meds for 2 days until wife finally gave him home meds. He was there for weakness and systolic heart failure. He has been back on his Rytary now for the past 2 days. Now he is on time with meds. He is wearing off at about 30 mins before the next dose. He has never gotten the amantadine refill and he never got to restart it since March. Hhe was working with Speech pathologist while he was here a few weeks ago . There was concern that he was confusing his medications and and not taking them as prescribed and was causing him to have more dyskinesia. He had been having hallucinations and I started nuplazid in March but he had stopped it due to not getting it from pharmacy. So he has been off after 2 weeks. No longer having hallucinations and just on higher dose of seroquel. BP is low and wife is trying to get a follow up with cardiology since discharge. Will go upstairs today to get an appt. Current Neuro related meds:  Rytary 61.25/245mg- 2 caps (9am, 1pm, 5pm, 9pm)   Rytary 48.75/195mg- 1 cap (9am, 1pm, 5pm, 9pm)   Seroquel 25mg- 3 tabs at bedtime, may take an extra mid day if it comes back. Sinemet 50/200mg- once at bedtime  Nuplazid 34mg Q day        Review of Systems   As per hpi otherwise neg.      Past Medical History:   Diagnosis Date    Acquired spondylolisthesis 6/16/2015    Actinic keratosis 8/9/2016    Arthritis     generalized OA, managed with OTC medication

## 2023-05-26 ENCOUNTER — TELEPHONE (OUTPATIENT)
Dept: NEUROLOGY | Age: 78
End: 2023-05-26

## 2023-06-02 ENCOUNTER — OFFICE VISIT (OUTPATIENT)
Age: 78
End: 2023-06-02
Payer: MEDICARE

## 2023-06-02 VITALS
DIASTOLIC BLOOD PRESSURE: 60 MMHG | BODY MASS INDEX: 25.2 KG/M2 | HEART RATE: 64 BPM | SYSTOLIC BLOOD PRESSURE: 100 MMHG | WEIGHT: 180 LBS | HEIGHT: 71 IN

## 2023-06-02 DIAGNOSIS — Z95.810 BIVENTRICULAR ICD (IMPLANTABLE CARDIOVERTER-DEFIBRILLATOR) IN PLACE: ICD-10-CM

## 2023-06-02 DIAGNOSIS — I50.20 HFREF (HEART FAILURE WITH REDUCED EJECTION FRACTION) (HCC): ICD-10-CM

## 2023-06-02 DIAGNOSIS — I25.10 CORONARY ARTERY DISEASE INVOLVING NATIVE CORONARY ARTERY OF NATIVE HEART WITHOUT ANGINA PECTORIS: Primary | ICD-10-CM

## 2023-06-02 DIAGNOSIS — E08.65 DIABETES MELLITUS DUE TO UNDERLYING CONDITION WITH HYPERGLYCEMIA, UNSPECIFIED WHETHER LONG TERM INSULIN USE (HCC): ICD-10-CM

## 2023-06-02 DIAGNOSIS — I95.1 ORTHOSTATIC INTOLERANCE: ICD-10-CM

## 2023-06-02 LAB
ANION GAP SERPL CALC-SCNC: 6 MMOL/L (ref 2–11)
BUN SERPL-MCNC: 33 MG/DL (ref 8–23)
CALCIUM SERPL-MCNC: 9 MG/DL (ref 8.3–10.4)
CHLORIDE SERPL-SCNC: 109 MMOL/L (ref 101–110)
CO2 SERPL-SCNC: 27 MMOL/L (ref 21–32)
CREAT SERPL-MCNC: 1 MG/DL (ref 0.8–1.5)
GLUCOSE SERPL-MCNC: 117 MG/DL (ref 65–100)
POTASSIUM SERPL-SCNC: 5.6 MMOL/L (ref 3.5–5.1)
SODIUM SERPL-SCNC: 142 MMOL/L (ref 133–143)

## 2023-06-02 PROCEDURE — G8428 CUR MEDS NOT DOCUMENT: HCPCS | Performed by: INTERNAL MEDICINE

## 2023-06-02 PROCEDURE — 3074F SYST BP LT 130 MM HG: CPT | Performed by: INTERNAL MEDICINE

## 2023-06-02 PROCEDURE — 1123F ACP DISCUSS/DSCN MKR DOCD: CPT | Performed by: INTERNAL MEDICINE

## 2023-06-02 PROCEDURE — 1036F TOBACCO NON-USER: CPT | Performed by: INTERNAL MEDICINE

## 2023-06-02 PROCEDURE — 99214 OFFICE O/P EST MOD 30 MIN: CPT | Performed by: INTERNAL MEDICINE

## 2023-06-02 PROCEDURE — 1111F DSCHRG MED/CURRENT MED MERGE: CPT | Performed by: INTERNAL MEDICINE

## 2023-06-02 PROCEDURE — 3078F DIAST BP <80 MM HG: CPT | Performed by: INTERNAL MEDICINE

## 2023-06-02 PROCEDURE — G8417 CALC BMI ABV UP PARAM F/U: HCPCS | Performed by: INTERNAL MEDICINE

## 2023-06-02 RX ORDER — ATORVASTATIN CALCIUM 80 MG/1
80 TABLET, FILM COATED ORAL DAILY
COMMUNITY
Start: 2023-05-11

## 2023-06-02 RX ORDER — FUROSEMIDE 40 MG/1
40 TABLET ORAL DAILY PRN
Qty: 60 TABLET | Refills: 3 | Status: SHIPPED | OUTPATIENT
Start: 2023-06-02 | End: 2023-07-02

## 2023-06-02 RX ORDER — POTASSIUM CHLORIDE 750 MG/1
TABLET, FILM COATED, EXTENDED RELEASE ORAL
COMMUNITY
Start: 2023-05-09 | End: 2023-06-02 | Stop reason: ALTCHOICE

## 2023-06-02 RX ORDER — FUROSEMIDE 40 MG/1
40 TABLET ORAL DAILY
COMMUNITY
Start: 2023-05-09 | End: 2023-06-02 | Stop reason: ALTCHOICE

## 2023-06-02 ASSESSMENT — ENCOUNTER SYMPTOMS
COUGH: 0
STRIDOR: 0
ABDOMINAL PAIN: 0
EYE PAIN: 0
NAIL CHANGES: 0
APHONIA: 0

## 2023-06-02 NOTE — PROGRESS NOTES
CRUCIATE LIGAMENT REPAIR Right 1991    APPENDECTOMY      age 22    CYSTOSTOMY      KNEE ARTHROSCOPY Right     LITHOTRIPSY  1998    x 2    LUMBAR LAMINECTOMY  06/2015    Laminectomy with fusion; lower back; with pins    PACEMAKER  10/31/2016    ICD/pacemaker    TOTAL HIP ARTHROPLASTY Left 2018    TOTAL KNEE ARTHROPLASTY Left 01/2021    UROLOGICAL SURGERY Right     open removal of kidney stones     Family History   Problem Relation Age of Onset    Diabetes Son     Heart Attack Son 45    Other Paternal Grandmother         Aneurysm    Heart Disease Paternal Uncle     Heart Disease Mother     Lung Disease Mother     Thyroid Disease Sister     Stroke Father     Heart Attack Father 52    Heart Disease Father     Hypertension Other     Diabetes Other         paternal uncle    Heart Attack Other 45        son    COPD Mother       Social History     Tobacco Use    Smoking status: Former     Packs/day: 1.50     Types: Cigarettes     Quit date: 7/12/2007     Years since quitting: 15.9    Smokeless tobacco: Never    Tobacco comments:     Quit smoking: stopped 2007   Substance Use Topics    Alcohol use: No       ROS:    Review of Systems   Constitutional: Negative for fever. HENT:  Negative for stridor. Eyes:  Negative for pain. Cardiovascular:  Negative for chest pain. Respiratory:  Negative for cough. Endocrine: Negative for cold intolerance. Skin:  Negative for nail changes. Musculoskeletal:  Negative for arthritis. Gastrointestinal:  Negative for abdominal pain. Genitourinary:  Negative for dysuria. Neurological:  Negative for aphonia. Psychiatric/Behavioral:  Negative for altered mental status. Allergic/Immunologic: Negative for hives.          PHYSICAL EXAM:    /60   Pulse 64   Ht 5' 11\" (1.803 m)   Wt 180 lb (81.6 kg)   BMI 25.10 kg/m²        Wt Readings from Last 3 Encounters:   06/02/23 180 lb (81.6 kg)   05/24/23 179 lb 3.2 oz (81.3 kg)   05/18/23 190 lb 3 oz (86.3 kg)     BP Readings

## 2023-06-02 NOTE — PATIENT INSTRUCTIONS
Stand up slowly and stand still when feeling dizzy or lightheaded  Perform handgrip exercise by placing palms together and pressing together for 20 seconds prior to standing  Avoid standing still or laying in a flat position for long periods  Avoid too much exposure to hot environments, such as hot baths, saunas, etc  Elevate the head of the bed when lying down ¾ try using a wedge under the head of the bed  Increase the amount of salt in the diet (if high blood pressure is not a problem)  Eat smaller, more frequent meals

## 2023-06-08 RX ORDER — PIMAVANSERIN TARTRATE 34 MG/1
34 CAPSULE ORAL DAILY
Qty: 30 CAPSULE | Refills: 12 | Status: SHIPPED | OUTPATIENT
Start: 2023-06-08

## 2023-06-09 DIAGNOSIS — Z95.810 BIVENTRICULAR ICD (IMPLANTABLE CARDIOVERTER-DEFIBRILLATOR) IN PLACE: ICD-10-CM

## 2023-06-09 DIAGNOSIS — I42.9 CARDIOMYOPATHY (HCC): ICD-10-CM

## 2023-06-19 ENCOUNTER — TELEPHONE (OUTPATIENT)
Dept: NEUROLOGY | Age: 78
End: 2023-06-19

## 2023-06-22 ENCOUNTER — CLINICAL DOCUMENTATION (OUTPATIENT)
Dept: NEUROLOGY | Age: 78
End: 2023-06-22

## 2023-06-26 ENCOUNTER — TELEPHONE (OUTPATIENT)
Dept: NEUROLOGY | Age: 78
End: 2023-06-26

## 2023-07-07 ENCOUNTER — OFFICE VISIT (OUTPATIENT)
Age: 78
End: 2023-07-07
Payer: MEDICARE

## 2023-07-07 VITALS
HEIGHT: 71 IN | BODY MASS INDEX: 24.09 KG/M2 | DIASTOLIC BLOOD PRESSURE: 58 MMHG | HEART RATE: 68 BPM | WEIGHT: 172.1 LBS | SYSTOLIC BLOOD PRESSURE: 98 MMHG

## 2023-07-07 DIAGNOSIS — I50.20 HFREF (HEART FAILURE WITH REDUCED EJECTION FRACTION) (HCC): Primary | ICD-10-CM

## 2023-07-07 DIAGNOSIS — I50.20 HFREF (HEART FAILURE WITH REDUCED EJECTION FRACTION) (HCC): ICD-10-CM

## 2023-07-07 DIAGNOSIS — Z95.810 BIVENTRICULAR ICD (IMPLANTABLE CARDIOVERTER-DEFIBRILLATOR) IN PLACE: ICD-10-CM

## 2023-07-07 DIAGNOSIS — I95.1 ORTHOSTATIC INTOLERANCE: ICD-10-CM

## 2023-07-07 LAB
ANION GAP SERPL CALC-SCNC: 8 MMOL/L (ref 2–11)
BUN SERPL-MCNC: 34 MG/DL (ref 8–23)
CALCIUM SERPL-MCNC: 9.5 MG/DL (ref 8.3–10.4)
CHLORIDE SERPL-SCNC: 111 MMOL/L (ref 101–110)
CO2 SERPL-SCNC: 25 MMOL/L (ref 21–32)
CREAT SERPL-MCNC: 1 MG/DL (ref 0.8–1.5)
GLUCOSE SERPL-MCNC: 86 MG/DL (ref 65–100)
POTASSIUM SERPL-SCNC: 3.9 MMOL/L (ref 3.5–5.1)
SODIUM SERPL-SCNC: 144 MMOL/L (ref 133–143)

## 2023-07-07 PROCEDURE — 1123F ACP DISCUSS/DSCN MKR DOCD: CPT | Performed by: INTERNAL MEDICINE

## 2023-07-07 PROCEDURE — G8420 CALC BMI NORM PARAMETERS: HCPCS | Performed by: INTERNAL MEDICINE

## 2023-07-07 PROCEDURE — 3078F DIAST BP <80 MM HG: CPT | Performed by: INTERNAL MEDICINE

## 2023-07-07 PROCEDURE — G8427 DOCREV CUR MEDS BY ELIG CLIN: HCPCS | Performed by: INTERNAL MEDICINE

## 2023-07-07 PROCEDURE — 3074F SYST BP LT 130 MM HG: CPT | Performed by: INTERNAL MEDICINE

## 2023-07-07 PROCEDURE — 99214 OFFICE O/P EST MOD 30 MIN: CPT | Performed by: INTERNAL MEDICINE

## 2023-07-07 PROCEDURE — 1036F TOBACCO NON-USER: CPT | Performed by: INTERNAL MEDICINE

## 2023-07-07 RX ORDER — FUROSEMIDE 40 MG/1
40 TABLET ORAL DAILY PRN
Qty: 60 TABLET | Refills: 3 | Status: SHIPPED | OUTPATIENT
Start: 2023-07-07 | End: 2023-08-06

## 2023-07-07 ASSESSMENT — ENCOUNTER SYMPTOMS
STRIDOR: 0
EYE PAIN: 0
APHONIA: 0
COUGH: 0
NAIL CHANGES: 0
ABDOMINAL PAIN: 0

## 2023-07-07 NOTE — PROGRESS NOTES
History of kidney stones     several with surgical interventions    Hyperlipidemia     Hypertension     managed with medication     Hypophonia with hoarseness 8/8/2016    Kidney problem     Kidney stone 06/2015    states he presently has 3 kidney stones monitored by Urology    Knee effusion 8/9/2016    Low vitamin B12 level 8/9/2016    Neurogenic bladder 8/9/2016    Obesity (BMI 30-39. 9)     BMI 34.5    Osteoarthritis 8/9/2016    Parkinson disease ([address])     Followed by Dr. Sergio Orozco with medication     Presence of combination internal cardiac defibrillator (ICD) and pacemaker     Pt states he has never had a shock as of 12/21/20    Primary osteoarthritis of knee 8/9/2016    Prostatitis 8/9/2016    Pseudogout of hand 8/9/2016    Rosacea 8/9/2016    S/P placement of cardiac pacemaker 10/31/2016    Sciatica 8/9/2016    Seborrhea 8/9/2016    Sleep apnea     Does not use c-pap. C-pap broken.      Slurred speech 08/09/2016    per patient \"mostly in the evenings\" from parkinsons     SOB (shortness of breath) 08/09/2016    With exertion only     Spinal stenosis, lumbar region, with neurogenic claudication 6/16/2015    Syncope, near 8/9/2016    Tremor 8/8/2016    Urinary frequency 08/09/2016    controlled with medication     Urinary retention 8/9/2016    Urinary urgency 8/9/2016    Wears dentures     upper and lower    Wears glasses      Past Surgical History:   Procedure Laterality Date    ANTERIOR CRUCIATE LIGAMENT REPAIR Right 1991    APPENDECTOMY      age 22    CYSTOSTOMY      KNEE ARTHROSCOPY Right     LITHOTRIPSY  1998    x 2    LUMBAR LAMINECTOMY  06/2015    Laminectomy with fusion; lower back; with pins    PACEMAKER  10/31/2016    ICD/pacemaker    TOTAL HIP ARTHROPLASTY Left 2018    TOTAL KNEE ARTHROPLASTY Left 01/2021    UROLOGICAL SURGERY Right     open removal of kidney stones     Family History   Problem Relation Age of Onset    Diabetes Son     Heart Attack Son 45    Other Paternal Grandmother

## 2023-07-13 PROCEDURE — 93295 DEV INTERROG REMOTE 1/2/MLT: CPT | Performed by: INTERNAL MEDICINE

## 2023-07-13 PROCEDURE — 93296 REM INTERROG EVL PM/IDS: CPT | Performed by: INTERNAL MEDICINE

## 2023-08-04 ENCOUNTER — TELEPHONE (OUTPATIENT)
Dept: NEUROLOGY | Age: 78
End: 2023-08-04

## 2023-08-04 NOTE — TELEPHONE ENCOUNTER
Spouse called and canceled  the appointment in Sept. He is in the hospital from a fall that broke his hip and then going to rehab and then having home health. She is wanting to talk with Dr. Ankit Mcleod about some things and about medication since it will be a while before coming back to see her.  Please call this number [phone]

## 2023-08-18 NOTE — TELEPHONE ENCOUNTER
Requested Prescriptions     Pending Prescriptions Disp Refills    carvedilol (COREG) 6.25 MG tablet [Pharmacy Med Name: CARVEDILOL TABS 6.25MG] 180 tablet 3     Sig: TAKE 1 TABLET IN THE MORNING AND 1 TABLET IN THE EVENING WITH MEALS

## 2023-08-19 RX ORDER — CARVEDILOL 6.25 MG/1
TABLET ORAL
Qty: 180 TABLET | Refills: 3 | Status: SHIPPED | OUTPATIENT
Start: 2023-08-19

## 2023-08-31 ENCOUNTER — NURSE ONLY (OUTPATIENT)
Age: 78
End: 2023-08-31

## 2023-08-31 ENCOUNTER — TELEPHONE (OUTPATIENT)
Age: 78
End: 2023-08-31

## 2023-08-31 DIAGNOSIS — I25.5 ISCHEMIC CARDIOMYOPATHY: ICD-10-CM

## 2023-08-31 DIAGNOSIS — Z95.810 BIVENTRICULAR ICD (IMPLANTABLE CARDIOVERTER-DEFIBRILLATOR) IN PLACE: Primary | ICD-10-CM

## 2023-08-31 RX ORDER — RISPERIDONE 1 MG/1
TABLET ORAL
Qty: 15 TABLET | Refills: 5 | Status: SHIPPED | OUTPATIENT
Start: 2023-08-31

## 2023-08-31 NOTE — TELEPHONE ENCOUNTER
Patient seen for a device check. Wife reported patient having worsening Parkinsons. He has had multiple falls recently related to low BP in which the latest he sustained a hip fracture. He was instructed to stop medications ( Carvedilol 6.25mg BID, olmesartan 5mg daily, and spironolactone 25mg daily) until follow up with cardiologist. Appt on 1/8/24  Device check normal. BP 89/55 in office. Labs to be scanned into the chart.

## 2023-08-31 NOTE — TELEPHONE ENCOUNTER
The pt was in a Hosp in [place] and was released on 8-30 with BP issues. He was advised to come off all his heart med's until he sees Dr. Nava Valdez. Please call pt and discuss.

## 2023-09-01 NOTE — TELEPHONE ENCOUNTER
Wife notified of recommendations from provider and verbalized understanding. Questions and concerns addressed. She will call for additional needs.

## 2023-09-01 NOTE — TELEPHONE ENCOUNTER
Reasonable to hold hypertensive therapies if hypotensive and symptomatic please advise patient to check supine blood pressures at night.

## 2023-09-06 ENCOUNTER — CLINICAL DOCUMENTATION (OUTPATIENT)
Dept: NEUROLOGY | Age: 78
End: 2023-09-06

## 2023-09-07 ENCOUNTER — TELEPHONE (OUTPATIENT)
Dept: NEUROLOGY | Age: 78
End: 2023-09-07

## 2023-09-07 NOTE — TELEPHONE ENCOUNTER
Elvia from [facility] called concerning this patients prescription of Nuplazid. She said that they have not been able to get in touch with the patient to be able to ask if he needs any refills. They were also calling to get more information on the PA status for this medication. Called Patient and LVM to call back to see if he needs a refill on this.

## 2023-09-12 DIAGNOSIS — G20 PARKINSON'S DISEASE (HCC): ICD-10-CM

## 2023-09-12 RX ORDER — LEVODOPA AND CARBIDOPA 195; 48.75 MG/1; MG/1
CAPSULE, EXTENDED RELEASE ORAL
Qty: 360 CAPSULE | Refills: 3 | Status: SHIPPED | OUTPATIENT
Start: 2023-09-12

## 2023-09-12 NOTE — TELEPHONE ENCOUNTER
Reason for call: Prescription refill    Medication: Rytary 195 MG    [facility]    Last visit: 5/24/23    Next visit: none scheduled    Contact information: Wife [phone] [address])    Patients needs medication urgently overnight. He is down to 4 pills and its currently in a skilled nursing home due to falls.

## 2023-09-18 NOTE — DISCHARGE INSTRUCTIONS
[facility]   Patient Discharge Instructions    Jonnathan Davis / [id number] : 1945    Admitted 2018 Discharged: 3/1/2018     IF YOU HAVE ANY PROBLEMS ONCE YOU ARE AT HOME CALL THE FOLLOWING NUMBERS:   Main office number: [phone]    Take Home Medications     · It is important that you take the medication exactly as they are prescribed. · Keep your medication in the bottles provided by the pharmacist and keep a list of the medication names, dosages, and times to be taken in your wallet. · Do not take other medications without consulting your doctor. What to do at [address] your prehospital diet. If you have excessive nausea or vomitting call your doctor's office     Home Physical Therapy is arranged. Use rolling walker when walking. Patients who have had a joint replacement should not drive until you are seen for your follow up appointment by Dr. Lulu Wharton. When to Call    - Call if you have a temperature greater then 101  - Unable to keep food down  - Loose control of your bladder or bowel function  - Are unable to bear any weight   - Need a pain medication refill       DISCHARGE SUMMARY from Nurse    The following personal items collected during your admission are returned to you:   Dental Appliance: Dental Appliances: Partials, Lowers, Uppers  Vision: Visual Aid: None  Hearing Aid:   na  Jewelry: Jewelry: Ring  Clothing:   self  Other Valuables: Other Valuables: Wallet, Cell Phone (all with Stormy Fosters)  Valuables sent to safe:   na    PATIENT INSTRUCTIONS:    After general anesthesia or intravenous sedation, for 24 hours or while taking prescription Narcotics:  · Limit your activities  · Do not drive and operate hazardous machinery  · Do not make important personal or business decisions  · Do  not drink alcoholic beverages  · If you have not urinated within 8 hours after discharge, please contact your surgeon on call.     Report the following to your surgeon:  · Excessive pain, swelling, redness or odor of or around the surgical area  · Temperature over 101  · Nausea and vomiting lasting longer than 4 hours or if unable to take medications  · Any signs of decreased circulation or nerve impairment to extremity: change in color, persistent  numbness, tingling, coldness or increase pain  · Follow hip precautions @ all times! · Any questions, call office @ [phone]      Keep scheduled follow up appointment. If need to change, call office @ [phone]. *  Please give a list of your current medications to your Primary Care Provider. *  Please update this list whenever your medications are discontinued, doses are      changed, or new medications (including over-the-counter products) are added. *  Please carry medication information at all times in case of emergency situations. Hip Replacement Surgery (Posterior): What to Expect at Home  Your Recovery  Hip replacement surgery replaces the worn parts of your hip joint. When you leave the hospital, you will probably be walking with crutches or a walker. You may be able to climb a few stairs and get in and out of bed and chairs. But you will need someone to help you at home for the next few weeks or until you have more energy and can move around better. If there is no one to help you at home, you may go to a rehabilitation center or long-term care center. You will go home with a bandage and stitches or staples. You can remove the bandage when your doctor tells you to. Your doctor will remove your stitches or staples 10 days to 3 weeks after your surgery. You may still have some mild pain, and the area may be swollen for 3 to 4 months after surgery. Your doctor will give you medicine for the pain. You will continue the rehabilitation program (rehab) you started in the hospital. The better you do with your rehab exercises, the sooner you will get your strength and movement back.  Most people are able to return to work 4 weeks to 4 months after surgery. This care sheet gives you a general idea about how long it will take for you to recover. But each person recovers at a different pace. Follow the steps below to get better as quickly as possible. How can you care for yourself at home? Activity  ? · Your doctor may not want your affected leg to cross the center of your body toward the other leg. If so, your therapist may suggest these ideas:  ¨ Do not cross your legs. ¨ Be very careful as you get in or out of bed or a car, so your leg does not cross that imaginary line in the middle of your body. ? · Rest when you feel tired. You may take a nap, but do not stay in bed all day. ? · Work with your physical therapist to learn the best way to exercise. You may be able to take frequent, short walks using crutches or a walker. You will probably have to use crutches or a walker for at least 4 to 6 weeks. ? · Your doctor may advise you to stay away from activities that put stress on the joint. This includes sports such as tennis, football, and jogging. ? · Try not to sit for too long at one time. You will feel less stiff if you take a short walk about every hour. When you sit, use chairs with arms, and do not sit in low chairs. ? · Do not bend over more than 90 degrees (like the angle in a letter \"L\"). ? · Sleep on your back with your legs slightly apart or on your side with a pillow between your knees for about 6 weeks or as your doctor tells you. Do not sleep on your stomach or affected leg. ? · You may need to take sponge baths until your stitches or staples have been removed. You will probably be able to shower 24 hours after they are removed. ? · Ask your doctor when you can drive again. ? · Most people are able to return to work 4 weeks to 4 months after surgery. ? · Ask your doctor when it is okay for you to have sex. Diet  ?  · By the time you leave the hospital, you will probably be eating your normal diet. If your stomach is upset, try bland, low-fat foods like plain rice, broiled chicken, toast, and yogurt. Your doctor may recommend that you take iron and vitamin supplements. ? · Drink plenty of fluids (unless your doctor tells you not to). ? · Eat healthy foods, and watch your portion sizes. Try to stay at your ideal weight. Too much weight puts more stress on your new hip joint. ? · You may notice that your bowel movements are not regular right after your surgery. This is common. Try to avoid constipation and straining with bowel movements. You may want to take a fiber supplement every day. If you have not had a bowel movement after a couple of days, ask your doctor about taking a mild laxative. Medicines  ? · Your doctor will tell you if and when you can restart your medicines. He or she will also give you instructions about taking any new medicines. ? · If you take blood thinners, such as warfarin (Coumadin), clopidogrel (Plavix), or aspirin, be sure to talk to your doctor. He or she will tell you if and when to start taking those medicines again. Make sure that you understand exactly what your doctor wants you to do.   ? · Your doctor may give you a blood-thinning medicine to prevent blood clots. If you take a blood thinner, be sure you get instructions about how to take your medicine safely. Blood thinners can cause serious bleeding problems. This medicine could be in pill form or as a shot (injection). If a shot is necessary, your doctor will tell you how to do this. ? · Be safe with medicines. Take pain medicines exactly as directed. ¨ If the doctor gave you a prescription medicine for pain, take it as prescribed. ¨ If you are not taking a prescription pain medicine, ask your doctor if you can take an over-the-counter medicine.    ? · If you think your pain medicine is making you sick to your stomach:  ¨ Take your medicine after meals (unless your doctor has told you not to).  ¨ Ask your doctor for a different pain medicine. ? · If your doctor prescribed antibiotics, take them as directed. Do not stop taking them just because you feel better. You need to take the full course of antibiotics. Incision care  ? · You will have a bandage over the cut (incision) and staples or stitches. Follow your doctor's instructions on when to take the bandage off. Giving the incision air will help it heal.   ? · Your doctor will remove the staples or stitches 10 days to 3 weeks after the surgery and replace them with strips of tape. Leave the strips on for a week or until they fall off. Exercise  ? · Your rehab program will include a number of exercises to do. Always do them as your therapist tells you. Ice and elevation  ? · For pain, put ice or a cold pack on the area for 10 to 20 minutes at a time. Put a thin cloth between the ice and your skin. ? · Your ankle may swell for about 3 months. Prop up your ankle when you ice it or anytime you sit or lie down. Try to keep it above the level of your heart. This will help reduce swelling. Other instructions  ? Continue to wear your support stockings as your doctor says. These help to prevent blood clots. The length of time that you will have to wear them depends on your activity level and the amount of swelling you have. Most people wear these stockings for 4 to 6 weeks after surgery. ?Preventing falls is also very important. To prevent falls:  ? · Arrange furniture so that you will not trip on it. ? · Get rid of throw rugs, and move electrical cords out of the way. ? · Walk only in areas with plenty of light. ? · Put grab bars in showers and bathtubs. ? · Avoid icy or snowy sidewalks. ? · Wear shoes with sturdy, flat soles. Follow-up care is a key part of your treatment and safety. Be sure to make and go to all appointments, and call your doctor if you are having problems.  It's also a good idea to know your test results and keep a list of the medicines you take. When should you call for help? Call 911 anytime you think you may need emergency care. For example, call if:  ? · You passed out (lost consciousness). ? · You have severe trouble breathing. ? · You have sudden chest pain and shortness of breath, or you cough up blood. ?Call your doctor now or seek immediate medical care if:  ? · You have signs that your hip may be dislocated, including:  ¨ Severe pain and not being able to stand. ¨ A crooked leg that looks like your hip is out of position. ¨ Not being able to bend or straighten your leg. ? · Your leg or foot is cool or pale or changes color. ? · You cannot feel or move your leg. ? · You have signs of a blood clot, such as:  ¨ Pain in your calf, back of the knee, thigh, or groin. ¨ Redness and swelling in your leg or groin. ? · Your incision comes open and begins to bleed, or the bleeding increases. ? · You feel like your heart is racing or beating irregularly. ? · You have signs of infection, such as:  ¨ Increased pain, swelling, warmth, or redness. ¨ Red streaks leading from the incision. ¨ Pus draining from the incision. ¨ A fever. ? Watch closely for changes in your health, and be sure to contact your doctor if:  ? · You do not have a bowel movement after taking a laxative. ? · You do not get better as expected. Where can you learn more? Go to http://clementina-shaista.info/. Enter M935 in the search box to learn more about \"Hip Replacement Surgery (Posterior): What to Expect at Home. \"  Current as of: March 21, 2017  Content Version: 11.4  © 2837-0276 [employer]. Care instructions adapted under license by [employer] (which disclaims liability or warranty for this information).  If you have questions about a medical condition or this instruction, always ask your healthcare professional. [facility] [address] any warranty or liability for your use of this information. These are general instructions for a healthy lifestyle:    No smoking/ No tobacco products/ Avoid exposure to second hand smoke    Surgeon General's Warning:  Quitting smoking now greatly reduces serious risk to your health. Obesity, smoking, and sedentary lifestyle greatly increases your risk for illness    A healthy diet, regular physical exercise & weight monitoring are important for maintaining a healthy lifestyle    You may be retaining fluid if you have a history of heart failure or if you experience any of the following symptoms:  Weight gain of 3 pounds or more overnight or 5 pounds in a week, increased swelling in our hands or feet or shortness of breath while lying flat in bed. Please call your doctor as soon as you notice any of these symptoms; do not wait until your next office visit. Recognize signs and symptoms of STROKE:    F-face looks uneven    A-arms unable to move or move even    S-speech slurred or non-existent    T-time-call 911 as soon as signs and symptoms begin-DO NOT go       Back to bed or wait to see if you get better-TIME IS BRAIN. The discharge information has been reviewed with the patient. The patient verbalized understanding. Information obtained by :  I understand that if any problems occur once I am at home I am to contact my physician. I understand and acknowledge receipt of the instructions indicated above.                                                                                                                                            Physician's or R.N.'s Signature                                                                  Date/Time                                                                                                                                              Patient or Representative Signature                                                          Date/Time 98

## 2023-10-09 NOTE — PROGRESS NOTES
Past Medical History:   Diagnosis Date    Acquired spondylolisthesis 6/16/2015    Actinic keratosis 8/9/2016    Arthritis     generalized OA, managed with OTC medication     Back pain 8/9/2016    Basal cell carcinoma dx 2/2018    small pea-sized lesion on right side of nose    BPH (benign prostatic hypertrophy) 8/9/2016    Cardiomyopathy ([address]) 08/02/2016    Followed by Dr. Noam Enriquez at [facility]     Chronic obstructive pulmonary disease [facility])     managed with inhalers-Followed by PCP, Dr. Jena Hopkins     Chronic renal insufficiency 08/09/2016    patient denies, states Creatinine elevated one time. Controlled diabetes mellitus type II without complication ([address]) 02/13/2089    Patient denies-states BS was elevated one time-patient does not take any medications for diabetes     COPD exacerbation ([address]) 8/9/2016    COPD with emphysema (HCC)     Coronary artery disease involving native coronary artery of native heart without angina pectoris 8/2/2016    Dermatitis, seborrheic 8/9/2016    HARGROVE (dyspnea on exertion) 05/31/2016    due to COPD     Dyspnea     ED (erectile dysfunction) 8/9/2016    Elevated PSA 8/9/2016    Encounter for long-term (current) use of medications 8/9/2016    Enlarged prostate     managed with medication     Fatigue 8/9/2016    Finger swelling 8/9/2016    H/O echocardiogram 08/04/2020    LVEF 35-40%    Hiatal hernia     History of kidney stones     several with surgical interventions    Hyperlipidemia     Hypertension     managed with medication     Hypophonia with hoarseness 8/8/2016    Kidney problem     Kidney stone 06/2015    states he presently has 3 kidney stones monitored by Urology    Knee effusion 8/9/2016    Low vitamin B12 level 8/9/2016    Neurogenic bladder 8/9/2016    Obesity (BMI 30-39. 9)     BMI 34.5    Osteoarthritis 8/9/2016    Parkinson disease     Followed by Dr. Ruiz Levine with medication     Presence of combination internal cardiac defibrillator (ICD) and

## 2023-10-10 ENCOUNTER — OFFICE VISIT (OUTPATIENT)
Dept: NEUROLOGY | Age: 78
End: 2023-10-10
Payer: MEDICARE

## 2023-10-10 VITALS
HEART RATE: 76 BPM | SYSTOLIC BLOOD PRESSURE: 111 MMHG | OXYGEN SATURATION: 96 % | WEIGHT: 165 LBS | DIASTOLIC BLOOD PRESSURE: 70 MMHG | BODY MASS INDEX: 23.01 KG/M2

## 2023-10-10 DIAGNOSIS — R29.3 ABNORMAL POSTURE: ICD-10-CM

## 2023-10-10 DIAGNOSIS — R26.89 MULTIFACTORIAL GAIT DISORDER: ICD-10-CM

## 2023-10-10 DIAGNOSIS — G47.52 RBD (REM BEHAVIORAL DISORDER): ICD-10-CM

## 2023-10-10 DIAGNOSIS — G20 PSYCHOSIS DUE TO PARKINSON'S DISEASE: ICD-10-CM

## 2023-10-10 DIAGNOSIS — R53.1 GENERALIZED WEAKNESS: ICD-10-CM

## 2023-10-10 DIAGNOSIS — R29.3 POSTURAL IMBALANCE: ICD-10-CM

## 2023-10-10 DIAGNOSIS — G20.B2 PARKINSON'S DISEASE WITH DYSKINESIA AND FLUCTUATING MANIFESTATIONS: Primary | ICD-10-CM

## 2023-10-10 DIAGNOSIS — R49.8 HYPOPHONIA: ICD-10-CM

## 2023-10-10 DIAGNOSIS — R41.89 COGNITIVE CHANGES: ICD-10-CM

## 2023-10-10 DIAGNOSIS — R25.1 TREMOR: ICD-10-CM

## 2023-10-10 PROCEDURE — G8484 FLU IMMUNIZE NO ADMIN: HCPCS | Performed by: PSYCHIATRY & NEUROLOGY

## 2023-10-10 PROCEDURE — 3074F SYST BP LT 130 MM HG: CPT | Performed by: PSYCHIATRY & NEUROLOGY

## 2023-10-10 PROCEDURE — 1123F ACP DISCUSS/DSCN MKR DOCD: CPT | Performed by: PSYCHIATRY & NEUROLOGY

## 2023-10-10 PROCEDURE — 99215 OFFICE O/P EST HI 40 MIN: CPT | Performed by: PSYCHIATRY & NEUROLOGY

## 2023-10-10 PROCEDURE — G8427 DOCREV CUR MEDS BY ELIG CLIN: HCPCS | Performed by: PSYCHIATRY & NEUROLOGY

## 2023-10-10 PROCEDURE — 3078F DIAST BP <80 MM HG: CPT | Performed by: PSYCHIATRY & NEUROLOGY

## 2023-10-10 PROCEDURE — 1036F TOBACCO NON-USER: CPT | Performed by: PSYCHIATRY & NEUROLOGY

## 2023-10-10 PROCEDURE — G8420 CALC BMI NORM PARAMETERS: HCPCS | Performed by: PSYCHIATRY & NEUROLOGY

## 2023-10-10 RX ORDER — MELOXICAM 15 MG/1
TABLET ORAL
COMMUNITY
Start: 2023-07-13

## 2023-10-10 RX ORDER — PIMAVANSERIN TARTRATE 10 MG/1
10 TABLET, COATED ORAL DAILY
Qty: 90 TABLET | Refills: 3 | Status: SHIPPED | OUTPATIENT
Start: 2023-10-10

## 2023-10-10 RX ORDER — PIMAVANSERIN TARTRATE 10 MG/1
10 TABLET, COATED ORAL DAILY
Qty: 90 TABLET | Refills: 3 | Status: SHIPPED | OUTPATIENT
Start: 2023-10-10 | End: 2023-10-10 | Stop reason: DRUGHIGH

## 2023-10-10 ASSESSMENT — ENCOUNTER SYMPTOMS
CONSTIPATION: 1
BACK PAIN: 1

## 2023-10-10 ASSESSMENT — PATIENT HEALTH QUESTIONNAIRE - PHQ9
SUM OF ALL RESPONSES TO PHQ9 QUESTIONS 1 & 2: 0
DEPRESSION UNABLE TO ASSESS: FUNCTIONAL CAPACITY MOTIVATION LIMITS ACCURACY
1. LITTLE INTEREST OR PLEASURE IN DOING THINGS: 0
SUM OF ALL RESPONSES TO PHQ QUESTIONS 1-9: 0
2. FEELING DOWN, DEPRESSED OR HOPELESS: 0

## 2023-10-27 DIAGNOSIS — G20.A1 PARKINSON'S DISEASE: ICD-10-CM

## 2023-10-27 RX ORDER — LEVODOPA AND CARBIDOPA 195; 48.75 MG/1; MG/1
CAPSULE, EXTENDED RELEASE ORAL
Qty: 360 CAPSULE | Refills: 3 | Status: SHIPPED | OUTPATIENT
Start: 2023-10-27

## 2023-12-21 PROCEDURE — 93295 DEV INTERROG REMOTE 1/2/MLT: CPT | Performed by: INTERNAL MEDICINE

## 2023-12-21 PROCEDURE — 93296 REM INTERROG EVL PM/IDS: CPT | Performed by: INTERNAL MEDICINE

## 2024-01-11 NOTE — PROGRESS NOTES
08/11/22  Lavon Irving. Chief Complaint:  Chief Complaint   Patient presents with    Neurologic Problem     3 month follow up Parkinson's       Parkinson's Disease Diagnosed:   diagnosed in 2015 with tremors at onset. HPI:   Lavon Irving., 77 y. o.,male here in clinic follow up for continued management of Parkinson's Disease. Recovering from right knee replacement. He has had a lot more falls, about once a week due to balance issues. He is having slowness and stiffness around 3pm. The mornings are good. RBD is rare now. Last visit we stopped sinemet and started Rytary. He feels that he is taking a lot of naps. He is seeing people in his yard and on his porch. This happens in the day. He is describing more visual illusions. He feels that it started about 2-3 months ago. It is not as frequent now as when it started. Having a couple of these a week. Memory is \"slipping\" and speech is getting worse. All this is better in the mornings. Even walks without his canes. The dyskinesia is about the same. Current Neuro related meds:  clonazePAM (KlonoPIN) 0.5 mg tablet, QHS  Rytary 245 mg 2 caps QID at 8 am, 12 pm, 4 pm and 8 pm  Rytary 195 mg 1 cap QID at 8 am, 12 pm, 4 pm and 8 pm   Sinemet ER 50/ 200 mg 2 tabs QHS      Review of Systems:    Review of Systems   Constitutional: Negative. HENT: Negative. Eyes: Negative. Respiratory: Negative. Cardiovascular: Negative. Endocrine: Negative. Genitourinary: Negative. Musculoskeletal: Negative. Skin: Negative. Allergic/Immunologic: Negative. Neurological: Negative. Hematological: Negative. Psychiatric/Behavioral: Negative. Patient denies:  dizziness or light headedness,  drooling or swallowing issues  constipation,   hallucinations/ visual illusions or impulse control disorder   recent falls.    RBD     RLS    Past Medical History:   Diagnosis Date    Acquired spondylolisthesis 6/16/2015    [facility] Pt educated on the need for urine specimen. Will follow up.    keratosis 8/9/2016    Arthritis     generalized OA, managed with OTC medication     Back pain 8/9/2016    Basal cell carcinoma dx 2/2018    small pea-sized lesion on right side of nose    BPH (benign prostatic hypertrophy) 8/9/2016    Cardiomyopathy ([facility] 08/02/2016    Followed by Dr. Cj Logan at [address] [facility]     Chronic obstructive pulmonary disease [facility])     managed with inhalers-Followed by PCP, Dr. Cherri Harvey     Chronic renal insufficiency 08/09/2016    patient denies, states Creatinine elevated one time. Controlled diabetes mellitus type II without complication ([facility] [address] 49/82/0485    Patient denies-states BS was elevated one time-patient does not take any medications for diabetes     COPD exacerbation ([facility] 8/9/2016    COPD with emphysema (HCC)     Coronary artery disease involving native coronary artery of native heart without angina pectoris 8/2/2016    Dermatitis, seborrheic 8/9/2016    HARGROVE (dyspnea on exertion) 05/31/2016    due to COPD     Dyspnea     ED (erectile dysfunction) 8/9/2016    Elevated PSA 8/9/2016    Encounter for long-term (current) use of medications 8/9/2016    Enlarged prostate     managed with medication     Fatigue 8/9/2016    Finger swelling 8/9/2016    H/O echocardiogram 08/04/2020    LVEF 35-40%    Hiatal hernia     History of kidney stones     several with surgical interventions    Hyperlipidemia     Hypertension     managed with medication     Hypophonia with hoarseness 8/8/2016    Kidney problem     Kidney stone 06/2015    states he presently has 3 kidney stones monitored by Urology    Knee effusion 8/9/2016    Low vitamin B12 level 8/9/2016    Neurogenic bladder 8/9/2016    Obesity (BMI 30-39. 9)     BMI 34.5    Osteoarthritis 8/9/2016    Parkinson disease ([facility] [address]     Followed by Dr. Niesha Rios with medication     Presence of combination internal cardiac defibrillator (ICD) and pacemaker     Pt states he has never had a shock as of 12/21/20    Primary osteoarthritis of knee 8/9/2016    Prostatitis 8/9/2016    Pseudogout of hand 8/9/2016    Rosacea 8/9/2016    S/P placement of cardiac pacemaker 10/31/2016    Sciatica 8/9/2016    Seborrhea 8/9/2016    Sleep apnea     Does not use c-pap. C-pap broken.      Slurred speech 08/09/2016    per patient \"mostly in the evenings\" from parkinsons     SOB (shortness of breath) 08/09/2016    With exertion only     Spinal stenosis, lumbar region, with neurogenic claudication 6/16/2015    Syncope, near 8/9/2016    Tremor 8/8/2016    Urinary frequency 08/09/2016    controlled with medication     Urinary retention 8/9/2016    Urinary urgency 8/9/2016    Wears dentures     upper and lower    Wears glasses         Past Surgical History:   Procedure Laterality Date    ANTERIOR CRUCIATE LIGAMENT REPAIR Right 1991    APPENDECTOMY      age 22    CYSTOSTOMY      KNEE ARTHROSCOPY Right     LITHOTRIPSY  1998    x 2    LUMBAR LAMINECTOMY  06/2015    Laminectomy with fusion; lower back; with pins    PACEMAKER  10/31/2016    ICD/pacemaker    TOTAL HIP ARTHROPLASTY Left 2018    TOTAL KNEE ARTHROPLASTY Left 01/2021    UROLOGICAL SURGERY Right     open removal of kidney stones       Family History   Problem Relation Age of Onset    Diabetes Son     Heart Attack Son 45    Other Paternal Grandmother         Aneurysm    Heart Disease Paternal Uncle     Heart Disease Mother     Lung Disease Mother     Thyroid Disease Sister     Stroke Father     Heart Attack Father 52    Heart Disease Father     Hypertension Other     Diabetes Other         paternal uncle    Heart Attack Other 45        son    COPD Mother        Social History     Socioeconomic History    Marital status:      Spouse name: Not on file    Number of children: Not on file    Years of education: Not on file    Highest education level: Not on file   Occupational History    Not on file   Tobacco Use    Smoking status: Former     Packs/day: 1.50     Types: Cigarettes     Quit date: 7/12/2007     Years since quitting: 15.0    Smokeless tobacco: Never    Tobacco comments:     Quit smoking: stopped 2007   Substance and Sexual Activity    Alcohol use: No    Drug use: Not Currently    Sexual activity: Not on file   Other Topics Concern    Not on file   Social History Narrative    Not on file     Social Determinants of Health     Financial Resource Strain: Not on file   Food Insecurity: Not on file   Transportation Needs: Not on file   Physical Activity: Not on file   Stress: Not on file   Social Connections: Not on file   Intimate Partner Violence: Not on file   Housing Stability: Not on file       Current Outpatient Medications on File Prior to Visit   Medication Sig Dispense Refill    sacubitril-valsartan (ENTRESTO) 24-26 MG per tablet Take 1 tablet by mouth in the morning and 1 tablet before bedtime. 180 tablet 3    carvedilol (COREG) 6.25 MG tablet Take 1 tablet by mouth in the morning and 1 tablet in the evening. Take with meals.  180 tablet 3    carbidopa-levodopa (RYTARY) 61. MG CPCR per extended release capsule Take 2 capsules at 8am, 12pm, 4pm and 8pm 720 capsule 3    Carbidopa-Levodopa ER (RYTARY) 48. MG CPCR Take 1 capsule at 8am, 12pm, 4pm and 8pm 360 capsule 3    carbidopa-levodopa (SINEMET CR)  MG per extended release tablet TAKE 2 TABLETS AT BEDTIME 180 tablet 3    MAGNESIUM PO Take by mouth daily      acetaminophen (TYLENOL) 500 MG tablet Take 1,000 mg by mouth every 6 hours      albuterol sulfate  (90 Base) MCG/ACT inhaler Inhale 2 puffs into the lungs every 4 hours as needed      allopurinol (ZYLOPRIM) 100 MG tablet 100 mg as needed      aspirin 81 MG EC tablet Take 81 mg by mouth 2 times daily      clonazePAM (KLONOPIN) 0.5 MG tablet 1 tab QHS      clotrimazole-betamethasone (LOTRISONE) 1-0.05 % cream Apply topically 2 times daily as needed      cyclobenzaprine (FLEXERIL) 5 MG tablet Take 5 mg by mouth 3 times daily as needed      doxycycline hyclate (VIBRA-TABS) 100 MG tablet Take 100 mg by mouth daily      fluticasone-salmeterol (ADVAIR) 250-50 MCG/DOSE AEPB Inhale 1 puff into the lungs daily      meloxicam (MOBIC) 15 MG tablet Take 15 mg by mouth daily      mirabegron (MYRBETRIQ) 25 MG TB24 Take 25 mg by mouth daily      omeprazole (PRILOSEC) 40 MG delayed release capsule Take 40 mg by mouth daily      ondansetron (ZOFRAN-ODT) 8 MG TBDP disintegrating tablet Take 8 mg by mouth every 8 hours as needed      polyethylene glycol (GLYCOLAX) 17 GM/SCOOP powder Take 17 g by mouth daily      senna-docusate (PERICOLACE) 8.6-50 MG per tablet Take 1 tablet by mouth daily      tamsulosin (FLOMAX) 0.4 MG capsule Take 0.4 mg by mouth daily      tiotropium (SPIRIVA) 18 MCG inhalation capsule Inhale 18 mcg into the lungs       No current facility-administered medications on file prior to visit. Allergies   Allergen Reactions    Promethazine Other (See Comments)     Confusion    Oxycodone Nausea And Vomiting           Physical Examination    Vitals:    08/11/22 1522 08/11/22 1528   BP: 129/72 124/64   Site: Left Upper Arm Left Upper Arm   Position: Sitting Standing   Pulse: 76 78   Weight: 204 lb (92.5 kg)    Height: 6' (1.829 m)          General: No acute distress  Psychiatric: well oriented, normal mood and affect  Cardiovascular: no peripheral edema, no JVD, no carotid bruits, RRR  Pulmonary: CTAB  Skin: No rashes, lesions or ulcers  Ext: no C/C/E      Neurologic Exam  Patient oriented to Person, Place and Time. Language and fund of knowledge intact. CN:  Extraocular movements are intact,facial sensation  Intact and strength is intact, , palate elevates normally, tongue protrudes midline, shoulder shrug is normal.    Motor:RUE 5/5, RLE 5/5, LUE 5/5, LLE 5/5 ,  normal bulk and tone    Reflexes: Patellar reflexes are +2 , Achilles reflexes are 1+ , toes are downgoing.     Sensation: Normal  light touch, temperature and vibration throughout     Cerebellar: FTN, HTS intact    Motor Examination: Last dose of Rytary was 3 hrs 50 mins ago. Voice tremor       Chin tremor         RIGHT LEFT   Tremor at rest 0 0   Postural Tremor of hands 0 0   Action Tremor of hands 1 1   Tremor of Lower extremity 0 0   Open/Close 0 0   Rapid Alternating Movements of Hands 2 2   Finger Taps 1 1   Rigidity - Upper extremity 1 1   Rigidity- Lower extremity 1 1   Foot tapping/LE agility 2 2         Hypophonia 3   Hypomimia 3   Arising from Chair        Assessment/Plan:   Diagnosis Orders   1. Parkinson's disease ([facility] [address]        2. Tremor        3. RBD (REM behavioral disorder)        4. Constipation by delayed colonic transit        5. Neurogenic orthostatic hypotension (HCC)        6. Encounter for long-term (current) use of high-risk medication        7. Hypophonia        8. Dysphagia, neurologic            Tremor predominant PD H&Y stage 3 with wearing off by 3-4pm. So I will add sinemet /200mg 1 tab at 12pm and may need to add one at 4pm.  His balance is affected by this as well and he has falls in the afternoons/evenings. So I will see how the added dose will do to help with balance as well. If not better than he will benefit from PT. If he forgets his dose and is too far worn off then he can use sinemet 25/100mg to help with a faster onset. I asked that he add mucinex at bedtime for his throat clearing in the AM.   PD related psychosis, I will start seroquel 12.5mg Qhs and this should help with his sleep as well. I have spent greater than 50% of the patient's 60  minute visit  in counseling for importance of exercise,  medications and education of disease and disease progression. Patient is to continue all other medications as directed by prescribing physicians unless addressed above in plan. Continuation of these medications from today's visit are made based on the patient's report of current medications.      Rg Harry MD  [facility]  Director Movement Disorders Program  [facility]. [address]  [place], [address]  Phone: [phone]  Fax: [phone]

## 2024-03-21 PROCEDURE — 93295 DEV INTERROG REMOTE 1/2/MLT: CPT | Performed by: INTERNAL MEDICINE

## 2024-03-21 PROCEDURE — 93296 REM INTERROG EVL PM/IDS: CPT | Performed by: INTERNAL MEDICINE

## 2024-06-21 PROCEDURE — 93296 REM INTERROG EVL PM/IDS: CPT | Performed by: INTERNAL MEDICINE

## 2024-06-21 PROCEDURE — 93295 DEV INTERROG REMOTE 1/2/MLT: CPT | Performed by: INTERNAL MEDICINE

## 2024-09-20 PROCEDURE — 93295 DEV INTERROG REMOTE 1/2/MLT: CPT | Performed by: INTERNAL MEDICINE

## 2024-09-20 PROCEDURE — 93296 REM INTERROG EVL PM/IDS: CPT | Performed by: INTERNAL MEDICINE

## (undated) DEVICE — SOLUTION IRRIG 3000ML 0.9% SOD CHL FLX CONT 0797208] ICU MEDICAL INC]

## (undated) DEVICE — Z DISCONTINUED USE 2423037 APPLICATOR MEDICATED 3 CC CHLORHEXIDINE GLUC 2% CHLORAPREP

## (undated) DEVICE — NEEDLE HYPO 21GA L1.5IN INTRAMUSCULAR S STL LATCH BVL UP

## (undated) DEVICE — Z DISCONTINUED PER MEDLINE USE 2741944 DRESSING AQUACEL 12 IN SURG W9XL30CM SIL CVR WTRPRF VIR BACT BARR ANTIMIC

## (undated) DEVICE — DRAPE,HIP,W/POUCHES,STERILE: Brand: MEDLINE

## (undated) DEVICE — SUTURE VCRL SZ 1 L27IN ABSRB UD L36MM CP-1 1/2 CIR REV CUT J268H

## (undated) DEVICE — SYR 10ML LUER LOK 1/5ML GRAD --

## (undated) DEVICE — STOCKINETTE IMPERV 12X48IN STE -- MEDICHOICE

## (undated) DEVICE — BANDAGE COBAN 4 IN COMPR W4INXL5YD FOAM COHESIVE QUIK STK SELF ADH SFT

## (undated) DEVICE — SOLUTION IV 1000ML 0.9% SOD CHL

## (undated) DEVICE — SYR 50ML LR LCK 1ML GRAD NSAF --

## (undated) DEVICE — NEEDLE HYPO 18GA L1.5IN PNK S STL HUB POLYPR SHLD REG BVL

## (undated) DEVICE — CORD RETRCT SIL

## (undated) DEVICE — STRYKER PERFORMANCE SERIES SAGITTAL BLADE: Brand: STRYKER PERFORMANCE SERIES

## (undated) DEVICE — CURETTE BNE CEM 10IN DISP --

## (undated) DEVICE — T4 HOOD

## (undated) DEVICE — BIPOLAR SEALER 23-313-1 AQM 9.5XL: Brand: AQUAMANTYS ®

## (undated) DEVICE — REM POLYHESIVE ADULT PATIENT RETURN ELECTRODE: Brand: VALLEYLAB

## (undated) DEVICE — TRAY PREP DRY W/ PREM GLV 2 APPL 6 SPNG 2 UNDPD 1 OVERWRAP

## (undated) DEVICE — SYR LR LCK 1ML GRAD NSAF 30ML --

## (undated) DEVICE — DRAPE SHT 3 QTR PROXIMA 53X77 --

## (undated) DEVICE — SUTURE STRATAFIX SPRL SZ 1 L14IN ABSRB VLT L48CM CTX 1/2 SXPD2B405

## (undated) DEVICE — SPONGE LAP 18X18IN STRL -- 5/PK

## (undated) DEVICE — DRAPE,TOP,102X53,STERILE: Brand: MEDLINE

## (undated) DEVICE — SUTURE FIBERWIRE SZ 2 W/ TAPERED NEEDLE BLUE L38IN NONABSORB BLU L26.5MM 1/2 CIRCLE AR7200

## (undated) DEVICE — KIT INT FIX FEM TIB CKPT MAKOPLASTY

## (undated) DEVICE — SOLUTION IV 250ML 0.9% SOD CHL CLR INJ FLX BG CONT PRT CLSR

## (undated) DEVICE — SUTURE STRATAFIX SYMMETRIC PDS + SZ 2-0 L18IN ABSRB VLT SXPP1A403

## (undated) DEVICE — HANDPIECE SET WITH COAXIAL HIGH FLOW TIP AND SUCTION TUBE: Brand: INTERPULSE

## (undated) DEVICE — STOCKINETTE TUBE 6X48 -- MEDICHOICE

## (undated) DEVICE — DRAPE,U/SHT,SPLIT,FILM,60X84,STERILE: Brand: MEDLINE

## (undated) DEVICE — GUIDEPIN ORTHOPEDIC NAVIGATION 4X140 MM 2P SCREW STRL

## (undated) DEVICE — 3000CC GUARDIAN II: Brand: GUARDIAN

## (undated) DEVICE — BUTTON SWITCH PENCIL BLADE ELECTRODE, HOLSTER: Brand: EDGE

## (undated) DEVICE — PIN BNE 4X110MM STRL -- 2/PK MAKO

## (undated) DEVICE — BIPOLAR SEALER 23-112-1 AQM 6.0: Brand: AQUAMANTYS ®

## (undated) DEVICE — (D)PREP SKN CHLRAPRP APPL 26ML -- CONVERT TO ITEM 371833

## (undated) DEVICE — Device: Brand: STABLECUT®

## (undated) DEVICE — SUTURE PDS II SZ 1 L36IN ABSRB VLT L48MM CTX 1/2 CIR Z371T

## (undated) DEVICE — BLADE SURG SAW STD S STL OSC W/ SERR EDGE DISP

## (undated) DEVICE — 2000CC GUARDIAN II: Brand: GUARDIAN

## (undated) DEVICE — AMD ANTIMICROBIAL NON-ADHERENT PAD,0.2% POLYHEXAMETHYLENE BIGUANIDE HCI (PHMB): Brand: TELFA

## (undated) DEVICE — PAD,NON-ADHERENT,3X8,STERILE,LF,1/PK: Brand: MEDLINE

## (undated) DEVICE — SUTURE ETHBND EXCEL SZ 5 L30IN NONABSORBABLE GRN L40MM V-37 MB66G

## (undated) DEVICE — 3M™ STERI-DRAPE™ INSTRUMENT POUCH 1018: Brand: STERI-DRAPE™

## (undated) DEVICE — SUTURE PDS II SZ 1 L54IN ABSRB VLT L65MM TP-1 1/2 CIR Z879G

## (undated) DEVICE — PIN BNE FIX 4X140MM STRL -- 1EA=2PK MAKO

## (undated) DEVICE — SOLUTION IRRIG 1000ML H2O STRL BLT

## (undated) DEVICE — MEDI-VAC YANKAUER SUCTION HANDLE W/BULBOUS TIP: Brand: CARDINAL HEALTH

## (undated) DEVICE — GOWN,AURORA,FABRIC-REINFORCED,2XL: Brand: MEDLINE

## (undated) DEVICE — KIT DRP FOR RIO ROBOTIC ARM ASST SYS

## (undated) DEVICE — BASIC SINGLE BASIN BTC-LF: Brand: MEDLINE INDUSTRIES, INC.

## (undated) DEVICE — SUTURE STRATAFIX SPRL SZ 1 L5IN ABSRB VLT CT-1 L36MM 1/2 SXPD2B401

## (undated) DEVICE — TANK YANK SHORT STRAIGHT

## (undated) DEVICE — TOTAL KNEE DR KAVOLUS: Brand: MEDLINE INDUSTRIES, INC.

## (undated) DEVICE — STERILE PRESSURE PROTECTOR PAD® FOR DE MAYO UNIVERSAL DISTRACTOR® (10/CASE): Brand: DE MAYO UNIVERSAL DISTRACTOR®

## (undated) DEVICE — NEEDLE HYPO 21GA L1.5IN GRN POLYPR HUB S STL REG BVL STR

## (undated) DEVICE — SYSTEM SKIN CLSR 22CM DERMBND PRINEO

## (undated) DEVICE — Device

## (undated) DEVICE — FAN SPRAY KIT: Brand: PULSAVAC®

## (undated) DEVICE — KIT PROC KNE TRACKING PK/1 -- VIZADISC MAKO

## (undated) DEVICE — SURGICAL PROCEDURE PACK TOT HIP CDS

## (undated) DEVICE — SUTURE MCRYL SZ 2-0 L27IN ABSRB UD CP-1 1 L36MM 1/2 CIR REV Y266H

## (undated) DEVICE — HOOD: Brand: FLYTE

## (undated) DEVICE — UTILITY MARKER,BLACK WITH LABELS: Brand: DEVON

## (undated) DEVICE — SYR 10ML CTRL LR LCK NSAF LF --

## (undated) DEVICE — TRAY CATH 16F DRN BG LTX -- CONVERT TO ITEM 363158

## (undated) DEVICE — 3M™ STERI-DRAPE™ INCISE DRAPE, XL 1051: Brand: STERI-DRAPE™

## (undated) DEVICE — 450 ML BOTTLE OF 0.05% CHLORHEXIDINE GLUCONATE IN 99.95% STERILE WATER FOR IRRIGATION, USP AND APPLICATOR.: Brand: IRRISEPT ANTIMICROBIAL WOUND LAVAGE

## (undated) DEVICE — JELLY LUBRICATING 10GM PREFIL SYR LUBE

## (undated) DEVICE — GOWN,REINFORCED,POLY,AURORA,XXLARGE,STR: Brand: MEDLINE

## (undated) DEVICE — TOTAL KNEE DR WATSON: Brand: MEDLINE INDUSTRIES, INC.

## (undated) DEVICE — CONTAINER,SPECIMEN,O.R.STRL,4.5OZ: Brand: MEDLINE

## (undated) DEVICE — PREP CHLORAPREP 10.5 ML ORG --

## (undated) DEVICE — GUIDEPIN ORTHOPEDIC NAVIGATION 4X110 MM 2P SCREW STRL

## (undated) DEVICE — HEWSON SUTURE RETRIEVER: Brand: HEWSON SUTURE RETRIEVER

## (undated) DEVICE — SUTURE PDS II SZ 1 L96IN ABSRB VLT TP-1 L65MM 1/2 CIR Z880G